# Patient Record
Sex: FEMALE | Race: WHITE | Employment: OTHER | URBAN - METROPOLITAN AREA
[De-identification: names, ages, dates, MRNs, and addresses within clinical notes are randomized per-mention and may not be internally consistent; named-entity substitution may affect disease eponyms.]

---

## 2017-01-31 RX ORDER — ATORVASTATIN CALCIUM 40 MG/1
TABLET, FILM COATED ORAL
Qty: 90 TAB | Refills: 3 | Status: SHIPPED | OUTPATIENT
Start: 2017-01-31 | End: 2018-01-19 | Stop reason: SDUPTHER

## 2017-04-21 ENCOUNTER — HOSPITAL ENCOUNTER (OUTPATIENT)
Dept: LAB | Age: 72
Discharge: HOME | End: 2017-04-21
Payer: MEDICARE

## 2017-04-21 ENCOUNTER — OFFICE VISIT (OUTPATIENT)
Dept: INTERNAL MEDICINE CLINIC | Age: 72
End: 2017-04-21

## 2017-04-21 VITALS
HEART RATE: 77 BPM | BODY MASS INDEX: 29.98 KG/M2 | HEIGHT: 67 IN | OXYGEN SATURATION: 95 % | DIASTOLIC BLOOD PRESSURE: 61 MMHG | WEIGHT: 191 LBS | RESPIRATION RATE: 14 BRPM | TEMPERATURE: 98.8 F | SYSTOLIC BLOOD PRESSURE: 120 MMHG

## 2017-04-21 DIAGNOSIS — Z12.11 COLON CANCER SCREENING: ICD-10-CM

## 2017-04-21 DIAGNOSIS — Z00.00 MEDICARE ANNUAL WELLNESS VISIT, SUBSEQUENT: Primary | ICD-10-CM

## 2017-04-21 DIAGNOSIS — E11.42 DIABETIC PERIPHERAL NEUROPATHY ASSOCIATED WITH TYPE 2 DIABETES MELLITUS (HCC): ICD-10-CM

## 2017-04-21 DIAGNOSIS — E78.00 HYPERCHOLESTEREMIA: ICD-10-CM

## 2017-04-21 DIAGNOSIS — J44.9 COPD, SEVERITY TO BE DETERMINED (HCC): ICD-10-CM

## 2017-04-21 DIAGNOSIS — E11.9 DIABETES MELLITUS TYPE 2, DIET-CONTROLLED (HCC): ICD-10-CM

## 2017-04-21 DIAGNOSIS — I10 HYPERTENSION, ESSENTIAL: ICD-10-CM

## 2017-04-21 DIAGNOSIS — Z71.89 ADVANCE DIRECTIVE DISCUSSED WITH PATIENT: ICD-10-CM

## 2017-04-21 DIAGNOSIS — K21.9 GASTROESOPHAGEAL REFLUX DISEASE WITHOUT ESOPHAGITIS: ICD-10-CM

## 2017-04-21 LAB — HBA1C MFR BLD HPLC: 6.7 % (ref 4.8–5.6)

## 2017-04-21 PROCEDURE — 82043 UR ALBUMIN QUANTITATIVE: CPT

## 2017-04-21 RX ORDER — DULOXETIN HYDROCHLORIDE 30 MG/1
30 CAPSULE, DELAYED RELEASE ORAL DAILY
Qty: 10 CAP | Refills: 0 | Status: SHIPPED | OUTPATIENT
Start: 2017-04-21 | End: 2017-05-10

## 2017-04-21 RX ORDER — AMITRIPTYLINE HYDROCHLORIDE 50 MG/1
50 TABLET, FILM COATED ORAL
Qty: 10 TAB | Refills: 0 | Status: SHIPPED | OUTPATIENT
Start: 2017-04-21 | End: 2017-05-10 | Stop reason: SDUPTHER

## 2017-04-21 RX ORDER — DULOXETIN HYDROCHLORIDE 60 MG/1
60 CAPSULE, DELAYED RELEASE ORAL DAILY
Qty: 30 CAP | Refills: 0 | Status: SHIPPED | OUTPATIENT
Start: 2017-04-21 | End: 2017-05-10

## 2017-04-21 RX ORDER — GUAIFENESIN 100 MG/5ML
81 LIQUID (ML) ORAL DAILY
Qty: 90 TAB | Refills: 3
Start: 2017-04-21 | End: 2022-03-03

## 2017-04-21 NOTE — PROGRESS NOTES
This is a Subsequent Medicare Annual Wellness Visit providing Personalized Prevention Plan Services (PPPS) (Performed 12 months after initial AWV and PPPS )    I have reviewed the patient's medical history in detail and updated the computerized patient record. History     Past Medical History:   Diagnosis Date    Abnormal chest x-ray 12/23/2009    Anemia 12/23/2009    Arrhythmia     SVT. Converts with Adenocard    Atrial fibrillation ([address] 12/23/2009    Carpal tunnel syndrome 12/23/2009    Chronic obstructive pulmonary disease (HCC)     Colitis 12/23/2009    Complex regional pain syndrome 12/23/2009    Depression 12/23/2009    Diabetes ([address]     GERD (gastroesophageal reflux disease) 2/8/2010    HTN (hypertension) 12/23/2009    Hypercholesteremia 12/23/2009    Hypertriglyceridemia 12/23/2009    Idiopathic peripheral neuropathy 12/23/2009    Iron deficiency anemia 12/23/2009    Osteopenia 12/23/2009    Paroxysmal atrial fibrillation ([address] 12/2/2011    Psoriasis 6/27/2012      Past Surgical History:   Procedure Laterality Date    HX CARPAL TUNNEL RELEASE  00/00/2002    Both hands    HX CYST REMOVAL  00/00/1965    Left wrist    HX HYSTERECTOMY  00/00/1985    HX ORTHOPAEDIC  00/00/1987    back surgery (disc)    HX ORTHOPAEDIC      right foot X 3 neuromas and tarsal tunnel release    HX TONSILLECTOMY  00/00/1962    NEUROLOGICAL PROCEDURE UNLISTED      Pain pump    PAIN PUMP DEVICE  00/00/2007    in left lower abdomen (for foot pain)     Current Outpatient Prescriptions   Medication Sig Dispense Refill    aspirin 81 mg chewable tablet Take 1 Tab by mouth daily. 90 Tab 3    omega 3-dha-epa-fish oil (FISH OIL) 100-160-1,000 mg cap Take 1 Cap by mouth daily.  amitriptyline (ELAVIL) 100 mg tablet Take 1 Tab by mouth nightly. Must have made doctor appointment for refill.  30 Tab 0    losartan (COZAAR) 100 mg tablet TAKE 1 TABLET BY MOUTH EVERY DAY 90 Tab 3    atorvastatin (LIPITOR) 40 mg tablet TAKE 1 TABLET BY MOUTH NIGHTLY. 90 Tab 3    metoprolol tartrate (LOPRESSOR) 25 mg tablet TAKE 1 TAB BY MOUTH TWO (2) TIMES A DAY. 180 Tab 3    RESTASIS 0.05 % ophthalmic emulsion   3    gabapentin (NEURONTIN) 800 mg tablet TAKE 1 TABLET BY MOUTH THREE (3) TIMES DAILY. 270 Tab 3    OMEPRAZOLE PO Take 20 mg by mouth daily.  PREVIDENT 5000 SENSITIVE 1.1-5 % pste BRUSH FOR 2 MINS TWICE A DAY  12     Allergies   Allergen Reactions    Duragesic [Fentanyl] Other (comments)     Patient sees things when taking    Codeine Nausea Only     Family History   Problem Relation Age of Onset    Arrhythmia Father     Heart Attack Father     Cancer Brother      Melanoma     Social History   Substance Use Topics    Smoking status: Former Smoker     Packs/day: 0.10     Years: 2.00     Quit date: 1/1/1975    Smokeless tobacco: Never Used    Alcohol use No     Patient Active Problem List   Diagnosis Code    Headache R51    Carpal tunnel syndrome G56.00    Hypercholesteremia E78.00    Depression F32.9    Abnormal chest x-ray R93.8    Complex regional pain syndrome G90.50    Diabetic peripheral neuropathy associated with type 2 diabetes mellitus ([facility] E11.42    Hypertension, essential I10    Diabetes mellitus type 2, diet-controlled ([facility] E11.9    Osteopenia M85.80    Vitamin D deficiency E55.9    GERD (gastroesophageal reflux disease) K21.9    Psoriasis L40.9    Paroxysmal atrial fibrillation (HCC) I48.0    Hepatic steatosis K76.0    COPD, severity to be determined ([facility] J44.9       Depression Risk Factor Screening:   No flowsheet data found. Alcohol Risk Factor Screening: On any occasion during the past 3 months, have you had more than 3 drinks containing alcohol? No    Do you average more than 7 drinks per week? No      Functional Ability and Level of Safety:     Hearing Loss   normal-to-mild    Activities of Daily Living   Self-care.    Requires assistance with: no ADLs    Fall Risk     Fall Risk Assessment, last 12 mths 8/9/2016   Able to walk? Yes   Fall in past 12 months?  No   Fall with injury? -   Number of falls in past 12 months -   Fall Risk Score -     Abuse Screen   Patient is not abused    Review of Systems   Not required    Physical Examination     Evaluation of Cognitive Function:  Mood/affect:  happy  Appearance: age appropriate, casually dressed and within normal Limits  Family member/caregiver input: none  Cognition and memory appear normal.      Patient Care Team:  Emanuel Savage MD as PCP - General (Internal Medicine)  Greta Blackman MD (Physical Medicine and Rehab)  Balbina Edmond MD (Ophthalmology)  Patt Tan MD (Neurology)    Advice/Referrals/Counseling   Education and counseling provided:  End-of-Life planning (with patient's consent)  Colorectal cancer screening tests  Screening for glaucoma      Assessment/Plan   See other note this date

## 2017-04-21 NOTE — LETTER
5/9/2017 10:50 AM 
 
Ms. Maudie [facility] [place] [id number] Dear Maudie Lundborg : 
 
 
Health Maintenance Due Topic Date Due  
 EYE EXAM RETINAL OR DILATED Q1  04/04/2017 FIT TEST is due It has come to our attention you are past due on your above health maintenance topics . If you need a copy of the order(s) or need assistance in rescheduling the test(s), please contact our office. If you have received this notification in error, please accept our apologies and contact our office ([phone]) to notify us.  
 
 
 
 
 
 
 
 
Sincerely, 
 
 
Oswaldo Wong MD

## 2017-04-21 NOTE — PROGRESS NOTES
HISTORY OF PRESENT ILLNESS  Philippe Lewis is a 66 y.o. female. HPI  She presents for follow up of diabetes mellitus with neuropathy, hypertension, hyperlipidemia and Atrial Fibrillation. Diabetic ROS - medication compliance: compliant all of the time,      home glucose monitoring: is not performed,      home BP Monitoring: is not measured at home. further diabetic ROS: no polyuria or polydipsia, no unusual visual symptoms, no hypoglycemia, no medication side effects noted, has dysesthesias in the feet. Diet and Lifestyle: generally follows a low fat low cholesterol diet, generally follows a low sodium diet, follows a diabetic diet regularly, sedentary, nonsmoker  Cardiovascular ROS:  She complains of palpitations, lower extremity edema, dyspnea on exertion. She denies orthopnea, exertional chest pressure/discomfort, claudication, dizziness    She is being seen for follow up of COPD, not currently in exacerbation. taking medications as instructed, no medication side effects noted, no significant ongoing wheezing or shortness of breath, using bronchodilator MDI less than twice a week. Shehas shortness of breath none, only with activity. Uses rescue inhaler:0  times /week      She presents for follow up of gastroesophageal reflux. She is currently treating this with omeprazole 20 mg daily. She denies dysphagia, has not lost weight, denies heartburn.       Patient Active Problem List   Diagnosis Code    Headache R51    Carpal tunnel syndrome G56.00    Hypercholesteremia E78.00    Depression F32.9    Abnormal chest x-ray R93.8    Complex regional pain syndrome G90.50    Diabetic peripheral neuropathy associated with type 2 diabetes mellitus ([facility] E11.42    Hypertension, essential I10    Diabetes mellitus type 2, diet-controlled ([facility] E11.9    Osteopenia M85.80    Vitamin D deficiency E55.9    GERD (gastroesophageal reflux disease) K21.9    Psoriasis L40.9    Paroxysmal atrial fibrillation ([facility] I48.0    Hepatic steatosis K76.0    COPD, severity to be determined ([facility] J44.9     Past Medical History:   Diagnosis Date    Abnormal chest x-ray 12/23/2009    Anemia 12/23/2009    Arrhythmia     SVT.  Converts with Adenocard    Atrial fibrillation ([address] 12/23/2009    Carpal tunnel syndrome 12/23/2009    Chronic obstructive pulmonary disease ([facility]     Colitis 12/23/2009    Complex regional pain syndrome 12/23/2009    Depression 12/23/2009    Diabetes ([address]     GERD (gastroesophageal reflux disease) 2/8/2010    HTN (hypertension) 12/23/2009    Hypercholesteremia 12/23/2009    Hypertriglyceridemia 12/23/2009    Idiopathic peripheral neuropathy 12/23/2009    Iron deficiency anemia 12/23/2009    Osteopenia 12/23/2009    Paroxysmal atrial fibrillation ([facility] 12/2/2011    Psoriasis 6/27/2012     Past Surgical History:   Procedure Laterality Date    HX CARPAL TUNNEL RELEASE  00/00/2002    Both hands    HX CYST REMOVAL  00/00/1965    Left wrist    HX HYSTERECTOMY  00/00/1985    HX ORTHOPAEDIC  00/00/1987    back surgery (disc)    HX ORTHOPAEDIC      right foot X 3 neuromas and tarsal tunnel release    HX TONSILLECTOMY  00/00/1962    NEUROLOGICAL PROCEDURE UNLISTED      Pain pump    PAIN PUMP DEVICE  00/00/2007    in left lower abdomen (for foot pain)     Social History     Social History    Marital status:      Spouse name: N/A    Number of children: N/A    Years of education: N/A     Social History Main Topics    Smoking status: Former Smoker     Packs/day: 0.10     Years: 2.00     Quit date: 1/1/1975    Smokeless tobacco: Never Used    Alcohol use No    Drug use: No    Sexual activity: Not Asked     Other Topics Concern    None     Social History Narrative     Family History   Problem Relation Age of Onset    Arrhythmia Father     Heart Attack Father     Cancer Brother      Melanoma     Allergies   Allergen Reactions    Duragesic [Fentanyl] Other (comments)     Patient sees things when taking    Codeine Nausea Only     Current Outpatient Prescriptions   Medication Sig Dispense Refill    aspirin 81 mg chewable tablet Take 1 Tab by mouth daily. 90 Tab 3    omega 3-dha-epa-fish oil (FISH OIL) 100-160-1,000 mg cap Take 1 Cap by mouth daily.  amitriptyline (ELAVIL) 100 mg tablet Take 1 Tab by mouth nightly. Must have made doctor appointment for refill. 30 Tab 0    losartan (COZAAR) 100 mg tablet TAKE 1 TABLET BY MOUTH EVERY DAY 90 Tab 3    atorvastatin (LIPITOR) 40 mg tablet TAKE 1 TABLET BY MOUTH NIGHTLY. 90 Tab 3    metoprolol tartrate (LOPRESSOR) 25 mg tablet TAKE 1 TAB BY MOUTH TWO (2) TIMES A DAY. 180 Tab 3    RESTASIS 0.05 % ophthalmic emulsion   3    gabapentin (NEURONTIN) 800 mg tablet TAKE 1 TABLET BY MOUTH THREE (3) TIMES DAILY. 270 Tab 3    OMEPRAZOLE PO Take 20 mg by mouth daily. Review of Systems   Constitutional: Positive for malaise/fatigue. Negative for weight loss. Gastrointestinal: Negative for constipation, diarrhea and heartburn. Musculoskeletal: Positive for back pain (low back). Negative for joint pain. Neurological: Negative for dizziness, tingling and focal weakness. Visit Vitals    /61 (BP 1 Location: Left arm, BP Patient Position: Sitting)    Pulse 77    Temp 98.8 °F (37.1 °C) (Oral)    Resp 14    Ht 5' 7\" (1.702 m)    Wt 191 lb (86.6 kg)    SpO2 95%    BMI 29.91 kg/m2     Physical Exam   Constitutional: She is oriented to person, place, and time. She appears well-developed and well-nourished. HENT:   Head: Normocephalic and atraumatic. Eyes: Conjunctivae are normal. Pupils are equal, round, and reactive to light. Neck: Neck supple. Carotid bruit is not present. No thyromegaly present. Cardiovascular: Normal rate, regular rhythm and normal heart sounds. PMI is not displaced. Exam reveals no gallop. No murmur heard.   Pulses:       Dorsalis pedis pulses are 2+ on the right side, and 2+ on the left side. Posterior tibial pulses are 2+ on the right side, and 2+ on the left side. Pulmonary/Chest: Effort normal. She has no wheezes. She has no rhonchi. She has no rales. Abdominal: Soft. Normal appearance. She exhibits no abdominal bruit and no mass. There is no hepatosplenomegaly. There is no tenderness. Musculoskeletal: She exhibits no edema. Lymphadenopathy:     She has no cervical adenopathy. Right: No supraclavicular adenopathy present. Left: No inguinal and no supraclavicular adenopathy present. Neurological: She is alert and oriented to person, place, and time. No sensory deficit. Skin: Skin is warm, dry and intact. No rash noted. Psychiatric: She has a normal mood and affect. Her behavior is normal.   Nursing note and vitals reviewed.   Diabetic foot exam:     Left: Reflexes absent     Vibratory sensation diminished 7 sec   Proprioception normal   Sharp/dull discrimination normal    Filament test 3/6   Pulse DP: 2+ (normal)   Pulse PT: 2+ (normal)   Deformities: None  Right: Reflexes absent   Vibratory sensation diminished  4 sec   Proprioception normal   Sharp/dull discrimination normal   Filament test 2/6   Pulse DP: 2+ (normal)   Pulse PT: 2+ (normal)   Deformities: None    Results for orders placed or performed in visit on 04/21/17   AMB POC HEMOGLOBIN A1C   Result Value Ref Range    Hemoglobin A1c (POC) 6.7 (A) 4.8 - 5.6 %     Lab Results   Component Value Date/Time    Cholesterol, total 112 02/01/2016 11:04 AM    HDL Cholesterol 31 02/01/2016 11:04 AM    LDL, calculated 46 02/01/2016 11:04 AM    VLDL, calculated 35 02/01/2016 11:04 AM    Triglyceride 176 02/01/2016 11:04 AM    CHOL/HDL Ratio 3.3 02/08/2010 01:53 PM     Lab Results   Component Value Date/Time    Sodium 141 09/22/2016 03:44 PM    Potassium 4.6 09/22/2016 03:44 PM    Chloride 96 09/22/2016 03:44 PM    CO2 28 09/22/2016 03:44 PM    Anion gap 8 03/02/2012 03:36 AM    Glucose 110 09/22/2016 03:44 PM BUN 7 09/22/2016 03:44 PM    Creatinine 0.64 09/22/2016 03:44 PM    BUN/Creatinine ratio 11 09/22/2016 03:44 PM    GFR est  09/22/2016 03:44 PM    GFR est non-AA 89 09/22/2016 03:44 PM    Calcium 8.9 09/22/2016 03:44 PM    Bilirubin, total 0.3 09/22/2016 03:44 PM    AST (SGOT) 32 09/22/2016 03:44 PM    Alk. phosphatase 128 09/22/2016 03:44 PM    Protein, total 6.6 09/22/2016 03:44 PM    Albumin 3.9 09/22/2016 03:44 PM    Globulin 4.2 03/02/2012 03:36 AM    A-G Ratio 1.4 09/22/2016 03:44 PM    ALT (SGPT) 31 09/22/2016 03:44 PM         ASSESSMENT and PLAN    ICD-10-CM ICD-9-CM    1. Medicare annual wellness visit, subsequent Z00.00 V70.0    2. Diabetes mellitus type 2, diet-controlled ([facility]) E11.9 250.00    3. Diabetic peripheral neuropathy associated with type 2 diabetes mellitus ([facility]) E11.42 250.60 MICROALBUMIN, UR, RAND W/ MICROALBUMIN/CREA RATIO     357.2 AMB POC HEMOGLOBIN A1C       DIABETES FOOT EXAM   4. Hypercholesteremia E78.00 272.0    5. Hypertension, essential I10 401.9    6. COPD, severity to be determined ([facility] J44.9 496    7. Gastroesophageal reflux disease without esophagitis K21.9 530.81    8. Colon cancer screening Z12.11 V76.51 OCCULT BLOOD, IMMUNOASSAY (FIT)   9. Advance directive discussed with patient Z71.89 V65.49          Medicare annual wellness visit, subsequent    Diabetes mellitus type 2, diet-controlled ([facility]    Diabetic peripheral neuropathy associated with type 2 diabetes mellitus ([facility]  -     amitriptyline (ELAVIL) 50 mg tablet; Take 1 Tab by mouth nightly. -     DULoxetine (CYMBALTA) 30 mg capsule; Take 1 Cap by mouth daily. When completed start duloxetine 60 mg. Indications: DIABETIC PERIPHERAL NEUROPATHY        -     DULoxetine (CYMBALTA) 60 mg capsule; Take 1 Cap by mouth daily.  Indications: DIABETIC PERIPHERAL NEUROPATHY  -     MICROALBUMIN, UR, RAND W/ MICROALBUMIN/CREA RATIO  -     AMB POC HEMOGLOBIN A1C  -     HM DIABETES FOOT EXAM    Hypercholesteremia  Hyperlipidemia is controlled    Hypertension, essential  Hypertension is controlled    COPD, severity to be determined ([facility] [address]  Stable    Gastroesophageal reflux disease without esophagitis    Colon cancer screening  -     OCCULT BLOOD, IMMUNOASSAY (FIT); Future    Advance directive discussed with patient    Other orders  -     aspirin 81 mg chewable tablet; Take 1 Tab by mouth daily. Follow-up Disposition:  Return in about 6 months (around 10/21/2017) for DM, chol, htn, POCA1c  Fasting lab next week. .  lab results and schedule of future lab studies reviewed with patient  reviewed diet, exercise and weight control  cardiovascular risk and specific lipid/LDL goals reviewed  I have discussed the diagnosis with the patient and the intended plan as seen in the above orders. Patient is in agreement. The patient has received an after-visit summary and questions were answered concerning future plans. I have discussed medication side effects and warnings with the patient as well.

## 2017-04-21 NOTE — MR AVS SNAPSHOT
Visit Information Date & Time Provider Department Dept. Phone Encounter #  
 4/21/2017  8:45 AM Anni Solano MD [facility] [phone] [id number] Follow-up Instructions Return in about 6 months (around 10/21/2017) for DM, chol, htn, POCA1c  Fasting lab next week. Magui Rashid Upcoming Health Maintenance Date Due FOBT Q 1 YEAR AGE 50-75 8/4[birth year]1993 MICROALBUMIN Q1 2/1/2017 LIPID PANEL Q1 2/1/2017 MEDICARE YEARLY EXAM 3/23/2017 EYE EXAM RETINAL OR DILATED Q1 4/4/2017 DTaP/Tdap/Td series (1 - Tdap) 9/22/2019* HEMOGLOBIN A1C Q6M 10/21/2017 GLAUCOMA SCREENING Q2Y 4/4/2018 FOOT EXAM Q1 4/21/2018 BREAST CANCER SCRN MAMMOGRAM 5/20/2018 *Topic was postponed. The date shown is not the original due date. Allergies as of 4/21/2017  Review Complete On: 4/21/2017 By: Anni Solano MD  
  
 Severity Noted Reaction Type Reactions Duragesic [Fentanyl] High 02/08/2010    Other (comments) Patient sees things when taking Codeine  12/23/2009    Nausea Only Current Immunizations  Reviewed on 4/21/2017 Name Date Influenza High Dose Vaccine PF 9/22/2016, 11/12/2015 Influenza Vaccine 10/17/2014, 10/3/2013 Influenza Vaccine Split 10/8/2012, 10/7/2011 Influenza Vaccine Whole 9/22/2010 Pneumococcal Conjugate (PCV-13) 3/22/2016 Pneumococcal Vaccine (Unspecified Type) 9/22/2009 TD Vaccine 9/22/2009 Reviewed by David Murphy LPN on 4/10/0091 at  8:41 AM  
You Were Diagnosed With   
  
 Codes Comments Diabetic peripheral neuropathy associated with type 2 diabetes mellitus ([facility]    -  Primary ICD-10-CM: E11.42 
ICD-9-CM: 250.60, 357.2 Colon cancer screening     ICD-10-CM: Z12.11 ICD-9-CM: V76.51 Hypercholesteremia     ICD-10-CM: E78.00 ICD-9-CM: 272.0 Hypertension, essential     ICD-10-CM: I10 
ICD-9-CM: 401.9 COPD, severity to be determined [facility])     ICD-10-CM: J44.9 ICD-9-CM: 265 Gastroesophageal reflux disease without esophagitis     ICD-10-CM: K21.9 ICD-9-CM: 530.81 Vitals BP Pulse Temp Resp Height(growth percentile) Weight(growth percentile) 120/61 (BP 1 Location: Left arm, BP Patient Position: Sitting) 77 98.8 °F (37.1 °C) (Oral) 14 5' 7\" (1.702 m) 191 lb (86.6 kg) SpO2 BMI OB Status Smoking Status 95% 29.91 kg/m2 Hysterectomy Former Smoker BMI and BSA Data Body Mass Index Body Surface Area  
 29.91 kg/m 2 2.02 m 2 Preferred Pharmacy Pharmacy Name Phone [place] [facility] [address] [facility], [facility] [address] [facility] [address] [facility] [place] [phone] Your Updated Medication List  
  
   
This list is accurate as of: 4/21/17  9:54 AM.  Always use your most recent med list.  
  
  
  
  
 amitriptyline 50 mg tablet Commonly known as:  ELAVIL Take 1 Tab by mouth nightly. aspirin 81 mg chewable tablet Take 1 Tab by mouth daily. atorvastatin 40 mg tablet Commonly known as:  LIPITOR  
TAKE 1 TABLET BY MOUTH NIGHTLY. * DULoxetine 30 mg capsule Commonly known as:  CYMBALTA Take 1 Cap by mouth daily. When completed start duloxetine 60 mg. Indications: DIABETIC PERIPHERAL NEUROPATHY * DULoxetine 60 mg capsule Commonly known as:  CYMBALTA Take 1 Cap by mouth daily. Indications: DIABETIC PERIPHERAL NEUROPATHY FISH -160-1,000 mg Cap Generic drug:  omega 3-dha-epa-fish oil Take 1 Cap by mouth daily. gabapentin 800 mg tablet Commonly known as:  NEURONTIN  
TAKE 1 TABLET BY MOUTH THREE (3) TIMES DAILY. losartan 100 mg tablet Commonly known as:  COZAAR  
TAKE 1 TABLET BY MOUTH EVERY DAY  
  
 metoprolol tartrate 25 mg tablet Commonly known as:  LOPRESSOR  
TAKE 1 TAB BY MOUTH TWO (2) TIMES A DAY. OMEPRAZOLE PO Take 20 mg by mouth daily. RESTASIS 0.05 % ophthalmic emulsion Generic drug:  cycloSPORINE  
  
 * Notice:   This list has 2 medication(s) that are the same as other medications prescribed for you. Read the directions carefully, and ask your doctor or other care provider to review them with you. Prescriptions Sent to Pharmacy Refills  
 amitriptyline (ELAVIL) 50 mg tablet 0 Sig: Take 1 Tab by mouth nightly. Class: Normal  
 Pharmacy: [address] [place] [address] Ph #: [phone] Route: Oral  
 DULoxetine (CYMBALTA) 30 mg capsule 0 Sig: Take 1 Cap by mouth daily. When completed start duloxetine 60 mg. Indications: DIABETIC PERIPHERAL NEUROPATHY Class: Normal  
 Pharmacy: [address] [place] [address] Ph #: [phone] Route: Oral  
 DULoxetine (CYMBALTA) 60 mg capsule 0 Sig: Take 1 Cap by mouth daily. Indications: DIABETIC PERIPHERAL NEUROPATHY Class: Normal  
 Pharmacy: [address] [place] [address] Ph #: [phone] Route: Oral  
  
We Performed the Following AMB POC HEMOGLOBIN A1C [70857 CPT(R)]  DIABETES FOOT EXAM [7 Custom] MICROALBUMIN, UR, RAND W/ MICROALBUMIN/CREA RATIO [id number] CPT(R)] Follow-up Instructions Return in about 6 months (around 10/21/2017) for DM, chol, htn, POCA1c  Fasting lab next week. Deyanira Sprung To-Do List   
 Around 04/25/2017 Lab:  OCCULT BLOOD, IMMUNOASSAY (FIT)   
  
 05/22/2017 2:00 PM  
  Appointment with [address] [address] [facility] 3 at [address] [place] [address] ([phone]) Shower or bathe using soap and water. Do not use deodorant, powder, perfumes, or lotion the day of your exam.  If your prior mammograms were not performed at [facility] 6 please bring films with you or forward prior images 2 days before your procedure. Check in at registration 15min before your appointment time unless you were instructed to do otherwise. A script is not necessary, but if you have one, please bring it on the day of the mammogram or have it faxed to the department. [facility] [phone] [facility]  [phone] [facility] 19 IGNACIA  [phone] [facility] [phone] [facility] 10[address] [facility] [phone] Patient Instructions Return soon for fasting lab Decrease amitriptyline to 50 mg for 10 days. At the same time start duloxetine 30 mg daily. After that 10 days stop amitriptyline and increase duloxetine to  60 mg daily Let me know if this does not do as well if not better for neuropathy. The other option is to increase gabapentin. Office visit in 6 months; we can do hemoglobin A1c the office that day. The best way to stay healthy is to live a healthy lifestyle. A healthy lifestyle includes regular exercise, eating a well-balanced diet, keeping a healthy weight and not smoking. Regular physical exams and screening tests are another important way to take care of yourself. Preventive exams provided by health care providers can find health problems early when treatment works best and can keep you from getting certain diseases or illnesses. Preventive services include exams, lab tests, screenings, shots, monitoring and information to help you take care of your own health. All people over 65 should have a pneumonia shot. Pneumonia shots are usually only needed once in a lifetime unless your doctor decides differently. In addition to your physical exam, some screening tests are recommended: 
 
All people over 65 should have a yearly flu shot. People over 65 are at medium to high risk for Hepatitis B. Three shots are needed for complete protection. Bone mass measurement (dexa scan) is recommended every two years. Diabetes Mellitus screening is recommended every year. Glaucoma is an eye disease caused by high pressure in the eye. An eye exam is recommended every year. Cardiovascular screening tests that check your cholesterol and other blood fat (lipid) levels are recommended every five years. Colorectal Cancer screening tests help to find pre-cancerous polyps (growths in the colon) so they can be removed before they turn into cancer. Tests ordered for screening depend on your personal and family history risk factors. Prostate Cancer Screening (annually up to age 76) Screening for breast cancer is recommended yearly with a Mammogram. 
 
Screening for cervical and vaginal cancer is recommended with a pelvic and Pap test every two years. However if you have had an abnormal pap in the past  three years or at high risk for cervical or vaginal cancer Medicare will cover a pap test and a pelvic exam every year. Here is a list of your current Health Maintenance items with a due date: 
Health Maintenance Due Topic Date Due  Stool testing for trace blood  08/04/1993  Albumin Urine Test  02/01/2017  Cholesterol Test   02/01/2017 [facility] Annual Well Visit  03/23/2017 [facility] Eye Exam  04/04/2017 Diabetes Foot Health: Care Instructions Your Care Instructions When you have diabetes, your feet need extra care and attention. Diabetes can damage the nerve endings and blood vessels in your feet, making you less likely to notice when your feet are injured. Diabetes also limits your body's ability to fight infection and get blood to areas that need it. If you get a minor foot injury, it could become an ulcer or a serious infection. With good foot care, you can prevent most of these problems. Caring for your feet can be quick and easy. Most of the care can be done when you are bathing or getting ready for bed. Follow-up care is a key part of your treatment and safety. Be sure to make and go to all appointments, and call your doctor if you are having problems. Its also a good idea to know your test results and keep a list of the medicines you take. How can you care for yourself at home?  
· Keep your blood sugar close to normal by watching what and how much you eat, monitoring blood sugar, taking medicines if prescribed, and getting regular exercise. · Do not smoke. Smoking affects blood flow and can make foot problems worse. If you need help quitting, talk to your doctor about stop-smoking programs and medicines. These can increase your chances of quitting for good. · Eat a diet that is low in fats. High fat intake can cause fat to build up in your blood vessels and decrease blood flow. · Inspect your feet daily for blisters, cuts, cracks, or sores. If you cannot see well, use a mirror or have someone help you. · Take care of your feet: 
[facility] your feet every day. Use warm (not hot) water. Check the water temperature with your wrists or other part of your body, not your feet. ¨ Dry your feet well. Pat them dry. Do not rub the skin on your feet too hard. Dry well between your toes. If the skin on your feet stays moist, bacteria or a fungus can grow, which can lead to infection. ¨ Keep your skin soft. Use moisturizing skin cream to keep the skin on your feet soft and prevent calluses and cracks. But do not put the cream between your toes, and stop using any cream that causes a rash. ¨ Clean underneath your toenails carefully. Do not use a sharp object to clean underneath your toenails. Use the blunt end of a nail file or other rounded tool. ¨ Trim and file your toenails straight across to prevent ingrown toenails. Use a nail clipper, not scissors. Use an emery board to smooth the edges. · Change socks daily. Socks without seams are best, because seams often rub the feet. You can find socks for people with diabetes from specialty catalogs. · Look inside your shoes every day for things like gravel or torn linings, which could cause blisters or sores. · Buy shoes that fit well: 
¨ Look for shoes that have plenty of space around the toes. This helps prevent bunions and blisters. ¨ Try on shoes while wearing the kind of socks you will usually wear with the shoes. ¨ Avoid plastic shoes. They may rub your feet and cause blisters. Good shoes should be made of materials that are flexible and breathable, such as leather or cloth. ¨ Break in new shoes slowly by wearing them for no more than an hour a day for several days. Take extra time to check your feet for red areas, blisters, or other problems after you wear new shoes. · Do not go barefoot. Do not wear sandals, and do not wear shoes with very thin soles. Thin soles are easy to puncture. They also do not protect your feet from hot pavement or cold weather. · Have your doctor check your feet during each visit. If you have a foot problem, see your doctor. Do not try to treat an early foot problem at home. Home remedies or treatments that you can buy without a prescription (such as corn removers) can be harmful. · Always get early treatment for foot problems. A minor irritation can lead to a major problem if not properly cared for early. When should you call for help? Call your doctor now or seek immediate medical care if: 
· You have a foot sore, an ulcer or break in the skin that is not healing after 4 days, bleeding corns or calluses, or an ingrown toenail. · You have blue or black areas, which can mean bruising or blood flow problems. · You have peeling skin or tiny blisters between your toes or cracking or oozing of the skin. · You have a fever for more than 24 hours and a foot sore. · You have new numbness or tingling in your feet that does not go away after you move your feet or change positions. · You have unexplained or unusual swelling of the foot or ankle. Watch closely for changes in your health, and be sure to contact your doctor if: 
· You cannot do proper foot care. Where can you learn more? Go to http://alejandro-samuel.info/. Enter A739 in the search box to learn more about \"Diabetes Foot Health: Care Instructions. \" Current as of: May 23, 2016 Content Version: 11.2 © 8256-7508 [employer]. Care instructions adapted under license by [employer] (which disclaims liability or warranty for this information). If you have questions about a medical condition or this instruction, always ask your healthcare professional. [address] any warranty or liability for your use of this information. Introducing [facility]! [employer] introduces Branded Online patient portal. Now you can access parts of your medical record, email your doctor's office, and request medication refills online. 1. In your internet browser, go to https://[employer] ([employer]). [employer] ([employer]) 2. Click on the First Time User? Click Here link in the Sign In box. You will see the New Member Sign Up page. 3. Enter your Branded Online Access Code exactly as it appears below. You will not need to use this code after youve completed the sign-up process. If you do not sign up before the expiration date, you must request a new code. · Branded Online Access Code: [email or web address] Expires: 2017  1:49 PM 
 
4. Enter the last four digits of your Social Security Number (xxxx) and Date of Birth (mm/dd/yyyy) as indicated and click Submit. You will be taken to the next sign-up page. 5. Create a Branded Online ID. This will be your Branded Online login ID and cannot be changed, so think of one that is secure and easy to remember. 6. Create a Branded Online password. You can change your password at any time. 7. Enter your Password Reset Question and Answer. This can be used at a later time if you forget your password. 8. Enter your e-mail address. You will receive e-mail notification when new information is available in [address] 9. Click Sign Up. You can now view and download portions of your medical record. 10. Click the Download Summary menu link to download a portable copy of your medical information.  
 
If you have questions, please visit the Frequently Asked Questions section of the [employer]. Remember, [employer] is NOT to be used for urgent needs. For medical emergencies, dial 911. Now available from your iPhone and Android! Please provide this summary of care documentation to your next provider. Your primary care clinician is listed as Jim Lofton. If you have any questions after today's visit, please call [phone].

## 2017-04-21 NOTE — PROGRESS NOTES
This is a Subsequent Medicare Annual Wellness Visit providing Personalized Prevention Plan Services (PPPS) (Performed 12 months after initial AWV and PPPS )    I have reviewed the patient's medical history in detail and updated the computerized patient record. History     Past Medical History:   Diagnosis Date    Abnormal chest x-ray 12/23/2009    Anemia 12/23/2009    Arrhythmia     SVT. Converts with [employer]    Atrial fibrillation ([address] 12/23/2009    Carpal tunnel syndrome 12/23/2009    Chronic obstructive pulmonary disease (HCC)     Colitis 12/23/2009    Complex regional pain syndrome 12/23/2009    Depression 12/23/2009    Diabetes ([address]     GERD (gastroesophageal reflux disease) 2/8/2010    HTN (hypertension) 12/23/2009    Hypercholesteremia 12/23/2009    Hypertriglyceridemia 12/23/2009    Idiopathic peripheral neuropathy 12/23/2009    Iron deficiency anemia 12/23/2009    Osteopenia 12/23/2009    Paroxysmal atrial fibrillation ([address] 12/2/2011    Psoriasis 6/27/2012      Past Surgical History:   Procedure Laterality Date    HX CARPAL TUNNEL RELEASE  00/00/2002    Both hands    HX CYST REMOVAL  00/00/1965    Left wrist    HX HYSTERECTOMY  00/00/1985    HX ORTHOPAEDIC  00/00/1987    back surgery (disc)    HX ORTHOPAEDIC      right foot X 3 neuromas and tarsal tunnel release    HX TONSILLECTOMY  00/00/1962    NEUROLOGICAL PROCEDURE UNLISTED      Pain pump    PAIN PUMP DEVICE  00/00/2007    in left lower abdomen (for foot pain)     Current Outpatient Prescriptions   Medication Sig Dispense Refill    omega 3-dha-epa-fish oil (FISH OIL) 100-160-1,000 mg cap Take 1 Cap by mouth daily.  amitriptyline (ELAVIL) 100 mg tablet Take 1 Tab by mouth nightly. Must have made doctor appointment for refill. 30 Tab 0    losartan (COZAAR) 100 mg tablet TAKE 1 TABLET BY MOUTH EVERY DAY 90 Tab 3    atorvastatin (LIPITOR) 40 mg tablet TAKE 1 TABLET BY MOUTH NIGHTLY.  90 Tab 3    metoprolol tartrate (LOPRESSOR) 25 mg tablet TAKE 1 TAB BY MOUTH TWO (2) TIMES A DAY. 180 Tab 3    RESTASIS 0.05 % ophthalmic emulsion   3    gabapentin (NEURONTIN) 800 mg tablet TAKE 1 TABLET BY MOUTH THREE (3) TIMES DAILY. 270 Tab 3    OMEPRAZOLE PO Take 20 mg by mouth daily.  aspirin 81 mg chewable tablet Take 81 mg by mouth daily.  PREVIDENT 5000 SENSITIVE 1.1-5 % pste BRUSH FOR 2 MINS TWICE A DAY  12     Allergies   Allergen Reactions    Duragesic [Fentanyl] Other (comments)     Patient sees things when taking    Codeine Nausea Only     Family History   Problem Relation Age of Onset    Arrhythmia Father     Heart Attack Father     Cancer Brother      Melanoma     Social History   Substance Use Topics    Smoking status: Former Smoker     Packs/day: 0.10     Years: 2.00     Quit date: 1/1/1975    Smokeless tobacco: Never Used    Alcohol use No     Patient Active Problem List   Diagnosis Code    Headache R51    Carpal tunnel syndrome G56.00    Hypercholesteremia E78.00    Depression F32.9    Abnormal chest x-ray R93.8    Complex regional pain syndrome G90.50    Diabetic peripheral neuropathy associated with type 2 diabetes mellitus ([facility] E11.42    Hypertension, essential I10    Diabetes mellitus type 2, diet-controlled ([facility] E11.9    Osteopenia M85.80    Vitamin D deficiency E55.9    GERD (gastroesophageal reflux disease) K21.9    Psoriasis L40.9    Paroxysmal atrial fibrillation (HCC) I48.0    Hepatic steatosis K76.0    COPD, severity to be determined ([facility] J44.9       Depression Risk Factor Screening:   No flowsheet data found. Alcohol Risk Factor Screening: On any occasion during the past 3 months, have you had more than 3 drinks containing alcohol? No    Do you average more than 7 drinks per week? No      Functional Ability and Level of Safety:     Hearing Loss   normal-to-mild    Activities of Daily Living   Self-care.    Requires assistance with: no ADLs    Fall Risk     Fall Risk Assessment, last 12 mths 8/9/2016   Able to walk? Yes   Fall in past 12 months?  No   Fall with injury? -   Number of falls in past 12 months -   Fall Risk Score -     Abuse Screen   Patient is not abused    Review of Systems   Not required    Physical Examination     Evaluation of Cognitive Function:  Mood/affect:  neutral  Appearance: age appropriate, casually dressed and within normal Limits  Family member/caregiver input: none        Patient Care Team:  Shaggy Ashford MD as PCP - General (Internal Medicine)  Erin Lopez MD (Physical Medicine and Rehab)  Baron Bran MD (Ophthalmology)  Anthony Orta MD (Neurology)    Advice/Referrals/Counseling   Education and counseling provided:  Colorectal cancer screening tests      Assessment/Plan   See other note this date

## 2017-04-21 NOTE — PROGRESS NOTES
Reviewed record In preparation for visit and have obtained necessary documentation. Advanced directive / living will on file. 1. Have you been to the ER, urgent care clinic or hospitalized since your last visit? No     2. Have you seen or consulted any other health care providers outside of the [address] [place] [address] since your last visit? Include any pap smears or colon screening.  Bone density, pain management    Health Maintenance reviewed:

## 2017-04-21 NOTE — PATIENT INSTRUCTIONS
Return soon for fasting lab  Decrease amitriptyline to 50 mg for 10 days. At the same time start duloxetine 30 mg daily. After that 10 days stop amitriptyline and increase duloxetine to  60 mg daily  Let me know if this does not do as well if not better for neuropathy. The other option is to increase gabapentin. Office visit in 6 months; we can do hemoglobin A1c the office that day. The best way to stay healthy is to live a healthy lifestyle. A healthy lifestyle includes regular exercise, eating a well-balanced diet, keeping a healthy weight and not smoking. Regular physical exams and screening tests are another important way to take care of yourself. Preventive exams provided by health care providers can find health problems early when treatment works best and can keep you from getting certain diseases or illnesses. Preventive services include exams, lab tests, screenings, shots, monitoring and information to help you take care of your own health. All people over 65 should have a pneumonia shot. Pneumonia shots are usually only needed once in a lifetime unless your doctor decides differently. In addition to your physical exam, some screening tests are recommended:    All people over 65 should have a yearly flu shot. People over 65 are at medium to high risk for Hepatitis B. Three shots are needed for complete protection. Bone mass measurement (dexa scan) is recommended every two years. Diabetes Mellitus screening is recommended every year. Glaucoma is an eye disease caused by high pressure in the eye. An eye exam is recommended every year. Cardiovascular screening tests that check your cholesterol and other blood fat (lipid) levels are recommended every five years. Colorectal Cancer screening tests help to find pre-cancerous polyps (growths in the colon) so they can be removed before they turn into cancer.   Tests ordered for screening depend on your personal and family history risk factors. Prostate Cancer Screening (annually up to age 76)    Screening for breast cancer is recommended yearly with a Mammogram.    Screening for cervical and vaginal cancer is recommended with a pelvic and Pap test every two years. However if you have had an abnormal pap in the past  three years or at high risk for cervical or vaginal cancer Medicare will cover a pap test and a pelvic exam every year. Here is a list of your current Health Maintenance items with a due date:  Health Maintenance Due   Topic Date Due    Stool testing for trace blood  08/04/1993    Albumin Urine Test  02/01/2017    Cholesterol Test   02/01/2017    Annual Well Visit  03/23/2017    Eye Exam  04/04/2017                Diabetes Foot Health: Care Instructions  Your Care Instructions    When you have diabetes, your feet need extra care and attention. Diabetes can damage the nerve endings and blood vessels in your feet, making you less likely to notice when your feet are injured. Diabetes also limits your body's ability to fight infection and get blood to areas that need it. If you get a minor foot injury, it could become an ulcer or a serious infection. With good foot care, you can prevent most of these problems. Caring for your feet can be quick and easy. Most of the care can be done when you are bathing or getting ready for bed. Follow-up care is a key part of your treatment and safety. Be sure to make and go to all appointments, and call your doctor if you are having problems. Its also a good idea to know your test results and keep a list of the medicines you take. How can you care for yourself at home? · Keep your blood sugar close to normal by watching what and how much you eat, monitoring blood sugar, taking medicines if prescribed, and getting regular exercise. · Do not smoke. Smoking affects blood flow and can make foot problems worse.  If you need help quitting, talk to your doctor about stop-smoking programs and medicines. These can increase your chances of quitting for good. · Eat a diet that is low in fats. High fat intake can cause fat to build up in your blood vessels and decrease blood flow. · Inspect your feet daily for blisters, cuts, cracks, or sores. If you cannot see well, use a mirror or have someone help you. · Take care of your feet:  [facility] your feet every day. Use warm (not hot) water. Check the water temperature with your wrists or other part of your body, not your feet. ¨ Dry your feet well. Pat them dry. Do not rub the skin on your feet too hard. Dry well between your toes. If the skin on your feet stays moist, bacteria or a fungus can grow, which can lead to infection. ¨ Keep your skin soft. Use moisturizing skin cream to keep the skin on your feet soft and prevent calluses and cracks. But do not put the cream between your toes, and stop using any cream that causes a rash. ¨ Clean underneath your toenails carefully. Do not use a sharp object to clean underneath your toenails. Use the blunt end of a nail file or other rounded tool. ¨ Trim and file your toenails straight across to prevent ingrown toenails. Use a nail clipper, not scissors. Use an emery board to smooth the edges. · Change socks daily. Socks without seams are best, because seams often rub the feet. You can find socks for people with diabetes from specialty catalogs. · Look inside your shoes every day for things like gravel or torn linings, which could cause blisters or sores. · Buy shoes that fit well:  ¨ Look for shoes that have plenty of space around the toes. This helps prevent bunions and blisters. ¨ Try on shoes while wearing the kind of socks you will usually wear with the shoes. ¨ Avoid plastic shoes. They may rub your feet and cause blisters. Good shoes should be made of materials that are flexible and breathable, such as leather or cloth.   ¨ Break in new shoes slowly by wearing them for no more than an hour a day for several days. Take extra time to check your feet for red areas, blisters, or other problems after you wear new shoes. · Do not go barefoot. Do not wear sandals, and do not wear shoes with very thin soles. Thin soles are easy to puncture. They also do not protect your feet from hot pavement or cold weather. · Have your doctor check your feet during each visit. If you have a foot problem, see your doctor. Do not try to treat an early foot problem at home. Home remedies or treatments that you can buy without a prescription (such as corn removers) can be harmful. · Always get early treatment for foot problems. A minor irritation can lead to a major problem if not properly cared for early. When should you call for help? Call your doctor now or seek immediate medical care if:  · You have a foot sore, an ulcer or break in the skin that is not healing after 4 days, bleeding corns or calluses, or an ingrown toenail. · You have blue or black areas, which can mean bruising or blood flow problems. · You have peeling skin or tiny blisters between your toes or cracking or oozing of the skin. · You have a fever for more than 24 hours and a foot sore. · You have new numbness or tingling in your feet that does not go away after you move your feet or change positions. · You have unexplained or unusual swelling of the foot or ankle. Watch closely for changes in your health, and be sure to contact your doctor if:  · You cannot do proper foot care. Where can you learn more? Go to http://alejandro-samuel.info/. Enter A739 in the search box to learn more about \"Diabetes Foot Health: Care Instructions. \"  Current as of: May 23, 2016  Content Version: 11.2  © 8001-7313 [employer]. Care instructions adapted under license by [employer] (which disclaims liability or warranty for this information).  If you have questions about a medical condition or this instruction, always ask your healthcare professional. [address] any warranty or liability for your use of this information.

## 2017-04-22 LAB — Lab: NORMAL

## 2017-04-23 ENCOUNTER — HOSPITAL ENCOUNTER (OUTPATIENT)
Dept: LAB | Age: 72
Discharge: HOME | End: 2017-04-23
Payer: MEDICARE

## 2017-04-23 PROCEDURE — 82270 OCCULT BLOOD FECES: CPT

## 2017-04-28 ENCOUNTER — HOSPITAL ENCOUNTER (OUTPATIENT)
Dept: LAB | Age: 72
Discharge: HOME | End: 2017-04-28
Payer: MEDICARE

## 2017-04-28 DIAGNOSIS — E55.9 VITAMIN D DEFICIENCY: ICD-10-CM

## 2017-04-28 DIAGNOSIS — E78.00 HYPERCHOLESTEREMIA: ICD-10-CM

## 2017-04-28 PROCEDURE — 36415 COLL VENOUS BLD VENIPUNCTURE: CPT

## 2017-04-28 PROCEDURE — 80061 LIPID PANEL: CPT

## 2017-04-28 PROCEDURE — 82306 VITAMIN D 25 HYDROXY: CPT

## 2017-04-28 PROCEDURE — 80053 COMPREHEN METABOLIC PANEL: CPT

## 2017-04-29 LAB
25(OH)D3+25(OH)D2 SERPL-MCNC: 23.4 NG/ML (ref 30–100)
ALBUMIN SERPL-MCNC: 4.1 G/DL (ref 3.5–4.8)
ALBUMIN/CREAT UR: 19.4 MG/G CREAT (ref 0–30)
ALBUMIN/GLOB SERPL: 1.4 {RATIO} (ref 1.2–2.2)
ALP SERPL-CCNC: 135 IU/L (ref 39–117)
ALT SERPL-CCNC: 56 IU/L (ref 0–32)
AST SERPL-CCNC: 66 IU/L (ref 0–40)
BILIRUB SERPL-MCNC: 0.5 MG/DL (ref 0–1.2)
BUN SERPL-MCNC: 9 MG/DL (ref 8–27)
BUN/CREAT SERPL: 13 (ref 12–28)
CALCIUM SERPL-MCNC: 9.4 MG/DL (ref 8.7–10.3)
CHLORIDE SERPL-SCNC: 98 MMOL/L (ref 96–106)
CHOLEST SERPL-MCNC: 96 MG/DL (ref 100–199)
CO2 SERPL-SCNC: 28 MMOL/L (ref 18–29)
CREAT SERPL-MCNC: 0.71 MG/DL (ref 0.57–1)
CREAT UR-MCNC: 100 MG/DL
GLOBULIN SER CALC-MCNC: 2.9 G/DL (ref 1.5–4.5)
GLUCOSE SERPL-MCNC: 111 MG/DL (ref 65–99)
HDLC SERPL-MCNC: 29 MG/DL
HEMOCCULT STL QL IA: NEGATIVE
INTERPRETATION, 910389: NORMAL
LDLC SERPL CALC-MCNC: 46 MG/DL (ref 0–99)
MICROALBUMIN UR-MCNC: 19.4 UG/ML
PDF IMAGE, 910387: NORMAL
POTASSIUM SERPL-SCNC: 5.1 MMOL/L (ref 3.5–5.2)
PROT SERPL-MCNC: 7 G/DL (ref 6–8.5)
SODIUM SERPL-SCNC: 141 MMOL/L (ref 134–144)
TRIGL SERPL-MCNC: 105 MG/DL (ref 0–149)
VLDLC SERPL CALC-MCNC: 21 MG/DL (ref 5–40)

## 2017-04-30 DIAGNOSIS — R74.01 ELEVATED TRANSAMINASE LEVEL: Primary | ICD-10-CM

## 2017-04-30 DIAGNOSIS — R73.9 HYPERGLYCEMIA: ICD-10-CM

## 2017-04-30 NOTE — PROGRESS NOTES
Inform patient stool for blood is negative. Urine shows no evidence that diabetes is affecting kidneys. Hgb A1c was done recently, so we do not need that in a month, just the hepatic panel.

## 2017-05-09 ENCOUNTER — TELEPHONE (OUTPATIENT)
Dept: INTERNAL MEDICINE CLINIC | Age: 72
End: 2017-05-09

## 2017-05-09 NOTE — TELEPHONE ENCOUNTER
Pt would like return call (preferably from Dr. Kae Lopez) but states she was advised to take a (pain) medication (pt did not specify, wasn't sure of exact name) in place of another medication (pt not sure of name)  Would like return call to further discuss

## 2017-05-10 RX ORDER — AMITRIPTYLINE HYDROCHLORIDE 50 MG/1
50 TABLET, FILM COATED ORAL
Qty: 90 TAB | Refills: 1 | Status: SHIPPED | OUTPATIENT
Start: 2017-05-10 | End: 2017-06-14 | Stop reason: SDUPTHER

## 2017-05-10 RX ORDER — GABAPENTIN 800 MG/1
TABLET ORAL
Qty: 90 TAB | Refills: 5 | Status: SHIPPED | OUTPATIENT
Start: 2017-05-10 | End: 2017-10-19 | Stop reason: SDUPTHER

## 2017-05-10 NOTE — TELEPHONE ENCOUNTER
Patient never tried the cymbalta she would like refills of the elavil and the gabapentin instead. gabapentin needs to go to [facility] for 30 days and elavil should go to [facility] for 90 days.

## 2017-05-22 ENCOUNTER — HOSPITAL ENCOUNTER (OUTPATIENT)
Dept: MAMMOGRAPHY | Age: 72
Discharge: HOME | End: 2017-05-22
Attending: INTERNAL MEDICINE
Payer: MEDICARE

## 2017-05-22 DIAGNOSIS — Z12.31 VISIT FOR SCREENING MAMMOGRAM: ICD-10-CM

## 2017-05-22 PROCEDURE — 77067 SCR MAMMO BI INCL CAD: CPT

## 2017-06-10 RX ORDER — METOPROLOL TARTRATE 25 MG/1
TABLET, FILM COATED ORAL
Qty: 180 TAB | Refills: 3 | Status: SHIPPED | OUTPATIENT
Start: 2017-06-10 | End: 2018-06-11 | Stop reason: SDUPTHER

## 2017-06-15 RX ORDER — AMITRIPTYLINE HYDROCHLORIDE 100 MG/1
100 TABLET, FILM COATED ORAL
Qty: 90 TAB | Refills: 1 | Status: SHIPPED | OUTPATIENT
Start: 2017-06-15 | End: 2017-06-23 | Stop reason: SDUPTHER

## 2017-06-23 ENCOUNTER — OFFICE VISIT (OUTPATIENT)
Dept: INTERNAL MEDICINE CLINIC | Age: 72
End: 2017-06-23

## 2017-06-23 VITALS
BODY MASS INDEX: 30.21 KG/M2 | OXYGEN SATURATION: 96 % | HEART RATE: 80 BPM | SYSTOLIC BLOOD PRESSURE: 156 MMHG | WEIGHT: 192.5 LBS | DIASTOLIC BLOOD PRESSURE: 88 MMHG | RESPIRATION RATE: 14 BRPM | TEMPERATURE: 98 F | HEIGHT: 67 IN

## 2017-06-23 DIAGNOSIS — S16.1XXA CERVICAL STRAIN, ACUTE, INITIAL ENCOUNTER: Primary | ICD-10-CM

## 2017-06-23 RX ORDER — IBUPROFEN 200 MG
TABLET ORAL
COMMUNITY
End: 2017-07-17

## 2017-06-23 RX ORDER — CYCLOBENZAPRINE HCL 5 MG
5 TABLET ORAL
Qty: 30 TAB | Refills: 0 | Status: SHIPPED | OUTPATIENT
Start: 2017-06-23 | End: 2017-09-08

## 2017-06-23 RX ORDER — MULTIVIT WITH MINERALS/HERBS
1 TABLET ORAL DAILY
COMMUNITY
End: 2018-04-17

## 2017-06-23 RX ORDER — AMITRIPTYLINE HYDROCHLORIDE 100 MG/1
100 TABLET, FILM COATED ORAL
Qty: 90 TAB | Refills: 1 | Status: SHIPPED | OUTPATIENT
Start: 2017-06-23 | End: 2018-05-28 | Stop reason: SDUPTHER

## 2017-06-23 RX ORDER — VITAMIN E 268 MG
CAPSULE ORAL DAILY
COMMUNITY
End: 2018-04-17

## 2017-06-23 NOTE — PROGRESS NOTES
HISTORY OF PRESENT ILLNESS  Yisel Dewitt is a 66 y.o. female. HPI  Neck Pain  Patient complains of left neck pain. Quality is throbbing. Intensity at worst is  10/10 and at least is 5/10 (when applies ice pack). Onset of symptoms was 2 months ago, gradually worsening since that time. Current symptoms are pain in left neck and posterior shoulder into deltoid area, paresthesias in posterior shoulder. Patient denies numbness, tingling, paresthesias in upper extremities. Patient denies weakness, diminished  strength, lack of coordination. Radiation of pain:    Event that precipitate these symptoms: none known. Pain is alleviated by ice. Pain is worse with application of heat. Patient has had neck problem in remote past. Saw Dr Unique Ybarra and had some cortisone injections, but they did not help. Was started on amitriptyline at that time and did not have pain again until now. Opioid Risk Tool   This tool should be administered to patients upon an initial visit prior to beginning opioid therapy for pain management. A score of 3 or lower indicates low risk for future opioid abuse, a score of 4 to 7 indicates moderate risk for opioid abuse, and a score of 8 or higher indicates a high risk for opioid abuse.  Chau each box that applies  Female  Male    Family history of substance abuse    Alcohol  1  3    Illegal drugs  2  3    Rx drugs  4  4    Personal history of substance abuse    Alcohol  3  3    Illegal drugs  4  4    Rx drugs  5  5    Age between 16--45 years  1  1    History of preadolescent sexual abuse  3  0    Psychological disease    ADD, OCD, bipolar, schizophrenia  2  2    Depression  1  1    Scoring totals      0              Patient Active Problem List   Diagnosis Code    Headache R51    Carpal tunnel syndrome G56.00    Hypercholesteremia E78.00    Depression F32.9    Abnormal chest x-ray R93.8    Complex regional pain syndrome G90.50    Diabetic peripheral neuropathy associated with type 2 diabetes mellitus (HCC) E11.42    Hypertension, essential I10    Diabetes mellitus type 2, diet-controlled ([address] E11.9    Osteopenia M85.80    Vitamin D deficiency E55.9    GERD (gastroesophageal reflux disease) K21.9    Psoriasis L40.9    Paroxysmal atrial fibrillation (HCC) I48.0    Hepatic steatosis K76.0    COPD, severity to be determined ([address] J44.9     Past Medical History:   Diagnosis Date    Abnormal chest x-ray 12/23/2009    Anemia 12/23/2009    Arrhythmia     SVT.  Converts with [employer]enocard    Atrial fibrillation ([address] 12/23/2009    Carpal tunnel syndrome 12/23/2009    Chronic obstructive pulmonary disease ([address]     Colitis 12/23/2009    Complex regional pain syndrome 12/23/2009    Depression 12/23/2009    Diabetes ([address]     GERD (gastroesophageal reflux disease) 2/8/2010    HTN (hypertension) 12/23/2009    Hypercholesteremia 12/23/2009    Hypertriglyceridemia 12/23/2009    Idiopathic peripheral neuropathy 12/23/2009    Iron deficiency anemia 12/23/2009    Osteopenia 12/23/2009    Paroxysmal atrial fibrillation ([address] 12/2/2011    Psoriasis 6/27/2012     Past Surgical History:   Procedure Laterality Date    HX CARPAL TUNNEL RELEASE  00/00/2002    Both hands    HX CYST REMOVAL  00/00/1965    Left wrist    HX HYSTERECTOMY  00/00/1985    HX ORTHOPAEDIC  00/00/1987    back surgery (disc)    HX ORTHOPAEDIC      right foot X 3 neuromas and tarsal tunnel release    HX TONSILLECTOMY  00/00/1962    NEUROLOGICAL PROCEDURE UNLISTED      Pain pump    PAIN PUMP DEVICE  00/00/2007    in left lower abdomen (for foot pain)     Social History     Social History    Marital status:      Spouse name: N/A    Number of children: N/A    Years of education: N/A     Social History Main Topics    Smoking status: Former Smoker     Packs/day: 0.10     Years: 2.00     Quit date: 1/1/1975    Smokeless tobacco: Never Used    Alcohol use No    Drug use: No    Sexual activity: Not Asked     Other Topics Concern    None     Social History Narrative     Family History   Problem Relation Age of Onset    Arrhythmia Father     Heart Attack Father     Cancer Brother      Melanoma    Breast Cancer Daughter      48    Breast Cancer Daughter 46    Breast Cancer Daughter 46     Allergies   Allergen Reactions    Duragesic [Fentanyl] Other (comments)     Patient sees things when taking    Codeine Nausea Only     Current Outpatient Prescriptions   Medication Sig Dispense Refill    b complex vitamins tablet Take 1 Tab by mouth daily.  multivit-min-FA-herbal no. 245 (ALIVE WOMEN'S GUMMY VITAMINS) 200 mcg- 37.5 mg chew Take  by mouth.  ibuprofen (ADVIL) 200 mg tablet Take  by mouth.  vitamin E (AQUA GEMS) 400 unit capsule Take  by mouth daily.  amitriptyline (ELAVIL) 100 mg tablet Take 1 Tab by mouth nightly. 90 Tab 1    metoprolol tartrate (LOPRESSOR) 25 mg tablet TAKE 1 TAB BY MOUTH TWO (2) TIMES A DAY. 180 Tab 3    gabapentin (NEURONTIN) 800 mg tablet TAKE 1 TABLET BY MOUTH THREE (3) TIMES DAILY. 90 Tab 5    aspirin 81 mg chewable tablet Take 1 Tab by mouth daily. 90 Tab 3    omega 3-dha-epa-fish oil (FISH OIL) 100-160-1,000 mg cap Take 1 Cap by mouth daily.  losartan (COZAAR) 100 mg tablet TAKE 1 TABLET BY MOUTH EVERY DAY 90 Tab 3    atorvastatin (LIPITOR) 40 mg tablet TAKE 1 TABLET BY MOUTH NIGHTLY. 90 Tab 3    RESTASIS 0.05 % ophthalmic emulsion   3    OMEPRAZOLE PO Take 20 mg by mouth daily. ROS       Visit Vitals    /88 (BP 1 Location: Left arm, BP Patient Position: Sitting)    Pulse 80    Temp 98 °F (36.7 °C) (Oral)    Resp 14    Ht 5' 7\" (1.702 m)    Wt 192 lb 8 oz (87.3 kg)    SpO2 96%    BMI 30.15 kg/m2     Physical Exam   Constitutional: She is oriented to person, place, and time. She appears well-developed and well-nourished. HENT:   Head: Normocephalic and atraumatic.    Cardiovascular: Normal rate, regular rhythm and normal heart sounds. Exam reveals no gallop. No murmur heard. Pulmonary/Chest: Effort normal and breath sounds normal. She has no wheezes. She has no rales. Musculoskeletal:        Cervical back: She exhibits decreased range of motion (with rotation to the left), tenderness, pain and spasm. Back:    Neurological: She is alert and oriented to person, place, and time. Reflex Scores:       Bicep reflexes are 2+ on the right side and 2+ on the left side. Motor strength is 5/5 to finger flexion/extension, abduction and opposition, wrist flexion/extension, elbow flexion extension     Skin: Skin is warm, dry and intact. No rash noted. Psychiatric: She has a normal mood and affect. Her behavior is normal.   Nursing note and vitals reviewed. ASSESSMENT and PLAN    ICD-10-CM ICD-9-CM    1. Cervical strain, acute, initial encounter S16. 1XXA 847.0 cyclobenzaprine (FLEXERIL) 5 mg tablet      REFERRAL TO PHYSICAL THERAPY         Cervical strain, acute, initial encounter  Blood pressure up due to pain. Has been well controlled at other visits. -     cyclobenzaprine (FLEXERIL) 5 mg tablet; Take 1 Tab by mouth three (3) times daily as needed for Muscle Spasm(s). -     REFERRAL TO PHYSICAL THERAPY    Other orders  -     amitriptyline (ELAVIL) 100 mg tablet; Take 1 Tab by mouth nightly. Follow-up Disposition:  Return if symptoms worsen or fail to improve. I have discussed the diagnosis with the patient and the intended plan as seen in the above orders. Patient is in agreement. The patient has received an after-visit summary and questions were answered concerning future plans. I have discussed medication side effects and warnings with the patient as well.

## 2017-06-23 NOTE — PATIENT INSTRUCTIONS
See physical therapist  [facility]   [address], [address]   Tel: [phone]     [facility]   [address] [facility] [address], [address] [place]   Tel: [phone] Tel: [phone]     Can try Flexeril 5 mg 3 times a day               Neck Strain or Sprain: Rehab Exercises  Your Care Instructions  Here are some examples of typical rehabilitation exercises for your condition. Start each exercise slowly. Ease off the exercise if you start to have pain. Your doctor or physical therapist will tell you when you can start these exercises and which ones will work best for you. How to do the exercises  Neck rotation    1. Sit in a firm chair, or stand up straight. 2. Keeping your chin level, turn your head to the right, and hold for 15 to 30 seconds. 3. Turn your head to the left and hold for 15 to 30 seconds. 4. Repeat 2 to 4 times to each side. Neck stretches    1. Look straight ahead, and tip your right ear to your right shoulder. Do not let your left shoulder rise up as you tip your head to the right. 2. Hold for 15 to 30 seconds. 3. Tilt your head to the left. Do not let your right shoulder rise up as you tip your head to the left. 4. Hold for 15 to 30 seconds. 5. Repeat 2 to 4 times to each side. Forward neck flexion    1. Sit in a firm chair, or stand up straight. 2. Bend your head forward. 3. Hold for 15 to 30 seconds. 4. Repeat 2 to 4 times. Lateral (side) bend strengthening    1. With your right hand, place your first two fingers on your right temple. 2. Start to bend your head to the side while using gentle pressure from your fingers to keep your head from bending. 3. Hold for about 6 seconds. 4. Repeat 8 to 12 times. 5. Switch hands and repeat the same exercise on your left side. Forward bend strengthening    1. Place your first two fingers of either hand on your forehead.   2. Start to bend your head forward while using gentle pressure from your fingers to keep your head from bending. 3. Hold for about 6 seconds. 4. Repeat 8 to 12 times. Neutral position strengthening    1. Using one hand, place your fingertips on the back of your head at the top of your neck. 2. Start to bend your head backward while using gentle pressure from your fingers to keep your head from bending. 3. Hold for about 6 seconds. 4. Repeat 8 to 12 times. Chin tuck    1. Lie on the floor with a rolled-up towel under your neck. Your head should be touching the floor. 2. Slowly bring your chin toward your chest.  3. Hold for a count of 6, and then relax for up to 10 seconds. 4. Repeat 8 to 12 times. Follow-up care is a key part of your treatment and safety. Be sure to make and go to all appointments, and call your doctor if you are having problems. It's also a good idea to know your test results and keep a list of the medicines you take. Where can you learn more? Go to http://alejandro-samuel.info/. Enter M679 in the search box to learn more about \"Neck Strain or Sprain: Rehab Exercises. \"  Current as of: March 21, 2017  Content Version: 11.3  © 2433-4473 [employer]. Care instructions adapted under license by [employer] (which disclaims liability or warranty for this information). If you have questions about a medical condition or this instruction, always ask your healthcare professional. [address] any warranty or liability for your use of this information.

## 2017-06-23 NOTE — MR AVS SNAPSHOT
Visit Information Date & Time Provider Department Dept. Phone Encounter #  
 6/23/2017  2:30 PM MD Kendall Maldonado [phone] [id number] Follow-up Instructions Return if symptoms worsen or fail to improve. Your Appointments 10/23/2017  1:30 PM  
ROUTINE CARE with MD Kendall Maldonado [facility]) Appt Note: 6mth f/u; DM, chol, htn, POCA1c  Fasting lab next week [address] [facility] [address] [phone]  
  
   
 8 [facility] [address] Upcoming Health Maintenance Date Due  
 EYE EXAM RETINAL OR DILATED Q1 4/4/2017 DTaP/Tdap/Td series (1 - Tdap) 9/22/2019* INFLUENZA AGE 9 TO ADULT 8/1/2017 HEMOGLOBIN A1C Q6M 10/21/2017 GLAUCOMA SCREENING Q2Y 4/4/2018 FOOT EXAM Q1 4/21/2018 MICROALBUMIN Q1 4/21/2018 MEDICARE YEARLY EXAM 4/22/2018 FOBT Q 1 YEAR AGE 50-75 4/23/2018 LIPID PANEL Q1 4/28/2018 BREAST CANCER SCRN MAMMOGRAM 5/22/2019 *Topic was postponed. The date shown is not the original due date. Allergies as of 6/23/2017  Review Complete On: 6/23/2017 By: Lennox James MD  
  
 Severity Noted Reaction Type Reactions Duragesic [Fentanyl] High 02/08/2010    Other (comments) Patient sees things when taking Codeine  12/23/2009    Nausea Only Current Immunizations  Reviewed on 6/23/2017 Name Date Influenza High Dose Vaccine PF 9/22/2016, 11/12/2015 Influenza Vaccine 10/17/2014, 10/3/2013 Influenza Vaccine Split 10/8/2012, 10/7/2011 Influenza Vaccine Whole 9/22/2010 Pneumococcal Conjugate (PCV-13) 3/22/2016 Pneumococcal Vaccine (Unspecified Type) 9/22/2009 TD Vaccine 9/22/2009 Reviewed by Alicia Aguilar LPN on 2/22/0388 at  2:27 PM  
You Were Diagnosed With   
  
 Codes Comments Cervical strain, acute, initial encounter    -  Primary ICD-10-CM: S16. Fredo Boyer ICD-9-CM: 658. 0 Vitals BP Pulse Temp Resp Height(growth percentile) Weight(growth percentile) 156/88 (BP 1 Location: Left arm, BP Patient Position: Sitting) 80 98 °F (36.7 °C) (Oral) 14 5' 7\" (1.702 m) 192 lb 8 oz (87.3 kg) SpO2 BMI OB Status Smoking Status 96% 30.15 kg/m2 Hysterectomy Former Smoker Vitals History BMI and BSA Data Body Mass Index Body Surface Area  
 30.15 kg/m 2 2.03 m 2 Preferred Pharmacy Pharmacy Name Phone [facility] [address] [place], [place] - [address] [place] [address] [place] [phone] Your Updated Medication List  
  
   
This list is accurate as of: 6/23/17  3:23 PM.  Always use your most recent med list. ADVIL 200 mg tablet Generic drug:  ibuprofen Take  by mouth. ALIVE WOMEN'S GUMMY VITAMINS 200 mcg- 37.5 mg Chew Generic drug:  multivit-min-FA-herbal no. [place] Take  by mouth. amitriptyline 100 mg tablet Commonly known as:  ELAVIL Take 1 Tab by mouth nightly. aspirin 81 mg chewable tablet Take 1 Tab by mouth daily. atorvastatin 40 mg tablet Commonly known as:  LIPITOR  
TAKE 1 TABLET BY MOUTH NIGHTLY. b complex vitamins tablet Take 1 Tab by mouth daily. cyclobenzaprine 5 mg tablet Commonly known as:  FLEXERIL Take 1 Tab by mouth three (3) times daily as needed for Muscle Spasm(s). FISH -160-1,000 mg Cap Generic drug:  omega 3-dha-epa-fish oil Take 1 Cap by mouth daily. gabapentin 800 mg tablet Commonly known as:  NEURONTIN  
TAKE 1 TABLET BY MOUTH THREE (3) TIMES DAILY. losartan 100 mg tablet Commonly known as:  COZAAR  
TAKE 1 TABLET BY MOUTH EVERY DAY  
  
 metoprolol tartrate 25 mg tablet Commonly known as:  LOPRESSOR  
TAKE 1 TAB BY MOUTH TWO (2) TIMES A DAY. OMEPRAZOLE PO Take 20 mg by mouth daily. RESTASIS 0.05 % ophthalmic emulsion Generic drug:  cycloSPORINE  
  
 vitamin E 400 unit capsule Commonly known as:  [address] Take  by mouth daily. Prescriptions Sent to Pharmacy Refills  
 amitriptyline (ELAVIL) 100 mg tablet 1 Sig: Take 1 Tab by mouth nightly. Class: Normal  
 Pharmacy: [id number] Ph #: [phone] Route: Oral  
 cyclobenzaprine (FLEXERIL) 5 mg tablet 0 Sig: Take 1 Tab by mouth three (3) times daily as needed for Muscle Spasm(s). Class: Normal  
 Pharmacy: [id number] Ph #: [phone] Route: Oral  
  
We Performed the Following REFERRAL TO PHYSICAL THERAPY [LXI91 Custom] Follow-up Instructions Return if symptoms worsen or fail to improve. Referral Information Referral ID Referred By Referred To  
  
 [id number] Sameer Gomez Not Available Visits Status Start Date End Date 1 New Request 6/23/17 6/23/18 If your referral has a status of pending review or denied, additional information will be sent to support the outcome of this decision. Patient Instructions See physical therapist 
[facility] [address] [facility], [address] [place] [address] Tel: [phone] [facility] [address] [place] [address] [id number], [address] [place] [address], [facility] Tel: [phone] Tel: [phone] Can try Flexeril 5 mg 3 times a day Neck Strain or Sprain: Rehab Exercises Your Care Instructions Here are some examples of typical rehabilitation exercises for your condition. Start each exercise slowly. Ease off the exercise if you start to have pain. Your doctor or physical therapist will tell you when you can start these exercises and which ones will work best for you. How to do the exercises Neck rotation 1. Sit in a firm chair, or stand up straight. 2. Keeping your chin level, turn your head to the right, and hold for 15 to 30 seconds. 3. Turn your head to the left and hold for 15 to 30 seconds. 4. Repeat 2 to 4 times to each side. Neck stretches 1. Look straight ahead, and tip your right ear to your right shoulder. Do not let your left shoulder rise up as you tip your head to the right. 2. Hold for 15 to 30 seconds. 3. Tilt your head to the left. Do not let your right shoulder rise up as you tip your head to the left. 4. Hold for 15 to 30 seconds. 5. Repeat 2 to 4 times to each side. Forward neck flexion 1. Sit in a firm chair, or stand up straight. 2. Bend your head forward. 3. Hold for 15 to 30 seconds. 4. Repeat 2 to 4 times. Lateral (side) bend strengthening 1. With your right hand, place your first two fingers on your right temple. 2. Start to bend your head to the side while using gentle pressure from your fingers to keep your head from bending. 3. Hold for about 6 seconds. 4. Repeat 8 to 12 times. 5. Switch hands and repeat the same exercise on your left side. Forward bend strengthening 1. Place your first two fingers of either hand on your forehead. 2. Start to bend your head forward while using gentle pressure from your fingers to keep your head from bending. 3. Hold for about 6 seconds. 4. Repeat 8 to 12 times. Neutral position strengthening 1. Using one hand, place your fingertips on the back of your head at the top of your neck. 2. Start to bend your head backward while using gentle pressure from your fingers to keep your head from bending. 3. Hold for about 6 seconds. 4. Repeat 8 to 12 times. Chin tuck 1. Lie on the floor with a rolled-up towel under your neck. Your head should be touching the floor. 2. Slowly bring your chin toward your chest. 
3. Hold for a count of 6, and then relax for up to 10 seconds. 4. Repeat 8 to 12 times. Follow-up care is a key part of your treatment and safety.  Be sure to make and go to all appointments, and call your doctor if you are having problems. It's also a good idea to know your test results and keep a list of the medicines you take. Where can you learn more? Go to http://alejandro-samuel.info/. Enter M679 in the search box to learn more about \"Neck Strain or Sprain: Rehab Exercises. \" Current as of: March 21, 2017 Content Version: 11.3 © 7882-4934 [employer]. Care instructions adapted under license by [employer] (which disclaims liability or warranty for this information). If you have questions about a medical condition or this instruction, always ask your healthcare professional. [place] [address] any warranty or liability for your use of this information. Introducing [facility]! [employer] introduces Coppertino patient portal. Now you can access parts of your medical record, email your doctor's office, and request medication refills online. 1. In your internet browser, go to [email or web address] 2. Click on the First Time User? Click Here link in the Sign In box. You will see the New Member Sign Up page. 3. Enter your Coppertino Access Code exactly as it appears below. You will not need to use this code after youve completed the sign-up process. If you do not sign up before the expiration date, you must request a new code. · Coppertino Access Code: [email or web address] Expires: 7/16/2017  1:49 PM 
 
4. Enter the last four digits of your Social Security Number (xxxx) and Date of Birth (mm/dd/yyyy) as indicated and click Submit. You will be taken to the next sign-up page. 5. Create a VIXXI Solutionst ID. This will be your Coppertino login ID and cannot be changed, so think of one that is secure and easy to remember. 6. Create a Coppertino password. You can change your password at any time. 7. Enter your Password Reset Question and Answer. This can be used at a later time if you forget your password. 8. Enter your e-mail address. You will receive e-mail notification when new information is available in [address] 9. Click Sign Up. You can now view and download portions of your medical record. 10. Click the Download Summary menu link to download a portable copy of your medical information. If you have questions, please visit the Frequently Asked Questions section of the Food[employer] website. Remember, Foodoro is NOT to be used for urgent needs. For medical emergencies, dial 911. Now available from your iPhone and Android! Please provide this summary of care documentation to your next provider. Your primary care clinician is listed as Richard Nichols. If you have any questions after today's visit, please call [phone].

## 2017-07-17 ENCOUNTER — OFFICE VISIT (OUTPATIENT)
Dept: INTERNAL MEDICINE CLINIC | Age: 72
End: 2017-07-17

## 2017-07-17 VITALS
WEIGHT: 191.5 LBS | OXYGEN SATURATION: 95 % | HEIGHT: 67 IN | HEART RATE: 82 BPM | RESPIRATION RATE: 14 BRPM | TEMPERATURE: 98.2 F | DIASTOLIC BLOOD PRESSURE: 84 MMHG | SYSTOLIC BLOOD PRESSURE: 142 MMHG | BODY MASS INDEX: 30.06 KG/M2

## 2017-07-17 DIAGNOSIS — G89.29 CHRONIC LEFT SHOULDER PAIN: ICD-10-CM

## 2017-07-17 DIAGNOSIS — M54.2 NECK PAIN ON LEFT SIDE: Primary | ICD-10-CM

## 2017-07-17 DIAGNOSIS — M25.512 CHRONIC LEFT SHOULDER PAIN: ICD-10-CM

## 2017-07-17 RX ORDER — MULTIVITAMIN WITH IRON
1 TABLET ORAL DAILY
COMMUNITY
End: 2017-10-19

## 2017-07-17 NOTE — PATIENT INSTRUCTIONS
Get X-rays done at hospital. Ask for copy of images on a disc. (this is in case you need them for an orthopedic referral). Continue physical therapy  Call your insurance carrier to see if they will cover TENS unit and if so who the DME (durable medical supplier is). If not improving in next 10 days, call us for orthopedic referral      Preventing Falls: Care Instructions  Your Care Instructions  Getting around your home safely can be a challenge if you have injuries or health problems that make it easy for you to fall. Loose rugs and furniture in walkways are among the dangers for many older people who have problems walking or who have poor eyesight. People who have conditions such as arthritis, osteoporosis, or dementia also have to be careful not to fall. You can make your home safer with a few simple measures. Follow-up care is a key part of your treatment and safety. Be sure to make and go to all appointments, and call your doctor if you are having problems. It's also a good idea to know your test results and keep a list of the medicines you take. How can you care for yourself at home? Taking care of yourself  · You may get dizzy if you do not drink enough water. To prevent dehydration, drink plenty of fluids, enough so that your urine is light yellow or clear like water. Choose water and other caffeine-free clear liquids. If you have kidney, heart, or liver disease and have to limit fluids, talk with your doctor before you increase the amount of fluids you drink. · Exercise regularly to improve your strength, muscle tone, and balance. Walk if you can. Swimming may be a good choice if you cannot walk easily. · Have your vision and hearing checked each year or any time you notice a change. If you have trouble seeing and hearing, you might not be able to avoid objects and could lose your balance. · Know the side effects of the medicines you take.  Ask your doctor or pharmacist whether the medicines you take can affect your balance. Sleeping pills or sedatives can affect your balance. · Limit the amount of alcohol you drink. Alcohol can impair your balance and other senses. · Ask your doctor whether calluses or corns on your feet need to be removed. If you wear loose-fitting shoes because of calluses or corns, you can lose your balance and fall. · Talk to your doctor if you have numbness in your feet. Preventing falls at home  · Remove raised doorway thresholds, throw rugs, and clutter. Repair loose carpet or raised areas in the floor. · Move furniture and electrical cords to keep them out of walking paths. · Use nonskid floor wax, and wipe up spills right away, especially on ceramic tile floors. · If you use a walker or cane, put rubber tips on it. If you use crutches, clean the bottoms of them regularly with an abrasive pad, such as steel wool. · Keep your house well lit, especially [place], and outside walkways. Use night-lights in areas such as hallways and bathrooms. Add extra light switches or use remote switches (such as switches that go on or off when you clap your hands) to make it easier to turn lights on if you have to get up during the night. · Install sturdy handrails on stairways. · Move items in your cabinets so that the things you use a lot are on the lower shelves (about waist level). · Keep a cordless phone and a flashlight with new batteries by your bed. If possible, put a phone in each of the main rooms of your house, or carry a cell phone in case you fall and cannot reach a phone. Or, you can wear a device around your neck or wrist. You push a button that sends a signal for help. · Wear low-heeled shoes that fit well and give your feet good support. Use footwear with nonskid soles. Check the heels and soles of your shoes for wear. Repair or replace worn heels or soles. · Do not wear socks without shoes on wood floors. · Walk on the grass when the sidewalks are slippery. If you live in an area that gets snow and ice in the winter, sprinkle salt on slippery steps and sidewalks. Preventing falls in the bath  · Install grab bars and nonskid mats inside and outside your shower or tub and near the toilet and sinks. · Use shower chairs and bath benches. · Use a hand-held shower head that will allow you to sit while showering. · Get into a tub or shower by putting the weaker leg in first. Get out of a tub or shower with your strong side first.  · Repair loose toilet seats and consider installing a raised toilet seat to make getting on and off the toilet easier. · Keep your bathroom door unlocked while you are in the shower. Where can you learn more? Go to http://alejandro-samuel.info/. Enter [id number] [id number] [phone] in the search box to learn more about \"Preventing Falls: Care Instructions. \"  Current as of: August 4, 2016  Content Version: 11.3  © 6624-8711 [employer]. Care instructions adapted under license by [employer] (which disclaims liability or warranty for this information). If you have questions about a medical condition or this instruction, always ask your healthcare professional. [address] any warranty or liability for your use of this information.

## 2017-07-17 NOTE — PROGRESS NOTES
Reviewed record In preparation for visit and have obtained necessary documentation. Advanced directive / living will on file. 1. Have you been to the ER, urgent care clinic or hospitalized since your last visit? No     2. Have you seen or consulted any other health care providers outside of the [address] [place] [address] since your last visit? Include any pap smears or colon screening. PT    Vitals reviewed with provider.     Health Maintenance reviewed:

## 2017-07-17 NOTE — MR AVS SNAPSHOT
Visit Information Date & Time Provider Department Dept. Phone Encounter #  
 7/17/2017  2:45 PM MD Macey Wisdom [phone] [id number] Follow-up Instructions Return if symptoms worsen or fail to improve. Your Appointments 10/23/2017  1:30 PM  
ROUTINE CARE with MD Macey Wisdom [address] [facility]) Appt Note: 6mth f/u; DM, chol, htn, POCA1c  Fasting lab next week [address] [facility] [address] [phone]  
  
   
 8 [facility] [address] Upcoming Health Maintenance Date Due  
 EYE EXAM RETINAL OR DILATED Q1 4/4/2017 DTaP/Tdap/Td series (1 - Tdap) 9/22/2019* INFLUENZA AGE 9 TO ADULT 8/1/2017 HEMOGLOBIN A1C Q6M 10/21/2017 GLAUCOMA SCREENING Q2Y 4/4/2018 FOOT EXAM Q1 4/21/2018 MICROALBUMIN Q1 4/21/2018 MEDICARE YEARLY EXAM 4/22/2018 FOBT Q 1 YEAR AGE 50-75 4/23/2018 LIPID PANEL Q1 4/28/2018 BREAST CANCER SCRN MAMMOGRAM 5/22/2019 *Topic was postponed. The date shown is not the original due date. Allergies as of 7/17/2017  Review Complete On: 7/17/2017 By: Iliana Miranda MD  
  
 Severity Noted Reaction Type Reactions Duragesic [Fentanyl] High 02/08/2010    Other (comments) Patient sees things when taking Codeine  12/23/2009    Nausea Only Current Immunizations  Reviewed on 7/17/2017 Name Date Influenza High Dose Vaccine PF 9/22/2016, 11/12/2015 Influenza Vaccine 10/17/2014, 10/3/2013 Influenza Vaccine Split 10/8/2012, 10/7/2011 Influenza Vaccine Whole 9/22/2010 Pneumococcal Conjugate (PCV-13) 3/22/2016 Pneumococcal Vaccine (Unspecified Type) 9/22/2009 TD Vaccine 9/22/2009 Reviewed by Jagdeep Waller LPN on 0/53/4352 at  3:06 PM  
You Were Diagnosed With   
  
 Codes Comments Neck pain on left side    -  Primary ICD-10-CM: M54.2 ICD-9-CM: 723.1 Chronic left shoulder pain     ICD-10-CM: M25.512, G89.29 ICD-9-CM: 719.41, 338.29 Vitals BP Pulse Temp Resp Height(growth percentile) Weight(growth percentile) 142/84 (BP 1 Location: Right arm, BP Patient Position: Sitting) 82 98.2 °F (36.8 °C) (Oral) 14 5' 7\" (1.702 m) 191 lb 8 oz (86.9 kg) SpO2 BMI OB Status Smoking Status 95% 29.99 kg/m2 Hysterectomy Former Smoker Vitals History BMI and BSA Data Body Mass Index Body Surface Area  
 29.99 kg/m 2 2.03 m 2 Preferred Pharmacy Pharmacy Name [facility] [address] [phone] Your Updated Medication List  
  
   
This list is accurate as of: 7/17/17  3:52 PM.  Always use your most recent med list.  
  
  
  
  
 amitriptyline 100 mg tablet Commonly known as:  ELAVIL Take 1 Tab by mouth nightly. aspirin 81 mg chewable tablet Take 1 Tab by mouth daily. atorvastatin 40 mg tablet Commonly known as:  LIPITOR  
TAKE 1 TABLET BY MOUTH NIGHTLY. b complex vitamins tablet Take 1 Tab by mouth daily. cyclobenzaprine 5 mg tablet Commonly known as:  FLEXERIL Take 1 Tab by mouth three (3) times daily as needed for Muscle Spasm(s). DAILY MULTI-VITAMINS/IRON tablet Generic drug:  multivitamin with iron Take 1 Tab by mouth daily. FISH -160-1,000 mg Cap Generic drug:  omega 3-dha-epa-fish oil Take 1 Cap by mouth daily. gabapentin 800 mg tablet Commonly known as:  NEURONTIN  
TAKE 1 TABLET BY MOUTH THREE (3) TIMES DAILY. losartan 100 mg tablet Commonly known as:  COZAAR  
TAKE 1 TABLET BY MOUTH EVERY DAY  
  
 metoprolol tartrate 25 mg tablet Commonly known as:  LOPRESSOR  
TAKE 1 TAB BY MOUTH TWO (2) TIMES A DAY. OMEPRAZOLE PO Take 20 mg by mouth daily. RESTASIS 0.05 % ophthalmic emulsion Generic drug:  cycloSPORINE  
  
 vitamin E 400 unit capsule Commonly known as:  [address]  
 Take  by mouth daily. We Performed the Following AMB SUPPLY ORDER [[id number] Custom] Comments:  
 TENS unit Follow-up Instructions Return if symptoms worsen or fail to improve. To-Do List   
 07/17/2017 Imaging:  XR SHOULDER LEFT 3 VIEW   
  
 07/17/2017 Imaging:  XR SPINE CERV 4 OR 5 V Patient Instructions Get X-rays done at hospital. Ask for copy of images on a disc. (this is in case you need them for an orthopedic referral). Continue physical therapy Call your insurance carrier to see if they will cover TENS unit and if so who the DME (durable medical supplier is). If not improving in next 10 days, call us for orthopedic referral 
 
 
Preventing Falls: Care Instructions Your Care Instructions Getting around your home safely can be a challenge if you have injuries or health problems that make it easy for you to fall. Loose rugs and furniture in walkways are among the dangers for many older people who have problems walking or who have poor eyesight. People who have conditions such as arthritis, osteoporosis, or dementia also have to be careful not to fall. You can make your home safer with a few simple measures. Follow-up care is a key part of your treatment and safety. Be sure to make and go to all appointments, and call your doctor if you are having problems. It's also a good idea to know your test results and keep a list of the medicines you take. How can you care for yourself at home? Taking care of yourself · You may get dizzy if you do not drink enough water. To prevent dehydration, drink plenty of fluids, enough so that your urine is light yellow or clear like water. Choose water and other caffeine-free clear liquids. If you have kidney, heart, or liver disease and have to limit fluids, talk with your doctor before you increase the amount of fluids you drink. · Exercise regularly to improve your strength, muscle tone, and balance. Walk if you can. Swimming may be a good choice if you cannot walk easily. · Have your vision and hearing checked each year or any time you notice a change. If you have trouble seeing and hearing, you might not be able to avoid objects and could lose your balance. · Know the side effects of the medicines you take. Ask your doctor or pharmacist whether the medicines you take can affect your balance. Sleeping pills or sedatives can affect your balance. · Limit the amount of alcohol you drink. Alcohol can impair your balance and other senses. · Ask your doctor whether calluses or corns on your feet need to be removed. If you wear loose-fitting shoes because of calluses or corns, you can lose your balance and fall. · Talk to your doctor if you have numbness in your feet. Preventing falls at home · Remove raised doorway thresholds, throw rugs, and clutter. Repair loose carpet or raised areas in the floor. · Move furniture and electrical cords to keep them out of walking paths. · Use nonskid floor wax, and wipe up spills right away, especially on ceramic tile floors. · If you use a walker or cane, put rubber tips on it. If you use crutches, clean the bottoms of them regularly with an abrasive pad, such as steel wool. · Keep your house well lit, especially [place], and outside walkways. Use night-lights in areas such as hallways and bathrooms. Add extra light switches or use remote switches (such as switches that go on or off when you clap your hands) to make it easier to turn lights on if you have to get up during the night. · Install sturdy handrails on stairways. · Move items in your cabinets so that the things you use a lot are on the lower shelves (about waist level). · Keep a cordless phone and a flashlight with new batteries by your bed. If possible, put a phone in each of the main rooms of your house, or carry a cell phone in case you fall and cannot reach a phone.  Or, you can wear a device around your neck or wrist. You push a button that sends a signal for help. · Wear low-heeled shoes that fit well and give your feet good support. Use footwear with nonskid soles. Check the heels and soles of your shoes for wear. Repair or replace worn heels or soles. · Do not wear socks without shoes on wood floors. · Walk on the grass when the sidewalks are slippery. If you live in an area that gets snow and ice in the winter, sprinkle salt on slippery steps and sidewalks. Preventing falls in the bath · Install grab bars and nonskid mats inside and outside your shower or tub and near the toilet and sinks. · Use shower chairs and bath benches. · Use a hand-held shower head that will allow you to sit while showering. · Get into a tub or shower by putting the weaker leg in first. Get out of a tub or shower with your strong side first. 
· Repair loose toilet seats and consider installing a raised toilet seat to make getting on and off the toilet easier. · Keep your bathroom door unlocked while you are in the shower. Where can you learn more? Go to http://alejandro-samuel.info/. Enter [phone] in the search box to learn more about \"Preventing Falls: Care Instructions. \" Current as of: August 4, 2016 Content Version: 11.3 © 9637-9687 [employer]. Care instructions adapted under license by "[employer] (which disclaims liability or warranty for this information). If you have questions about a medical condition or this instruction, always ask your healthcare professional. [place] [address] any warranty or liability for your use of this information. Introducing [facility]! [employer] introduces [employer] patient portal. Now you can access parts of your medical record, email your doctor's office, and request medication refills online. 1. In your internet browser, go to [email or web address] 2. Click on the First Time User? Click Here link in the Sign In box. You will see the New Member Sign Up page. 3. Enter your [employer] Access Code exactly as it appears below. You will not need to use this code after youve completed the sign-up process. If you do not sign up before the expiration date, you must request a new code. · [employer] Access Code: [email or web address] Expires: 10/15/2017  3:48 PM 
 
4. Enter the last four digits of your Social Security Number (xxxx) and Date of Birth (mm/dd/yyyy) as indicated and click Submit. You will be taken to the next sign-up page. 5. Create a Or[employer] ID. This will be your [employer] login ID and cannot be changed, so think of one that is secure and easy to remember. 6. Create a [employer] password. You can change your password at any time. 7. Enter your Password Reset Question and Answer. This can be used at a later time if you forget your password. 8. Enter your e-mail address. You will receive e-mail notification when new information is available in [address] 9. Click Sign Up. You can now view and download portions of your medical record. 10. Click the Download Summary menu link to download a portable copy of your medical information. If you have questions, please visit the Frequently Asked Questions section of the [employer] website. Remember, [employer] is NOT to be used for urgent needs. For medical emergencies, dial 911. Now available from your iPhone and Android! Please provide this summary of care documentation to your next provider. Your primary care clinician is listed as [facility] Portal. If you have any questions after today's visit, please call [phone].

## 2017-07-17 NOTE — PROGRESS NOTES
HISTORY OF PRESENT ILLNESS  Caleb Awan is a 66 y.o. female. HPI  She presents for follow up/complaint of pain in neck, left shoulder of 3 months duration. She was initially seen here for this on 6/23. She was referred to physical therapy. Thus far she has noticed no improvement in pain, but there has been improvement in ROM. Quality is throbbing. Intensity at worst is  9/10 and at least is 5/10. Pain is Increases w/ turning head to left, Pain is constant. Pain is alleviated by ice. Heat feels good while it is there, but comes back immediately after removes it. TENS feels good while using (@ PT. Over time pain is the same. Patient Active Problem List   Diagnosis Code    Headache R51    Carpal tunnel syndrome G56.00    Hypercholesteremia E78.00    Depression F32.9    Abnormal chest x-ray R93.8    Complex regional pain syndrome G90.50    Diabetic peripheral neuropathy associated with type 2 diabetes mellitus ([address] E11.42    Hypertension, essential I10    Diabetes mellitus type 2, diet-controlled ([address] E11.9    Osteopenia M85.80    Vitamin D deficiency E55.9    GERD (gastroesophageal reflux disease) K21.9    Psoriasis L40.9    Paroxysmal atrial fibrillation (HCC) I48.0    Hepatic steatosis K76.0    COPD, severity to be determined ([address] J44.9     Past Medical History:   Diagnosis Date    Abnormal chest x-ray 12/23/2009    Anemia 12/23/2009    Arrhythmia     SVT.  Converts with Adenocard    Atrial fibrillation ([address] 12/23/2009    Carpal tunnel syndrome 12/23/2009    Chronic obstructive pulmonary disease ([address]     Colitis 12/23/2009    Complex regional pain syndrome 12/23/2009    Depression 12/23/2009    Diabetes ([address]     GERD (gastroesophageal reflux disease) 2/8/2010    HTN (hypertension) 12/23/2009    Hypercholesteremia 12/23/2009    Hypertriglyceridemia 12/23/2009    Idiopathic peripheral neuropathy 12/23/2009    Iron deficiency anemia 12/23/2009    Osteopenia 12/23/2009    Paroxysmal atrial fibrillation ([facility] 12/2/2011    Psoriasis 6/27/2012     Past Surgical History:   Procedure Laterality Date    HX CARPAL TUNNEL RELEASE  00/00/2002    Both hands    HX CYST REMOVAL  00/00/1965    Left wrist    HX HYSTERECTOMY  00/00/1985    HX ORTHOPAEDIC  00/00/1987    back surgery (disc)    HX ORTHOPAEDIC      right foot X 3 neuromas and tarsal tunnel release    HX TONSILLECTOMY  00/00/1962    NEUROLOGICAL PROCEDURE UNLISTED      Pain pump    PAIN PUMP DEVICE  00/00/2007    in left lower abdomen (for foot pain)     Social History     Social History    Marital status:      Spouse name: N/A    Number of children: N/A    Years of education: N/A     Social History Main Topics    Smoking status: Former Smoker     Packs/day: 0.10     Years: 2.00     Quit date: 1/1/1975    Smokeless tobacco: Never Used    Alcohol use No    Drug use: No    Sexual activity: Not Asked     Other Topics Concern    None     Social History Narrative     Family History   Problem Relation Age of Onset    Arrhythmia Father     Heart Attack Father     Cancer Brother      Melanoma    Breast Cancer Daughter      48    Breast Cancer Daughter 46    Breast Cancer Daughter 46     Allergies   Allergen Reactions    Duragesic [Fentanyl] Other (comments)     Patient sees things when taking    Codeine Nausea Only     Current Outpatient Prescriptions   Medication Sig Dispense Refill    multivitamin with iron (DAILY MULTI-VITAMINS/IRON) tablet Take 1 Tab by mouth daily.  b complex vitamins tablet Take 1 Tab by mouth daily.  vitamin E (AQUA GEMS) 400 unit capsule Take  by mouth daily.  amitriptyline (ELAVIL) 100 mg tablet Take 1 Tab by mouth nightly. 90 Tab 1    cyclobenzaprine (FLEXERIL) 5 mg tablet Take 1 Tab by mouth three (3) times daily as needed for Muscle Spasm(s). 30 Tab 0    metoprolol tartrate (LOPRESSOR) 25 mg tablet TAKE 1 TAB BY MOUTH TWO (2) TIMES A DAY.  180 Tab 3  gabapentin (NEURONTIN) 800 mg tablet TAKE 1 TABLET BY MOUTH THREE (3) TIMES DAILY. 90 Tab 5    aspirin 81 mg chewable tablet Take 1 Tab by mouth daily. 90 Tab 3    omega 3-dha-epa-fish oil (FISH OIL) 100-160-1,000 mg cap Take 1 Cap by mouth daily.  losartan (COZAAR) 100 mg tablet TAKE 1 TABLET BY MOUTH EVERY DAY 90 Tab 3    atorvastatin (LIPITOR) 40 mg tablet TAKE 1 TABLET BY MOUTH NIGHTLY. 90 Tab 3    RESTASIS 0.05 % ophthalmic emulsion   3    OMEPRAZOLE PO Take 20 mg by mouth daily. ROS    Visit Vitals    /84 (BP 1 Location: Right arm, BP Patient Position: Sitting)    Pulse 82    Temp 98.2 °F (36.8 °C) (Oral)    Resp 14    Ht 5' 7\" (1.702 m)    Wt 191 lb 8 oz (86.9 kg)    SpO2 95%    BMI 29.99 kg/m2     Physical Exam   Constitutional: She is oriented to person, place, and time. She appears well-developed and well-nourished. HENT:   Head: Normocephalic and atraumatic. Eyes: Pupils are equal, round, and reactive to light. Neck: Neck supple. Cardiovascular: Normal rate, regular rhythm and normal heart sounds. Exam reveals no gallop. No murmur heard. Pulmonary/Chest: Effort normal and breath sounds normal. She has no wheezes. She has no rales. Musculoskeletal: She exhibits no edema. Left shoulder: She exhibits decreased range of motion (active abduction to 90 @, passsive to 180), tenderness, spasm and decreased strength (but seems to be pain induced). She exhibits no bony tenderness and no swelling. Cervical back: She exhibits decreased range of motion (rotation to left), tenderness and spasm. She exhibits no bony tenderness. Back:    Neurological: She is alert and oriented to person, place, and time. No sensory deficit. Reflex Scores:       Bicep reflexes are 2+ on the right side and 2+ on the left side.   Motor strength, right and left, is 5/5 to finger flexion/extension, abduction and opposition, wrist flexion/extension, elbow flexion extension     Skin: Skin is warm and dry. No erythema. Nursing note and vitals reviewed. ASSESSMENT and PLAN    ICD-10-CM ICD-9-CM    1. Neck pain on left side M54.2 723.1 XR SPINE CERV 4 OR 5 V      AMB SUPPLY ORDER   2. Chronic left shoulder pain M25.512 719.41 XR SHOULDER LEFT 3 VIEW    G89.29 338.29 AMB SUPPLY ORDER         Neck pain on left side  Continue physical therapy. See if insurance covers TENS unit. -     XR SPINE CERV 4 OR 5 V; Future  -     AMB SUPPLY ORDER    Chronic left shoulder pain  -     XR SHOULDER LEFT 3 VIEW; Future  -     AMB SUPPLY ORDER      Follow-up Disposition:  Return if symptoms worsen or fail to improve. I have discussed the diagnosis with the patient and the intended plan as seen in the above orders. Patient is in agreement. The patient has received an after-visit summary and questions were answered concerning future plans. I have discussed medication side effects and warnings with the patient as well.

## 2017-07-26 ENCOUNTER — HOSPITAL ENCOUNTER (OUTPATIENT)
Dept: GENERAL RADIOLOGY | Age: 72
Discharge: HOME | End: 2017-07-26
Payer: MEDICARE

## 2017-07-26 DIAGNOSIS — M25.512 CHRONIC LEFT SHOULDER PAIN: ICD-10-CM

## 2017-07-26 DIAGNOSIS — M54.2 NECK PAIN ON LEFT SIDE: ICD-10-CM

## 2017-07-26 DIAGNOSIS — G89.29 CHRONIC LEFT SHOULDER PAIN: ICD-10-CM

## 2017-07-26 PROCEDURE — 72050 X-RAY EXAM NECK SPINE 4/5VWS: CPT

## 2017-07-26 PROCEDURE — 73030 X-RAY EXAM OF SHOULDER: CPT

## 2017-07-26 NOTE — LETTER
7/27/2017 10:21 AM 
 
[id number] [phone] Dear Vickie Pruitt: 
 
Please find your most recent results below. Resulted Orders XR SPINE CERV 4 OR 5 V Narrative INDICATION: Painon left, no injury. EXAM: CERVICAL SPINE RADIOGRAPHS, MINIMUM 4 VIEWS. FINDINGS: A 6 view examination of the cervical spine reveals normal bony 
alignment and bone mineral content for age. There is no obvious acute fracture 
or dislocation . Vertebral body heights are preserved. Disc space height is 
diminished at C4-5 and C5-6, with endplate sclerosis and spondylosis. Mild 
foraminal encroachment at these levels. . The prevertebral soft tissue space is 
normal, as is the predental space. Odontoid view shows normal alignment. . There 
is mild multilevel degenerative change. IMPRESSION: No acute bony abnormality of the cervical spine . Multilevel 
degenerative disc disease. Gary Salguero RECOMMENDATIONS: 
 
Inform patient that Xray of neck shows degenerative discs and arthritis. Xray of shoulder shows arthritis at a-c joint, but not main shoulder joint. Continue PT. If not getting improvement in another 2-3 weeks, consider seeing orthopedic specialist. 
 
 
 
 
Please call me if you have any questions: [phone] Sincerely, Sincerely,  
Trudy Ashford LPN  [facility] per Dr Sandra Baker note

## 2017-07-26 NOTE — PROGRESS NOTES
Inform patient that Xray of neck shows degenerative discs and arthritis. Xray of shoulder shows arthritis at a-c joint, but not main shoulder joint. Continue PT.  If not getting improvement in another 2-3 weeks, consider seeing orthopedic specialist.

## 2017-07-31 ENCOUNTER — TELEPHONE (OUTPATIENT)
Dept: INTERNAL MEDICINE CLINIC | Age: 72
End: 2017-07-31

## 2017-07-31 DIAGNOSIS — M54.2 NECK PAIN: Primary | ICD-10-CM

## 2017-07-31 NOTE — TELEPHONE ENCOUNTER
Rquests pain medication for neck. Thinks PT made pain no better and maybe worse after treatment. She agrees to see orthopedic physician. She cannot take tramadol due to medication interactions. She is intolerant of codeine (nausea only). Do not think anything stronger is indicated, She can try Aleve 2 tablets twice a day for 7-10 days, but should not take longer due to diabetes and hypertension.

## 2017-08-04 NOTE — TELEPHONE ENCOUNTER
Pt returned my call, verified two patient identifiers, name and . She was given information about pain medications and advised to take the Aleve 2 tablets twice a day and no longer than 7 - 10 days. She has an orthopedic she has seen before, she will call and make an appointment. Advised to let us know who she will be seeing and when so we can send notes and xray results, stated she would.

## 2017-09-05 ENCOUNTER — TELEPHONE (OUTPATIENT)
Dept: INTERNAL MEDICINE CLINIC | Age: 72
End: 2017-09-05

## 2017-09-05 NOTE — TELEPHONE ENCOUNTER
I need notes from [facility]. All I have is an X-ray disc (which she needs to take to radiologist if we do CT scan) and an x-ray report.

## 2017-09-05 NOTE — TELEPHONE ENCOUNTER
Requested records from [facility] med and advised to put gloria zarate/dr Yumiko Lam

## 2017-09-05 NOTE — TELEPHONE ENCOUNTER
----- Message from Dusty Torres LPN sent at 7/4/5759  1:11 PM EDT -----  Regarding: FW: Wants appt today or tomorrow  Contact: [phone]  This writer called patient for appt, no answer. Patient walked into office at 1:12pm to inform dr Renee Modi she want to better med r/t  Congestion. Patient was dx with bronchitis and needs a ct scan and f/u with dr Renee Modi this week. Please advise  ----- Message -----     From: Buck Urrutia     Sent: 9/5/2017   9:26 AM       To: Dusty Torres LPN  Subject: Wants appt today or tomorrow                     Pt would like to come in to see anyone today or tomorrow for congestion.

## 2017-09-06 ENCOUNTER — HOSPITAL ENCOUNTER (OUTPATIENT)
Dept: CT IMAGING | Age: 72
Discharge: HOME | End: 2017-09-06
Payer: MEDICARE

## 2017-09-06 DIAGNOSIS — R93.89 ABNORMAL CHEST X-RAY: ICD-10-CM

## 2017-09-06 LAB — CREAT BLD-MCNC: 0.6 MG/DL (ref 0.6–1.3)

## 2017-09-06 PROCEDURE — 82565 ASSAY OF CREATININE: CPT

## 2017-09-06 PROCEDURE — 74011250636 HC RX REV CODE- 250/636: Performed by: INTERNAL MEDICINE

## 2017-09-06 PROCEDURE — 74011636320 HC RX REV CODE- 636/320: Performed by: INTERNAL MEDICINE

## 2017-09-06 PROCEDURE — 71260 CT THORAX DX C+: CPT

## 2017-09-06 RX ORDER — SODIUM CHLORIDE 0.9 % (FLUSH) 0.9 %
10 SYRINGE (ML) INJECTION
Status: COMPLETED | OUTPATIENT
Start: 2017-09-06 | End: 2017-09-06

## 2017-09-06 RX ORDER — SODIUM CHLORIDE 9 MG/ML
50 INJECTION, SOLUTION INTRAVENOUS
Status: COMPLETED | OUTPATIENT
Start: 2017-09-06 | End: 2017-09-06

## 2017-09-06 RX ADMIN — Medication 10 ML: at 15:32

## 2017-09-06 RX ADMIN — IOPAMIDOL 100 ML: 612 INJECTION, SOLUTION INTRAVENOUS at 15:32

## 2017-09-06 RX ADMIN — SODIUM CHLORIDE 50 ML/HR: 900 INJECTION, SOLUTION INTRAVENOUS at 15:32

## 2017-09-06 NOTE — TELEPHONE ENCOUNTER
I have only 2 pages of records from [facility] and do not have the history or physical. It appears [facility] ordered CT scan. Until we get those results, I cannot tell her what needs to be done next. She may need to see a specialist rather than us.

## 2017-09-06 NOTE — TELEPHONE ENCOUNTER
----- Message from Gardenia Ramirez LPN sent at 4/5/0268  1:11 PM EDT -----  Regarding: FW: Wants appt today or tomorrow  Contact: [phone]  This writer called patient for appt, no answer. Patient walked into office at 1:12pm to inform dr Tyler Nash she want to better med r/t  Congestion. Patient was dx with bronchitis and needs a ct scan and f/u with dr Tyler Nash this week. Please advise  ----- Message -----     From: Juan Miguel Antonio     Sent: 9/5/2017   9:26 AM       To: Gardenia Ramirez LPN  Subject: Wants appt today or tomorrow                     Pt would like to come in to see anyone today or tomorrow for congestion.

## 2017-09-06 NOTE — TELEPHONE ENCOUNTER
Patient advised to have ct scan done ordered by [facility].  Patient informed of  number for junior baez

## 2017-09-08 ENCOUNTER — OFFICE VISIT (OUTPATIENT)
Dept: INTERNAL MEDICINE CLINIC | Age: 72
End: 2017-09-08

## 2017-09-08 VITALS
SYSTOLIC BLOOD PRESSURE: 138 MMHG | BODY MASS INDEX: 29.98 KG/M2 | RESPIRATION RATE: 16 BRPM | HEIGHT: 67 IN | TEMPERATURE: 98.8 F | WEIGHT: 191 LBS | HEART RATE: 77 BPM | OXYGEN SATURATION: 95 % | DIASTOLIC BLOOD PRESSURE: 78 MMHG

## 2017-09-08 DIAGNOSIS — J01.90 ACUTE NON-RECURRENT SINUSITIS, UNSPECIFIED LOCATION: ICD-10-CM

## 2017-09-08 DIAGNOSIS — J44.1 COPD EXACERBATION (HCC): Primary | ICD-10-CM

## 2017-09-08 DIAGNOSIS — D86.2 SARCOIDOSIS OF LUNG WITH SARCOIDOSIS OF LYMPH NODES (HCC): ICD-10-CM

## 2017-09-08 PROBLEM — D86.1 SARCOIDOSIS OF LYMPH NODES: Status: ACTIVE | Noted: 2017-09-08

## 2017-09-08 RX ORDER — AMOXICILLIN AND CLAVULANATE POTASSIUM 875; 125 MG/1; MG/1
1 TABLET, FILM COATED ORAL EVERY 12 HOURS
Qty: 20 TAB | Refills: 0 | Status: SHIPPED | OUTPATIENT
Start: 2017-09-08 | End: 2017-10-19 | Stop reason: ALTCHOICE

## 2017-09-08 RX ORDER — PREDNISONE 20 MG/1
TABLET ORAL
Qty: 13 TAB | Refills: 0 | Status: SHIPPED | OUTPATIENT
Start: 2017-09-08 | End: 2017-10-19 | Stop reason: ALTCHOICE

## 2017-09-08 RX ORDER — ALBUTEROL SULFATE 90 UG/1
2 AEROSOL, METERED RESPIRATORY (INHALATION)
Qty: 1 INHALER | Refills: 1 | Status: SHIPPED | OUTPATIENT
Start: 2017-09-08 | End: 2021-06-11

## 2017-09-08 RX ORDER — ALBUTEROL SULFATE 0.83 MG/ML
2.5 SOLUTION RESPIRATORY (INHALATION) ONCE
Qty: 1 EACH | Refills: 0
Start: 2017-09-08 | End: 2017-09-08

## 2017-09-08 NOTE — PROGRESS NOTES
Reviewed record In preparation for visit and have obtained necessary documentation. Advanced directive / living will on file. 1. Have you been to the ER, urgent care clinic or hospitalized since your last visit? [facility] Med 9/5/17    2. Have you seen or consulted any other health care providers outside of the [address] [place] [address] since your last visit? Include any pap smears or colon screening. CT, PT    Vitals reviewed with provider.     Health Maintenance reviewed:

## 2017-09-08 NOTE — MR AVS SNAPSHOT
Visit Information Date & Time Provider Department Dept. Phone Encounter #  
 9/8/2017  3:30 PM MD Haley Ornelas [phone] [id number] Follow-up Instructions Return if symptoms worsen or fail to improve. Your Appointments 10/23/2017  1:30 PM  
ROUTINE CARE with MD Haley Ornelas [address] [facility]) Appt Note: 6mth f/u; DM, chol, htn, POCA1c  Fasting lab next week [address] [address] [phone]  
  
   
 [facility] [address] Upcoming Health Maintenance Date Due INFLUENZA AGE 9 TO ADULT 8/1/2017 DTaP/Tdap/Td series (1 - Tdap) 9/22/2019* HEMOGLOBIN A1C Q6M 10/21/2017 EYE EXAM RETINAL OR DILATED Q1 11/29/2017 FOOT EXAM Q1 4/21/2018 MICROALBUMIN Q1 4/21/2018 MEDICARE YEARLY EXAM 4/22/2018 FOBT Q 1 YEAR AGE 50-75 4/23/2018 LIPID PANEL Q1 4/28/2018 GLAUCOMA SCREENING Q2Y 11/29/2018 *Topic was postponed. The date shown is not the original due date. Allergies as of 9/8/2017  Review Complete On: 9/8/2017 By: Oleg Markham MD  
  
 Severity Noted Reaction Type Reactions Duragesic [Fentanyl] High 02/08/2010    Other (comments) Patient sees things when taking Codeine  12/23/2009    Nausea Only Current Immunizations  Reviewed on 9/8/2017 Name Date Influenza High Dose Vaccine PF 9/22/2016, 11/12/2015 Influenza Vaccine 10/17/2014, 10/3/2013 Influenza Vaccine Split 10/8/2012, 10/7/2011 Influenza Vaccine Whole 9/22/2010 Pneumococcal Conjugate (PCV-13) 3/22/2016 TD Vaccine 9/22/2009 ZZZ-RETIRED (DO NOT USE) Pneumococcal Vaccine (Unspecified Type) 9/22/2009 Reviewed by Amber Cesar LPN on 1/9/3095 at  0:20 PM  
You Were Diagnosed With   
  
 Codes Comments COPD exacerbation ([facility]    -  Primary ICD-10-CM: J44.1 ICD-9-CM: 491.21   
 Acute non-recurrent sinusitis, unspecified location     ICD-10-CM: J01.90 ICD-9-CM: 461.9 Vitals BP Pulse Temp Resp Height(growth percentile) Weight(growth percentile) 138/78 (BP 1 Location: Left arm, BP Patient Position: Sitting) 77 98.8 °F (37.1 °C) (Oral) 16 5' 7\" (1.702 m) 191 lb (86.6 kg) SpO2 BMI OB Status Smoking Status 95% 29.91 kg/m2 Hysterectomy Former Smoker Vitals History BMI and BSA Data Body Mass Index Body Surface Area  
 29.91 kg/m 2 2.02 m 2 Preferred Pharmacy Pharmacy Name Phone [id number] [address] [id number] [phone] Your Updated Medication List  
  
   
This list is accurate as of: 9/8/17  5:14 PM.  Always use your most recent med list.  
  
  
  
  
 * albuterol 2.5 mg /3 mL (0.083 %) nebulizer solution Commonly known as:  PROVENTIL VENTOLIN  
3 mL by Nebulization route once for 1 dose. * albuterol 90 mcg/actuation inhaler Commonly known as:  PROVENTIL HFA, VENTOLIN HFA, PROAIR HFA Take 2 Puffs by inhalation every four (4) hours as needed for Wheezing. amitriptyline 100 mg tablet Commonly known as:  ELAVIL Take 1 Tab by mouth nightly. amoxicillin-clavulanate 875-125 mg per tablet Commonly known as:  AUGMENTIN Take 1 Tab by mouth every twelve (12) hours. aspirin 81 mg chewable tablet Take 1 Tab by mouth daily. atorvastatin 40 mg tablet Commonly known as:  LIPITOR  
TAKE 1 TABLET BY MOUTH NIGHTLY. b complex vitamins tablet Take 1 Tab by mouth daily. DAILY MULTI-VITAMINS/IRON tablet Generic drug:  multivitamin with iron Take 1 Tab by mouth daily. FISH -160-1,000 mg Cap Generic drug:  omega 3-dha-epa-fish oil Take 1 Cap by mouth daily. gabapentin 800 mg tablet Commonly known as:  NEURONTIN  
TAKE 1 TABLET BY MOUTH THREE (3) TIMES DAILY. losartan 100 mg tablet Commonly known as:  COZAAR  
 TAKE 1 TABLET BY MOUTH EVERY DAY  
  
 metoprolol tartrate 25 mg tablet Commonly known as:  LOPRESSOR  
TAKE 1 TAB BY MOUTH TWO (2) TIMES A DAY. OMEPRAZOLE PO Take 20 mg by mouth daily. predniSONE 20 mg tablet Commonly known as:  [place] Take 3 tablets for 3 days and 2 tablets for 2 days. RESTASIS 0.05 % ophthalmic emulsion Generic drug:  cycloSPORINE  
  
 vitamin E 400 unit capsule Commonly known as:  [address] Take  by mouth daily. * Notice: This list has 2 medication(s) that are the same as other medications prescribed for you. Read the directions carefully, and ask your doctor or other care provider to review them with you. Prescriptions Sent to Pharmacy Refills  
 predniSONE (DELTASONE) 20 mg tablet 0 Sig: Take 3 tablets for 3 days and 2 tablets for 2 days. Class: Normal  
 Pharmacy: [facility]/pharmacy #[id number] Ph #: [phone]  
 albuterol (PROVENTIL HFA, VENTOLIN HFA, PROAIR HFA) 90 mcg/actuation inhaler 1 Sig: Take 2 Puffs by inhalation every four (4) hours as needed for Wheezing. Class: Normal  
 Pharmacy: [facility]/pharmacy #[id number] [address], [address] Ph #: [phone] Route: Inhalation  
 amoxicillin-clavulanate (AUGMENTIN) 875-125 mg per tablet 0 Sig: Take 1 Tab by mouth every twelve (12) hours. Class: Normal  
 Pharmacy: [facility]/pharmacy #[id number] [place] [address], [address] Ph #: [phone] Route: Oral  
  
We Performed the Following ALBUTEROL, INHAL. SOL., FDA-APPROVED FINAL, NON-COMPOUND UNIT DOSE, 1 MG [ \[facility] [place] [facility]\""] Follow-up Instructions Return if symptoms worsen or fail to improve. To-Do List   
 09/08/2017 Procedures:  INHAL RX, AIRWAY OBST/DX SPUTUM INDUCT Patient Instructions Use albuterol inhaler every 4 hours while awake for at least 48 hours, then as needed for cough or shortness. Prednisone 20 mg, 3 tablets daily for 3 days, then 2 daily for 2 days Augmentin (amoxicillin/clavulonate) 875/125 mg twice a day with food for 10 days If not improving call me on Monday. We will adjust medication or arrange to see you again. Using a Metered-Dose Inhaler: Care Instructions Your Care Instructions A metered-dose inhaler lets you breathe medicine into your lungs quickly. Inhaled medicine works faster than the same medicine in a pill. An inhaler allows you to take less medicine than you would need if you took it as a pill. \"Metered-dose\" means that the inhaler gives a measured amount of medicine each time you use it. A metered-dose inhaler gives medicine in the form of a liquid mist. 
Your doctor may want you to use a spacer with your inhaler. A spacer is a chamber that you attach to the inhaler. The chamber holds the medicine before you inhale it. That way, you can inhale the medicine in as many breaths as you need. Doctors recommend using a spacer with most metered-dose inhalers, especially those with corticosteroid medicines. Follow-up care is a key part of your treatment and safety. Be sure to make and go to all appointments, and call your doctor if you are having problems. It's also a good idea to know your test results and keep a list of the medicines you take. How can you care for yourself at home? To get started using your inhaler · Talk with your health care provider to be sure you are using your inhaler the right way. It might help if you practice using it in front of a mirror. Use the inhaler exactly as prescribed. · Check that you have the correct medicine. If you use more than one inhaler, put a label on each one. This will let you know which one to use at the right time. · Keep track of how much medicine is in the inhaler. Check the label to see how many doses are in the container.  If you know how many puffs you can take, you can replace the inhaler before you run out. Ask your health care provider how you can keep track of how much medicine is left. · Use a spacer if you have problems pressing the inhaler and breathing in at the same time. You also may need a spacer if you are using corticosteroid medicines. · If you are using a corticosteroid inhaler, gargle and rinse out your mouth with water after use. Do not swallow the water. Swallowing the water will increase the chance that the medicine will get into your bloodstream. This may make it more likely that you will have side effects. To use an inhaler without a spacer 1. Shake the inhaler as directed. Remove the cap. Check the instructions to see if you need to prime your inhaler before you use it. If it needs priming, follow the instructions on how to prime your inhaler. 2. Hold the inhaler upright with the mouthpiece at the bottom. 3. Tilt your head back a little, and breathe out slowly and completely. 4. Position the inhaler in one of two ways: 
¨ You can place the inhaler in your mouth. This is easier for most people. And it lowers the risk that any of the medicine will get into your eyes. ¨ Or you can place the inhaler 1 to 2 inches in front of your open mouth, without closing your lips over it. Try to open your mouth as wide as you can. Placing the inhaler in front of your open mouth may be better for getting the medicine into your lungs. But some people may find this too hard to do. 5. Start taking slow, even breaths through your mouth. Press down on the inhaler once, then inhale fully. 6. Hold your breath for 10 seconds. This will let the medicine settle in your lungs. 7. If you need to take a second dose, wait 30 to 60 seconds to allow the inhaler valve to refill. Where can you learn more? Go to http://alejandro-samuel.info/. Enter K111 in the search box to learn more about \"Using a Metered-Dose Inhaler: Care Instructions. \" 
 Current as of: March 25, 2017 Content Version: 11.3 © 9976-7250 [employer], [employer]. Care instructions adapted under license by [employer] (which disclaims liability or warranty for this information). If you have questions about a medical condition or this instruction, always ask your healthcare professional. [address] any warranty or liability for your use of this information. Introducing [facility]! Sondra Novak introduces [employer] patient portal. Now you can access parts of your medical record, email your doctor's office, and request medication refills online. 1. In your internet browser, go to https://[employer]. [employer] 2. Click on the First Time User? Click Here link in the Sign In box. You will see the New Member Sign Up page. 3. Enter your [employer] Access Code exactly as it appears below. You will not need to use this code after youve completed the sign-up process. If you do not sign up before the expiration date, you must request a new code. · [employer] Access Code: [email or web address] Expires: 10/15/2017  3:48 PM 
 
4. Enter the last four digits of your Social Security Number (xxxx) and Date of Birth (mm/dd/yyyy) as indicated and click Submit. You will be taken to the next sign-up page. 5. Create a [employer] ID. This will be your [employer] login ID and cannot be changed, so think of one that is secure and easy to remember. 6. Create a [employer] password. You can change your password at any time. 7. Enter your Password Reset Question and Answer. This can be used at a later time if you forget your password. 8. Enter your e-mail address. You will receive e-mail notification when new information is available in [address] 9. Click Sign Up. You can now view and download portions of your medical record. 10. Click the Download Summary menu link to download a portable copy of your medical information. If you have questions, please visit the Frequently Asked Questions section of the [employer] website. Remember, [facility] is NOT to be used for urgent needs. For medical emergencies, dial 911. Now available from your iPhone and Android! Please provide this summary of care documentation to your next provider. Your primary care clinician is listed as Ramonita Console. If you have any questions after today's visit, please call [phone].

## 2017-09-08 NOTE — PATIENT INSTRUCTIONS
Use albuterol inhaler every 4 hours while awake for at least 48 hours, then as needed for cough or shortness. Prednisone 20 mg, 3 tablets daily for 3 days, then 2 daily for 2 days  Augmentin (amoxicillin/clavulonate) 875/125 mg twice a day with food for 10 days  If not improving call me on Monday. We will adjust medication or arrange to see you again. Using a Metered-Dose Inhaler: Care Instructions  Your Care Instructions    A metered-dose inhaler lets you breathe medicine into your lungs quickly. Inhaled medicine works faster than the same medicine in a pill. An inhaler allows you to take less medicine than you would need if you took it as a pill. \"Metered-dose\" means that the inhaler gives a measured amount of medicine each time you use it. A metered-dose inhaler gives medicine in the form of a liquid mist.  Your doctor may want you to use a spacer with your inhaler. A spacer is a chamber that you attach to the inhaler. The chamber holds the medicine before you inhale it. That way, you can inhale the medicine in as many breaths as you need. Doctors recommend using a spacer with most metered-dose inhalers, especially those with corticosteroid medicines. Follow-up care is a key part of your treatment and safety. Be sure to make and go to all appointments, and call your doctor if you are having problems. It's also a good idea to know your test results and keep a list of the medicines you take. How can you care for yourself at home? To get started using your inhaler  · Talk with your health care provider to be sure you are using your inhaler the right way. It might help if you practice using it in front of a mirror. Use the inhaler exactly as prescribed. · Check that you have the correct medicine. If you use more than one inhaler, put a label on each one. This will let you know which one to use at the right time. · Keep track of how much medicine is in the inhaler.  Check the label to see how many doses are in the container. If you know how many puffs you can take, you can replace the inhaler before you run out. Ask your health care provider how you can keep track of how much medicine is left. · Use a spacer if you have problems pressing the inhaler and breathing in at the same time. You also may need a spacer if you are using corticosteroid medicines. · If you are using a corticosteroid inhaler, gargle and rinse out your mouth with water after use. Do not swallow the water. Swallowing the water will increase the chance that the medicine will get into your bloodstream. This may make it more likely that you will have side effects. To use an inhaler without a spacer  1. Shake the inhaler as directed. Remove the cap. Check the instructions to see if you need to prime your inhaler before you use it. If it needs priming, follow the instructions on how to prime your inhaler. 2. Hold the inhaler upright with the mouthpiece at the bottom. 3. Tilt your head back a little, and breathe out slowly and completely. 4. Position the inhaler in one of two ways:  ¨ You can place the inhaler in your mouth. This is easier for most people. And it lowers the risk that any of the medicine will get into your eyes. ¨ Or you can place the inhaler 1 to 2 inches in front of your open mouth, without closing your lips over it. Try to open your mouth as wide as you can. Placing the inhaler in front of your open mouth may be better for getting the medicine into your lungs. But some people may find this too hard to do. 5. Start taking slow, even breaths through your mouth. Press down on the inhaler once, then inhale fully. 6. Hold your breath for 10 seconds. This will let the medicine settle in your lungs. 7. If you need to take a second dose, wait 30 to 60 seconds to allow the inhaler valve to refill. Where can you learn more? Go to http://alejandro-samuel.info/.   Enter K111 in the search box to learn more about \"Using a Metered-Dose Inhaler: Care Instructions. \"  Current as of: March 25, 2017  Content Version: 11.3  © 3443-5420 [employer]. Care instructions adapted under license by [employer] (which disclaims liability or warranty for this information). If you have questions about a medical condition or this instruction, always ask your healthcare professional. [address] any warranty or liability for your use of this information.

## 2017-09-08 NOTE — PROGRESS NOTES
HISTORY OF PRESENT ILLNESS  Dana Dockery is a 74 y.o. female. She is accompanied by her . HPI  She complains of 1 month of congestion, coryza and productive cough with green colored sputum and fatigue. Other symptoms include shortness of breath, ear pressure/pain, headache, sinus pressure, sore throat and wheezing. She denies fever or chills. Clinical course has been gradually worsening since that time. She has tried DayQuil, Pseudofed with no relief. Past history is significant for COPD and sarcoidosis. Patient is former smoker. She went to [facility] on 9/5 where a chest X-ray showed mediastinal adenopathy. She was sent for a CT scan that showed mediastinal and hilar adenopathy comparable to that seen on CT scans at least as long ago as 2009. She was diagnosed and followed at that time by Dr Emerson Moritz for sarcoidosis. She has not seen him in some time. [facility] gave her a referral to oncology. No treatment was offered for congestion or dyspnea. Patient Active Problem List   Diagnosis Code    Headache R51    Carpal tunnel syndrome G56.00    Hypercholesteremia E78.00    Depression F32.9    Abnormal chest x-ray R93.8    Complex regional pain syndrome G90.50    Diabetic peripheral neuropathy associated with type 2 diabetes mellitus ([address] E11.42    Hypertension, essential I10    Diabetes mellitus type 2, diet-controlled ([address] E11.9    Osteopenia M85.80    Vitamin D deficiency E55.9    GERD (gastroesophageal reflux disease) K21.9    Psoriasis L40.9    Paroxysmal atrial fibrillation (HCC) I48.0    Hepatic steatosis K76.0    COPD, severity to be determined ([address] J44.9    Sarcoidosis of lymph nodes (HCC) D86.1     Past Medical History:   Diagnosis Date    Abnormal chest x-ray 12/23/2009    Anemia 12/23/2009    Arrhythmia     SVT.  Converts with Adenocard    Atrial fibrillation ([address] 12/23/2009    Carpal tunnel syndrome 12/23/2009    Chronic obstructive pulmonary disease ([address]     Colitis 12/23/2009    Complex regional pain syndrome 12/23/2009    Depression 12/23/2009    Diabetes [facility]     GERD (gastroesophageal reflux disease) 2/8/2010    HTN (hypertension) 12/23/2009    Hypercholesteremia 12/23/2009    Hypertriglyceridemia 12/23/2009    Idiopathic peripheral neuropathy 12/23/2009    Iron deficiency anemia 12/23/2009    Osteopenia 12/23/2009    Paroxysmal atrial fibrillation ([facility] 12/2/2011    Psoriasis 6/27/2012    Sarcoidosis ([facility]      Past Surgical History:   Procedure Laterality Date    HX CARPAL TUNNEL RELEASE  00/00/2002    Both hands    HX CYST REMOVAL  00/00/1965    Left wrist    HX HYSTERECTOMY  00/00/1985    HX ORTHOPAEDIC  00/00/1987    back surgery (disc)    HX ORTHOPAEDIC      right foot X 3 neuromas and tarsal tunnel release    HX TONSILLECTOMY  00/00/1962    NEUROLOGICAL PROCEDURE UNLISTED      Pain pump    PAIN PUMP DEVICE  00/00/2007    in left lower abdomen (for foot pain)     Social History     Social History    Marital status:      Spouse name: N/A    Number of children: N/A    Years of education: N/A     Social History Main Topics    Smoking status: Former Smoker     Packs/day: 0.10     Years: 2.00     Quit date: 1/1/1975    Smokeless tobacco: Never Used    Alcohol use No    Drug use: No    Sexual activity: Not Asked     Other Topics Concern    None     Social History Narrative     Family History   Problem Relation Age of Onset    Arrhythmia Father     Heart Attack Father     Cancer Brother      Melanoma    Breast Cancer Daughter      48    Breast Cancer Daughter 46    Breast Cancer Daughter 46     Allergies   Allergen Reactions    Duragesic [Fentanyl] Other (comments)     Patient sees things when taking    Codeine Nausea Only     Current Outpatient Prescriptions   Medication Sig Dispense Refill    multivitamin with iron (DAILY MULTI-VITAMINS/IRON) tablet Take 1 Tab by mouth daily.       b complex vitamins tablet Take 1 Tab by mouth daily.  vitamin E (AQUA GEMS) 400 unit capsule Take  by mouth daily.  amitriptyline (ELAVIL) 100 mg tablet Take 1 Tab by mouth nightly. 90 Tab 1    metoprolol tartrate (LOPRESSOR) 25 mg tablet TAKE 1 TAB BY MOUTH TWO (2) TIMES A DAY. 180 Tab 3    gabapentin (NEURONTIN) 800 mg tablet TAKE 1 TABLET BY MOUTH THREE (3) TIMES DAILY. 90 Tab 5    aspirin 81 mg chewable tablet Take 1 Tab by mouth daily. 90 Tab 3    omega 3-dha-epa-fish oil (FISH OIL) 100-160-1,000 mg cap Take 1 Cap by mouth daily.  losartan (COZAAR) 100 mg tablet TAKE 1 TABLET BY MOUTH EVERY DAY 90 Tab 3    atorvastatin (LIPITOR) 40 mg tablet TAKE 1 TABLET BY MOUTH NIGHTLY. 90 Tab 3    RESTASIS 0.05 % ophthalmic emulsion   3    OMEPRAZOLE PO Take 20 mg by mouth daily. ROS     Visit Vitals    /78 (BP 1 Location: Left arm, BP Patient Position: Sitting)    Pulse 77    Temp 98.8 °F (37.1 °C) (Oral)    Resp 16    Ht 5' 7\" (1.702 m)    Wt 191 lb (86.6 kg)    SpO2 95%    BMI 29.91 kg/m2     Physical Exam   Constitutional: She is oriented to person, place, and time. She appears well-developed and well-nourished. HENT:   Head: Normocephalic and atraumatic. Right Ear: Tympanic membrane and ear canal normal.   Left Ear: Tympanic membrane and ear canal normal.   Nose: No mucosal edema. Mouth/Throat: Oropharynx is clear and moist and mucous membranes are normal. Mucous membranes are not pale and not dry. No oropharyngeal exudate, posterior oropharyngeal edema or posterior oropharyngeal erythema. Eyes: Conjunctivae are normal. Pupils are equal, round, and reactive to light. Right eye exhibits no discharge. Left eye exhibits no discharge. Neck: Neck supple. Cardiovascular: Normal rate, regular rhythm, S1 normal, S2 normal and normal heart sounds. Exam reveals no gallop. No murmur heard. Pulmonary/Chest: Effort normal. She has decreased breath sounds. She has no wheezes. She has no rhonchi.  She has no rales.   After nebulizer treatment with albuterol she was improved with less cough, less chest tightness. Physical exam showed improved air movement and  end expiratory wheezing. Lymphadenopathy:     She has no cervical adenopathy. Neurological: She is alert and oriented to person, place, and time. Skin: Skin is warm, dry and intact. No rash noted. Nursing note and vitals reviewed. ASSESSMENT and PLAN    ICD-10-CM ICD-9-CM    1. COPD exacerbation ([facility]) J44.1 491.21 albuterol (PROVENTIL VENTOLIN) 2.5 mg /3 mL (0.083 %) nebulizer solution      ALBUTEROL, INHAL. SOL., FDA-APPROVED FINAL, NON-COMPOUND UNIT DOSE, 1 MG      INHAL RX, AIRWAY OBST/DX SPUTUM INDUCT      predniSONE (DELTASONE) 20 mg tablet      albuterol (PROVENTIL HFA, VENTOLIN HFA, PROAIR HFA) 90 mcg/actuation inhaler   2. Acute non-recurrent sinusitis, unspecified location J01.90 461.9 amoxicillin-clavulanate (AUGMENTIN) 875-125 mg per tablet   3. Sarcoidosis of lung with sarcoidosis of lymph nodes ([facility] D86.2 135      517.8      Follow-up Disposition:  Return if symptoms worsen or fail to improve. current treatment plan is effective, no change in therapy  reviewed diet, exercise and weight control  radiology results and schedule of future radiology studies reviewed with patient  I have discussed the diagnosis with the patient and the intended plan as seen in the above orders. Patient is in agreement. The patient has received an after-visit summary and questions were answered concerning future plans. I have discussed medication side effects and warnings with the patient as well.   45 minutes were spent with patient and , more than 50% in counseling regarding sarcoidosis, treatment of bronchitis and reassuring that she did not need to see oncologist.

## 2017-10-09 ENCOUNTER — TELEPHONE (OUTPATIENT)
Dept: INTERNAL MEDICINE CLINIC | Age: 72
End: 2017-10-09

## 2017-10-09 NOTE — TELEPHONE ENCOUNTER
Pt states fell on 10/1/17 and hit her head on her cedar chest. Today has been experiencing some head pain, states that even moving her hair hurts  Would like call from nurse to further discuss   May be reached at [phone]

## 2017-10-09 NOTE — TELEPHONE ENCOUNTER
Patient reports she fell and hit her head on a cedar chest 10/1/17. Patient reports she did not seek medical care r/t she felt ok. Patient reports she has a \"knot\" on the side of her head and would like to be seen r/t the issue. Patient denies any other issues at this time; no nausea/vomiting, dizziness, or memory loss.  appt set 10/10/17 10am

## 2017-10-13 ENCOUNTER — HOSPITAL ENCOUNTER (OUTPATIENT)
Dept: CT IMAGING | Age: 72
Discharge: HOME | End: 2017-10-13
Attending: ORTHOPAEDIC SURGERY
Payer: MEDICARE

## 2017-10-13 ENCOUNTER — HOSPITAL ENCOUNTER (OUTPATIENT)
Dept: GENERAL RADIOLOGY | Age: 72
Discharge: HOME | End: 2017-10-13
Attending: ORTHOPAEDIC SURGERY
Payer: MEDICARE

## 2017-10-13 VITALS
OXYGEN SATURATION: 94 % | SYSTOLIC BLOOD PRESSURE: 164 MMHG | TEMPERATURE: 98.6 F | HEART RATE: 78 BPM | RESPIRATION RATE: 20 BRPM | DIASTOLIC BLOOD PRESSURE: 77 MMHG

## 2017-10-13 DIAGNOSIS — M47.812 CERVICAL SPONDYLOSIS WITHOUT MYELOPATHY: ICD-10-CM

## 2017-10-13 DIAGNOSIS — M54.2 CERVICALGIA: ICD-10-CM

## 2017-10-13 PROCEDURE — 74011000250 HC RX REV CODE- 250: Performed by: RADIOLOGY

## 2017-10-13 PROCEDURE — 74011636320 HC RX REV CODE- 636/320: Performed by: RADIOLOGY

## 2017-10-13 PROCEDURE — 62302 MYELOGRAPHY LUMBAR INJECTION: CPT

## 2017-10-13 PROCEDURE — 72126 CT NECK SPINE W/DYE: CPT

## 2017-10-13 RX ORDER — LIDOCAINE HYDROCHLORIDE 10 MG/ML
10 INJECTION INFILTRATION; PERINEURAL
Status: COMPLETED | OUTPATIENT
Start: 2017-10-13 | End: 2017-10-13

## 2017-10-13 RX ADMIN — LIDOCAINE HYDROCHLORIDE 1 ML: 10 INJECTION, SOLUTION INFILTRATION; PERINEURAL at 09:00

## 2017-10-13 RX ADMIN — IOHEXOL 10 ML: 300 INJECTION, SOLUTION INTRAVENOUS at 09:45

## 2017-10-13 NOTE — IP AVS SNAPSHOT
[address] [facility] 
[phone] Patient: Gale Ewing MRN: [id number] YVT:9[birth year] You are allergic to the following Allergen Reactions Duragesic (Fentanyl) Other (comments) Patient sees things when taking Codeine Nausea Only Recent Documentation OB Status Smoking Status Hysterectomy Former Smoker Emergency Contacts Name Discharge Info Relation Home Work Mobile Aaron Madden DISCHARGE CAREGIVER [3] Spouse [3] [phone] About your hospitalization You were admitted on:  October 13, 2017 You last received care in the:  [facility] RADIOLOGY You were discharged on:  October 13, 2017 Unit phone number:  [phone] Why you were hospitalized Your primary diagnosis was:  Not on File Providers Seen During Your Hospitalizations Provider Role Specialty Primary office phone Presley Khoury MD Attending Provider Orthopedic Surgery [phone] Your Primary Care Physician (PCP) Primary Care Physician Office Phone Office Fax [address] [facility], [address] [place] [address] Road [phone] Follow-up Information None Your Appointments Monday October 23, 2017  1:30 PM EDT ROUTINE CARE with MD Alannah Moctezuma [facility] [address] [place] [address] [phone] Current Discharge Medication List  
  
ASK your doctor about these medications Dose & Instructions Dispensing Information Comments Morning Noon Evening Bedtime  
 albuterol 90 mcg/actuation inhaler Commonly known as:  PROVENTIL HFA, VENTOLIN HFA, PROAIR HFA Your last dose was: Your next dose is:    
   
   
 Dose:  2 Puff Take 2 Puffs by inhalation every four (4) hours as needed for Wheezing. Quantity:  1 Inhaler Refills:  1 amitriptyline 100 mg tablet Commonly known as:  ELAVIL Your last dose was: Your next dose is:    
   
   
 Dose:  100 mg Take 1 Tab by mouth nightly. Quantity:  90 Tab Refills:  1  
     
   
   
   
  
 amoxicillin-clavulanate 875-125 mg per tablet Commonly known as:  AUGMENTIN Your last dose was: Your next dose is:    
   
   
 Dose:  1 Tab Take 1 Tab by mouth every twelve (12) hours. Quantity:  20 Tab Refills:  0  
     
   
   
   
  
 aspirin 81 mg chewable tablet Your last dose was: Your next dose is:    
   
   
 Dose:  81 mg Take 1 Tab by mouth daily. Quantity:  90 Tab Refills:  3  
     
   
   
   
  
 atorvastatin 40 mg tablet Commonly known as:  LIPITOR Your last dose was: Your next dose is: TAKE 1 TABLET BY MOUTH NIGHTLY. Quantity:  90 Tab Refills:  3  
     
   
   
   
  
 b complex vitamins tablet Your last dose was: Your next dose is:    
   
   
 Dose:  1 Tab Take 1 Tab by mouth daily. Refills:  0 DAILY MULTI-VITAMINS/IRON tablet Generic drug:  multivitamin with iron Your last dose was: Your next dose is:    
   
   
 Dose:  1 Tab Take 1 Tab by mouth daily. Refills:  0  
     
   
   
   
  
 FISH -160-1,000 mg Cap Generic drug:  omega 3-dha-epa-fish oil Your last dose was: Your next dose is:    
   
   
 Dose:  1 Cap Take 1 Cap by mouth daily. Refills:  0  
     
   
   
   
  
 gabapentin 800 mg tablet Commonly known as:  NEURONTIN Your last dose was: Your next dose is: TAKE 1 TABLET BY MOUTH THREE (3) TIMES DAILY. Quantity:  90 Tab Refills:  5  
     
   
   
   
  
 losartan 100 mg tablet Commonly known as:  COZAAR Your last dose was: Your next dose is: TAKE 1 TABLET BY MOUTH EVERY DAY Quantity:  90 Tab Refills:  3 metoprolol tartrate 25 mg tablet Commonly known as:  LOPRESSOR Your last dose was: Your next dose is: TAKE 1 TAB BY MOUTH TWO (2) TIMES A DAY. Quantity:  180 Tab Refills:  3 OMEPRAZOLE PO Your last dose was: Your next dose is:    
   
   
 Dose:  20 mg Take 20 mg by mouth daily. Refills:  0  
     
   
   
   
  
 predniSONE 20 mg tablet Commonly known as:  Joy Hakan Your last dose was: Your next dose is: Take 3 tablets for 3 days and 2 tablets for 2 days. Quantity:  13 Tab Refills:  0  
     
   
   
   
  
 RESTASIS 0.05 % ophthalmic emulsion Generic drug:  cycloSPORINE Your last dose was: Your next dose is:    
   
   
  Refills:  3  
     
   
   
   
  
 vitamin E 400 unit capsule Commonly known as:  [address] Your last dose was: Your next dose is: Take  by mouth daily. Refills:  0 Discharge Instructions Lyubov Muniz [facility] Special Procedures/Radiology Department Myelogram Discharge Instructions Restrict your activity for the next 48 hours. You may get up and sit in the chair or get up and go to the bathroom with slow movements. You must keep your head above your chest until 8 a.m. tomorrow. Rest as much as possible tonight and tomorrow. Resume your previous diet and follow the medication reconciliation form. Drink plenty of water. Avoid products with caffeine. You may take Tylenol, as directed on the label, for pain or discomfort. Avoid ibuprofen (Advil, Motrin) and aspirin as they may cause bleeding. Avoid lifting anything heavier than 5 pounds. Avoid excessive bending and lifting as this may increase the chance of having a headache. Be sure to follow up with your physician. Take your CD disk with you. If you have severe pain, or if you have any questions or concerns, please call [phone] and ask to speak to the nurse on-call. Discharge Orders None ACO Transitions of Care Introducing [facility] [address] offers a voluntary care coordination program to provide high quality service and care to [facility] fee-for-service beneficiaries. Bernard Bojorquez was designed to help you enhance your health and well-being through the following services: ? Transitions of Care  support for individuals who are transitioning from one care setting to another (example: Hospital to home). ? Chronic and Complex Care Coordination  support for individuals and caregivers of those with serious or chronic illnesses or with more than one chronic (ongoing) condition and those who take a number of different medications. If you meet specific medical criteria, a [facility] [address] may call you directly to coordinate your care with your primary care physician and your other care providers. For questions about the [facility] programs, please, contact your physicians office. For general questions or additional information about Accountable Care Organizations: 
Please visit www.medicare.gov/acos. html or call 1-800-MEDICARE (1-764.753.7649) TTY users should call 6-154.240.3747. Introducing [facility]! Agustin He introduces [employer] patient portal. Now you can access parts of your medical record, email your doctor's office, and request medication refills online. 1. In your internet browser, go to [email or web address] 2. Click on the First Time User? Click Here link in the Sign In box. You will see the New Member Sign Up page. 3. Enter your [employer] Access Code exactly as it appears below. You will not need to use this code after youve completed the sign-up process.  If you do not sign up before the expiration date, you must request a new code. · [employer] Access Code: [email or web address] Expires: 10/15/2017  3:48 PM 
 
4. Enter the last four digits of your Social Security Number (xxxx) and Date of Birth (mm/dd/yyyy) as indicated and click Submit. You will be taken to the next sign-up page. 5. Create a [employer] ID. This will be your [employer] login ID and cannot be changed, so think of one that is secure and easy to remember. 6. Create a [employer] password. You can change your password at any time. 7. Enter your Password Reset Question and Answer. This can be used at a later time if you forget your password. 8. Enter your e-mail address. You will receive e-mail notification when new information is available in [address] 9. Click Sign Up. You can now view and download portions of your medical record. 10. Click the Download Summary menu link to download a portable copy of your medical information. If you have questions, please visit the Frequently Asked Questions section of the [employer] website. Remember, [employer] is NOT to be used for urgent needs. For medical emergencies, dial 911. Now available from your iPhone and Android! General Information Please provide this summary of care documentation to your next provider. Patient Signature:  ____________________________________________________________ Date:  ____________________________________________________________  
  
Franchesca Sleight Provider Signature:  ____________________________________________________________ Date:  ____________________________________________________________

## 2017-10-13 NOTE — DISCHARGE INSTRUCTIONS
[facility]  Special Procedures/Radiology Department      Myelogram Discharge Instructions    Restrict your activity for the next 48 hours. You may get up and sit in the chair or get up and go to the bathroom with slow movements. You must keep your head above your chest until 8 a.m. tomorrow. Rest as much as possible tonight and tomorrow. Resume your previous diet and follow the medication reconciliation form. Drink plenty of water. Avoid products with caffeine. You may take Tylenol, as directed on the label, for pain or discomfort. Avoid ibuprofen (Advil, Motrin) and aspirin as they may cause bleeding. Avoid lifting anything heavier than 5 pounds. Avoid excessive bending and lifting as this may increase the chance of having a headache. Be sure to follow up with your physician. Take your CD disk with you. If you have severe pain, or if you have any questions or concerns, please call [phone] and ask to speak to the nurse on-call.

## 2017-10-13 NOTE — PROGRESS NOTES
Received pt assignment - Pt sitting on stretcher with feet on floor, pt had ambulated to restroom and dressed self prior and returned fully dressed. Ms. Kelvin Westbrook denies any pain or discomfort. Vital signs obtained, Ms. Kelvin Westbrook reports that she had a myelogram \"years ago\" and recalls most of the discharge instructions reviewed. I reviewed the given discharge instructions line by line, which she verbalized understanding of all. Ms. Kelvin Westbrook advised she wanted to walk to entrance of [facility] where  would drive pt home. Ms. Kelvin Westbrook walked upright steady gait and remains pain free. Disc picked up and carried home by pt. Ms. Kelivn Westbrook stated that she will follow activity restriction and continued lay down as instructed.

## 2017-10-19 ENCOUNTER — OFFICE VISIT (OUTPATIENT)
Dept: INTERNAL MEDICINE CLINIC | Age: 72
End: 2017-10-19

## 2017-10-19 VITALS
HEIGHT: 67 IN | RESPIRATION RATE: 20 BRPM | SYSTOLIC BLOOD PRESSURE: 137 MMHG | DIASTOLIC BLOOD PRESSURE: 69 MMHG | WEIGHT: 190 LBS | BODY MASS INDEX: 29.82 KG/M2 | HEART RATE: 77 BPM | TEMPERATURE: 98.9 F | OXYGEN SATURATION: 100 %

## 2017-10-19 DIAGNOSIS — I48.0 PAROXYSMAL ATRIAL FIBRILLATION (HCC): ICD-10-CM

## 2017-10-19 DIAGNOSIS — S00.03XA HEMATOMA OF SCALP, INITIAL ENCOUNTER: ICD-10-CM

## 2017-10-19 DIAGNOSIS — E78.00 HYPERCHOLESTEREMIA: ICD-10-CM

## 2017-10-19 DIAGNOSIS — I10 HYPERTENSION, ESSENTIAL: ICD-10-CM

## 2017-10-19 DIAGNOSIS — Z23 ENCOUNTER FOR IMMUNIZATION: ICD-10-CM

## 2017-10-19 DIAGNOSIS — E11.42 CONTROLLED TYPE 2 DIABETES MELLITUS WITH DIABETIC POLYNEUROPATHY, WITHOUT LONG-TERM CURRENT USE OF INSULIN (HCC): Primary | ICD-10-CM

## 2017-10-19 LAB — HBA1C MFR BLD HPLC: 6.7 %

## 2017-10-19 RX ORDER — OLOPATADINE HYDROCHLORIDE 1 MG/ML
SOLUTION/ DROPS OPHTHALMIC
COMMUNITY
Start: 2017-09-27 | End: 2018-04-17

## 2017-10-19 RX ORDER — GABAPENTIN 800 MG/1
1200 TABLET ORAL 3 TIMES DAILY
Qty: 135 TAB | Refills: 5 | Status: SHIPPED | OUTPATIENT
Start: 2017-10-19 | End: 2017-11-20 | Stop reason: SDUPTHER

## 2017-10-19 NOTE — PATIENT INSTRUCTIONS
Office visit in 6 months with fasting lab work a week before visit. Vaccine Information Statement    Influenza (Flu) Vaccine (Inactivated or Recombinant): What you need to know    Many Vaccine Information Statements are available in Croatian and other languages. See www.immunize.org/vis  Hojas de Información Sobre Vacunas están disponibles en Español y en muchos otros idiomas. Visite www.immunize.org/vis    1. Why get vaccinated? Influenza (flu) is a contagious disease that spreads around the [facility] every year, usually between October and May. Flu is caused by influenza viruses, and is spread mainly by coughing, sneezing, and close contact. Anyone can get flu. Flu strikes suddenly and can last several days. Symptoms vary by age, but can include:   fever/chills   sore throat   muscle aches   fatigue   cough   headache    runny or stuffy nose    Flu can also lead to pneumonia and blood infections, and cause diarrhea and seizures in children. If you have a medical condition, such as heart or lung disease, flu can make it worse. Flu is more dangerous for some people. Infants and young children, people 72years of age and older, pregnant women, and people with certain health conditions or a weakened immune system are at greatest risk. Each year thousands of people in the [facility] die from flu, and many more are hospitalized. Flu vaccine can:   keep you from getting flu,   make flu less severe if you do get it, and   keep you from spreading flu to your family and other people. 2. Inactivated and recombinant flu vaccines    A dose of flu vaccine is recommended every flu season. Children 6 months through 6years of age may need two doses during the same flu season. Everyone else needs only one dose each flu season.        Some inactivated flu vaccines contain a very small amount of a mercury-based preservative called thimerosal. Studies have not shown thimerosal in vaccines to be harmful, but flu vaccines that do not contain thimerosal are available. There is no live flu virus in flu shots. They cannot cause the flu. There are many flu viruses, and they are always changing. Each year a new flu vaccine is made to protect against three or four viruses that are likely to cause disease in the upcoming flu season. But even when the vaccine doesnt exactly match these viruses, it may still provide some protection    Flu vaccine cannot prevent:   flu that is caused by a virus not covered by the vaccine, or   illnesses that look like flu but are not. It takes about 2 weeks for protection to develop after vaccination, and protection lasts through the flu season. 3. Some people should not get this vaccine    Tell the person who is giving you the vaccine:     If you have any severe, life-threatening allergies. If you ever had a life-threatening allergic reaction after a dose of flu vaccine, or have a severe allergy to any part of this vaccine, you may be advised not to get vaccinated. Most, but not all, types of flu vaccine contain a small amount of egg protein.  If you ever had Guillain-Barré Syndrome (also called GBS). Some people with a history of GBS should not get this vaccine. This should be discussed with your doctor.  If you are not feeling well. It is usually okay to get flu vaccine when you have a mild illness, but you might be asked to come back when you feel better. 4. Risks of a vaccine reaction    With any medicine, including vaccines, there is a chance of reactions. These are usually mild and go away on their own, but serious reactions are also possible. Most people who get a flu shot do not have any problems with it.      Minor problems following a flu shot include:    soreness, redness, or swelling where the shot was given     hoarseness   sore, red or itchy eyes   cough   fever   aches   headache   itching   fatigue  If these problems occur, they usually begin soon after the shot and last 1 or 2 days. More serious problems following a flu shot can include the following:     There may be a small increased risk of Guillain-Barré Syndrome (GBS) after inactivated flu vaccine. This risk has been estimated at 1 or 2 additional cases per million people vaccinated. This is much lower than the risk of severe complications from flu, which can be prevented by flu vaccine.  Young children who get the flu shot along with pneumococcal vaccine (PCV13) and/or DTaP vaccine at the same time might be slightly more likely to have a seizure caused by fever. Ask your doctor for more information. Tell your doctor if a child who is getting flu vaccine has ever had a seizure. Problems that could happen after any injected vaccine:      People sometimes faint after a medical procedure, including vaccination. Sitting or lying down for about 15 minutes can help prevent fainting, and injuries caused by a fall. Tell your doctor if you feel dizzy, or have vision changes or ringing in the ears.  Some people get severe pain in the shoulder and have difficulty moving the arm where a shot was given. This happens very rarely.  Any medication can cause a severe allergic reaction. Such reactions from a vaccine are very rare, estimated at about 1 in a million doses, and would happen within a few minutes to a few hours after the vaccination. As with any medicine, there is a very remote chance of a vaccine causing a serious injury or death. The safety of vaccines is always being monitored. For more information, visit: www.cdc.gov/vaccinesafety/    5. What if there is a serious reaction? What should I look for?  Look for anything that concerns you, such as signs of a severe allergic reaction, very high fever, or unusual behavior.     Signs of a severe allergic reaction can include hives, swelling of the face and throat, difficulty breathing, a fast heartbeat, dizziness, and weakness - usually within a few minutes to a few hours after the vaccination. What should I do?  If you think it is a severe allergic reaction or other emergency that cant wait, call 9-1-1 and get the person to the nearest hospital. Otherwise, call your doctor.  Reactions should be reported to the Vaccine Adverse Event Reporting System (VAERS). Your doctor should file this report, or you can do it yourself through  the VAERS web site at www.vaers. [facility].gov, or by calling 9-917.112.8091. VAERS does not give medical advice. 6. The National Vaccine Injury Compensation Program    The [facility] Vaccine Injury Compensation Program (VICP) is a federal program that was created to compensate people who may have been injured by certain vaccines. Persons who believe they may have been injured by a vaccine can learn about the program and about filing a claim by calling 9-818.470.8694 or visiting the [address] [place] [address] [address] website at www.[facility].gov/vaccinecompensation. There is a time limit to file a claim for compensation. 7. How can I learn more?  Ask your healthcare provider. He or she can give you the vaccine package insert or suggest other sources of information.  Call your local or state health department.  Contact the Centers for Disease Control and Prevention (CDC):  - Call 8-815.109.7746 (1-800-CDC-INFO) or  - Visit CDCs website at www.cdc.gov/flu    Vaccine Information Statement   Inactivated Influenza Vaccine   8/7/2015  [address] 191XX-24    Department of Health and Human Services  Centers for Disease Control and Prevention    Office Use Only       Diabetes and Preventing Falls: Care Instructions  Your Care Instructions  If you are an older adult who has diabetes, you may have a higher risk of falling.  Complications of diabetes--such as nerve damage, foot problems, and reduced vision--may increase your risk of a fall. Some of your medicines also may add to your risk. By making your home safer, you can lower your risk of falling. Doing things to prevent diabetes complications may also help to lower your risk. You can make your home safer with a few simple measures. Follow-up care is a key part of your treatment and safety. Be sure to make and go to all appointments, and call your doctor if you are having problems. It's also a good idea to know your test results and keep a list of the medicines you take. How can you care for yourself at home? Taking care of yourself  · Keep your blood sugar at a target level (which you set with your doctor). · Exercise regularly to improve your strength, muscle tone, and balance. Walk if you can. Swimming may be a good choice if you cannot walk easily. · Have your vision checked as often as your doctor recommends. It is usually once a year or more often if you have eye problems. · Know the side effects of the medicines you take. Ask your doctor or pharmacist whether the medicines you take can affect your balance. Sleeping pills or sedatives can affect your balance. · Limit the amount of alcohol you drink. Alcohol can impair your balance and other senses. · Have your doctor check your feet during each visit. If you have a foot problem, see your doctor. Preventing falls at home  · Remove raised doorway thresholds, throw rugs, and clutter. Repair loose carpet or raised areas in the floor. · Move furniture and electrical cords to keep them out of walking paths. · Use nonskid floor wax, and wipe up spills right away, especially on ceramic tile floors. · If you use a walker or cane, put rubber tips on it. If you use crutches, clean the bottoms of them regularly with an abrasive pad, such as steel wool. · Keep your house well lit, especially Maribel Ely, and outside walkways. Use night-lights in areas such as hallways and bathrooms.  Add extra light switches or use remote switches (such as switches that go on or off when you clap your hands) to make it easier to turn lights on if you have to get up during the night. · Install sturdy handrails on stairways. Put grab bars near your shower, bathtub, and toilet. · Store household items on low shelves so that you do not have to climb or reach high. Or use a reaching device that you can get at a medical supply store. If you have to climb for something, use a step stool with handrails, or ask someone to get it for you. · Keep a cordless phone and a flashlight with new batteries by your bed. If possible, put a phone in each of the main rooms of your house, or carry a cell phone in case you fall and cannot reach a phone. Or you can wear a device around your neck or wrist. You push a button that sends a signal for help. · Wear low-heeled shoes that fit well and give your feet good support. Use footwear with nonskid soles. Check the heels and soles of your shoes for wear. Repair or replace worn heels or soles. · Do not wear socks without shoes on wood floors. · Walk on the grass when the sidewalks are slippery. If you live in an area that gets snow and ice in the winter, sprinkle salt on slippery steps and sidewalks. Where can you learn more? Go to http://alejandro-samuel.info/. Enter L576 in the search box to learn more about \"Diabetes and Preventing Falls: Care Instructions. \"  Current as of: August 4, 2016  Content Version: 11.3  © 9420-2007 [employer]. Care instructions adapted under license by [employer] (which disclaims liability or warranty for this information). If you have questions about a medical condition or this instruction, always ask your healthcare professional. [place] [address] any warranty or liability for your use of this information.

## 2017-10-19 NOTE — PROGRESS NOTES
[place]    Chief Complaint   Patient presents with    Head Pain     fell 10/1/17       Reviewed record In preparation for visit and have obtained necessary documentation. Advanced directive / living will on file. 1. Have you been to the ER, urgent care clinic or hospitalized since your last visit? No     2. Have you seen or consulted any other health care providers outside of the [address] [place] [address] since your last visit? Include any pap smears or colon screening. Dr Peg Musa reviewed with provider. Health Maintenance reviewed: After verbal order read back of , patient received High Dose Flu Shot in left arm. [address] [phone]  Lot [id number] Exp 04/30/18. Patient tolerated procedure without complaints and received VIS.

## 2017-10-19 NOTE — MR AVS SNAPSHOT
Visit Information Date & Time Provider Department Dept. Phone Encounter #  
 10/19/2017  4:00 PM Alberto Fuentes MD [facility] [phone] [id number] Follow-up Instructions Return in about 6 months (around 4/19/2018) for DM, HTN, Chol  Fasting lab one week prior . Upcoming Health Maintenance Date Due DTaP/Tdap/Td series (1 - Tdap) 9/22/2019* EYE EXAM RETINAL OR DILATED Q1 11/29/2017 HEMOGLOBIN A1C Q6M 4/19/2018 FOOT EXAM Q1 4/21/2018 MICROALBUMIN Q1 4/21/2018 MEDICARE YEARLY EXAM 4/22/2018 FOBT Q 1 YEAR AGE 50-75 4/23/2018 LIPID PANEL Q1 4/28/2018 GLAUCOMA SCREENING Q2Y 11/29/2018 *Topic was postponed. The date shown is not the original due date. Allergies as of 10/19/2017  Review Complete On: 10/19/2017 By: Alberto Fuentes MD  
  
 Severity Noted Reaction Type Reactions Duragesic [Fentanyl] High 02/08/2010    Other (comments) Patient sees things when taking Codeine  12/23/2009    Nausea Only Current Immunizations  Reviewed on 10/19/2017 Name Date Influenza High Dose Vaccine PF 10/19/2017, 9/22/2016, 11/12/2015 Influenza Vaccine 10/17/2014, 10/3/2013 Influenza Vaccine Split 10/8/2012, 10/7/2011 Influenza Vaccine Whole 9/22/2010 Pneumococcal Conjugate (PCV-13) 3/22/2016 TD Vaccine 9/22/2009 ZZZ-RETIRED (DO NOT USE) Pneumococcal Vaccine (Unspecified Type) 9/22/2009 Reviewed by Anel Orantes LPN on 55/58/9949 at  4:17 PM  
You Were Diagnosed With   
  
 Codes Comments Controlled type 2 diabetes mellitus with diabetic polyneuropathy, without long-term current use of insulin (HCC)    -  Primary ICD-10-CM: E11.42 
ICD-9-CM: 250.60, 357.2 Diabetic peripheral neuropathy associated with type 2 diabetes mellitus (HCC)     ICD-10-CM: E11.42 
ICD-9-CM: 250.60, 357.2 Hematoma of scalp, initial encounter     ICD-10-CM: S00. [address] ICD-9-CM: 007   
 Hypercholesteremia     ICD-10-CM: E78.00 ICD-9-CM: 272.0 Hypertension, essential     ICD-10-CM: I10 
ICD-9-CM: 401.9 Paroxysmal atrial fibrillation (HCC)     ICD-10-CM: I48.0 ICD-9-CM: 427.31 Encounter for immunization     ICD-10-CM: Z30 ICD-9-CM: V03.89 Vitals BP Pulse Temp Resp Height(growth percentile) Weight(growth percentile)  
 137/69 (BP 1 Location: Left arm, BP Patient Position: Sitting) 77 98.9 °F (37.2 °C) (Oral) 20 5' 7\" (1.702 m) 190 lb (86.2 kg) SpO2 BMI OB Status Smoking Status 100% 29.76 kg/m2 Hysterectomy Former Smoker Vitals History BMI and BSA Data Body Mass Index Body Surface Area  
 29.76 kg/m 2 2.02 m 2 Preferred Pharmacy Pharmacy Name Phone [id number] [place] [id number] [phone] Your Updated Medication List  
  
   
This list is accurate as of: 10/19/17  5:19 PM.  Always use your most recent med list.  
  
  
  
  
 albuterol 90 mcg/actuation inhaler Commonly known as:  PROVENTIL HFA, VENTOLIN HFA, PROAIR HFA Take 2 Puffs by inhalation every four (4) hours as needed for Wheezing. amitriptyline 100 mg tablet Commonly known as:  ELAVIL Take 1 Tab by mouth nightly. aspirin 81 mg chewable tablet Take 1 Tab by mouth daily. atorvastatin 40 mg tablet Commonly known as:  LIPITOR  
TAKE 1 TABLET BY MOUTH NIGHTLY. b complex vitamins tablet Take 1 Tab by mouth daily. FISH -160-1,000 mg Cap Generic drug:  omega 3-dha-epa-fish oil Take 1 Cap by mouth daily. gabapentin 800 mg tablet Commonly known as:  NEURONTIN Take 1.5 Tabs by mouth three (3) times daily. losartan 100 mg tablet Commonly known as:  COZAAR  
TAKE 1 TABLET BY MOUTH EVERY DAY  
  
 metoprolol tartrate 25 mg tablet Commonly known as:  LOPRESSOR  
TAKE 1 TAB BY MOUTH TWO (2) TIMES A DAY. olopatadine 0.1 % ophthalmic solution Commonly known as:  PATANOL  
  
 OMEPRAZOLE PO Take 20 mg by mouth daily. RESTASIS 0.05 % ophthalmic emulsion Generic drug:  cycloSPORINE  
  
 vitamin E 400 unit capsule Commonly known as:  [address] Take  by mouth daily. Prescriptions Sent to Pharmacy Refills  
 gabapentin (NEURONTIN) 800 mg tablet 5 Sig: Take 1.5 Tabs by mouth three (3) times daily. Class: Normal  
 Pharmacy: [id number] Brittanie Yancey, [address] Ph #: [phone] Route: Oral  
  
We Performed the Following ADMIN INFLUENZA VIRUS VAC [ [facility]] AMB POC HEMOGLOBIN A1C [30287 CPT(R)] INFLUENZA VIRUS VACCINE, HIGH DOSE SEASONAL, PRESERVATIVE FREE [19353 CPT(R)] Follow-up Instructions Return in about 6 months (around 4/19/2018) for DM, HTN, Chol  Fasting lab one week prior . To-Do List   
 04/19/2018 Lab:  HEMOGLOBIN A1C WITH EAG   
  
 04/19/2018 Lab:  LIPID PANEL   
  
 04/19/2018 Lab:  METABOLIC PANEL, COMPREHENSIVE   
  
 04/19/2018 Lab:  MICROALBUMIN, UR, RAND W/ MICROALBUMIN/CREA RATIO Patient Instructions Office visit in 6 months with fasting lab work a week before visit. Vaccine Information Statement Influenza (Flu) Vaccine (Inactivated or Recombinant): What you need to know Many Vaccine Information Statements are available in [place]an and other languages. See www.immunize.org/vis Hojas de Información Sobre Vacunas están disponibles en Español y en muchos otros idiomas. Visite www.immunize.org/vis 1. Why get vaccinated? Influenza (flu) is a contagious disease that spreads around the [place] every year, usually between October and May. Flu is caused by influenza viruses, and is spread mainly by coughing, sneezing, and close contact. Anyone can get flu. Flu strikes suddenly and can last several days. Symptoms vary by age, but can include: 
 fever/chills  sore throat  muscle aches  fatigue  cough  headache  runny or stuffy nose Flu can also lead to pneumonia and blood infections, and cause diarrhea and seizures in children. If you have a medical condition, such as heart or lung disease, flu can make it worse. Flu is more dangerous for some people. Infants and young children, people 72years of age and older, pregnant women, and people with certain health conditions or a weakened immune system are at greatest risk. Each year thousands of people in the [facility] die from flu, and many more are hospitalized. Flu vaccine can: 
 keep you from getting flu, 
 make flu less severe if you do get it, and 
 keep you from spreading flu to your family and other people. 2. Inactivated and recombinant flu vaccines A dose of flu vaccine is recommended every flu season. Children 6 months through 6years of age may need two doses during the same flu season. Everyone else needs only one dose each flu season. Some inactivated flu vaccines contain a very small amount of a mercury-based preservative called thimerosal. Studies have not shown thimerosal in vaccines to be harmful, but flu vaccines that do not contain thimerosal are available. There is no live flu virus in flu shots. They cannot cause the flu. There are many flu viruses, and they are always changing. Each year a new flu vaccine is made to protect against three or four viruses that are likely to cause disease in the upcoming flu season. But even when the vaccine doesnt exactly match these viruses, it may still provide some protection Flu vaccine cannot prevent: 
 flu that is caused by a virus not covered by the vaccine, or 
 illnesses that look like flu but are not. It takes about 2 weeks for protection to develop after vaccination, and protection lasts through the flu season. 3. Some people should not get this vaccine Tell the person who is giving you the vaccine:  If you have any severe, life-threatening allergies. If you ever had a life-threatening allergic reaction after a dose of flu vaccine, or have a severe allergy to any part of this vaccine, you may be advised not to get vaccinated. Most, but not all, types of flu vaccine contain a small amount of egg protein.  If you ever had Guillain-Barré Syndrome (also called GBS). Some people with a history of GBS should not get this vaccine. This should be discussed with your doctor.  If you are not feeling well. It is usually okay to get flu vaccine when you have a mild illness, but you might be asked to come back when you feel better. 4. Risks of a vaccine reaction With any medicine, including vaccines, there is a chance of reactions. These are usually mild and go away on their own, but serious reactions are also possible. Most people who get a flu shot do not have any problems with it. Minor problems following a flu shot include:  
 soreness, redness, or swelling where the shot was given  hoarseness  sore, red or itchy eyes  cough  fever  aches  headache  itching  fatigue If these problems occur, they usually begin soon after the shot and last 1 or 2 days. More serious problems following a flu shot can include the following:  There may be a small increased risk of Guillain-Barré Syndrome (GBS) after inactivated flu vaccine. This risk has been estimated at 1 or 2 additional cases per million people vaccinated. This is much lower than the risk of severe complications from flu, which can be prevented by flu vaccine.  Young children who get the flu shot along with pneumococcal vaccine (PCV13) and/or DTaP vaccine at the same time might be slightly more likely to have a seizure caused by fever. Ask your doctor for more information. Tell your doctor if a child who is getting flu vaccine has ever had a seizure. Problems that could happen after any injected vaccine:  People sometimes faint after a medical procedure, including vaccination. Sitting or lying down for about 15 minutes can help prevent fainting, and injuries caused by a fall. Tell your doctor if you feel dizzy, or have vision changes or ringing in the ears.  Some people get severe pain in the shoulder and have difficulty moving the arm where a shot was given. This happens very rarely.  Any medication can cause a severe allergic reaction. Such reactions from a vaccine are very rare, estimated at about 1 in a million doses, and would happen within a few minutes to a few hours after the vaccination. As with any medicine, there is a very remote chance of a vaccine causing a serious injury or death. The safety of vaccines is always being monitored. For more information, visit: www.cdc.gov/vaccinesafety/ 
 
 
6. The National Vaccine Injury Compensation Program 
 
The [facility] Vaccine Injury Compensation Program (VICP) is a federal program that was created to compensate people who may have been injured by certain vaccines. Persons who believe they may have been injured by a vaccine can learn about the program and about filing a claim by calling 7-296.773.2820 or visiting the [employer] website at www.[facility].gov/vaccinecompensation. There is a time limit to file a claim for compensation. 7. How can I learn more?  Ask your healthcare provider. He or she can give you the vaccine package insert or suggest other sources of information.  Call your local or state health department.  Contact the Centers for Disease Control and Prevention (CDC): 
- Call 5-106.290.5924 (1-800-CDC-INFO) or 
- Visit CDCs website at www.cdc.gov/flu Vaccine Information Statement Inactivated Influenza Vaccine 8/7/2015 
[facility] [id number] Department of Health and [employer] Centers for Disease Control and Prevention Office Use Only Diabetes and Preventing Falls: Care Instructions Your Care Instructions If you are an older adult who has diabetes, you may have a higher risk of falling. Complications of diabetessuch as nerve damage, foot problems, and reduced visionmay increase your risk of a fall. Some of your medicines also may add to your risk. By making your home safer, you can lower your risk of falling. Doing things to prevent diabetes complications may also help to lower your risk. You can make your home safer with a few simple measures. Follow-up care is a key part of your treatment and safety. Be sure to make and go to all appointments, and call your doctor if you are having problems. It's also a good idea to know your test results and keep a list of the medicines you take. How can you care for yourself at home? Taking care of yourself · Keep your blood sugar at a target level (which you set with your doctor). · Exercise regularly to improve your strength, muscle tone, and balance. Walk if you can. Swimming may be a good choice if you cannot walk easily. · Have your vision checked as often as your doctor recommends. It is usually once a year or more often if you have eye problems. · Know the side effects of the medicines you take. Ask your doctor or pharmacist whether the medicines you take can affect your balance. Sleeping pills or sedatives can affect your balance. · Limit the amount of alcohol you drink. Alcohol can impair your balance and other senses. · Have your doctor check your feet during each visit. If you have a foot problem, see your doctor. Preventing falls at home · Remove raised doorway thresholds, throw rugs, and clutter. Repair loose carpet or raised areas in the floor. · Move furniture and electrical cords to keep them out of walking paths. · Use nonskid floor wax, and wipe up spills right away, especially on ceramic tile floors. · If you use a walker or cane, put rubber tips on it. If you use crutches, clean the bottoms of them regularly with an abrasive pad, such as steel wool. · Keep your house well lit, especially [place], and outside walkways. Use night-lights in areas such as hallways and bathrooms. Add extra light switches or use remote switches (such as switches that go on or off when you clap your hands) to make it easier to turn lights on if you have to get up during the night. · Install sturdy handrails on stairways. Put grab bars near your shower, bathtub, and toilet. · Store household items on low shelves so that you do not have to climb or reach high. Or use a reaching device that you can get at a medical supply store. If you have to climb for something, use a step stool with handrails, or ask someone to get it for you. · Keep a cordless phone and a flashlight with new batteries by your bed. If possible, put a phone in each of the main rooms of your house, or carry a cell phone in case you fall and cannot reach a phone.  Or you can wear a device around your neck or wrist. You push a button that sends a signal for help. · Wear low-heeled shoes that fit well and give your feet good support. Use footwear with nonskid soles. Check the heels and soles of your shoes for wear. Repair or replace worn heels or soles. · Do not wear socks without shoes on wood floors. · Walk on the grass when the sidewalks are slippery. If you live in an area that gets snow and ice in the winter, sprinkle salt on slippery steps and sidewalks. Where can you learn more? Go to http://[employer]uel.info/. Enter F777 in the search box to learn more about \"Diabetes and Preventing Falls: Care Instructions. \" Current as of: August 4, 2016 Content Version: 11.3 © 4170-4497 [employer]. Care instructions adapted under license by [employer] (which disclaims liability or warranty for this information). If you have questions about a medical condition or this instruction, always ask your healthcare professional. [address] any warranty or liability for your use of this information. Introducing [facility]! [facility] introduces [employer] patient portal. Now you can access parts of your medical record, email your doctor's office, and request medication refills online. 1. In your internet browser, go to [email or web address] 2. Click on the First Time User? Click Here link in the Sign In box. You will see the New Member Sign Up page. 3. Enter your [employer] Access Code exactly as it appears below. You will not need to use this code after youve completed the sign-up process. If you do not sign up before the expiration date, you must request a new code. · [employer] Access Code: [email or web address] Expires: 1/17/2018  4:25 PM 
 
4. Enter the last four digits of your Social Security Number (xxxx) and Date of Birth (mm/dd/yyyy) as indicated and click Submit.  You will be taken to the next sign-up page. 5. Create a Runteq ID. This will be your Runteq login ID and cannot be changed, so think of one that is secure and easy to remember. 6. Create a Runteq password. You can change your password at any time. 7. Enter your Password Reset Question and Answer. This can be used at a later time if you forget your password. 8. Enter your e-mail address. You will receive e-mail notification when new information is available in [address]. 9. Click Sign Up. You can now view and download portions of your medical record. 10. Click the Download Summary menu link to download a portable copy of your medical information. If you have questions, please visit the Frequently Asked Questions section of the [employer] website. Remember, [employer]unteq is NOT to be used for urgent needs. For medical emergencies, dial 911. Now available from your iPhone and Android! Please provide this summary of care documentation to your next provider. Your primary care clinician is listed as Joseph Bates. If you have any questions after today's visit, please call [phone].

## 2017-10-19 NOTE — PROGRESS NOTES
HISTORY OF PRESENT ILLNESS  Keara Ravi is a 74 y.o. female. HPI  Got up at 5 AM on October 1 to use bathroom. She fell backward as she went around foot of bed, (did not trip or feel dizzy) and hit left parietal region of head on corner of cedar chest. Had a big knot that has gone down some. If bends over feels lightheaded. No headache. Has blurry vision, but this was present prior to fall (chronic). No numbness or weakness in face, arms or legs. No difficulty speaking or swallowing. Scalp still very tender. She presents for follow up of diabetes mellitus, hypertension, hyperlipidemia and Atrial Fibrillation. Diabetic ROS - medication compliance: compliant all of the time,      home glucose monitoring: is not performed,      home BP Monitoring: is not measured at home. further diabetic ROS: no polyuria or polydipsia, no unusual visual symptoms, no hypoglycemia, no medication side effects noted, has dysesthesias in the feet. Diet and Lifestyle: generally follows a low fat low cholesterol diet, generally follows a low sodium diet, does not rigorously follow a diabetic diet, sedentary, nonsmoker  Cardiovascular ROS:  She complains of dyspnea on exertion. She denies palpitations, orthopnea, exertional chest pressure/discomfort, claudication, lower extremity edema    She is being seen for follow up of sarcoidosis and COPD, not currently in exacerbation. taking medications as instructed, no medication side effects noted, using bronchodilator MDI less than twice a week, notes stable dyspnea on exertion, no change. Shehas shortness of breath none, only with activity.      Patient Active Problem List   Diagnosis Code    Headache(784.0) R51    Carpal tunnel syndrome G56.00    Hypercholesteremia E78.00    Depression F32.9    Abnormal chest x-ray R93.8    Complex regional pain syndrome G90.50    Diabetic peripheral neuropathy associated with type 2 diabetes mellitus ([facility] [address] E11.42    Hypertension, essential I10    Controlled type 2 diabetes mellitus with diabetic polyneuropathy, without long-term current use of insulin (HCC) E11.42    Osteopenia M85.80    Vitamin D deficiency E55.9    GERD (gastroesophageal reflux disease) K21.9    Psoriasis L40.9    Paroxysmal atrial fibrillation (HCC) I48.0    Hepatic steatosis K76.0    COPD, severity to be determined ([address] J44.9    Sarcoidosis of lymph nodes D86.1     Past Medical History:   Diagnosis Date    Abnormal chest x-ray 12/23/2009    Anemia 12/23/2009    Arrhythmia     SVT.  Converts with Adenocard    Atrial fibrillation ([address] 12/23/2009    Carpal tunnel syndrome 12/23/2009    Chronic obstructive pulmonary disease (HCC)     Colitis 12/23/2009    Complex regional pain syndrome 12/23/2009    Depression 12/23/2009    Diabetes ([address]     GERD (gastroesophageal reflux disease) 2/8/2010    HTN (hypertension) 12/23/2009    Hypercholesteremia 12/23/2009    Hypertriglyceridemia 12/23/2009    Idiopathic peripheral neuropathy 12/23/2009    Iron deficiency anemia 12/23/2009    Osteopenia 12/23/2009    Paroxysmal atrial fibrillation ([address] 12/2/2011    Psoriasis 6/27/2012    Sarcoidosis      Past Surgical History:   Procedure Laterality Date    HX CARPAL TUNNEL RELEASE  00/00/2002    Both hands    HX CYST REMOVAL  00/00/1965    Left wrist    HX HYSTERECTOMY  00/00/1985    HX ORTHOPAEDIC  00/00/1987    back surgery (disc)    HX ORTHOPAEDIC      right foot X 3 neuromas and tarsal tunnel release    HX TONSILLECTOMY  00/00/1962    NEUROLOGICAL PROCEDURE UNLISTED      Pain pump    PAIN PUMP DEVICE  00/00/2007    in left lower abdomen (for foot pain)     Social History     Social History    Marital status:      Spouse name: N/A    Number of children: N/A    Years of education: N/A     Social History Main Topics    Smoking status: Former Smoker     Packs/day: 0.10     Years: 2.00     Quit date: 1/1/1975    Smokeless tobacco: Never Used  Alcohol use No    Drug use: No    Sexual activity: Not Asked     Other Topics Concern    None     Social History Narrative     Family History   Problem Relation Age of Onset    Arrhythmia Father     Heart Attack Father     Cancer Brother      Melanoma    Breast Cancer Daughter      48    Breast Cancer Daughter 46    Breast Cancer Daughter 46     Allergies   Allergen Reactions    Duragesic [Fentanyl] Other (comments)     Patient sees things when taking    Codeine Nausea Only     Current Outpatient Prescriptions   Medication Sig Dispense Refill    olopatadine (PATANOL) 0.1 % ophthalmic solution       albuterol (PROVENTIL HFA, VENTOLIN HFA, PROAIR HFA) 90 mcg/actuation inhaler Take 2 Puffs by inhalation every four (4) hours as needed for Wheezing. 1 Inhaler 1    b complex vitamins tablet Take 1 Tab by mouth daily.  vitamin E (AQUA GEMS) 400 unit capsule Take  by mouth daily.  amitriptyline (ELAVIL) 100 mg tablet Take 1 Tab by mouth nightly. 90 Tab 1    metoprolol tartrate (LOPRESSOR) 25 mg tablet TAKE 1 TAB BY MOUTH TWO (2) TIMES A DAY. 180 Tab 3    gabapentin (NEURONTIN) 800 mg tablet TAKE 1 TABLET BY MOUTH THREE (3) TIMES DAILY. 90 Tab 5    aspirin 81 mg chewable tablet Take 1 Tab by mouth daily. 90 Tab 3    omega 3-dha-epa-fish oil (FISH OIL) 100-160-1,000 mg cap Take 1 Cap by mouth daily.  losartan (COZAAR) 100 mg tablet TAKE 1 TABLET BY MOUTH EVERY DAY 90 Tab 3    atorvastatin (LIPITOR) 40 mg tablet TAKE 1 TABLET BY MOUTH NIGHTLY. 90 Tab 3    RESTASIS 0.05 % ophthalmic emulsion   3    OMEPRAZOLE PO Take 20 mg by mouth daily. Review of Systems   Constitutional: Negative for malaise/fatigue and weight loss. Gastrointestinal: Negative for constipation, diarrhea and heartburn. Musculoskeletal: Negative for back pain and joint pain. Neurological: Negative for dizziness, tingling and focal weakness.      Visit Vitals    /69 (BP 1 Location: Left arm, BP Patient Position: Sitting)    Pulse 77    Temp 98.9 °F (37.2 °C) (Oral)    Resp 20    Ht 5' 7\" (1.702 m)    Wt 190 lb (86.2 kg)    SpO2 100%    BMI 29.76 kg/m2     Physical Exam   Constitutional: She is oriented to person, place, and time. She appears well-developed and well-nourished. HENT:   Head: Normocephalic. Eyes: Conjunctivae are normal. Pupils are equal, round, and reactive to light. Neck: Neck supple. Carotid bruit is not present. No thyromegaly present. Cardiovascular: Normal rate, regular rhythm and normal heart sounds. PMI is not displaced. Exam reveals no gallop. No murmur heard. Pulses:       Dorsalis pedis pulses are 1+ on the right side, and 1+ on the left side. Posterior tibial pulses are 1+ on the right side, and 1+ on the left side. Pulmonary/Chest: Effort normal. She has no wheezes. She has no rhonchi. She has no rales. Abdominal: Soft. Normal appearance. She exhibits no abdominal bruit and no mass. There is no hepatosplenomegaly. There is no tenderness. Musculoskeletal: She exhibits no edema. Lymphadenopathy:     She has no cervical adenopathy. Right: No supraclavicular adenopathy present. Left: No inguinal and no supraclavicular adenopathy present. Neurological: She is alert and oriented to person, place, and time. No sensory deficit. Skin: Skin is warm, dry and intact. No rash noted. Psychiatric: She has a normal mood and affect. Her behavior is normal.   Nursing note and vitals reviewed.     Results for orders placed or performed in visit on 10/19/17   AMB POC HEMOGLOBIN A1C   Result Value Ref Range    Hemoglobin A1c (POC) 6.7 %     Lab Results   Component Value Date/Time    Cholesterol, total 96 04/28/2017 09:55 AM    HDL Cholesterol 29 04/28/2017 09:55 AM    LDL, calculated 46 04/28/2017 09:55 AM    VLDL, calculated 21 04/28/2017 09:55 AM    Triglyceride 105 04/28/2017 09:55 AM    CHOL/HDL Ratio 3.3 02/08/2010 01:53 PM     Lab Results Component Value Date/Time    Sodium 141 04/28/2017 09:55 AM    Potassium 5.1 04/28/2017 09:55 AM    Chloride 98 04/28/2017 09:55 AM    CO2 28 04/28/2017 09:55 AM    Anion gap 8 03/02/2012 03:36 AM    Glucose 111 04/28/2017 09:55 AM    BUN 9 04/28/2017 09:55 AM    Creatinine 0.71 04/28/2017 09:55 AM    BUN/Creatinine ratio 13 04/28/2017 09:55 AM    GFR est AA 98 04/28/2017 09:55 AM    GFR est non-AA 85 04/28/2017 09:55 AM    Calcium 9.4 04/28/2017 09:55 AM    Bilirubin, total 0.5 04/28/2017 09:55 AM    AST (SGOT) 66 04/28/2017 09:55 AM    Alk. phosphatase 135 04/28/2017 09:55 AM    Protein, total 7.0 04/28/2017 09:55 AM    Albumin 4.1 04/28/2017 09:55 AM    Globulin 4.2 03/02/2012 03:36 AM    A-G Ratio 1.4 04/28/2017 09:55 AM    ALT (SGPT) 56 04/28/2017 09:55 AM       ASSESSMENT and PLAN    ICD-10-CM ICD-9-CM    1. Controlled type 2 diabetes mellitus with diabetic polyneuropathy, without long-term current use of insulin (HCC) E11.42 250.60 AMB POC HEMOGLOBIN A1C     357.2 HEMOGLOBIN A1C WITH EAG      MICROALBUMIN, UR, RAND W/ MICROALBUMIN/CREA RATIO   2. Hematoma of scalp, initial encounter S00. 03XA 920    3. Hypercholesteremia E78.00 272.0 LIPID PANEL      METABOLIC PANEL, COMPREHENSIVE   4. Hypertension, essential I10 401.9    5. Paroxysmal atrial fibrillation (HCC) I48.0 427.31    6. Encounter for immunization Z23 V03.89 INFLUENZA VIRUS VACCINE, HIGH DOSE SEASONAL, PRESERVATIVE FREE      ADMIN INFLUENZA VIRUS VAC     Diagnoses and all orders for this visit:    1. Controlled type 2 diabetes mellitus with diabetic polyneuropathy, without long-term current use of insulin (HCC)  -     AMB POC HEMOGLOBIN A1C  -     HEMOGLOBIN A1C WITH EAG; Future  -     MICROALBUMIN, UR, RAND W/ MICROALBUMIN/CREA RATIO; Future        -     gabapentin (NEURONTIN) 800 mg tablet; Take 1.5 Tabs by mouth three (3) times daily. 2. Hematoma of scalp, initial encounter  Reassured.     3. Hypercholesteremia  Hyperlipidemia is controlled  - LIPID PANEL; Future  -     METABOLIC PANEL, COMPREHENSIVE; Future    4. Hypertension, essential  Hypertension is controlled    5. Paroxysmal atrial fibrillation (HCC)  Stable. 6. Encounter for immunization  -     Influenza virus vaccine ([address]) 72 years and older ([id number])  -     Administration fee () for Medicare insured patients        Follow-up Disposition:  Return in about 6 months (around 4/19/2018) for DM, HTN, Chol  Fasting lab one week prior . lab results and schedule of future lab studies reviewed with patient  reviewed diet, exercise and weight control  cardiovascular risk and specific lipid/LDL goals reviewed  I have discussed the diagnosis with the patient and the intended plan as seen in the above orders. Patient is in agreement. The patient has received an after-visit summary and questions were answered concerning future plans. I have discussed medication side effects and warnings with the patient as well.

## 2017-11-20 RX ORDER — GABAPENTIN 800 MG/1
TABLET ORAL
Qty: 90 TAB | Refills: 5 | Status: SHIPPED | OUTPATIENT
Start: 2017-11-20 | End: 2018-04-17

## 2018-01-19 RX ORDER — ATORVASTATIN CALCIUM 40 MG/1
TABLET, FILM COATED ORAL
Qty: 90 TAB | Refills: 3 | Status: SHIPPED | OUTPATIENT
Start: 2018-01-19 | End: 2018-07-16 | Stop reason: SDUPTHER

## 2018-03-14 RX ORDER — LOSARTAN POTASSIUM 100 MG/1
TABLET ORAL
Qty: 90 TAB | Refills: 3 | Status: SHIPPED | OUTPATIENT
Start: 2018-03-14 | End: 2019-02-25 | Stop reason: SDUPTHER

## 2018-03-14 NOTE — TELEPHONE ENCOUNTER
PCP: Vidhya Ordoñez MD     Last appt: 10/19/2017   Future Appointments  Date Time Provider Vaibhav Velascoi   4/10/2018 8:30 AM LAB [facility] SCHED   4/17/2018 1:30 PM Vidhya Ordoñez MD [facility] SCHED        Requested Prescriptions     Pending Prescriptions Disp Refills    losartan (COZAAR) 100 mg tablet 90 Tab 3     Sig: TAKE 1 TABLET BY MOUTH EVERY DAY

## 2018-04-17 ENCOUNTER — OFFICE VISIT (OUTPATIENT)
Dept: INTERNAL MEDICINE CLINIC | Facility: CLINIC | Age: 73
End: 2018-04-17

## 2018-04-17 ENCOUNTER — HOSPITAL ENCOUNTER (OUTPATIENT)
Dept: LAB | Age: 73
Discharge: HOME | End: 2018-04-17
Payer: COMMERCIAL

## 2018-04-17 VITALS
DIASTOLIC BLOOD PRESSURE: 76 MMHG | OXYGEN SATURATION: 96 % | WEIGHT: 188 LBS | TEMPERATURE: 99.5 F | SYSTOLIC BLOOD PRESSURE: 128 MMHG | HEIGHT: 67 IN | BODY MASS INDEX: 29.51 KG/M2 | HEART RATE: 72 BPM | RESPIRATION RATE: 20 BRPM

## 2018-04-17 DIAGNOSIS — E11.42 CONTROLLED TYPE 2 DIABETES MELLITUS WITH DIABETIC POLYNEUROPATHY, WITHOUT LONG-TERM CURRENT USE OF INSULIN (HCC): ICD-10-CM

## 2018-04-17 DIAGNOSIS — K76.0 HEPATIC STEATOSIS: ICD-10-CM

## 2018-04-17 DIAGNOSIS — R74.01 ELEVATED TRANSAMINASE LEVEL: ICD-10-CM

## 2018-04-17 DIAGNOSIS — D86.1 SARCOIDOSIS OF LYMPH NODES: ICD-10-CM

## 2018-04-17 DIAGNOSIS — I10 HYPERTENSION, ESSENTIAL: ICD-10-CM

## 2018-04-17 DIAGNOSIS — E78.00 HYPERCHOLESTEREMIA: ICD-10-CM

## 2018-04-17 DIAGNOSIS — Z00.00 MEDICARE ANNUAL WELLNESS VISIT, SUBSEQUENT: Primary | ICD-10-CM

## 2018-04-17 DIAGNOSIS — J44.1 COPD WITH EXACERBATION (HCC): ICD-10-CM

## 2018-04-17 DIAGNOSIS — I48.0 PAROXYSMAL ATRIAL FIBRILLATION (HCC): ICD-10-CM

## 2018-04-17 DIAGNOSIS — Z71.89 ADVANCE DIRECTIVE DISCUSSED WITH PATIENT: ICD-10-CM

## 2018-04-17 DIAGNOSIS — E11.42 DIABETIC PERIPHERAL NEUROPATHY ASSOCIATED WITH TYPE 2 DIABETES MELLITUS (HCC): ICD-10-CM

## 2018-04-17 LAB — HBA1C MFR BLD HPLC: 6.9 %

## 2018-04-17 PROCEDURE — 82043 UR ALBUMIN QUANTITATIVE: CPT

## 2018-04-17 RX ORDER — DOXYCYCLINE 100 MG/1
100 CAPSULE ORAL 2 TIMES DAILY
Qty: 14 CAP | Refills: 0 | Status: SHIPPED | OUTPATIENT
Start: 2018-04-17 | End: 2018-06-18 | Stop reason: ALTCHOICE

## 2018-04-17 RX ORDER — GABAPENTIN 600 MG/1
900 TABLET ORAL 2 TIMES DAILY
COMMUNITY
Start: 2018-02-05 | End: 2018-07-20 | Stop reason: SDUPTHER

## 2018-04-17 NOTE — MR AVS SNAPSHOT
[address] [address] [phone] Patient: Alex Ballard MRN: [id number] HYR:[birth year] Visit Information Date & Time Provider Department Dept. Phone Encounter #  
 4/17/2018  1:30 PM MD Komal Shankar [facility] [address] [id number] Follow-up Instructions Return in about 6 months (around 10/17/2018) for DM, HTN, chol, POC A1c,  Fasitng lab soone. Routing History Upcoming Health Maintenance Date Due  
 EYE EXAM RETINAL OR DILATED Q1 11/29/2017 FOOT EXAM Q1 4/21/2018 MICROALBUMIN Q1 4/21/2018 FOBT Q 1 YEAR AGE 50-75 4/23/2018 LIPID PANEL Q1 4/28/2018 DTaP/Tdap/Td series (1 - Tdap) 9/22/2019* MEDICARE YEARLY EXAM 4/22/2018 HEMOGLOBIN A1C Q6M 10/17/2018 GLAUCOMA SCREENING Q2Y 11/29/2018 BREAST CANCER SCRN MAMMOGRAM 5/22/2019 *Topic was postponed. The date shown is not the original due date. Allergies as of 4/17/2018  Review Complete On: 4/17/2018 By: Xenia Pritchard MD  
  
 Severity Noted Reaction Type Reactions Duragesic [Fentanyl] High 02/08/2010    Other (comments) Patient sees things when taking Codeine  12/23/2009    Nausea Only Current Immunizations  Reviewed on 4/17/2018 Name Date Influenza High Dose Vaccine PF 10/19/2017, 9/22/2016, 11/12/2015 Influenza Vaccine 10/17/2014, 10/3/2013 Influenza Vaccine Split 10/8/2012, 10/7/2011 Influenza Vaccine Whole 9/22/2010 Pneumococcal Conjugate (PCV-13) 3/22/2016 TD Vaccine 9/22/2009 ZZZ-RETIRED (DO NOT USE) Pneumococcal Vaccine (Unspecified Type) 9/22/2009 Reviewed by Montana Ivy LPN on 3/74/5164 at  1:23 PM  
 Reviewed by Montana Ivy LPN on 3/11/9066 at  1:24 PM  
You Were Diagnosed With   
  
 Codes Comments Medicare annual wellness visit, subsequent    -  Primary ICD-10-CM: Z00.00 ICD-9-CM: V70.0 Advance directive discussed with patient     ICD-10-CM: Z71.89 ICD-9-CM: V65.49 Diabetic peripheral neuropathy associated with type 2 diabetes mellitus (HCC)     ICD-10-CM: E11.42 
ICD-9-CM: 250.60, 357.2 Controlled type 2 diabetes mellitus with diabetic polyneuropathy, without long-term current use of insulin (HCC)     ICD-10-CM: E11.42 
ICD-9-CM: 250.60, 357.2 Hypercholesteremia     ICD-10-CM: E78.00 ICD-9-CM: 272.0 Hypertension, essential     ICD-10-CM: I10 
ICD-9-CM: 401.9 Sarcoidosis of lymph nodes     ICD-10-CM: D86.1 ICD-9-CM: 135 Paroxysmal atrial fibrillation (HCC)     ICD-10-CM: I48.0 ICD-9-CM: 427.31   
 COPD with exacerbation ([facility] [address]     ICD-10-CM: J44.1 ICD-9-CM: 491.21 Vitals BP Pulse Temp Resp Height(growth percentile) Weight(growth percentile) 128/76 (BP 1 Location: Left arm, BP Patient Position: Sitting) 72 99.5 °F (37.5 °C) (Oral) 20 5' 7\" (1.702 m) 188 lb (85.3 kg) SpO2 BMI OB Status Smoking Status 96% 29.44 kg/m2 Hysterectomy Former Smoker BMI and BSA Data Body Mass Index Body Surface Area  
 29.44 kg/m 2 2.01 m 2 Preferred Pharmacy Pharmacy Name Phone [facility]/PHARMACY #[id number] [id number] [phone] Your Updated Medication List  
  
   
This list is accurate as of 4/17/18  2:21 PM.  Always use your most recent med list.  
  
  
  
  
 albuterol 90 mcg/actuation inhaler Commonly known as:  PROVENTIL HFA, VENTOLIN HFA, PROAIR HFA Take 2 Puffs by inhalation every four (4) hours as needed for Wheezing. amitriptyline 100 mg tablet Commonly known as:  ELAVIL Take 1 Tab by mouth nightly. aspirin 81 mg chewable tablet Take 1 Tab by mouth daily. atorvastatin 40 mg tablet Commonly known as:  LIPITOR  
TAKE 1 TABLET BY MOUTH NIGHTLY. doxycycline 100 mg capsule Commonly known as:  VIBRAMYCIN Take 1 Cap by mouth two (2) times a day. FISH -160-1,000 mg Cap Generic drug:  omega 3-dha-epa-fish oil Take 1 Cap by mouth daily. gabapentin 600 mg tablet Commonly known as:  NEURONTIN Take 900 mg by mouth two (2) times a day. losartan 100 mg tablet Commonly known as:  COZAAR  
TAKE 1 TABLET BY MOUTH EVERY DAY  
  
 metoprolol tartrate 25 mg tablet Commonly known as:  LOPRESSOR  
TAKE 1 TAB BY MOUTH TWO (2) TIMES A DAY. OMEPRAZOLE PO Take 20 mg by mouth daily. RESTASIS 0.05 % ophthalmic emulsion Generic drug:  cycloSPORINE Prescriptions Sent to Pharmacy Refills  
 doxycycline (VIBRAMYCIN) 100 mg capsule 0 Sig: Take 1 Cap by mouth two (2) times a day. Class: Normal  
 Pharmacy: [facility]/pharmacy #[id number] Mariana Sandoval, [address] Ph #: [phone] Route: Oral  
  
We Performed the Following AMB POC HEMOGLOBIN A1C [83928 CPT(R)] HM DIABETES FOOT EXAM [7 Custom] MICROALBUMIN, UR, RAND W/ MICROALB/CREAT RATIO [id number] CPT(R)] Follow-up Instructions Return in about 6 months (around 10/17/2018) for DM, HTN, chol, POC A1c,  Fasitng lab soone. Patient Instructions Return soon for fasting lab Office visit in 6 months with hemoglobin A1c at that visit. The best way to stay healthy is to live a healthy lifestyle. A healthy lifestyle includes regular exercise, eating a well-balanced diet, keeping a healthy weight and not smoking. Regular physical exams and screening tests are another important way to take care of yourself. Preventive exams provided by health care providers can find health problems early when treatment works best and can keep you from getting certain diseases or illnesses. Preventive services include exams, lab tests, screenings, shots, monitoring and information to help you take care of your own health. All people over 65 should have a pneumonia shot.  Pneumonia shots are usually only needed once in a lifetime unless your doctor decides differently. In addition to your physical exam, some screening tests are recommended: 
 
All people over 65 should have a yearly flu shot. People over 65 are at medium to high risk for Hepatitis B. Three shots are needed for complete protection. Bone mass measurement (dexa scan) is recommended every two years. Diabetes Mellitus screening is recommended every year. Glaucoma is an eye disease caused by high pressure in the eye. An eye exam is recommended every year. Cardiovascular screening tests that check your cholesterol and other blood fat (lipid) levels are recommended every five years. Colorectal Cancer screening tests help to find pre-cancerous polyps (growths in the colon) so they can be removed before they turn into cancer. Tests ordered for screening depend on your personal and family history risk factors. Prostate Cancer Screening (annually up to age 76) Screening for breast cancer is recommended yearly with a Mammogram. 
 
Screening for cervical and vaginal cancer is recommended with a pelvic and Pap test every two years. However if you have had an abnormal pap in the past  three years or at high risk for cervical or vaginal cancer Medicare will cover a pap test and a pelvic exam every year. Here is a list of your current Health Maintenance items with a due date: 
Health Maintenance Due Topic Date Due Quincy Mcgarry Eye Exam  11/29/2017 Quincy Mcgarry Diabetic Foot Care  04/21/2018  Albumin Urine Test  04/21/2018  Stool testing for trace blood  04/23/2018  Cholesterol Test   04/28/2018 Diabetes Foot Health: Care Instructions Your Care Instructions When you have diabetes, your feet need extra care and attention. Diabetes can damage the nerve endings and blood vessels in your feet, making you less likely to notice when your feet are injured.  Diabetes also limits your body's ability to fight infection and get blood to areas that need it. If you get a minor foot injury, it could become an ulcer or a serious infection. With good foot care, you can prevent most of these problems. Caring for your feet can be quick and easy. Most of the care can be done when you are bathing or getting ready for bed. Follow-up care is a key part of your treatment and safety. Be sure to make and go to all appointments, and call your doctor if you are having problems. It's also a good idea to know your test results and keep a list of the medicines you take. How can you care for yourself at home? · Keep your blood sugar close to normal by watching what and how much you eat, monitoring blood sugar, taking medicines if prescribed, and getting regular exercise. · Do not smoke. Smoking affects blood flow and can make foot problems worse. If you need help quitting, talk to your doctor about stop-smoking programs and medicines. These can increase your chances of quitting for good. · Eat a diet that is low in fats. High fat intake can cause fat to build up in your blood vessels and decrease blood flow. · Inspect your feet daily for blisters, cuts, cracks, or sores. If you cannot see well, use a mirror or have someone help you. · Take care of your feet: 
[facility] your feet every day. Use warm (not hot) water. Check the water temperature with your wrists or other part of your body, not your feet. ¨ Dry your feet well. Pat them dry. Do not rub the skin on your feet too hard. Dry well between your toes. If the skin on your feet stays moist, bacteria or a fungus can grow, which can lead to infection. ¨ Keep your skin soft. Use moisturizing skin cream to keep the skin on your feet soft and prevent calluses and cracks. But do not put the cream between your toes, and stop using any cream that causes a rash. ¨ Clean underneath your toenails carefully.  Do not use a sharp object to clean underneath your toenails. Use the blunt end of a nail file or other rounded tool. ¨ Trim and file your toenails straight across to prevent ingrown toenails. Use a nail clipper, not scissors. Use an emery board to smooth the edges. · Change socks daily. Socks without seams are best, because seams often rub the feet. You can find socks for people with diabetes from specialty catalogs. · Look inside your shoes every day for things like gravel or torn linings, which could cause blisters or sores. · Buy shoes that fit well: 
¨ Look for shoes that have plenty of space around the toes. This helps prevent bunions and blisters. ¨ Try on shoes while wearing the kind of socks you will usually wear with the shoes. ¨ Avoid plastic shoes. They may rub your feet and cause blisters. Good shoes should be made of materials that are flexible and breathable, such as leather or cloth. ¨ Break in new shoes slowly by wearing them for no more than an hour a day for several days. Take extra time to check your feet for red areas, blisters, or other problems after you wear new shoes. · Do not go barefoot. Do not wear sandals, and do not wear shoes with very thin soles. Thin soles are easy to puncture. They also do not protect your feet from hot pavement or cold weather. · Have your doctor check your feet during each visit. If you have a foot problem, see your doctor. Do not try to treat an early foot problem at home. Home remedies or treatments that you can buy without a prescription (such as corn removers) can be harmful. · Always get early treatment for foot problems. A minor irritation can lead to a major problem if not properly cared for early. When should you call for help? Call your doctor now or seek immediate medical care if: 
? · You have a foot sore, an ulcer or break in the skin that is not healing after 4 days, bleeding corns or calluses, or an ingrown toenail. ? · You have blue or black areas, which can mean bruising or blood flow problems. ? · You have peeling skin or tiny blisters between your toes or cracking or oozing of the skin. ? · You have a fever for more than 24 hours and a foot sore. ? · You have new numbness or tingling in your feet that does not go away after you move your feet or change positions. ? · You have unexplained or unusual swelling of the foot or ankle. ? Watch closely for changes in your health, and be sure to contact your doctor if: 
? · You cannot do proper foot care. Where can you learn more? Go to http://alejandro-samuel.info/. Enter A739 in the search box to learn more about \"Diabetes Foot Health: Care Instructions. \" Current as of: March 13, 2017 Content Version: 11.4 © 4824-3632 [employer]. Care instructions adapted under license by [employer] (which disclaims liability or warranty for this information). If you have questions about a medical condition or this instruction, always ask your healthcare professional. [address] [address] any warranty or liability for your use of this information. Introducing [facility]! [employer] introduces [employer] patient portal. Now you can access parts of your medical record, email your doctor's office, and request medication refills online. 1. In your internet browser, go to [email or web address] 2. Click on the First Time User? Click Here link in the Sign In box. You will see the New Member Sign Up page. 3. Enter your [employer] Access Code exactly as it appears below. You will not need to use this code after youve completed the sign-up process. If you do not sign up before the expiration date, you must request a new code. · [employer] Access Code: [email or web address] Expires: 7/16/2018  2:21 PM 
 
4.  Enter the last four digits of your Social Security Number (xxxx) and Date of Birth (mm/dd/yyyy) as indicated and click Submit. You will be taken to the next sign-up page. 5. Create a [employer] ID. This will be your [employer] login ID and cannot be changed, so think of one that is secure and easy to remember. 6. Create a [employer] password. You can change your password at any time. 7. Enter your Password Reset Question and Answer. This can be used at a later time if you forget your password. 8. Enter your e-mail address. You will receive e-mail notification when new information is available in [address]. 9. Click Sign Up. You can now view and download portions of your medical record. 10. Click the Download Summary menu link to download a portable copy of your medical information. If you have questions, please visit the Frequently Asked Questions section of the [employer] website. Remember, [employer] is NOT to be used for urgent needs. For medical emergencies, dial 911. Now available from your iPhone and Android! Please provide this summary of care documentation to your next provider. Your primary care clinician is listed as Gabriel Suárez. If you have any questions after today's visit, please call [phone].

## 2018-04-17 NOTE — PROGRESS NOTES
This is the Subsequent Medicare Annual Wellness Exam, performed 12 months or more after the Initial AWV or the last Subsequent AWV    I have reviewed the patient's medical history in detail and updated the computerized patient record. History     Past Medical History:   Diagnosis Date    Abnormal chest x-ray 12/23/2009    Anemia 12/23/2009    Arrhythmia     SVT. Converts with [employer]    Atrial fibrillation ([address] 12/23/2009    Carpal tunnel syndrome 12/23/2009    Chronic obstructive pulmonary disease (HCC)     Colitis 12/23/2009    Complex regional pain syndrome 12/23/2009    Depression 12/23/2009    Diabetes ([address]     GERD (gastroesophageal reflux disease) 2/8/2010    HTN (hypertension) 12/23/2009    Hypercholesteremia 12/23/2009    Hypertriglyceridemia 12/23/2009    Idiopathic peripheral neuropathy 12/23/2009    Iron deficiency anemia 12/23/2009    Osteopenia 12/23/2009    Paroxysmal atrial fibrillation ([address] 12/2/2011    Psoriasis 6/27/2012    Sarcoidosis       Past Surgical History:   Procedure Laterality Date    HX CARPAL TUNNEL RELEASE  00/00/2002    Both hands    HX CYST REMOVAL  00/00/1965    Left wrist    HX HYSTERECTOMY  00/00/1985    HX ORTHOPAEDIC  00/00/1987    back surgery (disc)    HX ORTHOPAEDIC      right foot X 3 neuromas and tarsal tunnel release    HX TONSILLECTOMY  00/00/1962    NEUROLOGICAL PROCEDURE UNLISTED      Pain pump    PAIN PUMP DEVICE  00/00/2007    in left lower abdomen (for foot pain)     Current Outpatient Prescriptions   Medication Sig Dispense Refill    gabapentin (NEURONTIN) 600 mg tablet Take 900 mg by mouth two (2) times a day.  doxycycline (VIBRAMYCIN) 100 mg capsule Take 1 Cap by mouth two (2) times a day. 14 Cap 0    losartan (COZAAR) 100 mg tablet TAKE 1 TABLET BY MOUTH EVERY DAY 90 Tab 3    atorvastatin (LIPITOR) 40 mg tablet TAKE 1 TABLET BY MOUTH NIGHTLY.  90 Tab 3    albuterol (PROVENTIL HFA, VENTOLIN HFA, PROAIR HFA) 90 mcg/actuation inhaler Take 2 Puffs by inhalation every four (4) hours as needed for Wheezing. 1 Inhaler 1    amitriptyline (ELAVIL) 100 mg tablet Take 1 Tab by mouth nightly. 90 Tab 1    metoprolol tartrate (LOPRESSOR) 25 mg tablet TAKE 1 TAB BY MOUTH TWO (2) TIMES A DAY. 180 Tab 3    aspirin 81 mg chewable tablet Take 1 Tab by mouth daily. 90 Tab 3    RESTASIS 0.05 % ophthalmic emulsion   3    OMEPRAZOLE PO Take 20 mg by mouth daily.  omega 3-dha-epa-fish oil (FISH OIL) 100-160-1,000 mg cap Take 1 Cap by mouth daily.        Allergies   Allergen Reactions    Duragesic [Fentanyl] Other (comments)     Patient sees things when taking    Codeine Nausea Only     Family History   Problem Relation Age of Onset    Arrhythmia Father     Heart Attack Father     Cancer Brother      Melanoma    Breast Cancer Daughter      48    Breast Cancer Daughter 46    Breast Cancer Daughter 46     Social History   Substance Use Topics    Smoking status: Former Smoker     Packs/day: 0.10     Years: 2.00     Quit date: 1/1/1975    Smokeless tobacco: Never Used    Alcohol use No     Patient Active Problem List   Diagnosis Code    Headache(784.0) R51    Carpal tunnel syndrome G56.00    Hypercholesteremia E78.00    Depression F32.9    Abnormal chest x-ray R93.8    Complex regional pain syndrome G90.50    Diabetic peripheral neuropathy associated with type 2 diabetes mellitus ([facility] [address] E11.42    Hypertension, essential I10    Controlled type 2 diabetes mellitus with diabetic polyneuropathy, without long-term current use of insulin (HCC) E11.42    Osteopenia M85.80    Vitamin D deficiency E55.9    GERD (gastroesophageal reflux disease) K21.9    Psoriasis L40.9    Paroxysmal atrial fibrillation (HCC) I48.0    Hepatic steatosis K76.0    COPD, severity to be determined ([facility] [address] J44.9    Sarcoidosis of lymph nodes D86.1       Depression Risk Factor Screening:     PHQ over the last two weeks 4/17/2018   Little interest or pleasure in doing things Not at all   Feeling down, depressed or hopeless Not at all   Total Score PHQ 2 0   Trouble falling or staying asleep, or sleeping too much Not at all   Feeling tired or having little energy Not at all   Poor appetite or overeating Not at all   Feeling bad about yourself - or that you are a failure or have let yourself or your family down Not at all   Trouble concentrating on things such as school, work, reading or watching TV Not at all   Moving or speaking so slowly that other people could have noticed; or the opposite being so fidgety that others notice Not at all   Thoughts of being better off dead, or hurting yourself in some way Not at all   PHQ 9 Score 0   How difficult have these problems made it for you to do your work, take care of your home and get along with others Not difficult at all     Alcohol Risk Factor Screening: You do not drink alcohol or very rarely. Functional Ability and Level of Safety:   Hearing Loss  Hearing is good. Activities of Daily Living  The home contains: handrails and rugs  Patient does total self care    Fall Risk  Fall Risk Assessment, last 12 mths 7/17/2017   Able to walk? Yes   Fall in past 12 months? Yes   Fall with injury? Yes   Number of falls in past 12 months 1   Fall Risk Score 2       Abuse Screen  Patient is not abused    Cognitive Screening   Evaluation of Cognitive Function:  Has your family/caregiver stated any concerns about your memory: no  Normal  Three word recall  Immediate recall 3/3 Delayed recall 3/3    Patient Care Team   Patient Care Team:  Jones Nino MD as PCP - General (Internal Medicine)  Steven Mckinley MD (Physical Medicine and Rehab)  James Sosa MD (Ophthalmology)  Ruperto Nyhan, MD (Neurology)    Assessment/Plan   Education and counseling provided:  Are appropriate based on today's review and evaluation    Diagnoses and all orders for this visit:    1.  Controlled type 2 diabetes mellitus with diabetic polyneuropathy, without long-term current use of insulin (HCC)  -     AMB POC HEMOGLOBIN A1C  -     MICROALBUMIN, UR, RAND W/ MICROALB/CREAT RATIO  -      DIABETES FOOT EXAM    2. Diabetic peripheral neuropathy associated with type 2 diabetes mellitus ([facility] [address]    3. Hypercholesteremia    4. Hypertension, essential    5. Sarcoidosis of lymph nodes    6. Paroxysmal atrial fibrillation (HCC)    7. COPD with exacerbation ([facility])  -     doxycycline (VIBRAMYCIN) 100 mg capsule; Take 1 Cap by mouth two (2) times a day.         Health Maintenance Due   Topic Date Due    EYE EXAM RETINAL OR DILATED Q1  11/29/2017    FOOT EXAM Q1  04/21/2018    MICROALBUMIN Q1  04/21/2018    FOBT Q 1 YEAR AGE 50-75  04/23/2018    LIPID PANEL Q1  04/28/2018

## 2018-04-17 NOTE — PATIENT INSTRUCTIONS
Return soon for fasting lab  Office visit in 6 months with hemoglobin A1c at that visit. The best way to stay healthy is to live a healthy lifestyle. A healthy lifestyle includes regular exercise, eating a well-balanced diet, keeping a healthy weight and not smoking. Regular physical exams and screening tests are another important way to take care of yourself. Preventive exams provided by health care providers can find health problems early when treatment works best and can keep you from getting certain diseases or illnesses. Preventive services include exams, lab tests, screenings, shots, monitoring and information to help you take care of your own health. All people over 65 should have a pneumonia shot. Pneumonia shots are usually only needed once in a lifetime unless your doctor decides differently. In addition to your physical exam, some screening tests are recommended:    All people over 65 should have a yearly flu shot. People over 65 are at medium to high risk for Hepatitis B. Three shots are needed for complete protection. Bone mass measurement (dexa scan) is recommended every two years. Diabetes Mellitus screening is recommended every year. Glaucoma is an eye disease caused by high pressure in the eye. An eye exam is recommended every year. Cardiovascular screening tests that check your cholesterol and other blood fat (lipid) levels are recommended every five years. Colorectal Cancer screening tests help to find pre-cancerous polyps (growths in the colon) so they can be removed before they turn into cancer. Tests ordered for screening depend on your personal and family history risk factors. Prostate Cancer Screening (annually up to age 76)    Screening for breast cancer is recommended yearly with a Mammogram.    Screening for cervical and vaginal cancer is recommended with a pelvic and Pap test every two years.  However if you have had an abnormal pap in the past three years or at high risk for cervical or vaginal cancer Medicare will cover a pap test and a pelvic exam every year. Here is a list of your current Health Maintenance items with a due date:  Health Maintenance Due   Topic Date Due    Eye Exam  11/29/2017    Diabetic Foot Care  04/21/2018    Albumin Urine Test  04/21/2018    Stool testing for trace blood  04/23/2018    Cholesterol Test   04/28/2018          Diabetes Foot Health: Care Instructions  Your Care Instructions    When you have diabetes, your feet need extra care and attention. Diabetes can damage the nerve endings and blood vessels in your feet, making you less likely to notice when your feet are injured. Diabetes also limits your body's ability to fight infection and get blood to areas that need it. If you get a minor foot injury, it could become an ulcer or a serious infection. With good foot care, you can prevent most of these problems. Caring for your feet can be quick and easy. Most of the care can be done when you are bathing or getting ready for bed. Follow-up care is a key part of your treatment and safety. Be sure to make and go to all appointments, and call your doctor if you are having problems. It's also a good idea to know your test results and keep a list of the medicines you take. How can you care for yourself at home? · Keep your blood sugar close to normal by watching what and how much you eat, monitoring blood sugar, taking medicines if prescribed, and getting regular exercise. · Do not smoke. Smoking affects blood flow and can make foot problems worse. If you need help quitting, talk to your doctor about stop-smoking programs and medicines. These can increase your chances of quitting for good. · Eat a diet that is low in fats. High fat intake can cause fat to build up in your blood vessels and decrease blood flow. · Inspect your feet daily for blisters, cuts, cracks, or sores.  If you cannot see well, use a mirror or have someone help you. · Take care of your feet:  [facility] your feet every day. Use warm (not hot) water. Check the water temperature with your wrists or other part of your body, not your feet. ¨ Dry your feet well. Pat them dry. Do not rub the skin on your feet too hard. Dry well between your toes. If the skin on your feet stays moist, bacteria or a fungus can grow, which can lead to infection. ¨ Keep your skin soft. Use moisturizing skin cream to keep the skin on your feet soft and prevent calluses and cracks. But do not put the cream between your toes, and stop using any cream that causes a rash. ¨ Clean underneath your toenails carefully. Do not use a sharp object to clean underneath your toenails. Use the blunt end of a nail file or other rounded tool. ¨ Trim and file your toenails straight across to prevent ingrown toenails. Use a nail clipper, not scissors. Use an emery board to smooth the edges. · Change socks daily. Socks without seams are best, because seams often rub the feet. You can find socks for people with diabetes from specialty catalogs. · Look inside your shoes every day for things like gravel or torn linings, which could cause blisters or sores. · Buy shoes that fit well:  ¨ Look for shoes that have plenty of space around the toes. This helps prevent bunions and blisters. ¨ Try on shoes while wearing the kind of socks you will usually wear with the shoes. ¨ Avoid plastic shoes. They may rub your feet and cause blisters. Good shoes should be made of materials that are flexible and breathable, such as leather or cloth. ¨ Break in new shoes slowly by wearing them for no more than an hour a day for several days. Take extra time to check your feet for red areas, blisters, or other problems after you wear new shoes. · Do not go barefoot. Do not wear sandals, and do not wear shoes with very thin soles. Thin soles are easy to puncture.  They also do not protect your feet from hot pavement or cold weather. · Have your doctor check your feet during each visit. If you have a foot problem, see your doctor. Do not try to treat an early foot problem at home. Home remedies or treatments that you can buy without a prescription (such as corn removers) can be harmful. · Always get early treatment for foot problems. A minor irritation can lead to a major problem if not properly cared for early. When should you call for help? Call your doctor now or seek immediate medical care if:  ? · You have a foot sore, an ulcer or break in the skin that is not healing after 4 days, bleeding corns or calluses, or an ingrown toenail. ? · You have blue or black areas, which can mean bruising or blood flow problems. ? · You have peeling skin or tiny blisters between your toes or cracking or oozing of the skin. ? · You have a fever for more than 24 hours and a foot sore. ? · You have new numbness or tingling in your feet that does not go away after you move your feet or change positions. ? · You have unexplained or unusual swelling of the foot or ankle. ? Watch closely for changes in your health, and be sure to contact your doctor if:  ? · You cannot do proper foot care. Where can you learn more? Go to http://alejandro-samuel.info/. Enter A739 in the search box to learn more about \"Diabetes Foot Health: Care Instructions. \"  Current as of: March 13, 2017  Content Version: 11.4  © 4928-6244 [employer]. Care instructions adapted under license by [employer] (which disclaims liability or warranty for this information). If you have questions about a medical condition or this instruction, always ask your healthcare professional. [place] [address] any warranty or liability for your use of this information.

## 2018-04-17 NOTE — PROGRESS NOTES
Edgard Laureano  Identified pt with two pt identifiers(name and ). Chief Complaint   Patient presents with    Diabetes    Blood Pressure Check    Cholesterol Problem       Reviewed record In preparation for visit and have obtained necessary documentation. Advanced directive / living will on file. 1. Have you been to the ER, urgent care clinic or hospitalized since your last visit? No     2. Have you seen or consulted any other health care providers outside of the [address] since your last visit? Include any pap smears or colon screening. [place]    Vitals reviewed with provider. Health Maintenance reviewed:     Health Maintenance Due   Topic    EYE EXAM RETINAL OR DILATED Q1     FOOT EXAM Q1     MICROALBUMIN Q1     FOBT Q 1 YEAR AGE 50-75     LIPID PANEL Q1     or   Wt Readings from Last 3 Encounters:   18 188 lb (85.3 kg)   10/19/17 190 lb (86.2 kg)   17 191 lb (86.6 kg)   mentally threatens you? N N N Y   Is it safe for you to go home? Y Y Y Y                No Help Needed    No Help Needed                               Shopping for groceries    Driving    Climbing a flight of stairs N  Learning Assessment 2014   PRIMARY LEARNER Patient   HIGHEST LEVEL OF EDUCATION - PRIMARY LEARNER  GRADUATED HIGH SCHOOL OR GED   BARRIERS PRIMARY LEARNER NONE   CO-LEARNER CAREGIVER No   PRIMARY LANGUAGE ENGLISH   LEARNER PREFERENCE PRIMARY DEMONSTRATION   ANSWERED BY patient   RELATIONSHIP SELF   e for you to go home?  Evie Anderson     PHQ over the last two weeks 2018   Little interest or pleasure in doing things Not at all   Feeling down, depressed or hopeless Not at all   Total Score PHQ 2 0   Trouble falling or staying asleep, or sleeping too much Not at all   Feeling tired or having little energy Not at all   Poor appetite or overeating Not at all   Feeling bad about yourself - or that you are a failure or have let yourself or your family down Not at all   Trouble concentrating on things such as school, work, reading or watching TV Not at all   Moving or speaking so slowly that other people could have noticed; or the opposite being so fidgety that others notice Not at all   Thoughts of being better off dead, or hurting yourself in some way Not at all   PHQ 9 Score 0   How difficult have these problems made it for you to do your work, take care of your home and get along with others Not difficult at all

## 2018-04-17 NOTE — PROGRESS NOTES
HISTORY OF PRESENT ILLNESS  Lennox Koenig is a 74 y.o. female. HPI  She presents for follow up of diabetes mellitus with peripheral neuropathy, hypertension, hyperlipidemia and Atrial Fibrillation. Diabetic ROS - medication compliance: compliant all of the time,      home glucose monitoring: no     home BP Monitoring: no.      further diabetic ROS: no polyuria or polydipsia, no unusual visual symptoms, no hypoglycemia, no medication side effects noted, has dysesthesias in the feet. Diet and Lifestyle: generally follows a low fat low cholesterol diet, generally follows a low sodium diet, follows a diabetic diet regularly, sedentary, nonsmoker  Cardiovascular ROS:  She complains of dyspnea on exertion. She denies palpitations, orthopnea, exertional chest pressure/discomfort, claudication, lower extremity edema, dizziness    She is being seen for follow up of COPD and sarcoidosis currently in exacerbation for 2 weeks. Taking medications as instructed, no medication side effects noted, using bronchodilator MDI less than twice a week. Denies wheezing. She has shortness of breath none, only with activity. She coughs up moderate green sputum. Sputum production has baseline.    Uses rescue inhaler:0  times /week  Smoking: no       Patient Active Problem List   Diagnosis Code    Headache(784.0) R51    Carpal tunnel syndrome G56.00    Hypercholesteremia E78.00    Depression F32.9    Abnormal chest x-ray R93.8    Complex regional pain syndrome G90.50    Diabetic peripheral neuropathy associated with type 2 diabetes mellitus ([facility] E11.42    Hypertension, essential I10    Controlled type 2 diabetes mellitus with diabetic polyneuropathy, without long-term current use of insulin (HCC) E11.42    Osteopenia M85.80    Vitamin D deficiency E55.9    GERD (gastroesophageal reflux disease) K21.9    Psoriasis L40.9    Paroxysmal atrial fibrillation (HCC) I48.0    Hepatic steatosis K76.0    COPD, severity to be determined ([facility] J44.9    Sarcoidosis of lymph nodes D86.1     Past Medical History:   Diagnosis Date    Abnormal chest x-ray 12/23/2009    Anemia 12/23/2009    Arrhythmia     SVT.  Converts with Adenocard    Atrial fibrillation ([facility] 12/23/2009    Carpal tunnel syndrome 12/23/2009    [facility] pulmonary disease [facility]     Colitis 12/23/2009    Complex regional pain syndrome 12/23/2009    Depression 12/23/2009    Diabetes [facility]     GERD (gastroesophageal reflux disease) 2/8/2010    HTN (hypertension) 12/23/2009    Hypercholesteremia 12/23/2009    Hypertriglyceridemia 12/23/2009    Idiopathic peripheral neuropathy 12/23/2009    Iron deficiency anemia 12/23/2009    Osteopenia 12/23/2009    Paroxysmal atrial fibrillation ([facility] 12/2/2011    Psoriasis 6/27/2012    Sarcoidosis      Past Surgical History:   Procedure Laterality Date    HX CARPAL TUNNEL RELEASE  00/00/2002    Both hands    HX CYST REMOVAL  00/00/1965    Left wrist    HX HYSTERECTOMY  00/00/1985    HX ORTHOPAEDIC  00/00/1987    back surgery (disc)    HX ORTHOPAEDIC      right foot X 3 neuromas and tarsal tunnel release    HX TONSILLECTOMY  00/00/1962    NEUROLOGICAL PROCEDURE UNLISTED      Pain pump    PAIN PUMP DEVICE  00/00/2007    in left lower abdomen (for foot pain)     Social History     Social History    Marital status:      Spouse name: N/A    Number of children: N/A    Years of education: N/A     Social History Main Topics    Smoking status: Former Smoker     Packs/day: 0.10     Years: 2.00     Quit date: 1/1/1975    Smokeless tobacco: Never Used    Alcohol use No    Drug use: No    Sexual activity: Not Asked     Other Topics Concern    None     Social History Narrative     Family History   Problem Relation Age of Onset    Arrhythmia Father     Heart Attack Father     Cancer Brother      Melanoma    Breast Cancer Daughter      48    Breast Cancer Daughter 46    Breast Cancer Daughter 46 Allergies   Allergen Reactions    Duragesic [Fentanyl] Other (comments)     Patient sees things when taking    Codeine Nausea Only     Current Outpatient Prescriptions   Medication Sig Dispense Refill    gabapentin (NEURONTIN) 600 mg tablet Take 900 mg by mouth two (2) times a day.  losartan (COZAAR) 100 mg tablet TAKE 1 TABLET BY MOUTH EVERY DAY 90 Tab 3    atorvastatin (LIPITOR) 40 mg tablet TAKE 1 TABLET BY MOUTH NIGHTLY. 90 Tab 3    albuterol (PROVENTIL HFA, VENTOLIN HFA, PROAIR HFA) 90 mcg/actuation inhaler Take 2 Puffs by inhalation every four (4) hours as needed for Wheezing. 1 Inhaler 1    amitriptyline (ELAVIL) 100 mg tablet Take 1 Tab by mouth nightly. 90 Tab 1    metoprolol tartrate (LOPRESSOR) 25 mg tablet TAKE 1 TAB BY MOUTH TWO (2) TIMES A DAY. 180 Tab 3    aspirin 81 mg chewable tablet Take 1 Tab by mouth daily. 90 Tab 3    RESTASIS 0.05 % ophthalmic emulsion   3    OMEPRAZOLE PO Take 20 mg by mouth daily.  omega 3-dha-epa-fish oil (FISH OIL) 100-160-1,000 mg cap Take 1 Cap by mouth daily. Review of Systems   Constitutional: Negative for malaise/fatigue and weight loss. Gastrointestinal: Negative for constipation and diarrhea. Musculoskeletal: Positive for neck pain. Negative for back pain and joint pain. Soreness around left shoulder blade for a couple of months It tingles at times. Hurts more with movement of shoulder. Neurological: Positive for tingling (feet and scapular area and left 3rd, 4th and 5th fingers) and focal weakness (left arm). Visit Vitals    /76 (BP 1 Location: Left arm, BP Patient Position: Sitting)    Pulse 72    Temp 99.5 °F (37.5 °C) (Oral)    Resp 20    Ht 5' 7\" (1.702 m)    Wt 188 lb (85.3 kg)    SpO2 96%    BMI 29.44 kg/m2     Physical Exam   Constitutional: She is oriented to person, place, and time. She appears well-developed and well-nourished. HENT:   Head: Normocephalic and atraumatic.    Eyes: Conjunctivae are normal. Pupils are equal, round, and reactive to light. Neck: Neck supple. Carotid bruit is not present. No thyromegaly present. Cardiovascular: Normal rate, regular rhythm and normal heart sounds. PMI is not displaced. Exam reveals no gallop. No murmur heard. Pulses:       Dorsalis pedis pulses are 2+ on the right side, and 2+ on the left side. Posterior tibial pulses are 2+ on the right side, and 2+ on the left side. Pulmonary/Chest: Effort normal. She has no wheezes. She has no rhonchi. She has no rales. Abdominal: Soft. Normal appearance. She exhibits no abdominal bruit and no mass. There is no hepatosplenomegaly. There is no tenderness. Musculoskeletal: She exhibits no edema. Lymphadenopathy:     She has no cervical adenopathy. Right: No supraclavicular adenopathy present. Left: No supraclavicular adenopathy present. Neurological: She is alert and oriented to person, place, and time. No sensory deficit. Skin: Skin is warm, dry and intact. No rash noted. Psychiatric: She has a normal mood and affect. Her behavior is normal.   Nursing note and vitals reviewed.   Diabetic foot exam:     Left: Reflexes absent     Vibratory sensation diminished    Proprioception normal   Sharp/dull discrimination normal    Filament test 1/6   Pulse DP: 1+ (weak)   Pulse PT: 1+ (weak)   Deformities: None  Right: Reflexes absent   Vibratory sensation diminished   Proprioception normal   Sharp/dull discrimination normal   Filament test 2/6   Pulse DP: 1+ (weak)   Pulse PT: 1+ (weak)   Deformities: None      Results for orders placed or performed in visit on 04/17/18   AMB POC HEMOGLOBIN A1C   Result Value Ref Range    Hemoglobin A1c (POC) 6.9 %       Lab Results   Component Value Date/Time    Cholesterol, total 96 (L) 04/28/2017 09:55 AM    HDL Cholesterol 29 (L) 04/28/2017 09:55 AM    LDL, calculated 46 04/28/2017 09:55 AM    VLDL, calculated 21 04/28/2017 09:55 AM Triglyceride 105 04/28/2017 09:55 AM    CHOL/HDL Ratio 3.3 02/08/2010 01:53 PM     Lab Results   Component Value Date/Time    Sodium 141 04/28/2017 09:55 AM    Potassium 5.1 04/28/2017 09:55 AM    Chloride 98 04/28/2017 09:55 AM    CO2 28 04/28/2017 09:55 AM    Anion gap 8 03/02/2012 03:36 AM    Glucose 111 (H) 04/28/2017 09:55 AM    BUN 9 04/28/2017 09:55 AM    Creatinine 0.71 04/28/2017 09:55 AM    BUN/Creatinine ratio 13 04/28/2017 09:55 AM    GFR est AA 98 04/28/2017 09:55 AM    GFR est non-AA 85 04/28/2017 09:55 AM    Calcium 9.4 04/28/2017 09:55 AM    Bilirubin, total 0.5 04/28/2017 09:55 AM    AST (SGOT) 66 (H) 04/28/2017 09:55 AM    Alk. phosphatase 135 (H) 04/28/2017 09:55 AM    Protein, total 7.0 04/28/2017 09:55 AM    Albumin 4.1 04/28/2017 09:55 AM    Globulin 4.2 (H) 03/02/2012 03:36 AM    A-G Ratio 1.4 04/28/2017 09:55 AM    ALT (SGPT) 56 (H) 04/28/2017 09:55 AM         ASSESSMENT and PLAN    ICD-10-CM ICD-9-CM    1. Medicare annual wellness visit, subsequent Z00.00 V70.0    2. Advance directive discussed with patient Z71.89 V65.49    3. Diabetic peripheral neuropathy associated with type 2 diabetes mellitus (HCC) E11.42 250.60      357.2    4. Controlled type 2 diabetes mellitus with diabetic polyneuropathy, without long-term current use of insulin (HCC) E11.42 250.60 AMB POC HEMOGLOBIN A1C     357.2 MICROALBUMIN, UR, RAND W/ MICROALB/CREAT RATIO       DIABETES FOOT EXAM   5. Hypercholesteremia E78.00 272.0    6. Hypertension, essential I10 401.9    7. Sarcoidosis of lymph nodes D86.1 135    8. Paroxysmal atrial fibrillation ([facility]) I48.0 427.31    9. COPD with exacerbation ([facility]) J44.1 491.21 doxycycline (VIBRAMYCIN) 100 mg capsule   10. Hepatic steatosis K76.0 571.8    11. Elevated transaminase level R74.0 790.4      Diagnoses and all orders for this visit:    1. Medicare annual wellness visit, subsequent    2. Advance directive discussed with patient    3.  Diabetic peripheral neuropathy associated with type 2 diabetes mellitus [facility]    4. Controlled type 2 diabetes mellitus with diabetic polyneuropathy, without long-term current use of insulin (HCC)  Diabetes Mellitus: well controlledIs taking statin and ACE/ARB  Issues reviewed with her: foot care discussed and Podiatry visits discussed, annual eye examinations at Ophthalmology discussed, glycohemoglobin and other lab monitoring discussed and long term diabetic complications discussed. -     AMB POC HEMOGLOBIN A1C  -     MICROALBUMIN, UR, RAND W/ MICROALB/CREAT RATIO  -      DIABETES FOOT EXAM    5. Hypercholesteremia  Hyperlipidemia is controlled     6. Hypertension, essential  Hypertension is controlled    7. Sarcoidosis of lymph nodes  Stable    8. Paroxysmal atrial fibrillation (HCC)  Stable on metoprolol. Continue current therapy. 9. COPD with exacerbation ([facility]  -     doxycycline (VIBRAMYCIN) 100 mg capsule; Take 1 Cap by mouth two (2) times a day. 10. Hepatic steatosis  Encouraged weight loss    11. Elevated transaminase level  Due to fatty liver. Stable since 2009. Follow-up Disposition:  Return in about 6 months (around 10/17/2018) for DM, HTN, chol, POC A1c,  Fasitng lab soone. lab results and schedule of future lab studies reviewed with patient  reviewed diet, exercise and weight control  cardiovascular risk and specific lipid/LDL goals reviewed  I have discussed the diagnosis, evaluation and treatment options and the intended plan with the patient. Patient is in agreement. The patient has received an after-visit summary and questions were answered concerning future plans. I have discussed side effects and warnings of any new medications with the patient as well.

## 2018-04-18 LAB
ALBUMIN/CREAT UR: 6.3 MG/G CREAT (ref 0–30)
CREAT UR-MCNC: 75.7 MG/DL
MICROALBUMIN UR-MCNC: 4.8 UG/ML

## 2018-04-23 ENCOUNTER — APPOINTMENT (OUTPATIENT)
Dept: INTERNAL MEDICINE CLINIC | Facility: CLINIC | Age: 73
End: 2018-04-23

## 2018-04-23 ENCOUNTER — HOSPITAL ENCOUNTER (OUTPATIENT)
Dept: LAB | Age: 73
Discharge: HOME | End: 2018-04-23
Payer: MEDICARE

## 2018-04-23 DIAGNOSIS — E11.42 CONTROLLED TYPE 2 DIABETES MELLITUS WITH DIABETIC POLYNEUROPATHY, WITHOUT LONG-TERM CURRENT USE OF INSULIN (HCC): ICD-10-CM

## 2018-04-23 DIAGNOSIS — E78.00 HYPERCHOLESTEREMIA: ICD-10-CM

## 2018-04-23 PROCEDURE — 36415 COLL VENOUS BLD VENIPUNCTURE: CPT

## 2018-04-23 PROCEDURE — 80053 COMPREHEN METABOLIC PANEL: CPT

## 2018-04-23 PROCEDURE — 80061 LIPID PANEL: CPT

## 2018-04-23 PROCEDURE — 82043 UR ALBUMIN QUANTITATIVE: CPT

## 2018-04-24 LAB
ALBUMIN SERPL-MCNC: 4 G/DL (ref 3.5–4.8)
ALBUMIN/CREAT UR: 6 MG/G CREAT (ref 0–30)
ALBUMIN/GLOB SERPL: 1.4 {RATIO} (ref 1.2–2.2)
ALP SERPL-CCNC: 158 IU/L (ref 39–117)
ALT SERPL-CCNC: 31 IU/L (ref 0–32)
AST SERPL-CCNC: 42 IU/L (ref 0–40)
BILIRUB SERPL-MCNC: 0.5 MG/DL (ref 0–1.2)
BUN SERPL-MCNC: 8 MG/DL (ref 8–27)
BUN/CREAT SERPL: 12 (ref 12–28)
CALCIUM SERPL-MCNC: 9.2 MG/DL (ref 8.7–10.3)
CHLORIDE SERPL-SCNC: 98 MMOL/L (ref 96–106)
CHOLEST SERPL-MCNC: 101 MG/DL (ref 100–199)
CO2 SERPL-SCNC: 28 MMOL/L (ref 18–29)
CREAT SERPL-MCNC: 0.69 MG/DL (ref 0.57–1)
CREAT UR-MCNC: 86.4 MG/DL
GFR SERPLBLD CREATININE-BSD FMLA CKD-EPI: 86 ML/MIN/1.73
GFR SERPLBLD CREATININE-BSD FMLA CKD-EPI: 99 ML/MIN/1.73
GLOBULIN SER CALC-MCNC: 2.9 G/DL (ref 1.5–4.5)
GLUCOSE SERPL-MCNC: 115 MG/DL (ref 65–99)
HDLC SERPL-MCNC: 29 MG/DL
LDLC SERPL CALC-MCNC: 50 MG/DL (ref 0–99)
MICROALBUMIN UR-MCNC: 5.2 UG/ML
POTASSIUM SERPL-SCNC: 4.6 MMOL/L (ref 3.5–5.2)
PROT SERPL-MCNC: 6.9 G/DL (ref 6–8.5)
SODIUM SERPL-SCNC: 142 MMOL/L (ref 134–144)
TRIGL SERPL-MCNC: 108 MG/DL (ref 0–149)
VLDLC SERPL CALC-MCNC: 22 MG/DL (ref 5–40)

## 2018-05-28 RX ORDER — AMITRIPTYLINE HYDROCHLORIDE 100 MG/1
TABLET, FILM COATED ORAL
Qty: 90 TAB | Refills: 1 | Status: SHIPPED | OUTPATIENT
Start: 2018-05-28 | End: 2018-12-07 | Stop reason: SDUPTHER

## 2018-06-11 RX ORDER — METOPROLOL TARTRATE 25 MG/1
TABLET, FILM COATED ORAL
Qty: 180 TAB | Refills: 3 | Status: SHIPPED | OUTPATIENT
Start: 2018-06-11 | End: 2018-06-26 | Stop reason: SDUPTHER

## 2018-06-11 NOTE — TELEPHONE ENCOUNTER
PCP: Bety Contreras MD     Last appt: 4/23/2018   Future Appointments  Date Time Provider Vaibhav Quintanilla   10/19/2018 1:30 PM Bety Contreras MD [facility]        Requested Prescriptions     Pending Prescriptions Disp Refills    metoprolol tartrate (LOPRESSOR) 25 mg tablet 180 Tab 3     Sig: TAKE 1 TAB BY MOUTH TWO (2) TIMES A DAY.

## 2018-06-18 ENCOUNTER — OFFICE VISIT (OUTPATIENT)
Dept: INTERNAL MEDICINE CLINIC | Facility: CLINIC | Age: 73
End: 2018-06-18

## 2018-06-18 VITALS
HEART RATE: 75 BPM | WEIGHT: 186 LBS | DIASTOLIC BLOOD PRESSURE: 76 MMHG | OXYGEN SATURATION: 95 % | SYSTOLIC BLOOD PRESSURE: 144 MMHG | TEMPERATURE: 98.6 F | BODY MASS INDEX: 29.19 KG/M2 | RESPIRATION RATE: 20 BRPM | HEIGHT: 67 IN

## 2018-06-18 DIAGNOSIS — M75.42 IMPINGEMENT SYNDROME OF LEFT SHOULDER: Primary | ICD-10-CM

## 2018-06-18 DIAGNOSIS — Z12.11 COLON CANCER SCREENING: ICD-10-CM

## 2018-06-18 RX ORDER — LIDOCAINE HYDROCHLORIDE 10 MG/ML
1 INJECTION INFILTRATION; PERINEURAL ONCE
Qty: 1 VIAL | Refills: 0
Start: 2018-06-18 | End: 2018-06-18

## 2018-06-18 RX ORDER — HYDROCODONE BITARTRATE AND ACETAMINOPHEN 5; 325 MG/1; MG/1
1 TABLET ORAL
Qty: 28 TAB | Refills: 0 | Status: SHIPPED | OUTPATIENT
Start: 2018-06-18 | End: 2018-07-18

## 2018-06-18 RX ORDER — TRIAMCINOLONE ACETONIDE 40 MG/ML
40 INJECTION, SUSPENSION INTRA-ARTICULAR; INTRAMUSCULAR ONCE
Qty: 1 ML | Refills: 0
Start: 2018-06-18 | End: 2018-06-18

## 2018-06-18 NOTE — PROGRESS NOTES
Devika Barnes  Identified pt with two pt identifiers(name and ). Chief Complaint   Patient presents with    Shoulder Pain     left for 6 weeks       Reviewed record In preparation for visit and have obtained necessary documentation. Advanced directive / living will on file. 1. Have you been to the ER, urgent care clinic or hospitalized since your last visit? No     2. Have you seen or consulted any other health care providers outside of the [facility] since your last visit? Include any pap smears or colon screening. No    Vitals reviewed with provider. Health Maintenance reviewed: Lab ordered per verbal order read back of Dr Dorcas Medley.     Health Maintenance Due   Topic    EYE EXAM RETINAL OR DILATED Q1     FOBT Q 1 YEAR AGE 50-75           Wt Readings from Last 3 Encounters:   18 186 lb (84.4 kg)   18 188 lb (85.3 kg)   10/19/17 190 lb (86.2 kg)        Temp Readings from Last 3 Encounters:   18 98.6 °F (37 °C) (Oral)   18 99.5 °F (37.5 °C) (Oral)   10/19/17 98.9 °F (37.2 °C) (Oral)        BP Readings from Last 3 Encounters:   18 144/76   18 128/76   10/19/17 137/69        Pulse Readings from Last 3 Encounters:   18 75   18 72   10/19/17 77        Vitals:    18 0929   BP: 144/76   Pulse: 75   Resp: 20   Temp: 98.6 °F (37 °C)   TempSrc: Oral   SpO2: 95%   Weight: 186 lb (84.4 kg)   Height: 5' 7\" (1.702 m)   PainSc:   9   PainLoc: Shoulder          Learning Assessment:   :       Learning Assessment 2014   PRIMARY LEARNER Patient   HIGHEST LEVEL OF EDUCATION - PRIMARY LEARNER  GRADUATED HIGH SCHOOL OR GED   BARRIERS PRIMARY LEARNER NONE   CO-LEARNER CAREGIVER No   PRIMARY LANGUAGE ENGLISH   LEARNER PREFERENCE PRIMARY DEMONSTRATION   ANSWERED BY patient   RELATIONSHIP SELF        Depression Screening:   :       PHQ over the last two weeks 2018   Little interest or pleasure in doing things Not at all   Feeling down, depressed or hopeless Not at all   Total Score PHQ 2 0   Trouble falling or staying asleep, or sleeping too much Not at all   Feeling tired or having little energy Not at all   Poor appetite or overeating Not at all   Feeling bad about yourself - or that you are a failure or have let yourself or your family down Not at all   Trouble concentrating on things such as school, work, reading or watching TV Not at all   Moving or speaking so slowly that other people could have noticed; or the opposite being so fidgety that others notice Not at all   Thoughts of being better off dead, or hurting yourself in some way Not at all   PHQ 9 Score 0   How difficult have these problems made it for you to do your work, take care of your home and get along with others Not difficult at all        Fall Risk Assessment:   :       Fall Risk Assessment, last 12 mths 6/18/2018   Able to walk? Yes   Fall in past 12 months? Yes   Fall with injury? No   Number of falls in past 12 months 1   Fall Risk Score 1        Abuse Screening:   :       Abuse Screening Questionnaire 4/17/2018 4/21/2017 3/22/2016 8/6/2014   Do you ever feel afraid of your partner? N N N N   Are you in a relationship with someone who physically or mentally threatens you? N N N Y   Is it safe for you to go home?  Y Y Y Y        ADL Screening:   :       ADL Assessment 12/17/2014   Feeding yourself No Help Needed   Getting from bed to chair No Help Needed   Getting dressed No Help Needed   Bathing or showering No Help Needed   Walk across the room (includes cane/walker) No Help Needed   Using the telphone No Help Needed   Taking your medications No Help Needed   Preparing meals No Help Needed   Managing money (expenses/bills) No Help Needed   Moderately strenuous housework (laundry) No Help Needed   Shopping for personal items (toiletries/medicines) No Help Needed   Shopping for groceries No Help Needed   Driving No Help Needed   Climbing a flight of stairs No Help Needed   Getting to places beyond walking distances No Help Needed

## 2018-06-18 NOTE — PROGRESS NOTES
HISTORY OF PRESENT ILLNESS  Dennis Mercado is a 74 y.o. female. HPI  She presents for complaint of pain in left shoulder of 6 weeks duration. . No injury:no unusual exertion. Quality is sharp. Intensity at worst is  9/10. Pain keeps her awake at night, wakes her from sleep, and prevents or limits ADLs, especially dressing. Pain is constant. Pain is worse with abduction and rotation of shoulder. Shoulder pops with movement . Pain is alleviated by nothing   ice has been ineffective. Over time pain is worsening. Patient Active Problem List   Diagnosis Code    Headache(784.0) R51    Carpal tunnel syndrome G56.00    Hypercholesteremia E78.00    Depression F32.9    Abnormal chest x-ray R93.8    Complex regional pain syndrome G90.50    Diabetic peripheral neuropathy associated with type 2 diabetes mellitus ([address] E11.42    Hypertension, essential I10    Controlled type 2 diabetes mellitus with diabetic polyneuropathy, without long-term current use of insulin (HCC) E11.42    Osteopenia M85.80    Vitamin D deficiency E55.9    GERD (gastroesophageal reflux disease) K21.9    Psoriasis L40.9    Paroxysmal atrial fibrillation (HCC) I48.0    Hepatic steatosis K76.0    COPD, severity to be determined ([address] J44.9    Sarcoidosis of lymph nodes D86.1     Past Medical History:   Diagnosis Date    Abnormal chest x-ray 12/23/2009    Anemia 12/23/2009    Arrhythmia     SVT.  Converts with Adenocard    Atrial fibrillation ([address] 12/23/2009    Carpal tunnel syndrome 12/23/2009    Chronic obstructive pulmonary disease ([address]     Colitis 12/23/2009    Complex regional pain syndrome 12/23/2009    Depression 12/23/2009    Diabetes ([address]     GERD (gastroesophageal reflux disease) 2/8/2010    HTN (hypertension) 12/23/2009    Hypercholesteremia 12/23/2009    Hypertriglyceridemia 12/23/2009    Idiopathic peripheral neuropathy 12/23/2009    Iron deficiency anemia 12/23/2009    Osteopenia 12/23/2009    Paroxysmal atrial fibrillation ([facility] 12/2/2011    Psoriasis 6/27/2012    Sarcoidosis      Past Surgical History:   Procedure Laterality Date    HX CARPAL TUNNEL RELEASE  00/00/2002    Both hands    HX CYST REMOVAL  00/00/1965    Left wrist    HX HYSTERECTOMY  00/00/1985    HX ORTHOPAEDIC  00/00/1987    back surgery (disc)    HX ORTHOPAEDIC      right foot X 3 neuromas and tarsal tunnel release    HX TONSILLECTOMY  00/00/1962    NEUROLOGICAL PROCEDURE UNLISTED      Pain pump    PAIN PUMP DEVICE  00/00/2007    in left lower abdomen (for foot pain)     Social History     Social History    Marital status:      Spouse name: N/A    Number of children: N/A    Years of education: N/A     Social History Main Topics    Smoking status: Former Smoker     Packs/day: 0.10     Years: 2.00     Quit date: 1/1/1975    Smokeless tobacco: Never Used    Alcohol use No    Drug use: No    Sexual activity: Not Asked     Other Topics Concern    None     Social History Narrative     Family History   Problem Relation Age of Onset    Arrhythmia Father     Heart Attack Father     Cancer Brother      Melanoma    Breast Cancer Daughter      48    Breast Cancer Daughter 46    Breast Cancer Daughter 46     Allergies   Allergen Reactions    Duragesic [Fentanyl] Other (comments)     Patient sees things when taking    Codeine Nausea Only     Current Outpatient Prescriptions   Medication Sig Dispense Refill    metoprolol tartrate (LOPRESSOR) 25 mg tablet TAKE 1 TAB BY MOUTH TWO (2) TIMES A DAY. 180 Tab 3    amitriptyline (ELAVIL) 100 mg tablet TAKE ONE TABLET BY MOUTH NIGHTLY 90 Tab 1    gabapentin (NEURONTIN) 600 mg tablet Take 900 mg by mouth two (2) times a day.  losartan (COZAAR) 100 mg tablet TAKE 1 TABLET BY MOUTH EVERY DAY 90 Tab 3    atorvastatin (LIPITOR) 40 mg tablet TAKE 1 TABLET BY MOUTH NIGHTLY.  90 Tab 3    albuterol (PROVENTIL HFA, VENTOLIN HFA, PROAIR HFA) 90 mcg/actuation inhaler Take 2 Puffs by inhalation every four (4) hours as needed for Wheezing. 1 Inhaler 1    aspirin 81 mg chewable tablet Take 1 Tab by mouth daily. 90 Tab 3    omega 3-dha-epa-fish oil (FISH OIL) 100-160-1,000 mg cap Take 1 Cap by mouth daily.  RESTASIS 0.05 % ophthalmic emulsion   3    OMEPRAZOLE PO Take 20 mg by mouth daily. ROS  Visit Vitals    /76 (BP 1 Location: Left arm, BP Patient Position: Sitting)    Pulse 75    Temp 98.6 °F (37 °C) (Oral)    Resp 20    Ht 5' 7\" (1.702 m)    Wt 186 lb (84.4 kg)    SpO2 95%    BMI 29.13 kg/m2     Physical Exam  [facility] INTERNAL MEDICINE [facility]  OFFICE PROCEDURE PROGRESS NOTE        Chart reviewed for the following:   Gabriel MARIE MD, have reviewed the History, Physical and updated the Allergic reactions for [address] performed immediately prior to start of procedure:   Gabriel MARIE MD, have performed the following reviews on Karen Juarez prior to the start of the procedure:            * Patient was identified by name and date of birth   * Agreement on procedure being performed was verified  * Risks and Benefits explained to the patient  * Procedure site verified and marked as necessary  * Patient was positioned for comfort  * Consent was signed and verified     Time: 1020      Date of procedure: 6/18/2018    Procedure performed by:  Gabriel Suárez MD    Provider assisted by: Jon Jarrett LPN    Patient assisted by: self    How tolerated by patient: tolerated the procedure well with no complications    Post Procedural Pain Scale: 6 - Hurts Even More    Comments: none    Indications:   Impingement syndrome    Procedure:  After consent was obtained, using sterile technique the left shoulder joint was prepped using Betadine. Kenalog 40 mg was mixed with 1% lidocaine 1 ml  and injected into the subacromial space and the needle withdrawn. The procedure was well tolerated.   The patient is asked to continue to rest the joint for a few more days before resuming regular activities. It may be more painful for the first 1-2 days. Watch for fever, or increased swelling or persistent pain in the joint. Call or return to clinic prn if such symptoms occur or there is failure to improve as anticipated. Results from Appointment encounter on 06/18/18   XR SHOULDER LT AP/LAT MIN 2 V   Narrative EXAM:  XR SHOULDER LT AP/LAT MIN 2 V  INDICATION: Left shoulder pain without injury. COMPARISON: 7/26/2017. FINDINGS: Two views of the left shoulder demonstrate no fracture, dislocation or  other abnormality. Impression IMPRESSION: Normal left shoulder. ASSESSMENT and PLAN    ICD-10-CM ICD-9-CM    1. Impingement syndrome of left shoulder M75.42 726.2 TRIAMCINOLONE ACETONIDE INJ      triamcinolone acetonide (KENALOG) 40 mg/mL injection      lidocaine (XYLOCAINE) 10 mg/mL (1 %) injection      MT DRAIN/INJECT LARGE JOINT/BURSA      HYDROcodone-acetaminophen (NORCO) 5-325 mg per tablet      XR SHOULDER LT AP/LAT MIN 2 V      CANCELED: XR SHOULDER LEFT 3 VIEW   2. Colon cancer screening Z12.11 V76.51 OCCULT BLOOD, IMMUNOASSAY (FIT)     Diagnoses and all orders for this visit:    1. Impingement syndrome of left shoulder  -     TRIAMCINOLONE ACETONIDE INJ  -     triamcinolone acetonide (KENALOG) 40 mg/mL injection; 1 mL by IntraBURSal route once for 1 dose. -     lidocaine (XYLOCAINE) 10 mg/mL (1 %) injection; 1 mL by IntraBURSal route once for 1 dose. -     MT DRAIN/INJECT LARGE JOINT/BURSA  - 20610  -     HYDROcodone-acetaminophen (NORCO) 5-325 mg per tablet; Take 1 Tab by mouth every six (6) hours as needed for Pain. Max Daily Amount: 4 Tabs. -     XR SHOULDER LT AP/LAT MIN 2 V; Future    2. Colon cancer screening  -     OCCULT BLOOD, IMMUNOASSAY (FIT);  Future      Follow-up Disposition: Not on File  radiology results and schedule of future radiology studies reviewed with patient  I have discussed the diagnosis, evaluation and treatment options and the intended plan with the patient. Patient is in agreement. The patient has received an after-visit summary and questions were answered concerning future plans. I have discussed side effects and warnings of any new medications with the patient as well.

## 2018-06-18 NOTE — MR AVS SNAPSHOT
[address] [address] [phone] Patient: Christy Alexis MRN: [id number] BFF:[id number] Visit Information Date & Time Provider Department Dept. Phone Encounter #  
 6/18/2018  9:15 AM MD Nimisha Muniz [facility] of [address] [place] [address] [id number] Your Appointments 10/19/2018  1:30 PM  
ROUTINE CARE with MD Nimisha Muniz [facility]) Appt Note: 6 months (around 10/17/2018) for DM, HTN, chol, POC A1c,  Fasitng lab chiara Scales 7 [phone]  
  
   
 [address] [facility] Upcoming Health Maintenance Date Due  
 EYE EXAM RETINAL OR DILATED Q1 11/29/2017 FOBT Q 1 YEAR AGE 50-75 4/23/2018 DTaP/Tdap/Td series (1 - Tdap) 9/22/2019* Influenza Age 5 to Adult 8/1/2018 HEMOGLOBIN A1C Q6M 10/17/2018 GLAUCOMA SCREENING Q2Y 11/29/2018 FOOT EXAM Q1 4/17/2019 MEDICARE YEARLY EXAM 4/18/2019 MICROALBUMIN Q1 4/23/2019 LIPID PANEL Q1 4/23/2019 BREAST CANCER SCRN MAMMOGRAM 5/22/2019 *Topic was postponed. The date shown is not the original due date. Allergies as of 6/18/2018  Review Complete On: 6/18/2018 By: Celina Rodriguez MD  
  
 Severity Noted Reaction Type Reactions Duragesic [Fentanyl] High 02/08/2010    Other (comments) Patient sees things when taking Codeine  12/23/2009    Nausea Only Current Immunizations  Reviewed on 6/18/2018 Name Date Influenza High Dose Vaccine PF 10/19/2017, 9/22/2016, 11/12/2015 Influenza Vaccine 10/17/2014, 10/3/2013 Influenza Vaccine Split 10/8/2012, 10/7/2011 Influenza Vaccine Whole 9/22/2010 Pneumococcal Conjugate (PCV-13) 3/22/2016 TD Vaccine 9/22/2009 ZZZ-RETIRED (DO NOT USE) Pneumococcal Vaccine (Unspecified Type) 9/22/2009 Reviewed by Lilian Nina LPN on 1/70/2043 at  9:23 AM  
You Were Diagnosed With   
  
 Codes Comments Colon cancer screening    -  Primary ICD-10-CM: Z12.11 ICD-9-CM: V76.51 Impingement syndrome of left shoulder     ICD-10-CM: M75.42 
ICD-9-CM: 726.2 Vitals BP Pulse Temp Resp Height(growth percentile) Weight(growth percentile) 144/76 (BP 1 Location: Left arm, BP Patient Position: Sitting) 75 98.6 °F (37 °C) (Oral) 20 5' 7\" (1.702 m) 186 lb (84.4 kg) SpO2 BMI OB Status Smoking Status 95% 29.13 kg/m2 Hysterectomy Former Smoker BMI and BSA Data Body Mass Index Body Surface Area  
 29.13 kg/m 2 2 m 2 Preferred Pharmacy Pharmacy Name Phone [id number] [address] [place] [address] [phone] Your Updated Medication List  
  
   
This list is accurate as of 6/18/18 10:27 AM.  Always use your most recent med list.  
  
  
  
  
 albuterol 90 mcg/actuation inhaler Commonly known as:  PROVENTIL HFA, VENTOLIN HFA, PROAIR HFA Take 2 Puffs by inhalation every four (4) hours as needed for Wheezing. amitriptyline 100 mg tablet Commonly known as:  ELAVIL TAKE ONE TABLET BY MOUTH NIGHTLY  
  
 aspirin 81 mg chewable tablet Take 1 Tab by mouth daily. atorvastatin 40 mg tablet Commonly known as:  LIPITOR  
TAKE 1 TABLET BY MOUTH NIGHTLY. FISH -160-1,000 mg Cap Generic drug:  omega 3-dha-epa-fish oil Take 1 Cap by mouth daily. gabapentin 600 mg tablet Commonly known as:  NEURONTIN Take 900 mg by mouth two (2) times a day. HYDROcodone-acetaminophen 5-325 mg per tablet Commonly known as:  Loli Moore Take 1 Tab by mouth every six (6) hours as needed for Pain. Max Daily Amount: 4 Tabs.  
  
 lidocaine 10 mg/mL (1 %) injection Commonly known as:  XYLOCAINE  
1 mL by IntraBURSal route once for 1 dose. losartan 100 mg tablet Commonly known as:  COZAAR  
 TAKE 1 TABLET BY MOUTH EVERY DAY  
  
 metoprolol tartrate 25 mg tablet Commonly known as:  LOPRESSOR  
TAKE 1 TAB BY MOUTH TWO (2) TIMES A DAY. OMEPRAZOLE PO Take 20 mg by mouth daily. RESTASIS 0.05 % ophthalmic emulsion Generic drug:  cycloSPORINE  
  
 triamcinolone acetonide 40 mg/mL injection Commonly known as:  KENALOG  
1 mL by IntraBURSal route once for 1 dose. Prescriptions Printed Refills HYDROcodone-acetaminophen (NORCO) 5-325 mg per tablet 0 Sig: Take 1 Tab by mouth every six (6) hours as needed for Pain. Max Daily Amount: 4 Tabs. Class: Print Route: Oral  
  
We Performed the Following TN DRAIN/INJECT LARGE JOINT/BURSA [id number] CPT(R)] TRIAMCINOLONE ACETONIDE INJ [ [facility]] To-Do List   
 06/18/2018 Imaging:  XR SHOULDER LEFT 3 VIEW Around 07/18/2018 Lab:  OCCULT BLOOD, IMMUNOASSAY (FIT) Patient Instructions Joint Injections: Care Instructions Your Care Instructions Joint injections are shots into a joint, such as the knee. They may be used to put in medicines, such as pain relievers. Or they can be used to take out fluid. Sometimes the fluid is tested in a lab. This can help find the cause of a joint problem. A corticosteroid, or steroid, shot is used to reduce inflammation in tendons or joints. It is often used to treat problems such as arthritis, tendinitis, and bursitis. Steroids can be injected directly into a painful, inflamed joint. They can also help reduce inflammation of a bursa. A bursa is a sac of fluid. It cushions and lubricates areas where tendons, ligaments, skin, muscles, or bones rub against each other. A steroid shot can sometimes help with short-term pain relief when other treatments haven't worked. If steroid shots help, pain may improve for weeks or months. Follow-up care is a key part of your treatment and safety.  Be sure to make and go to all appointments, and call your doctor if you are having problems. It's also a good idea to know your test results and keep a list of the medicines you take. How can you care for yourself at home? · Put ice or a cold pack on the area for 10 to 20 minutes at a time. Put a thin cloth between the ice and your skin. · Avoid strenuous activities for several days, especially those that put stress on the area where you got the shot. · If you have dressings over the area, keep them clean and dry. You may remove them when your doctor tells you to. When should you call for help? Call your doctor now or seek immediate medical care if: 
? · You have signs of infection, such as: 
¨ Increased pain, swelling, warmth, or redness. ¨ Red streaks leading from the site. ¨ Pus draining from the site. ¨ A fever. ? Watch closely for changes in your health, and be sure to contact your doctor if you have any problems. Where can you learn more? Go to http://alejandro-samuel.info/. Enter N616 in the search box to learn more about \"Joint Injections: Care Instructions. \" Current as of: March 21, 2017 Content Version: 11.4 © 7024-1363 [employer]. Care instructions adapted under license by [employer] (which disclaims liability or warranty for this information). If you have questions about a medical condition or this instruction, always ask your healthcare professional. [address] [address] any warranty or liability for your use of this information. Introducing [facility] [place] [facility]! [employer] introduces [employer] patient portal. Now you can access parts of your medical record, email your doctor's office, and request medication refills online. 1. In your internet browser, go to [email or web address] 2. Click on the First Time User? Click Here link in the Sign In box. You will see the New Member Sign Up page. 3. Enter your [employer] Access Code exactly as it appears below. You will not need to use this code after youve completed the sign-up process. If you do not sign up before the expiration date, you must request a new code. · [employer] Access Code: [email or web address] Expires: 7/16/2018  2:21 PM 
 
4. Enter the last four digits of your Social Security Number (xxxx) and Date of Birth (mm/dd/yyyy) as indicated and click Submit. You will be taken to the next sign-up page. 5. Create a [employer] ID. This will be your [employer] login ID and cannot be changed, so think of one that is secure and easy to remember. 6. Create a [employer] password. You can change your password at any time. 7. Enter your Password Reset Question and Answer. This can be used at a later time if you forget your password. 8. Enter your e-mail address. You will receive e-mail notification when new information is available in [address]. 9. Click Sign Up. You can now view and download portions of your medical record. 10. Click the Download Summary menu link to download a portable copy of your medical information. If you have questions, please visit the Frequently Asked Questions section of the [employer] website. Remember, [employer] is NOT to be used for urgent needs. For medical emergencies, dial 911. Now available from your iPhone and Android! Please provide this summary of care documentation to your next provider. Your primary care clinician is listed as Chris Gleason. If you have any questions after today's visit, please call [phone].

## 2018-06-18 NOTE — PATIENT INSTRUCTIONS
Joint Injections: Care Instructions  Your Care Instructions  Joint injections are shots into a joint, such as the knee. They may be used to put in medicines, such as pain relievers. Or they can be used to take out fluid. Sometimes the fluid is tested in a lab. This can help find the cause of a joint problem. A corticosteroid, or steroid, shot is used to reduce inflammation in tendons or joints. It is often used to treat problems such as arthritis, tendinitis, and bursitis. Steroids can be injected directly into a painful, inflamed joint. They can also help reduce inflammation of a bursa. A bursa is a sac of fluid. It cushions and lubricates areas where tendons, ligaments, skin, muscles, or bones rub against each other. A steroid shot can sometimes help with short-term pain relief when other treatments haven't worked. If steroid shots help, pain may improve for weeks or months. Follow-up care is a key part of your treatment and safety. Be sure to make and go to all appointments, and call your doctor if you are having problems. It's also a good idea to know your test results and keep a list of the medicines you take. How can you care for yourself at home? · Put ice or a cold pack on the area for 10 to 20 minutes at a time. Put a thin cloth between the ice and your skin. · Avoid strenuous activities for several days, especially those that put stress on the area where you got the shot. · If you have dressings over the area, keep them clean and dry. You may remove them when your doctor tells you to. When should you call for help? Call your doctor now or seek immediate medical care if:  ? · You have signs of infection, such as:  ¨ Increased pain, swelling, warmth, or redness. ¨ Red streaks leading from the site. ¨ Pus draining from the site. ¨ A fever. ? Watch closely for changes in your health, and be sure to contact your doctor if you have any problems. Where can you learn more?   Go to http://alejandro-samuel.info/. Enter N616 in the search box to learn more about \"Joint Injections: Care Instructions. \"  Current as of: March 21, 2017  Content Version: 11.4  © 5624-0751 Healthwise, [employer]. Care instructions adapted under license by [employer] (which disclaims liability or warranty for this information). If you have questions about a medical condition or this instruction, always ask your healthcare professional. [address] [address] any warranty or liability for your use of this information.

## 2018-06-26 ENCOUNTER — OFFICE VISIT (OUTPATIENT)
Dept: INTERNAL MEDICINE CLINIC | Facility: CLINIC | Age: 73
End: 2018-06-26

## 2018-06-26 VITALS
HEART RATE: 75 BPM | HEIGHT: 67 IN | DIASTOLIC BLOOD PRESSURE: 84 MMHG | SYSTOLIC BLOOD PRESSURE: 136 MMHG | BODY MASS INDEX: 28.09 KG/M2 | RESPIRATION RATE: 20 BRPM | TEMPERATURE: 98.4 F | WEIGHT: 179 LBS | OXYGEN SATURATION: 95 %

## 2018-06-26 DIAGNOSIS — I25.10 CORONARY ARTERY DISEASE INVOLVING NATIVE CORONARY ARTERY OF NATIVE HEART, ANGINA PRESENCE UNSPECIFIED: Primary | ICD-10-CM

## 2018-06-26 DIAGNOSIS — I48.0 PAROXYSMAL ATRIAL FIBRILLATION (HCC): ICD-10-CM

## 2018-06-26 DIAGNOSIS — I95.9 HYPOTENSION, UNSPECIFIED HYPOTENSION TYPE: ICD-10-CM

## 2018-06-26 RX ORDER — METOPROLOL TARTRATE 50 MG/1
TABLET ORAL
Qty: 60 TAB | Refills: 5 | Status: SHIPPED | OUTPATIENT
Start: 2018-06-26 | End: 2018-07-27 | Stop reason: SDUPTHER

## 2018-06-26 RX ORDER — OMEPRAZOLE 20 MG/1
20 CAPSULE, DELAYED RELEASE ORAL DAILY
COMMUNITY
Start: 2017-09-05

## 2018-06-26 NOTE — PATIENT INSTRUCTIONS
Increase metoprolol to 50 mg twice a day  Continue aspirin  Id you have symptoms again, call 911 and go to ER. Learning About Coronary Artery Disease (CAD)  What is coronary artery disease? Coronary artery disease (CAD) occurs when plaque builds up in the arteries that bring oxygen-rich blood to your heart. Plaque is a fatty substance made of cholesterol, calcium, and other substances in the blood. This process is called hardening of the arteries, or atherosclerosis. What happens when you have coronary artery disease? · Plaque may narrow the coronary arteries. Narrowed arteries cause poor blood flow. This can lead to angina symptoms such as chest pain or discomfort. If blood flow is completely blocked, you could have a heart attack. · You can slow CAD and reduce the risk of future problems by making changes in your lifestyle. These include quitting smoking and eating heart-healthy foods. · Treatments for CAD, along with changes in your lifestyle, can help you live a longer and healthier life. How can you prevent coronary artery disease? · Do not smoke. It may be the best thing you can do to prevent heart disease. If you need help quitting, talk to your doctor about stop-smoking programs and medicines. These can increase your chances of quitting for good. · Be active. Get at least 30 minutes of exercise on most days of the week. Walking is a good choice. You also may want to do other activities, such as running, swimming, cycling, or playing tennis or team sports. · Eat heart-healthy foods. Eat more fruits and vegetables and less foods that contain saturated and trans fats. Limit alcohol, sodium, and sweets. · Stay at a healthy weight. Lose weight if you need to. · Manage other health problems such as diabetes, high blood pressure, and high cholesterol. · Manage stress. Stress can hurt your heart. To keep stress low, talk about your problems and feelings.  Don't keep your feelings hidden. · If you have talked about it with your doctor, take a low-dose aspirin every day. Aspirin can help certain people lower their risk of a heart attack or stroke. But taking aspirin isn't right for everyone, because it can cause serious bleeding. Do not start taking daily aspirin unless your doctor knows about it. How is coronary artery disease treated? · Your doctor will suggest that you make lifestyle changes. For example, your doctor may ask you to eat healthy foods, quit smoking, lose extra weight, and be more active. · You will have to take medicines. · Your doctor may suggest a procedure to open narrowed or blocked arteries. This is called angioplasty. Or your doctor may suggest using healthy blood vessels to create detours around narrowed or blocked arteries. This is called bypass surgery. Follow-up care is a key part of your treatment and safety. Be sure to make and go to all appointments, and call your doctor if you are having problems. It's also a good idea to know your test results and keep a list of the medicines you take. Where can you learn more? Go to http://alejandro-samuel.info/. Enter [phone] in the search box to learn more about \"Learning About Coronary Artery Disease (CAD). \"  Current as of: September 21, 2016  Content Version: 11.4  © 4925-7964 Healthwise, Incorporated. Care instructions adapted under license by [employer] (which disclaims liability or warranty for this information). If you have questions about a medical condition or this instruction, always ask your healthcare professional. [place] [address] any warranty or liability for your use of this information.

## 2018-06-26 NOTE — MR AVS SNAPSHOT
[address] [address] [phone] Patient: Elinor Luna MRN: [id number] Visit Information Date & Time Provider Department Dept. Phone Encounter #  
 6/26/2018  9:15 AM MD Jovanna Sal[facility] [place] [id number] Follow-up Instructions Return if symptoms worsen or fail to improve. Your Appointments 10/19/2018  1:30 PM  
ROUTINE CARE with MD Jovanna Salazar [facility]) Appt Note: 6 months (around 10/17/2018) for DM, HTN, chol, POC A1c,  Fasitng lab chiara Scales 7 [phone]  
  
   
 [address] [place] [address] Upcoming Health Maintenance Date Due  
 EYE EXAM RETINAL OR DILATED Q1 11/29/2017 FOBT Q 1 YEAR AGE 50-75 4/23/2018 DTaP/Tdap/Td series (1 - Tdap) 9/22/2019* Influenza Age 5 to Adult 8/1/2018 HEMOGLOBIN A1C Q6M 10/17/2018 GLAUCOMA SCREENING Q2Y 11/29/2018 FOOT EXAM Q1 4/17/2019 MEDICARE YEARLY EXAM 4/18/2019 MICROALBUMIN Q1 4/23/2019 LIPID PANEL Q1 4/23/2019 BREAST CANCER SCRN MAMMOGRAM 5/22/2019 *Topic was postponed. The date shown is not the original due date. Allergies as of 6/26/2018  Review Complete On: 6/26/2018 By: Danielle Hubbard MD  
  
 Severity Noted Reaction Type Reactions Duragesic [Fentanyl] High 02/08/2010    Other (comments) Patient sees things when taking Codeine  12/23/2009    Nausea Only Current Immunizations  Reviewed on 6/26/2018 Name Date Influenza High Dose Vaccine PF 10/19/2017, 9/22/2016, 11/12/2015 Influenza Vaccine 10/17/2014, 10/3/2013 Influenza Vaccine Split 10/8/2012, 10/7/2011 Influenza Vaccine Whole 9/22/2010 Pneumococcal Conjugate (PCV-13) 3/22/2016 TD Vaccine 9/22/2009 ZZZ-RETIRED (DO NOT USE) Pneumococcal Vaccine (Unspecified Type) 9/22/2009 Reviewed by Fred Cedeno LPN on 6/05/0181 at  9:18 AM  
You Were Diagnosed With   
  
 Codes Comments Coronary artery disease involving native coronary artery of native heart, angina presence unspecified    -  Primary ICD-10-CM: I25.10 ICD-9-CM: 414.01 Hypotension, unspecified hypotension type     ICD-10-CM: I95.9 ICD-9-CM: 458.9 Paroxysmal atrial fibrillation (HCC)     ICD-10-CM: I48.0 ICD-9-CM: 427.31 Vitals BP Pulse Temp Resp Height(growth percentile) Weight(growth percentile) 136/84 (BP 1 Location: Left arm, BP Patient Position: Sitting) 75 98.4 °F (36.9 °C) (Oral) 20 5' 7\" (1.702 m) 179 lb (81.2 kg) SpO2 BMI OB Status Smoking Status 95% 28.04 kg/m2 Hysterectomy Former Smoker Vitals History BMI and BSA Data Body Mass Index Body Surface Area 28.04 kg/m 2 1.96 m 2 Preferred Pharmacy Pharmacy Name Phone [id number] [address] [id number] [phone] Your Updated Medication List  
  
   
This list is accurate as of 6/26/18 10:54 AM.  Always use your most recent med list.  
  
  
  
  
 albuterol 90 mcg/actuation inhaler Commonly known as:  PROVENTIL HFA, VENTOLIN HFA, PROAIR HFA Take 2 Puffs by inhalation every four (4) hours as needed for Wheezing. amitriptyline 100 mg tablet Commonly known as:  ELAVIL TAKE ONE TABLET BY MOUTH NIGHTLY  
  
 aspirin 81 mg chewable tablet Take 1 Tab by mouth daily. atorvastatin 40 mg tablet Commonly known as:  LIPITOR  
TAKE 1 TABLET BY MOUTH NIGHTLY. FISH -160-1,000 mg Cap Generic drug:  omega 3-dha-epa-fish oil Take 1 Cap by mouth daily. gabapentin 600 mg tablet Commonly known as:  NEURONTIN Take 900 mg by mouth two (2) times a day. HYDROcodone-acetaminophen 5-325 mg per tablet Commonly known as:  [facility]  
 Take 1 Tab by mouth every six (6) hours as needed for Pain. Max Daily Amount: 4 Tabs. losartan 100 mg tablet Commonly known as:  COZAAR  
TAKE 1 TABLET BY MOUTH EVERY DAY  
  
 metoprolol tartrate 50 mg tablet Commonly known as:  LOPRESSOR  
TAKE 1 TAB BY MOUTH TWO (2) TIMES A DAY. omeprazole 20 mg capsule Commonly known as:  PRILOSEC Take  by mouth. RESTASIS 0.05 % ophthalmic emulsion Generic drug:  cycloSPORINE Prescriptions Sent to Pharmacy Refills  
 metoprolol tartrate (LOPRESSOR) 50 mg tablet 5 Sig: TAKE 1 TAB BY MOUTH TWO (2) TIMES A DAY. Class: Normal  
 Pharmacy: [id number] Edwinvaishali Fred, [address] Ph #: [phone] We Performed the Following AMB POC EKG ROUTINE W/ 12 LEADS, INTER & REP [04297 CPT(R)] REFERRAL TO CARDIOLOGY [DDD30 Custom] Comments:  
 Possible recent IWMI Follow-up Instructions Return if symptoms worsen or fail to improve. Referral Information Referral ID Referred By Referred To  
  
 [id number] [address] [facility], [address] [facility] [address] [place] [address] [place], [address] [facility] Visits Status Start Date End Date 1 New Request 6/26/18 6/26/19 If your referral has a status of pending review or denied, additional information will be sent to support the outcome of this decision. Patient Instructions Increase metoprolol to 50 mg twice a day Continue aspirin Id you have symptoms again, call 911 and go to ER. Learning About Coronary Artery Disease (CAD) What is coronary artery disease? Coronary artery disease (CAD) occurs when plaque builds up in the arteries that bring oxygen-rich blood to your heart. Plaque is a fatty substance made of cholesterol, calcium, and other substances in the blood. This process is called hardening of the arteries, or atherosclerosis. What happens when you have coronary artery disease? · Plaque may narrow the coronary arteries. Narrowed arteries cause poor blood flow. This can lead to angina symptoms such as chest pain or discomfort. If blood flow is completely blocked, you could have a heart attack. · You can slow CAD and reduce the risk of future problems by making changes in your lifestyle. These include quitting smoking and eating heart-healthy foods. · Treatments for CAD, along with changes in your lifestyle, can help you live a longer and healthier life. How can you prevent coronary artery disease? · Do not smoke. It may be the best thing you can do to prevent heart disease. If you need help quitting, talk to your doctor about stop-smoking programs and medicines. These can increase your chances of quitting for good. · Be active. Get at least 30 minutes of exercise on most days of the week. Walking is a good choice. You also may want to do other activities, such as running, swimming, cycling, or playing tennis or team sports. · Eat heart-healthy foods. Eat more fruits and vegetables and less foods that contain saturated and trans fats. Limit alcohol, sodium, and sweets. · Stay at a healthy weight. Lose weight if you need to. · Manage other health problems such as diabetes, high blood pressure, and high cholesterol. · Manage stress. Stress can hurt your heart. To keep stress low, talk about your problems and feelings. Don't keep your feelings hidden. · If you have talked about it with your doctor, take a low-dose aspirin every day. Aspirin can help certain people lower their risk of a heart attack or stroke. But taking aspirin isn't right for everyone, because it can cause serious bleeding. Do not start taking daily aspirin unless your doctor knows about it. How is coronary artery disease treated? · Your doctor will suggest that you make lifestyle changes.  For example, your doctor may ask you to eat healthy foods, quit smoking, lose extra weight, and be more active. · You will have to take medicines. · Your doctor may suggest a procedure to open narrowed or blocked arteries. This is called angioplasty. Or your doctor may suggest using healthy blood vessels to create detours around narrowed or blocked arteries. This is called bypass surgery. Follow-up care is a key part of your treatment and safety. Be sure to make and go to all appointments, and call your doctor if you are having problems. It's also a good idea to know your test results and keep a list of the medicines you take. Where can you learn more? Go to http://alejandro-samuel.info/. Enter ([phone] in the search box to learn more about \"Learning About Coronary Artery Disease (CAD). \" Current as of: September 21, 2016 Content Version: 11.4 © 2323-2565 [employer]. Care instructions adapted under license by [employer] (which disclaims liability or warranty for this information). If you have questions about a medical condition or this instruction, always ask your healthcare professional. [address] any warranty or liability for your use of this information. Introducing [facility]! Mauricio Hanley introduces [email or web address] / Netcycler patient portal. Now you can access parts of your medical record, email your doctor's office, and request medication refills online. 1. In your internet browser, go to [email or web address] 2. Click on the First Time User? Click Here link in the Sign In box. You will see the New Member Sign Up page. 3. Enter your Swap.com / Net[employer]er Access Code exactly as it appears below. You will not need to use this code after youve completed the sign-up process. If you do not sign up before the expiration date, you must request a new code. · Swap.com / Net[employer] Access Code: [email or web address] Expires: 7/16/2018  2:21 PM 
 
 4. Enter the last four digits of your Social Security Number (xxxx) and Date of Birth (mm/dd/yyyy) as indicated and click Submit. You will be taken to the next sign-up page. 5. Create a [email or web address] ID. This will be your [email or web address] login ID and cannot be changed, so think of one that is secure and easy to remember. 6. Create a [email or web address] password. You can change your password at any time. 7. Enter your Password Reset Question and Answer. This can be used at a later time if you forget your password. 8. Enter your e-mail address. You will receive e-mail notification when new information is available in [address] 9. Click Sign Up. You can now view and download portions of your medical record. 10. Click the Download Summary menu link to download a portable copy of your medical information. If you have questions, please visit the Frequently Asked Questions section of the [email or web address] website. Remember, [email or web address] is NOT to be used for urgent needs. For medical emergencies, dial 911. Now available from your iPhone and Android! Please provide this summary of care documentation to your next provider. Your primary care clinician is listed as Alina Borjas. If you have any questions after today's visit, please call [phone].

## 2018-06-26 NOTE — PROGRESS NOTES
Madan Lopez  Identified pt with two pt identifiers(name and ). Chief Complaint   Patient presents with    Vomiting     cols]d sweats       Reviewed record In preparation for visit and have obtained necessary documentation. Advanced directive / living will on file. 1. Have you been to the ER, urgent care clinic or hospitalized since your last visit? No     2. Have you seen or consulted any other health care providers outside of the [address] [place] [address] since your last visit? Include any pap smears or colon screening. No    Vitals reviewed with provider.     Health Maintenance reviewed:     Health Maintenance Due   Topic    EYE EXAM RETINAL OR DILATED Q1     FOBT Q 1 YEAR AGE 50-75           Wt Readings from Last 3 Encounters:   18 179 lb (81.2 kg)   18 186 lb (84.4 kg)   18 188 lb (85.3 kg)        Temp Readings from Last 3 Encounters:   18 98.4 °F (36.9 °C) (Oral)   18 98.6 °F (37 °C) (Oral)   18 99.5 °F (37.5 °C) (Oral)        BP Readings from Last 3 Encounters:   18 146/81   18 144/76   18 128/76        Pulse Readings from Last 3 Encounters:   18 75   18 75   18 72        Vitals:    18 0925   BP: 146/81   Pulse: 75   Resp: 20   Temp: 98.4 °F (36.9 °C)   TempSrc: Oral   SpO2: 95%   Weight: 179 lb (81.2 kg)   Height: 5' 7\" (1.702 m)   PainSc:   7   PainLoc: Foot          Learning Assessment:   :       Learning Assessment 2014   PRIMARY LEARNER Patient   HIGHEST LEVEL OF EDUCATION - PRIMARY LEARNER  GRADUATED HIGH SCHOOL OR GED   BARRIERS PRIMARY LEARNER NONE   CO-LEARNER CAREGIVER No   PRIMARY LANGUAGE ENGLISH   LEARNER PREFERENCE PRIMARY DEMONSTRATION   ANSWERED BY patient   RELATIONSHIP SELF        Depression Screening:   :       PHQ over the last two weeks 2018   Little interest or pleasure in doing things Not at all   Feeling down, depressed or hopeless Not at all   Total Score PHQ 2 0   Trouble falling or staying asleep, or sleeping too much Not at all   Feeling tired or having little energy Not at all   Poor appetite or overeating Not at all   Feeling bad about yourself - or that you are a failure or have let yourself or your family down Not at all   Trouble concentrating on things such as school, work, reading or watching TV Not at all   Moving or speaking so slowly that other people could have noticed; or the opposite being so fidgety that others notice Not at all   Thoughts of being better off dead, or hurting yourself in some way Not at all   PHQ 9 Score 0   How difficult have these problems made it for you to do your work, take care of your home and get along with others Not difficult at all        Fall Risk Assessment:   :       Fall Risk Assessment, last 12 mths 6/18/2018   Able to walk? Yes   Fall in past 12 months? Yes   Fall with injury? No   Number of falls in past 12 months 1   Fall Risk Score 1        Abuse Screening:   :       Abuse Screening Questionnaire 4/17/2018 4/21/2017 3/22/2016 8/6/2014   Do you ever feel afraid of your partner? N N N N   Are you in a relationship with someone who physically or mentally threatens you? N N N Y   Is it safe for you to go home?  Y Y Y Y        ADL Screening:   :       ADL Assessment 12/17/2014   Feeding yourself No Help Needed   Getting from bed to chair No Help Needed   Getting dressed No Help Needed   Bathing or showering No Help Needed   Walk across the room (includes cane/walker) No Help Needed   Using the telphone No Help Needed   Taking your medications No Help Needed   Preparing meals No Help Needed   Managing money (expenses/bills) No Help Needed   Moderately strenuous housework (laundry) No Help Needed   Shopping for personal items (toiletries/medicines) No Help Needed   Shopping for groceries No Help Needed   Driving No Help Needed   Climbing a flight of stairs No Help Needed   Getting to places beyond walking distances No Help Needed

## 2018-06-26 NOTE — PROGRESS NOTES
HISTORY OF PRESENT ILLNESS  Karen Juarez is a 74 y.o. female. She is accompanied by her daughter. HPI  She presents after an episode of nausea, vomiting and cold sweats in past week. It lasted for about 30 minutes. Denies chest discomfort, dyspnea, palpitations, edema and dizziness. Felt exhasted after the episode. She stayed in bed all day for several days after this event. S;he did not seek medical attention at that time. She denies previous or subsequent episodes. She has a history of diabetes with neuropathy, hypertension, atrial fibrillation, hepatic steatosis and hypercholesterolemia. She also has a history of GERD. Patient Active Problem List   Diagnosis Code    Headache(784.0) R51    Carpal tunnel syndrome G56.00    Hypercholesteremia E78.00    Depression F32.9    Abnormal chest x-ray R93.8    Complex regional pain syndrome G90.50    Diabetic peripheral neuropathy associated with type 2 diabetes mellitus ([address] E11.42    Hypertension, essential I10    Controlled type 2 diabetes mellitus with diabetic polyneuropathy, without long-term current use of insulin (HCC) E11.42    Osteopenia M85.80    Vitamin D deficiency E55.9    GERD (gastroesophageal reflux disease) K21.9    Psoriasis L40.9    Paroxysmal atrial fibrillation (HCC) I48.0    Hepatic steatosis K76.0    COPD, severity to be determined ([address] J44.9    Sarcoidosis of lymph nodes D86.1     Past Medical History:   Diagnosis Date    Abnormal chest x-ray 12/23/2009    Anemia 12/23/2009    Arrhythmia     SVT.  Converts with Adenocard    Atrial fibrillation ([address] 12/23/2009    Carpal tunnel syndrome 12/23/2009    Chronic obstructive pulmonary disease ([address]     Colitis 12/23/2009    Complex regional pain syndrome 12/23/2009    Depression 12/23/2009    Diabetes ([address]     GERD (gastroesophageal reflux disease) 2/8/2010    HTN (hypertension) 12/23/2009    Hypercholesteremia 12/23/2009    Hypertriglyceridemia 12/23/2009    Idiopathic peripheral neuropathy 12/23/2009    Iron deficiency anemia 12/23/2009    Osteopenia 12/23/2009    Paroxysmal atrial fibrillation ([facility] [address] 12/2/2011    Psoriasis 6/27/2012    Sarcoidosis      Past Surgical History:   Procedure Laterality Date    HX CARPAL TUNNEL RELEASE  00/00/2002    Both hands    HX CYST REMOVAL  00/00/1965    Left wrist    HX HYSTERECTOMY  00/00/1985    HX ORTHOPAEDIC  00/00/1987    back surgery (disc)    HX ORTHOPAEDIC      right foot X 3 neuromas and tarsal tunnel release    HX TONSILLECTOMY  00/00/1962    NEUROLOGICAL PROCEDURE UNLISTED      Pain pump    PAIN PUMP DEVICE  00/00/2007    in left lower abdomen (for foot pain)     Social History     Social History    Marital status:      Spouse name: N/A    Number of children: N/A    Years of education: N/A     Social History Main Topics    Smoking status: Former Smoker     Packs/day: 0.10     Years: 2.00     Quit date: 1/1/1975    Smokeless tobacco: Never Used    Alcohol use No    Drug use: No    Sexual activity: Not Asked     Other Topics Concern    None     Social History Narrative     Family History   Problem Relation Age of Onset    Arrhythmia Father     Heart Attack Father     Cancer Brother      Melanoma    Breast Cancer Daughter      48    Breast Cancer Daughter 46    Breast Cancer Daughter 46     Allergies   Allergen Reactions    Duragesic [Fentanyl] Other (comments)     Patient sees things when taking    Codeine Nausea Only     Current Outpatient Prescriptions   Medication Sig Dispense Refill    omeprazole (PRILOSEC) 20 mg capsule Take  by mouth.  HYDROcodone-acetaminophen (NORCO) 5-325 mg per tablet Take 1 Tab by mouth every six (6) hours as needed for Pain. Max Daily Amount: 4 Tabs. 28 Tab 0    metoprolol tartrate (LOPRESSOR) 25 mg tablet TAKE 1 TAB BY MOUTH TWO (2) TIMES A DAY.  180 Tab 3    amitriptyline (ELAVIL) 100 mg tablet TAKE ONE TABLET BY MOUTH NIGHTLY 90 Tab 1    gabapentin (NEURONTIN) 600 mg tablet Take 900 mg by mouth two (2) times a day.  losartan (COZAAR) 100 mg tablet TAKE 1 TABLET BY MOUTH EVERY DAY 90 Tab 3    atorvastatin (LIPITOR) 40 mg tablet TAKE 1 TABLET BY MOUTH NIGHTLY. 90 Tab 3    albuterol (PROVENTIL HFA, VENTOLIN HFA, PROAIR HFA) 90 mcg/actuation inhaler Take 2 Puffs by inhalation every four (4) hours as needed for Wheezing. 1 Inhaler 1    aspirin 81 mg chewable tablet Take 1 Tab by mouth daily. 90 Tab 3    omega 3-dha-epa-fish oil (FISH OIL) 100-160-1,000 mg cap Take 1 Cap by mouth daily.  RESTASIS 0.05 % ophthalmic emulsion   3         ROS       Visit Vitals    /84 (BP 1 Location: Left arm, BP Patient Position: Sitting)    Pulse 75    Temp 98.4 °F (36.9 °C) (Oral)    Resp 20    Ht 5' 7\" (1.702 m)    Wt 179 lb (81.2 kg)    SpO2 95%    BMI 28.04 kg/m2     Physical Exam   Constitutional: She is oriented to person, place, and time. She appears well-developed and well-nourished. HENT:   Head: Normocephalic and atraumatic. Eyes: Pupils are equal, round, and reactive to light. Neck: Neck supple. Cardiovascular: Normal rate, regular rhythm and normal heart sounds. Exam reveals no gallop. No murmur heard. Pulmonary/Chest: Effort normal and breath sounds normal. She has no wheezes. She has no rales. Musculoskeletal: She exhibits no edema. Neurological: She is alert and oriented to person, place, and time. Skin: Skin is warm, dry and intact. No rash noted. Nursing note and vitals reviewed. EKG:  Sinus rhythm, rate: 69  IL: 152 msec, QRS: 108 msec, QT: 378 msec,  QRS axis: -27 degrees, T wave inversions and Q waves in III and avf, electrical interference in II could not be resolved. ST/T changes. Findings c/w inferior wall MI age uncertain, but new since tracing of 3/2/2012. There is voltage criteria for LVH unchanged since tracing of 3/2/2012. ASSESSMENT and PLAN    ICD-10-CM ICD-9-CM    1.  Coronary artery disease involving native coronary artery of native heart, angina presence unspecified I25.10 414.01 REFERRAL TO CARDIOLOGY      metoprolol tartrate (LOPRESSOR) 50 mg tablet   2. Hypotension, unspecified hypotension type I95.9 458.9 AMB POC EKG ROUTINE W/ 12 LEADS, INTER & REP   3. Paroxysmal atrial fibrillation (HCC) I48.0 427.31      Diagnoses and all orders for this visit:    1. Coronary artery disease involving native coronary artery of native heart, angina presence unspecified  Event 8 days ago, could have been IWMI. She is diabetic and absence of chest pain could occur. She will continue aspirin and increase metoprolol until we get cardiology evaluation. If has symptoms again, instructed to call 911.   -     Jenn Rykert Card Ref [facility]  -     metoprolol tartrate (LOPRESSOR) 50 mg tablet; TAKE 1 TAB BY MOUTH TWO (2) TIMES A DAY. 2. Hypotension, unspecified hypotension type  Suspect hypotension with event 8 days ago. -     AMB POC EKG ROUTINE W/ 12 LEADS, INTER & REP    3. Paroxysmal atrial fibrillation (HCC)  In sinus rhythm      Follow-up Disposition:  Return if symptoms worsen or fail to improve. reviewed diet, exercise and weight control  EKG results reviewed. I have discussed the diagnosis, evaluation and treatment options and the intended plan with the patient. Patient is in agreement. The patient has received an after-visit summary and questions were answered concerning future plans. I have discussed side effects and warnings of any new medications with the patient as well.

## 2018-06-28 LAB — HEMOCCULT STL QL IA: POSITIVE

## 2018-06-29 ENCOUNTER — TELEPHONE (OUTPATIENT)
Dept: INTERNAL MEDICINE CLINIC | Facility: CLINIC | Age: 73
End: 2018-06-29

## 2018-06-29 DIAGNOSIS — R19.5 HEME POSITIVE STOOL: Primary | ICD-10-CM

## 2018-06-29 DIAGNOSIS — D50.9 MICROCYTIC ANEMIA: ICD-10-CM

## 2018-06-29 NOTE — PROGRESS NOTES
Inform patient there is blood in stool. Needs to see gastroenterologist for colonoscopy and maybe EGD as well. Should have CBC done as well.

## 2018-06-29 NOTE — TELEPHONE ENCOUNTER
Pt notified about positive stool, CBC scheduled for Monday. Will send inf to gastro after results of lab. Pt has no further questions at this time.

## 2018-07-02 ENCOUNTER — APPOINTMENT (OUTPATIENT)
Dept: INTERNAL MEDICINE CLINIC | Facility: CLINIC | Age: 73
End: 2018-07-02

## 2018-07-03 LAB
BASOPHILS # BLD AUTO: 0.1 X10E3/UL (ref 0–0.2)
BASOPHILS NFR BLD AUTO: 1 %
EOSINOPHIL # BLD AUTO: 2 X10E3/UL (ref 0–0.4)
EOSINOPHIL NFR BLD AUTO: 15 %
ERYTHROCYTE [DISTWIDTH] IN BLOOD BY AUTOMATED COUNT: 17.5 % (ref 12.3–15.4)
HCT VFR BLD AUTO: 34.8 % (ref 34–46.6)
HGB BLD-MCNC: 10.5 G/DL (ref 11.1–15.9)
IMM GRANULOCYTES # BLD: 0 X10E3/UL (ref 0–0.1)
IMM GRANULOCYTES NFR BLD: 0 %
LYMPHOCYTES # BLD AUTO: 2.5 X10E3/UL (ref 0.7–3.1)
LYMPHOCYTES NFR BLD AUTO: 18 %
MCH RBC QN AUTO: 23.3 PG (ref 26.6–33)
MCHC RBC AUTO-ENTMCNC: 30.2 G/DL (ref 31.5–35.7)
MCV RBC AUTO: 77 FL (ref 79–97)
MONOCYTES # BLD AUTO: 0.8 X10E3/UL (ref 0.1–0.9)
MONOCYTES NFR BLD AUTO: 6 %
NEUTROPHILS # BLD AUTO: 8.3 X10E3/UL (ref 1.4–7)
NEUTROPHILS NFR BLD AUTO: 60 %
PLATELET # BLD AUTO: 328 X10E3/UL (ref 150–379)
RBC # BLD AUTO: 4.51 X10E6/UL (ref 3.77–5.28)
WBC # BLD AUTO: 13.6 X10E3/UL (ref 3.4–10.8)

## 2018-07-03 NOTE — TELEPHONE ENCOUNTER
Inform patient there is a mild anemia that looks like iron deficiency. We need iron levels to confirm, but will need blood in a different tube. Needs lab appointment. Also need to send results to gastroenterologist who will be doing colonoscopy; might also need EGD at same sitting. Do not start taking iron until we get results of iron levels.

## 2018-07-05 NOTE — TELEPHONE ENCOUNTER
Verified two patient identifiers, name and . Pt notified of lab results, scheduled for lab tomorrow. She does not want a colonoscopy. Pt had no further questions at this time.

## 2018-07-06 ENCOUNTER — APPOINTMENT (OUTPATIENT)
Dept: INTERNAL MEDICINE CLINIC | Facility: CLINIC | Age: 73
End: 2018-07-06

## 2018-07-06 DIAGNOSIS — D50.9 MICROCYTIC ANEMIA: ICD-10-CM

## 2018-07-07 LAB
FERRITIN SERPL-MCNC: 21 NG/ML (ref 15–150)
IRON SATN MFR SERPL: 8 % (ref 15–55)
IRON SERPL-MCNC: 28 UG/DL (ref 27–139)
RETICS/RBC NFR AUTO: 1.1 % (ref 0.6–2.6)
TIBC SERPL-MCNC: 373 UG/DL (ref 250–450)
UIBC SERPL-MCNC: 345 UG/DL (ref 118–369)

## 2018-07-08 NOTE — PROGRESS NOTES
Inform patient iron is low. If she absolutely refuses colonoscopy, we can do air contrast barium enema (ACBE), but we must look at colon. If negative, then she needs to see GI for EGD. Should start ferrous sulfate 325 mg PLUS Vitamin C 500 mg twice a day for at least 4 months. Will she agree to ACBE? If so we can order. If not, She needs OV to discuss further.

## 2018-07-16 RX ORDER — ATORVASTATIN CALCIUM 40 MG/1
TABLET, FILM COATED ORAL
Qty: 90 TAB | Refills: 3 | Status: SHIPPED | OUTPATIENT
Start: 2018-07-16 | End: 2019-05-06 | Stop reason: SDUPTHER

## 2018-07-16 NOTE — TELEPHONE ENCOUNTER
Genevieve Kerr [facility]   10/19/2018 2:Future Appointments  Date Time Provider Vaibhav Garridoisti   10/19/2018 2:30 PM Genevieve Kerr  W. Daryl Vega   30 PM Genevieve Kerr  W. Daryl Vega        Pending Prescriptions Disp Refills    atorvastatin (LIPITOR) 40 mg tablet 90 Tab 3     Sig: TAKE 1 TABLET BY MOUTH NIGHTLY.

## 2018-07-18 ENCOUNTER — HOSPITAL ENCOUNTER (EMERGENCY)
Age: 73
Discharge: HOME | End: 2018-07-19
Attending: EMERGENCY MEDICINE | Admitting: EMERGENCY MEDICINE
Payer: MEDICARE

## 2018-07-18 ENCOUNTER — APPOINTMENT (OUTPATIENT)
Dept: GENERAL RADIOLOGY | Age: 73
End: 2018-07-18
Attending: EMERGENCY MEDICINE
Payer: MEDICARE

## 2018-07-18 DIAGNOSIS — D86.9 SARCOID: ICD-10-CM

## 2018-07-18 DIAGNOSIS — G47.30 SLEEP APNEA, UNSPECIFIED TYPE: Primary | ICD-10-CM

## 2018-07-18 DIAGNOSIS — R07.89 OTHER CHEST PAIN: ICD-10-CM

## 2018-07-18 DIAGNOSIS — R42 DIZZINESS: ICD-10-CM

## 2018-07-18 LAB
BASOPHILS # BLD: 0.1 K/UL (ref 0–0.1)
BASOPHILS NFR BLD: 1 % (ref 0–1)
DIFFERENTIAL METHOD BLD: ABNORMAL
EOSINOPHIL # BLD: 0.7 K/UL (ref 0–0.4)
EOSINOPHIL NFR BLD: 6 % (ref 0–7)
ERYTHROCYTE [DISTWIDTH] IN BLOOD BY AUTOMATED COUNT: 19.1 % (ref 11.5–14.5)
HCT VFR BLD AUTO: 34 % (ref 35–47)
HGB BLD-MCNC: 10.6 G/DL (ref 11.5–16)
IMM GRANULOCYTES # BLD: 0.1 K/UL (ref 0–0.04)
IMM GRANULOCYTES NFR BLD AUTO: 1 % (ref 0–0.5)
LYMPHOCYTES # BLD: 1.9 K/UL (ref 0.8–3.5)
LYMPHOCYTES NFR BLD: 18 % (ref 12–49)
MCH RBC QN AUTO: 24.4 PG (ref 26–34)
MCHC RBC AUTO-ENTMCNC: 31.2 G/DL (ref 30–36.5)
MCV RBC AUTO: 78.2 FL (ref 80–99)
MONOCYTES # BLD: 0.7 K/UL (ref 0–1)
MONOCYTES NFR BLD: 7 % (ref 5–13)
NEUTS SEG # BLD: 7.1 K/UL (ref 1.8–8)
NEUTS SEG NFR BLD: 68 % (ref 32–75)
NRBC # BLD: 0 K/UL (ref 0–0.01)
NRBC BLD-RTO: 0 PER 100 WBC
PLATELET # BLD AUTO: 321 K/UL (ref 150–400)
PMV BLD AUTO: 11.5 FL (ref 8.9–12.9)
RBC # BLD AUTO: 4.35 M/UL (ref 3.8–5.2)
WBC # BLD AUTO: 10.4 K/UL (ref 3.6–11)

## 2018-07-18 PROCEDURE — 71045 X-RAY EXAM CHEST 1 VIEW: CPT

## 2018-07-18 PROCEDURE — 99285 EMERGENCY DEPT VISIT HI MDM: CPT

## 2018-07-18 PROCEDURE — 85025 COMPLETE CBC W/AUTO DIFF WBC: CPT | Performed by: EMERGENCY MEDICINE

## 2018-07-18 PROCEDURE — 82550 ASSAY OF CK (CPK): CPT | Performed by: EMERGENCY MEDICINE

## 2018-07-18 PROCEDURE — 77030038269 HC DRN EXT URIN PURWCK BARD -A

## 2018-07-18 PROCEDURE — 84443 ASSAY THYROID STIM HORMONE: CPT | Performed by: EMERGENCY MEDICINE

## 2018-07-18 PROCEDURE — 80053 COMPREHEN METABOLIC PANEL: CPT | Performed by: EMERGENCY MEDICINE

## 2018-07-18 PROCEDURE — 81001 URINALYSIS AUTO W/SCOPE: CPT | Performed by: EMERGENCY MEDICINE

## 2018-07-18 PROCEDURE — 93005 ELECTROCARDIOGRAM TRACING: CPT

## 2018-07-18 PROCEDURE — 36415 COLL VENOUS BLD VENIPUNCTURE: CPT | Performed by: EMERGENCY MEDICINE

## 2018-07-18 PROCEDURE — 84484 ASSAY OF TROPONIN QUANT: CPT | Performed by: EMERGENCY MEDICINE

## 2018-07-19 ENCOUNTER — APPOINTMENT (OUTPATIENT)
Dept: CT IMAGING | Age: 73
End: 2018-07-19
Attending: EMERGENCY MEDICINE
Payer: MEDICARE

## 2018-07-19 VITALS
OXYGEN SATURATION: 91 % | HEART RATE: 85 BPM | HEIGHT: 67 IN | SYSTOLIC BLOOD PRESSURE: 141 MMHG | WEIGHT: 179.01 LBS | RESPIRATION RATE: 11 BRPM | BODY MASS INDEX: 28.1 KG/M2 | DIASTOLIC BLOOD PRESSURE: 67 MMHG | TEMPERATURE: 98.9 F

## 2018-07-19 LAB
ALBUMIN SERPL-MCNC: 3.3 G/DL (ref 3.5–5)
ALBUMIN/GLOB SERPL: 0.9 {RATIO} (ref 1.1–2.2)
ALP SERPL-CCNC: 148 U/L (ref 45–117)
ALT SERPL-CCNC: 33 U/L (ref 12–78)
ANION GAP SERPL CALC-SCNC: 10 MMOL/L (ref 5–15)
APPEARANCE UR: CLEAR
AST SERPL-CCNC: 29 U/L (ref 15–37)
ATRIAL RATE: 78 BPM
BACTERIA URNS QL MICRO: NEGATIVE /HPF
BILIRUB SERPL-MCNC: 0.4 MG/DL (ref 0.2–1)
BILIRUB UR QL: NEGATIVE
BUN SERPL-MCNC: 9 MG/DL (ref 6–20)
BUN/CREAT SERPL: 12 (ref 12–20)
CALCIUM SERPL-MCNC: 8.9 MG/DL (ref 8.5–10.1)
CALCULATED P AXIS, ECG09: 38 DEGREES
CALCULATED R AXIS, ECG10: -22 DEGREES
CALCULATED T AXIS, ECG11: 16 DEGREES
CHLORIDE SERPL-SCNC: 100 MMOL/L (ref 97–108)
CK SERPL-CCNC: 51 U/L (ref 26–192)
CK SERPL-CCNC: 58 U/L (ref 26–192)
CO2 SERPL-SCNC: 30 MMOL/L (ref 21–32)
COLOR UR: NORMAL
CREAT SERPL-MCNC: 0.73 MG/DL (ref 0.55–1.02)
D DIMER PPP FEU-MCNC: 0.45 MG/L FEU (ref 0–0.65)
DIAGNOSIS, 93000: NORMAL
EPITH CASTS URNS QL MICRO: NORMAL /LPF
GLOBULIN SER CALC-MCNC: 3.7 G/DL (ref 2–4)
GLUCOSE SERPL-MCNC: 111 MG/DL (ref 65–100)
GLUCOSE UR STRIP.AUTO-MCNC: NEGATIVE MG/DL
HGB UR QL STRIP: NEGATIVE
HYALINE CASTS URNS QL MICRO: NORMAL /LPF (ref 0–5)
KETONES UR QL STRIP.AUTO: NEGATIVE MG/DL
LEUKOCYTE ESTERASE UR QL STRIP.AUTO: NEGATIVE
NITRITE UR QL STRIP.AUTO: NEGATIVE
P-R INTERVAL, ECG05: 176 MS
PH UR STRIP: 6.5 [PH] (ref 5–8)
POTASSIUM SERPL-SCNC: 3.9 MMOL/L (ref 3.5–5.1)
PROT SERPL-MCNC: 7 G/DL (ref 6.4–8.2)
PROT UR STRIP-MCNC: NEGATIVE MG/DL
Q-T INTERVAL, ECG07: 390 MS
QRS DURATION, ECG06: 100 MS
QTC CALCULATION (BEZET), ECG08: 444 MS
RBC #/AREA URNS HPF: NORMAL /HPF (ref 0–5)
SODIUM SERPL-SCNC: 140 MMOL/L (ref 136–145)
SP GR UR REFRACTOMETRY: 1.01 (ref 1–1.03)
TROPONIN I SERPL-MCNC: <0.05 NG/ML
TROPONIN I SERPL-MCNC: <0.05 NG/ML
TSH SERPL DL<=0.05 MIU/L-ACNC: 1.6 UIU/ML (ref 0.36–3.74)
UA: UC IF INDICATED,UAUC: NORMAL
UROBILINOGEN UR QL STRIP.AUTO: 1 EU/DL (ref 0.2–1)
VENTRICULAR RATE, ECG03: 78 BPM
WBC URNS QL MICRO: NORMAL /HPF (ref 0–4)

## 2018-07-19 PROCEDURE — 74011636320 HC RX REV CODE- 636/320: Performed by: EMERGENCY MEDICINE

## 2018-07-19 PROCEDURE — 96360 HYDRATION IV INFUSION INIT: CPT

## 2018-07-19 PROCEDURE — 71275 CT ANGIOGRAPHY CHEST: CPT

## 2018-07-19 PROCEDURE — 85379 FIBRIN DEGRADATION QUANT: CPT | Performed by: EMERGENCY MEDICINE

## 2018-07-19 PROCEDURE — 36415 COLL VENOUS BLD VENIPUNCTURE: CPT | Performed by: EMERGENCY MEDICINE

## 2018-07-19 PROCEDURE — 84484 ASSAY OF TROPONIN QUANT: CPT | Performed by: EMERGENCY MEDICINE

## 2018-07-19 PROCEDURE — 70450 CT HEAD/BRAIN W/O DYE: CPT

## 2018-07-19 PROCEDURE — 74011250636 HC RX REV CODE- 250/636: Performed by: EMERGENCY MEDICINE

## 2018-07-19 PROCEDURE — 82550 ASSAY OF CK (CPK): CPT | Performed by: EMERGENCY MEDICINE

## 2018-07-19 RX ORDER — SODIUM CHLORIDE 9 MG/ML
50 INJECTION, SOLUTION INTRAVENOUS
Status: DISCONTINUED | OUTPATIENT
Start: 2018-07-19 | End: 2018-07-19

## 2018-07-19 RX ORDER — SODIUM CHLORIDE 0.9 % (FLUSH) 0.9 %
10 SYRINGE (ML) INJECTION
Status: DISCONTINUED | OUTPATIENT
Start: 2018-07-19 | End: 2018-07-19

## 2018-07-19 RX ORDER — SODIUM CHLORIDE 9 MG/ML
50 INJECTION, SOLUTION INTRAVENOUS
Status: COMPLETED | OUTPATIENT
Start: 2018-07-19 | End: 2018-07-19

## 2018-07-19 RX ORDER — SODIUM CHLORIDE 0.9 % (FLUSH) 0.9 %
10 SYRINGE (ML) INJECTION
Status: COMPLETED | OUTPATIENT
Start: 2018-07-19 | End: 2018-07-19

## 2018-07-19 RX ADMIN — IOPAMIDOL 80 ML: 755 INJECTION, SOLUTION INTRAVENOUS at 03:40

## 2018-07-19 RX ADMIN — SODIUM CHLORIDE 50 ML/HR: 900 INJECTION, SOLUTION INTRAVENOUS at 03:40

## 2018-07-19 RX ADMIN — Medication 10 ML: at 03:40

## 2018-07-19 RX ADMIN — SODIUM CHLORIDE 1000 ML: 900 INJECTION, SOLUTION INTRAVENOUS at 00:19

## 2018-07-19 NOTE — ED PROVIDER NOTES
EMERGENCY DEPARTMENT HISTORY AND PHYSICAL EXAM 
 
 
Date: 7/18/2018 Patient Name: Christopher Burger History of Presenting Illness Chief Complaint Patient presents with  Chest Pain  Dizziness History Provided By: Patient and EMS 
 
HPI: Christopher Burger, 74 y.o. female with PMHx significant for anemia / hypercholesteremia / hypertriglyceridemia / depression / A fib / HTN / iron deficiency anemia / GERD / SVT / DM / COPD / sarcoidosis, presents via EMS to the ED with cc of acute onset of L side non-radiating CP that began at ~2030 this evening and has gradually resolved since arrival to the ED. Pt complains of associated intermittent lightheadedness and dizziness that began at ~2130 this evening that has likewise resolved and intermittent diaphoresis that accompanied her CP. She notes that her CP is not exacerbated with deep inspiration and denies any other exacerbating or alleviating factors. Pt reports a similar episode of diaphoresis and lightheadedness about 1.5 weeks ago while getting up to go to the bathroom at night. She states that she was seen by her PCP following that incident and had an EKG that was normal but notes that her Hgb was low at that time and was started on Iron and Vitamin C supplements but denies any improvement in her symptoms since starting these medications. Pt reports a history of A fib and SVT but notes that her current symptoms are not similar. She also states that she was seen by her cardiologist last week at which time she had a normal stress test. Pt notes that she has not had her thyroid levels evaluated recently. Per EMS, pt was given Nitro and ASA en route. She specifically denies nausea, vomiting, fever, chills, or SOB. There are no other complaints, changes, or physical findings at this time. PCP: Lynne Kaminski MD 
 
Current Outpatient Prescriptions Medication Sig Dispense Refill  atorvastatin (LIPITOR) 40 mg tablet TAKE 1 TABLET BY MOUTH NIGHTLY. 90 Tab 3  
 omeprazole (PRILOSEC) 20 mg capsule Take  by mouth.  metoprolol tartrate (LOPRESSOR) 50 mg tablet TAKE 1 TAB BY MOUTH TWO (2) TIMES A DAY. 60 Tab 5  
 amitriptyline (ELAVIL) 100 mg tablet TAKE ONE TABLET BY MOUTH NIGHTLY 90 Tab 1  
 gabapentin (NEURONTIN) 600 mg tablet Take 900 mg by mouth two (2) times a day.  losartan (COZAAR) 100 mg tablet TAKE 1 TABLET BY MOUTH EVERY DAY 90 Tab 3  
 albuterol (PROVENTIL HFA, VENTOLIN HFA, PROAIR HFA) 90 mcg/actuation inhaler Take 2 Puffs by inhalation every four (4) hours as needed for Wheezing. 1 Inhaler 1  
 aspirin 81 mg chewable tablet Take 1 Tab by mouth daily. 90 Tab 3  
 RESTASIS 0.05 % ophthalmic emulsion   3 Past History Past Medical History: 
Past Medical History:  
Diagnosis Date  Abnormal chest x-ray 12/23/2009  Anemia 12/23/2009  Arrhythmia   
 SVT. Converts with Adenocard  Atrial fibrillation ([address] 12/23/2009  Carpal tunnel syndrome 12/23/2009  Chronic obstructive pulmonary disease ([address]  Colitis 12/23/2009  Complex regional pain syndrome 12/23/2009  Depression 12/23/2009  Diabetes ([address]  GERD (gastroesophageal reflux disease) 2/8/2010  
 HTN (hypertension) 12/23/2009  Hypercholesteremia 12/23/2009  Hypertriglyceridemia 12/23/2009  Idiopathic peripheral neuropathy 12/23/2009  Iron deficiency anemia 12/23/2009  Osteopenia 12/23/2009  Paroxysmal atrial fibrillation ([address] 12/2/2011  Psoriasis 6/27/2012  Sarcoidosis Past Surgical History: 
Past Surgical History:  
Procedure Laterality Date  HX CARPAL TUNNEL RELEASE  00/00/2002 Both hands  HX CYST REMOVAL  00/00/1965 Left wrist  
 HX HYSTERECTOMY  00/00/1985  HX ORTHOPAEDIC  00/00/1987  
 back surgery (disc)  HX ORTHOPAEDIC    
 right foot X 3 neuromas and tarsal tunnel release  HX TONSILLECTOMY  00/00/1962  
 NEUROLOGICAL PROCEDURE UNLISTED Pain pump  PAIN PUMP DEVICE  00/00/2007  in left lower abdomen (for foot pain) Family History: 
Family History Problem Relation Age of Onset  Arrhythmia Father  Heart Attack Father  Cancer Brother Melanoma  Breast Cancer Daughter 48  Breast Cancer Daughter 46  Breast Cancer Daughter 46 Social History: 
Social History Substance Use Topics  Smoking status: Former Smoker Packs/day: 0.10 Years: 2.00 Quit date: 1/1/1975  Smokeless tobacco: Never Used  Alcohol use No  
 
 
Allergies: Allergies Allergen Reactions  Duragesic [Fentanyl] Other (comments) Patient sees things when taking  Codeine Nausea Only Review of Systems Review of Systems Constitutional: Positive for diaphoresis (resolved). Negative for activity change, appetite change, fatigue and fever. HENT: Negative. Negative for congestion, rhinorrhea and sore throat. Respiratory: Negative. Negative for cough, shortness of breath and wheezing. Cardiovascular: Positive for chest pain (resolved). Negative for leg swelling. Gastrointestinal: Negative. Negative for abdominal distention, abdominal pain, constipation, diarrhea, nausea and vomiting. Endocrine: Negative. Genitourinary: Negative for difficulty urinating, dysuria, menstrual problem, vaginal bleeding and vaginal discharge. Musculoskeletal: Negative. Negative for arthralgias, joint swelling and myalgias. Skin: Negative. Negative for rash. Neurological: Positive for dizziness (resolved) and light-headedness (resolved). Negative for weakness and headaches. Psychiatric/Behavioral: Negative. Physical Exam  
Physical Exam  
Constitutional: She is oriented to person, place, and time. She appears well-developed and well-nourished. HENT:  
Head: Atraumatic. Eyes: EOM are normal.  
Cardiovascular: Normal rate, regular rhythm, normal heart sounds and intact distal pulses. Exam reveals no gallop and no friction rub.    
No murmur heard. 
Positive orthostatics Pulmonary/Chest: Effort normal and breath sounds normal. No respiratory distress. She has no wheezes. She has no rales. She exhibits no tenderness. Abdominal: Soft. Bowel sounds are normal. She exhibits no distension and no mass. There is no tenderness. There is no rebound and no guarding. Musculoskeletal: Normal range of motion. She exhibits no edema or tenderness. Neurological: She is oriented to person, place, and time. Skin: Skin is warm. Psychiatric: She has a normal mood and affect. Nursing note and vitals reviewed. Diagnostic Study Results Labs - Recent Results (from the past 12 hour(s)) EKG, 12 LEAD, INITIAL Collection Time: 07/18/18 11:11 PM  
Result Value Ref Range Ventricular Rate 78 BPM  
 Atrial Rate 78 BPM  
 P-R Interval 176 ms QRS Duration 100 ms Q-T Interval 390 ms QTC Calculation (Bezet) 444 ms Calculated P Axis 38 degrees Calculated R Axis -22 degrees Calculated T Axis 16 degrees Diagnosis Normal sinus rhythm Moderate voltage criteria for LVH, may be normal variant When compared with ECG of 02-MAR-2012 03:22, No significant change was found CBC WITH AUTOMATED DIFF Collection Time: 07/18/18 11:19 PM  
Result Value Ref Range WBC 10.4 3.6 - 11.0 K/uL  
 RBC 4.35 3.80 - 5.20 M/uL  
 HGB 10.6 (L) 11.5 - 16.0 g/dL HCT 34.0 (L) 35.0 - 47.0 % MCV 78.2 (L) 80.0 - 99.0 FL  
 MCH 24.4 (L) 26.0 - 34.0 PG  
 MCHC 31.2 30.0 - 36.5 g/dL  
 RDW 19.1 (H) 11.5 - 14.5 % PLATELET 914 593 - 603 K/uL MPV 11.5 8.9 - 12.9 FL  
 NRBC 0.0 0  WBC ABSOLUTE NRBC 0.00 0.00 - 0.01 K/uL NEUTROPHILS 68 32 - 75 % LYMPHOCYTES 18 12 - 49 % MONOCYTES 7 5 - 13 % EOSINOPHILS 6 0 - 7 % BASOPHILS 1 0 - 1 % IMMATURE GRANULOCYTES 1 (H) 0.0 - 0.5 % ABS. NEUTROPHILS 7.1 1.8 - 8.0 K/UL  
 ABS. LYMPHOCYTES 1.9 0.8 - 3.5 K/UL  
 ABS. MONOCYTES 0.7 0.0 - 1.0 K/UL  
 ABS.  EOSINOPHILS 0.7 (H) 0.0 - 0.4 K/UL  
 ABS. BASOPHILS 0.1 0.0 - 0.1 K/UL  
 ABS. IMM. GRANS. 0.1 (H) 0.00 - 0.04 K/UL  
 DF AUTOMATED METABOLIC PANEL, COMPREHENSIVE Collection Time: 07/18/18 11:19 PM  
Result Value Ref Range Sodium 140 136 - 145 mmol/L Potassium 3.9 3.5 - 5.1 mmol/L Chloride 100 97 - 108 mmol/L  
 CO2 30 21 - 32 mmol/L Anion gap 10 5 - 15 mmol/L Glucose 111 (H) 65 - 100 mg/dL BUN 9 6 - 20 MG/DL Creatinine 0.73 0.55 - 1.02 MG/DL  
 BUN/Creatinine ratio 12 12 - 20 GFR est AA >60 >60 ml/min/1.73m2 GFR est non-AA >60 >60 ml/min/1.73m2 Calcium 8.9 8.5 - 10.1 MG/DL Bilirubin, total 0.4 0.2 - 1.0 MG/DL  
 ALT (SGPT) 33 12 - 78 U/L  
 AST (SGOT) 29 15 - 37 U/L Alk. phosphatase 148 (H) 45 - 117 U/L Protein, total 7.0 6.4 - 8.2 g/dL Albumin 3.3 (L) 3.5 - 5.0 g/dL Globulin 3.7 2.0 - 4.0 g/dL A-G Ratio 0.9 (L) 1.1 - 2.2    
TROPONIN I Collection Time: 07/18/18 11:19 PM  
Result Value Ref Range Troponin-I, Qt. <0.05 <0.05 ng/mL CK W/ REFLX CKMB Collection Time: 07/18/18 11:19 PM  
Result Value Ref Range CK 58 26 - 192 U/L  
TSH 3RD GENERATION Collection Time: 07/18/18 11:19 PM  
Result Value Ref Range TSH 1.60 0.36 - 3.74 uIU/mL URINALYSIS W/ REFLEX CULTURE Collection Time: 07/18/18 11:32 PM  
Result Value Ref Range Color YELLOW/STRAW Appearance CLEAR CLEAR Specific gravity 1.011 1.003 - 1.030    
 pH (UA) 6.5 5.0 - 8.0 Protein NEGATIVE  NEG mg/dL Glucose NEGATIVE  NEG mg/dL Ketone NEGATIVE  NEG mg/dL Bilirubin NEGATIVE  NEG Blood NEGATIVE  NEG Urobilinogen 1.0 0.2 - 1.0 EU/dL Nitrites NEGATIVE  NEG Leukocyte Esterase NEGATIVE  NEG    
 WBC 0-4 0 - 4 /hpf  
 RBC 0-5 0 - 5 /hpf Epithelial cells FEW FEW /lpf Bacteria NEGATIVE  NEG /hpf  
 UA:UC IF INDICATED CULTURE NOT INDICATED BY UA RESULT CNI Hyaline cast 0-2 0 - 5 /lpf D DIMER Collection Time: 07/19/18  2:40 AM  
Result Value Ref Range  D-dimer 0. 45 0.00 - 0.65 mg/L FEU  
TROPONIN I Collection Time: 07/19/18  2:40 AM  
Result Value Ref Range Troponin-I, Qt. <0.05 <0.05 ng/mL CK W/ REFLX CKMB Collection Time: 07/19/18  2:40 AM  
Result Value Ref Range CK 51 26 - 192 U/L Radiologic Studies -  
CTA CHEST W OR W WO CONT Final Result CT HEAD WO CONT Final Result XR CHEST PORT Final Result CT Results  (Last 48 hours) 07/19/18 0340  CTA CHEST W OR W WO CONT Final result Impression:  IMPRESSION: No pulmonary embolus. Mediastinal and hilar adenopathy  
redemonstrated, likely sarcoid. Narrative:  EXAM:  CTA CHEST W OR W WO CONT INDICATION: Chest pain and dizziness with hypoxia. COMPARISON: CT 9/6/2017. Tab Lobo CONTRAST:  80 mL of Isovue-370. TECHNIQUE:   
Precontrast  images were obtained to localize the volume for acquisition. Multislice helical CT arteriography was performed from the diaphragm to the  
thoracic inlet during uneventful rapid bolus intravenous contrast  
administration. Lung and soft tissue windows were generated. Coronal and  
sagittal images were generated and 3D post processing consisting of coronal  
maximum intensity images was performed. CT dose reduction was achieved through  
use of a standardized protocol tailored for this examination and automatic  
exposure control for dose modulation. FINDINGS:  
The lungs show some linear scarring or atelectasis in the upper lobes  
bilaterally and left lower lobe but otherwise are clear of mass, nodule,  
airspace disease or edema. The pulmonary arteries are well enhanced and no pulmonary emboli are identified. There is stable mediastinal and hilar adenopathy measuring up to 1.6 cm  
subcarinal, 1.1 cm left paratracheal, 1.4 cm right paratracheal, 1.5 cm left  
hilar, and 1.1 cm right hilar. There is no mediastinal mass. The heart is normal  
in size without pericardial effusion.  The aorta enhances normally without  
evidence of aneurysm or dissection. The visualized portions of the upper abdominal organs are normal. Distal  
structures are unremarkable apart degenerative spine change. A small sliding  
hiatal hernia is noted. 07/19/18 0158  CT HEAD WO CONT Final result Impression:  IMPRESSION: No acute findings or findings correlate with dizziness. Narrative:  EXAM:  CT HEAD WO CONT INDICATION:   DIZZINESS  
   
COMPARISON: None. CONTRAST:  None. TECHNIQUE: Unenhanced CT of the head was performed using 5 mm images. Brain and  
bone windows were generated. CT dose reduction was achieved through use of a  
standardized protocol tailored for this examination and automatic exposure  
control for dose modulation. FINDINGS:  
The ventricles and sulci are normal in size, shape and configuration and  
midline. There is no significant white matter disease. There is no intracranial  
hemorrhage, extra-axial collection, mass, mass effect or midline shift. The  
basilar cisterns are open. No acute infarct is identified. The bone windows  
demonstrate no abnormalities. The visualized portions of the paranasal sinuses  
and mastoid air cells are clear. CXR Results  (Last 48 hours) 07/19/18 0005  XR CHEST PORT Final result Impression:  IMPRESSION: No acute findings. Narrative:  EXAM:  XR CHEST PORT INDICATION:  Chest pain and dizziness. COMPARISON:  Chest x-ray 7/23/2013. FINDINGS: A portable AP radiograph of the chest was obtained at 00:00 hours. The  
patient is on a cardiac monitor. The lungs are clear. The cardiac and  
mediastinal contours and pulmonary vascularity are normal.  The bones and soft  
tissues are grossly within normal limits. Medical Decision Making I am the first provider for this patient.  
 
I reviewed the vital signs, available nursing notes, past medical history, past surgical history, family history and social history. Vital Signs-Reviewed the patient's vital signs. Patient Vitals for the past 12 hrs: 
 Temp Pulse Resp BP SpO2  
07/19/18 0401 - - - - 91 %  
07/19/18 0400 - 85 11 141/67 -  
07/19/18 0130 - 72 14 125/66 (!) 87 % 07/19/18 0100 - 69 14 134/69 (!) 87 % 07/19/18 0030 - 70 13 139/70 (!) 84 % 07/18/18 2358 - 72 16 147/73 -  
07/18/18 2356 - 76 21 100/62 (!) 89 % 07/18/18 2353 - 75 12 148/69 (!) 87 % 07/18/18 2330 - 80 20 (!) 128/109 90 % 07/18/18 2318 - - - - 93 % 07/18/18 2317 - - - (!) 157/96 -  
07/18/18 2311 98.9 °F (37.2 °C) 78 17 (!) 157/96 94 % Pulse Oximetry Analysis - 94% on RA 
 
EKG interpretation: (Preliminary) 23:11 Rhythm: normal sinus rhythm; and regular . Rate (approx.): 78; Axis: normal; AL interval: normal; QRS interval: normal ; ST/T wave: normal; Other findings: left ventricular hypertrophy. Written by Hector Weissr, ED Scribe, as dictated by Radha Miller MD. Records Reviewed: Nursing Notes, Old Medical Records, Previous electrocardiograms, Ambulance Run Sheet, Previous Radiology Studies and Previous Laboratory Studies Provider Notes (Medical Decision Making):  
Pt has had recent negative stress test, however is here today with recurrence of CP and diaphoresis. Will obtain 2 set cardiac enzyme work up and expand work up to evaluate for thyroid disease, electrolyte abnormality, or acute kidney injury. Will evaluate for orthostasis. Possible episodes of SVT however not present at current time of assessment. Pt with self-reported recent anemia, will evaluate for response to iron. ED Course:  
Initial assessment performed. The patients presenting problems have been discussed, and they are in agreement with the care plan formulated and outlined with them. I have encouraged them to ask questions as they arise throughout their visit.  
 
Medications  
sodium chloride 0.9 % bolus infusion 1,000 mL (0 mL IntraVENous IV Completed 7/19/18 0119)  
sodium chloride (NS) flush 10 mL (10 mL IntraVENous Given 7/19/18 0340) iopamidol (ISOVUE-370) 76 % injection 80 mL (80 mL IntraVENous Given 7/19/18 0340)  
0.9% sodium chloride infusion (0 mL/hr IntraVENous IV Completed 7/19/18 2105) Progress Note: 
12:00 AM 
Pt was noted to be orthostatic; Pts blood systolic blood pressure dropped from the 140s while supine and sitting to 100 with standing although pt states that she did not experience any recurrence of her dizziness or diaphoresis. Written by BRAULIO Gomes, as dictated by Pardeep De La Rosa MD. 
 
Progress Note: 
1:41 AM 
Pts pulse ox was 86% on entry to room with pt sleeping. After waking the pt and having her speak a few sentences, pts pulse ox improved to 96%. Findings are consistent with sleep apnea. Pt states that her dizziness is improved and continues to deny any SOB. Written by BRAULIO Gomes, as dictated by Pardeep De La Rosa MD. 
 
Progress Note: 
2:53 AM 
Pts O2 sats are 86% while sleeping, pt has been persistently hypoxic while sleeping. Will obtain imaging for further evaluation. Written by BRAULIO Gomes, as dictated by Pardeep De La Rosa MD. 
 
Progress Note: 
3:57 AM 
Pt ambulated without difficulty with O2 sat at 93%. Written by BRAULIO Gomesibtiffanie, as dictated by Pardepe De La Rosa MD. 
 
Progress Note: 
4:05 AM 
Pt updated on imaging results. She expresses her understanding and agrees with the current plan of care, states that she is ready for discharge. Pt is asymptomatic at the time of discharge and has had no recurrence of her CP or dizziness. Written by BRAULIO Gomes, as dictated by Pardeep De La Rosa MD. Critical Care Time:  
0 Disposition: 
DISCHARGE NOTE 
4:08 AM 
The patient has been re-evaluated and is ready for discharge. Reviewed available results with patient. Counseled pt on diagnosis and care plan.  Pt has expressed understanding, and all questions have been answered. Pt agrees with plan and agrees to F/U as recommended, or return to the ED if their sxs worsen. Discharge instructions have been provided and explained to the pt, along with reasons to return to the ED. Written by Ramona Guerrero, ED Scribe, as dictated by Shilpa Dobson MD. 
 
PLAN: 
1. Discharge Medication List as of 7/19/2018  4:08 AM  
  
 
2. Follow-up Information Follow up With Details Comments Contact Info Mario Dominguez MD In 3 days  [address] [facility] [address] [phone] Fransico Henry MD In 1 week for possible Sarcoid [address] [facility] 
[phone] Nancie Cooper MD In 3 days  [id number] [facility] 
[phone] [facility] EMERGENCY DEPT  If symptoms worsen [address] [facility] [address] [facility] 
[phone] Return to ED if worse Diagnosis Clinical Impression: 1. Sleep apnea, unspecified type 2. Dizziness 3. Sarcoid 4. Other chest pain Attestations: This note is prepared by Livia Booth, acting as Scribe for Shilpa Dobson MD. 
 
The scribe's documentation has been prepared under my direction and personally reviewed by me in its entirety. I confirm that the note above accurately reflects all work, treatment, procedures, and medical decision making performed by me.  
Shilpa Dobson MD

## 2018-07-19 NOTE — DISCHARGE INSTRUCTIONS
Chest Pain: Care Instructions  Your Care Instructions    There are many things that can cause chest pain. Some are not serious and will get better on their own in a few days. But some kinds of chest pain need more testing and treatment. Your doctor may have recommended a follow-up visit in the next 8 to 12 hours. If you are not getting better, you may need more tests or treatment. Even though your doctor has released you, you still need to watch for any problems. The doctor carefully checked you, but sometimes problems can develop later. If you have new symptoms or if your symptoms do not get better, get medical care right away. If you have worse or different chest pain or pressure that lasts more than 5 minutes or you passed out (lost consciousness), call 911 or seek other emergency help right away. A medical visit is only one step in your treatment. Even if you feel better, you still need to do what your doctor recommends, such as going to all suggested follow-up appointments and taking medicines exactly as directed. This will help you recover and help prevent future problems. How can you care for yourself at home? · Rest until you feel better. · Take your medicine exactly as prescribed. Call your doctor if you think you are having a problem with your medicine. · Do not drive after taking a prescription pain medicine. When should you call for help? Call 911 if:    · You passed out (lost consciousness).     · You have severe difficulty breathing.     · You have symptoms of a heart attack. These may include:  ¨ Chest pain or pressure, or a strange feeling in your chest.  ¨ Sweating. ¨ Shortness of breath. ¨ Nausea or vomiting. ¨ Pain, pressure, or a strange feeling in your back, neck, jaw, or upper belly or in one or both shoulders or arms. ¨ Lightheadedness or sudden weakness. ¨ A fast or irregular heartbeat.   After you call 911, the  may tell you to chew 1 adult-strength or 2 to 4 low-dose aspirin. Wait for an ambulance. Do not try to drive yourself.    Call your doctor today if:    · You have any trouble breathing.     · Your chest pain gets worse.     · You are dizzy or lightheaded, or you feel like you may faint.     · You are not getting better as expected.     · You are having new or different chest pain. Where can you learn more? Go to http://alejandro-samuel.info/. Enter A120 in the search box to learn more about \"Chest Pain: Care Instructions. \"  Current as of: November 20, 2017  Content Version: 11.7  © 6481-8908 [employer]. Care instructions adapted under license by [employer] (which disclaims liability or warranty for this information). If you have questions about a medical condition or this instruction, always ask your healthcare professional. [address] any warranty or liability for your use of this information. Dizziness: Care Instructions  Your Care Instructions  Dizziness is the feeling of unsteadiness or fuzziness in your head. It is different than having vertigo, which is a feeling that the room is spinning or that you are moving or falling. It is also different from lightheadedness, which is the feeling that you are about to faint. It can be hard to know what causes dizziness. Some people feel dizzy when they have migraine headaches. Sometimes bouts of flu can make you feel dizzy. Some medical conditions, such as heart problems or high blood pressure, can make you feel dizzy. Many medicines can cause dizziness, including medicines for high blood pressure, pain, or anxiety. If a medicine causes your symptoms, your doctor may recommend that you stop or change the medicine. If it is a problem with your heart, you may need medicine to help your heart work better.  If there is no clear reason for your symptoms, your doctor may suggest watching and waiting for a while to see if the dizziness goes away on its own. Follow-up care is a key part of your treatment and safety. Be sure to make and go to all appointments, and call your doctor if you are having problems. It's also a good idea to know your test results and keep a list of the medicines you take. How can you care for yourself at home? · If your doctor recommends or prescribes medicine, take it exactly as directed. Call your doctor if you think you are having a problem with your medicine. · Do not drive while you feel dizzy. · Try to prevent falls. Steps you can take include:  ¨ Using nonskid mats, adding grab bars near the tub, and using night-lights. ¨ Clearing your home so that walkways are free of anything you might trip on. ¨ Letting family and friends know that you have been feeling dizzy. This will help them know how to help you. When should you call for help? Call 911 anytime you think you may need emergency care. For example, call if:    · You passed out (lost consciousness).     · You have dizziness along with symptoms of a heart attack. These may include:  ¨ Chest pain or pressure, or a strange feeling in the chest.  ¨ Sweating. ¨ Shortness of breath. ¨ Nausea or vomiting. ¨ Pain, pressure, or a strange feeling in the back, neck, jaw, or upper belly or in one or both shoulders or arms. ¨ Lightheadedness or sudden weakness. ¨ A fast or irregular heartbeat.     · You have symptoms of a stroke. These may include:  ¨ Sudden numbness, tingling, weakness, or loss of movement in your face, arm, or leg, especially on only one side of your body. ¨ Sudden vision changes. ¨ Sudden trouble speaking. ¨ Sudden confusion or trouble understanding simple statements. ¨ Sudden problems with walking or balance.   ¨ A sudden, severe headache that is different from past headaches.    Call your doctor now or seek immediate medical care if:    · You feel dizzy and have a fever, headache, or ringing in your ears.     · You have new or increased nausea and vomiting.     · Your dizziness does not go away or comes back.    Watch closely for changes in your health, and be sure to contact your doctor if:    · You do not get better as expected. Where can you learn more? Go to http://alejandro-samuel.info/. Enter C842 in the search box to learn more about \"Dizziness: Care Instructions. \"  Current as of: November 20, 2017  Content Version: 11.7  © 3000-0860 [employer]. Care instructions adapted under license by [employer] (which disclaims liability or warranty for this information). If you have questions about a medical condition or this instruction, always ask your healthcare professional. [address] any warranty or liability for your use of this information.

## 2018-07-20 ENCOUNTER — OFFICE VISIT (OUTPATIENT)
Dept: INTERNAL MEDICINE CLINIC | Facility: CLINIC | Age: 73
End: 2018-07-20

## 2018-07-20 VITALS
BODY MASS INDEX: 28.28 KG/M2 | TEMPERATURE: 98.2 F | WEIGHT: 180.2 LBS | HEART RATE: 65 BPM | HEIGHT: 67 IN | SYSTOLIC BLOOD PRESSURE: 122 MMHG | RESPIRATION RATE: 18 BRPM | OXYGEN SATURATION: 95 % | DIASTOLIC BLOOD PRESSURE: 73 MMHG

## 2018-07-20 DIAGNOSIS — R19.5 OCCULT BLOOD IN STOOLS: ICD-10-CM

## 2018-07-20 DIAGNOSIS — K21.9 GASTROESOPHAGEAL REFLUX DISEASE WITHOUT ESOPHAGITIS: Primary | ICD-10-CM

## 2018-07-20 DIAGNOSIS — E11.42 DIABETIC PERIPHERAL NEUROPATHY ASSOCIATED WITH TYPE 2 DIABETES MELLITUS (HCC): ICD-10-CM

## 2018-07-20 RX ORDER — GABAPENTIN 600 MG/1
900 TABLET ORAL 2 TIMES DAILY
Qty: 90 TAB | Refills: 1 | Status: SHIPPED | OUTPATIENT
Start: 2018-07-20 | End: 2018-11-15 | Stop reason: SDUPTHER

## 2018-07-20 NOTE — MR AVS SNAPSHOT
[address] [address] [phone] Patient: Iona Barnett MRN: [id number] VMV:[id number] Visit Information Date & Time Provider Department Dept. Phone Encounter #  
 7/20/2018  2:30 PM Janeen Bill, [address] Internal Medicine of [address] [place] [address] [id number] Follow-up Instructions Return if symptoms worsen or fail to improve, for Keep f/u with Dr. Melo Villalobos. Your Appointments 10/19/2018  2:30 PM  
ROUTINE CARE with Delores Dakins, MD [facility] of [facility]) Appt Note: 6 months (around 10/17/2018) for DM, HTN, chol, POC A1c,  Fasitng lab soone; 6 months (around 10/17/2018) for DM, HTN, chol, POC A1c, Fasitng lab soone [address] [phone]  
  
   
 14 [facility] [address] [place] [address] Upcoming Health Maintenance Date Due  
 EYE EXAM RETINAL OR DILATED Q1 11/29/2017 DTaP/Tdap/Td series (1 - Tdap) 9/22/2019* Influenza Age 5 to Adult 8/1/2018 HEMOGLOBIN A1C Q6M 10/17/2018 GLAUCOMA SCREENING Q2Y 11/29/2018 FOOT EXAM Q1 4/17/2019 MEDICARE YEARLY EXAM 4/18/2019 MICROALBUMIN Q1 4/23/2019 LIPID PANEL Q1 4/23/2019 BREAST CANCER SCRN MAMMOGRAM 5/22/2019 FOBT Q 1 YEAR AGE 50-75 6/18/2019 *Topic was postponed. The date shown is not the original due date. Allergies as of 7/20/2018  Review Complete On: 7/20/2018 By: Janeen Bill NP Severity Noted Reaction Type Reactions Duragesic [Fentanyl] High 02/08/2010    Other (comments) Patient sees things when taking Codeine  12/23/2009    Nausea Only Current Immunizations  Reviewed on 7/20/2018 Name Date Influenza High Dose Vaccine PF 10/19/2017, 9/22/2016, 11/12/2015 Influenza Vaccine 10/17/2014, 10/3/2013 Influenza Vaccine Split 10/8/2012, 10/7/2011 Influenza Vaccine Whole 9/22/2010 Pneumococcal Conjugate (PCV-13) 3/22/2016 TD Vaccine 9/22/2009 ZZZ-RETIRED (DO NOT USE) Pneumococcal Vaccine (Unspecified Type) 9/22/2009 Reviewed by Sergio Sebastian LPN on 5/66/1979 at  2:19 PM  
You Were Diagnosed With   
  
 Codes Comments Gastroesophageal reflux disease without esophagitis    -  Primary ICD-10-CM: K21.9 ICD-9-CM: 530.81 Diabetic peripheral neuropathy associated with type 2 diabetes mellitus (HCC)     ICD-10-CM: E11.42 
ICD-9-CM: 250.60, 357.2 Vitals BP Pulse Temp Resp Height(growth percentile) Weight(growth percentile) 122/73 (BP 1 Location: Left arm, BP Patient Position: Sitting) 65 98.2 °F (36.8 °C) (Oral) 18 5' 7\" (1.702 m) 180 lb 3.2 oz (81.7 kg) SpO2 BMI OB Status Smoking Status 95% 28.22 kg/m2 Hysterectomy Former Smoker Vitals History BMI and BSA Data Body Mass Index Body Surface Area  
 28.22 kg/m 2 1.97 m 2 Preferred Pharmacy Pharmacy Name Phone [id number] [address] [address] [address] [phone] Your Updated Medication List  
  
   
This list is accurate as of 7/20/18  3:09 PM.  Always use your most recent med list.  
  
  
  
  
 albuterol 90 mcg/actuation inhaler Commonly known as:  PROVENTIL HFA, VENTOLIN HFA, PROAIR HFA Take 2 Puffs by inhalation every four (4) hours as needed for Wheezing. amitriptyline 100 mg tablet Commonly known as:  ELAVIL TAKE ONE TABLET BY MOUTH NIGHTLY  
  
 aspirin 81 mg chewable tablet Take 1 Tab by mouth daily. atorvastatin 40 mg tablet Commonly known as:  LIPITOR  
TAKE 1 TABLET BY MOUTH NIGHTLY.  
  
 gabapentin 600 mg tablet Commonly known as:  NEURONTIN Take 1.5 Tabs by mouth two (2) times a day. losartan 100 mg tablet Commonly known as:  COZAAR  
TAKE 1 TABLET BY MOUTH EVERY DAY  
  
 metoprolol tartrate 50 mg tablet Commonly known as:  LOPRESSOR  
TAKE 1 TAB BY MOUTH TWO (2) TIMES A DAY. omeprazole 20 mg capsule Commonly known as:  PRILOSEC Take  by mouth. RESTASIS 0.05 % ophthalmic emulsion Generic drug:  cycloSPORINE Prescriptions Sent to Pharmacy Refills  
 gabapentin (NEURONTIN) 600 mg tablet 1 Sig: Take 1.5 Tabs by mouth two (2) times a day. Class: Normal  
 Pharmacy: [id number] Jennifer Redman, [address]  #: [phone] Route: Oral  
  
Follow-up Instructions Return if symptoms worsen or fail to improve, for Keep f/u with Dr. Darion Larkin. Patient Instructions Start taking zantac 150mg at nightime. Continue prilosec 20mg in the AM. To control GERD: 
Elevate the head of the bed on blocks at least 6-8 inches high. Weight loss (if indicated) can be helpful in reducing or completely eliminating symptoms. Avoid acidic, spicy, greasy, tomato-based, dairy, and high-fat foods. Avoid caffeine, carbonated beverages, chocolate, and peppermint. Avoid NSAIDs- Ibuprofen, Motrin, Advil, and Aleve. Avoid tobacco and excessive alcohol use. Please contact our office if your symptoms worsen or do not improve. Please call 911 or go directly to the Emergency Department if you develop shortness of breath, chest pain, difficulty breathing or worsening of your symptoms. Please follow-up with Dr. [facility] at [facility] for your sarcoid. Introducing [facility]! Livia Conway introduces [employer] patient portal. Now you can access parts of your medical record, email your doctor's office, and request medication refills online. 1. In your internet browser, go to [email or web address] 2. Click on the First Time User? Click Here link in the Sign In box. You will see the New Member Sign Up page. 3. Enter your [email or web address] Access Code exactly as it appears below. You will not need to use this code after youve completed the sign-up process.  If you do not sign up before the expiration date, you must request a new code. · [employer] Access Code: [email or web address] Expires: 10/16/2018 11:11 PM 
 
4. Enter the last four digits of your Social Security Number (xxxx) and Date of Birth (mm/dd/yyyy) as indicated and click Submit. You will be taken to the next sign-up page. 5. Create a [employer] ID. This will be your [employer] login ID and cannot be changed, so think of one that is secure and easy to remember. 6. Create a [employer] password. You can change your password at any time. 7. Enter your Password Reset Question and Answer. This can be used at a later time if you forget your password. 8. Enter your e-mail address. You will receive e-mail notification when new information is available in [address]. 9. Click Sign Up. You can now view and download portions of your medical record. 10. Click the Download Summary menu link to download a portable copy of your medical information. If you have questions, please visit the Frequently Asked Questions section of the [employer] website. Remember, [employer] is NOT to be used for urgent needs. For medical emergencies, dial 911. Now available from your iPhone and Android! Please provide this summary of care documentation to your next provider. Your primary care clinician is listed as Asmita Chinchilla. If you have any questions after today's visit, please call [phone].

## 2018-07-20 NOTE — PROGRESS NOTES
SANDRO Sabillon is a 74y.o. year old female patient of Salazar Fair MD who presents with c/o ED follow-up. Pt has history of has Headache(784.0), Carpal tunnel syndrome, Hypercholesteremia, Depression, Abnormal chest x-ray, Complex regional pain syndrome, Diabetic peripheral neuropathy associated with type 2 diabetes mellitus ([address] Hypertension, essential, Controlled type 2 diabetes mellitus with diabetic polyneuropathy, without long-term current use of insulin ([address] Osteopenia, Vitamin D deficiency, GERD (gastroesophageal reflux disease), Psoriasis, Paroxysmal atrial fibrillation ([address] Hepatic steatosis, COPD, severity to be determined ([address] and Sarcoidosis of lymph nodes on her problem list..    Pt was seen in the ED on 7/18 for chest pain. She was previously referred to cardiology after her appt on 6/26 with Dr. Desiree Barboza and underwent a stress test last week which was negative. While in the ED patient had a negative CXR, EKG, CT Head, cardiac enzymes, D-dimer, CMP, TSH, and UA that were all normal. Her CBC showed some chronic anemia. CT-A showed possible chronic sarcoidosis. Pt was found to be hypoxic while sleeping attributed to likely ROYCE. She was orthostatic positive during ED visit. Pt was encouraged to f/u with Dr. Trisha Mcintyre for sarcoidosis and ROYCE evaluation, and Dr. Royal Fisher with Cardiology. Pt was also referred to GI 3 weeks ago for hem positive stool for a colonoscopy and possible EGD. Pt reports epigastric pain,  Describes as stabbing, started two days ago. Hurts worse right after eating or especially if eat too fast. Takes prilosec 20mg daily. Denies belching or food emesis. Denies waking her up from sleep. Denies unintentional weight loss. Flares up with any foods or sometimes just drinking water. Doesn't currently have follow-up for pulmonary with Dr. Trisha Mcintyre.    Doesn't currently have a f/u with cardiologist.   Did not go see GI- pt states she does not believe she truly has blood in her stools, she believes its from her hemorrhoids and would like to have a kit to recheck her stool. She did start taking the iron and vitamin C Dr. Odilia Diaz recommended. Patient Active Problem List   Diagnosis Code    Headache(784.0) R51    Carpal tunnel syndrome G56.00    Hypercholesteremia E78.00    Depression F32.9    Abnormal chest x-ray R93.8    Complex regional pain syndrome G90.50    Diabetic peripheral neuropathy associated with type 2 diabetes mellitus ([address] E11.42    Hypertension, essential I10    Controlled type 2 diabetes mellitus with diabetic polyneuropathy, without long-term current use of insulin (HCC) E11.42    Osteopenia M85.80    Vitamin D deficiency E55.9    GERD (gastroesophageal reflux disease) K21.9    Psoriasis L40.9    Paroxysmal atrial fibrillation (HCC) I48.0    Hepatic steatosis K76.0    COPD, severity to be determined ([address] J44.9    Sarcoidosis of lymph nodes D86.1     Past Medical History:   Diagnosis Date    Abnormal chest x-ray 12/23/2009    Anemia 12/23/2009    Arrhythmia     SVT.  Converts with [employer]    Atrial fibrillation ([address] 12/23/2009    Carpal tunnel syndrome 12/23/2009    Chronic obstructive pulmonary disease ([address]     Colitis 12/23/2009    Complex regional pain syndrome 12/23/2009    Depression 12/23/2009    Diabetes ([address]     GERD (gastroesophageal reflux disease) 2/8/2010    HTN (hypertension) 12/23/2009    Hypercholesteremia 12/23/2009    Hypertriglyceridemia 12/23/2009    Idiopathic peripheral neuropathy 12/23/2009    Iron deficiency anemia 12/23/2009    Osteopenia 12/23/2009    Paroxysmal atrial fibrillation ([address] 12/2/2011    Psoriasis 6/27/2012    Sarcoidosis      Past Surgical History:   Procedure Laterality Date    HX CARPAL TUNNEL RELEASE  00/00/2002    Both hands    HX CYST REMOVAL  00/00/1965    Left wrist    HX HYSTERECTOMY  00/00/1985    HX ORTHOPAEDIC  00/00/1987    back surgery (disc)    HX ORTHOPAEDIC right foot X 3 neuromas and tarsal tunnel release    HX TONSILLECTOMY  00/00/1962    NEUROLOGICAL PROCEDURE UNLISTED      Pain pump    PAIN PUMP DEVICE  00/00/2007    in left lower abdomen (for foot pain)     Social History     Social History    Marital status:      Spouse name: N/A    Number of children: N/A    Years of education: N/A     Social History Main Topics    Smoking status: Former Smoker     Packs/day: 0.10     Years: 2.00     Quit date: 1/1/1975    Smokeless tobacco: Never Used    Alcohol use No    Drug use: No    Sexual activity: Not Asked     Other Topics Concern    None     Social History Narrative     Family History   Problem Relation Age of Onset    Arrhythmia Father     Heart Attack Father     Cancer Brother      Melanoma    Breast Cancer Daughter      48    Breast Cancer Daughter 46    Breast Cancer Daughter 46     Allergies   Allergen Reactions    Duragesic [Fentanyl] Other (comments)     Patient sees things when taking    Codeine Nausea Only       MEDICATIONS  Current Outpatient Prescriptions   Medication Sig    atorvastatin (LIPITOR) 40 mg tablet TAKE 1 TABLET BY MOUTH NIGHTLY.  omeprazole (PRILOSEC) 20 mg capsule Take  by mouth.  metoprolol tartrate (LOPRESSOR) 50 mg tablet TAKE 1 TAB BY MOUTH TWO (2) TIMES A DAY.  amitriptyline (ELAVIL) 100 mg tablet TAKE ONE TABLET BY MOUTH NIGHTLY    gabapentin (NEURONTIN) 600 mg tablet Take 900 mg by mouth two (2) times a day.  losartan (COZAAR) 100 mg tablet TAKE 1 TABLET BY MOUTH EVERY DAY    albuterol (PROVENTIL HFA, VENTOLIN HFA, PROAIR HFA) 90 mcg/actuation inhaler Take 2 Puffs by inhalation every four (4) hours as needed for Wheezing.  aspirin 81 mg chewable tablet Take 1 Tab by mouth daily.  RESTASIS 0.05 % ophthalmic emulsion      No current facility-administered medications for this visit.         REVIEW OF SYSTEMS  Per HPI        Visit Vitals    /73 (BP 1 Location: Left arm, BP Patient Position: Sitting)    Pulse 65    Temp 98.2 °F (36.8 °C) (Oral)    Resp 18    Ht 5' 7\" (1.702 m)    Wt 180 lb 3.2 oz (81.7 kg)    SpO2 95%    BMI 28.22 kg/m2         General: Well-developed, well-nourished. In no distress. A&O x 3. Head: Normocephalic, atraumatic. Eyes: Conjunctiva clear. Pupils equal, round, reactive to light. Extraocular movements intact. Ears/Nose: TM's and ear canals normal bilaterally. Nares normal. Septum midline. Normal nasal mucosa. No drainage or sinus tenderness. Mouth/Throat: Lips, mucosa, and tongue normal. Oropharynx benign. Neck: Supple, symmetrical, trachea midline, no lymphadenopathy, no carotid bruits, no JVD, thyroid: not enlarged, symmetric, no tenderness/mass/nodules. Lungs: Clear to auscultation bilaterally. No crackles or wheezes. No use of accessory muscles. Speaks in full sentences without SOB. Chest Wall: No tenderness or deformity. Heart: RRR, normal S1 and S2, no murmur, click, rub, or gallop. Back: Symmetric. ROM intact. No CVA tenderness. Abdomen: Soft, non-distended, bowel sounds normal. No tenderness. No masses. No hepatosplenomegaly. .   Skin: No rashes or lesions. Neurological: CN II-XII intact. Normal strength, sensation, and reflexes throughout. Neurovasc: No edema appreciated. Dorsalis pedis pulses are 2+ on the right side, and 2+ on the left side. Posterior tibial pulses are 2+ on the right side, and 2+ on the left side. Musculoskeletal: Gait normal. ROM normal at both knees and shoulders. Psychiatric: Normal mood and affect. Behavior is normal.             ASSESSMENT and PLAN  Diagnoses and all orders for this visit:    1.  Gastroesophageal reflux disease without esophagitis  -Symptoms likely GERD-releated  -Discussed option including increasing protonix to 40mg vs starting zantac in the evening  -Pt to start Zantac 150mg qhs- denies prescription, [facility]  -Provided handout on GERD lifestyle modifications  -Contact office if sx worsen or do not improve  -If sx persist, recommend increasing protonix and possible referral for EGD      2. Diabetic peripheral neuropathy associated with type 2 diabetes mellitus (HCC)  -     gabapentin (NEURONTIN) 600 mg tablet; Take 1.5 Tabs by mouth two (2) times a day. -Med refilled per request    3. Occult blood in stools  -     OCCULT BLOOD, STOOL; Future  -Pt would like to repeat her home test- doesn't feel last test was accurate  -Recommend pt f/u with GI per Dr. Ruben Jensen for anemia          Patient Instructions   Start taking zantac 150mg at nightime. Continue prilosec 20mg in the AM.    To control GERD:  Elevate the head of the bed on blocks at least 6-8 inches high. Weight loss (if indicated) can be helpful in reducing or completely eliminating symptoms. Avoid acidic, spicy, greasy, tomato-based, dairy, and high-fat foods. Avoid caffeine, carbonated beverages, chocolate, and peppermint. Avoid NSAIDs- Ibuprofen, Motrin, Advil, and Aleve. Avoid tobacco and excessive alcohol use. Please contact our office if your symptoms worsen or do not improve. Please call 911 or go directly to the Emergency Department if you develop shortness of breath, chest pain, difficulty breathing or worsening of your symptoms. Please follow-up with Dr. Mone Hoffman at [facility] for your sarcoid. Please keep your follow-up appointment with May Chow MD.     Follow-up Disposition:  Return if symptoms worsen or fail to improve, for Keep f/u with Dr. Raine Chino. Health Maintenance Due   Topic Date Due    EYE EXAM RETINAL OR DILATED Q1  11/29/2017       I have discussed the diagnosis with the patient and the intended plan as seen in the above orders. Patient is in agreement. The patient has received an after-visit summary and questions were answered concerning future plans. I have discussed medication side effects and warnings with the patient as well.  Warning signs for the above conditions were discussed including when to call our office or go to the emergency room. The nurse provided the patient and/or family with advanced directive information if needed and encouraged the patient to provide a copy to the office when available.

## 2018-07-20 NOTE — PATIENT INSTRUCTIONS
Start taking zantac 150mg at nightime. Continue prilosec 20mg in the AM.    To control GERD:  Elevate the head of the bed on blocks at least 6-8 inches high. Weight loss (if indicated) can be helpful in reducing or completely eliminating symptoms. Avoid acidic, spicy, greasy, tomato-based, dairy, and high-fat foods. Avoid caffeine, carbonated beverages, chocolate, and peppermint. Avoid NSAIDs- Ibuprofen, Motrin, Advil, and Aleve. Avoid tobacco and excessive alcohol use. Please contact our office if your symptoms worsen or do not improve. Please call 911 or go directly to the Emergency Department if you develop shortness of breath, chest pain, difficulty breathing or worsening of your symptoms. Please follow-up with Dr. Dannielle Gautam at [facility] for your sarcoid.

## 2018-07-20 NOTE — PROGRESS NOTES
Sumi Barrera  Identified pt with two pt identifiers(name and ). Chief Complaint   Patient presents with    Chest Pain     ER follow up , not steady shoots to right shoulder 10/10 takes her breath away       Reviewed record In preparation for visit and have obtained necessary documentation. Advanced directive / living will on file. 1. Have you been to the ER, urgent care clinic or hospitalized since your last visit? Er     2. Have you seen or consulted any other health care providers outside of the [address] [place] [address] since your last visit? Include any pap smears or colon screening. No    Vitals reviewed with provider. Health Maintenance reviewed: eye exam this     Health Maintenance Due   Topic    EYE EXAM RETINAL OR DILATED Q1    14   Wt Readings from Last 3 Encounters:   18 180 lb 3.2 oz (81.7 kg)   18 179 lb 0.2 oz (81.2 kg)   18 179 lb (81.2 kg)    N N N N    N N N Y   Is it safe for you to go home?  Y Y Y Y                   BP Readings from Last 3 Encounters:   18 122/73   18 141/67   18 136/84                                No Help Needed    No Help Needed    No Help Needed    No Help Needed    No Help Needed      Abu  PHQ over the last two weeks 2018   Little interest or pleasure in doing things Not at all   Feeling down, depressed, irritable, or hopeless Not at all   Total Score PHQ 2 0   Trouble falling or staying asleep, or sleeping too much Not at all   Feeling tired or having little energy Not at all   Poor appetite, weight loss, or overeating Not at all   Feeling bad about yourself - or that you are a failure or have let yourself or your family down Not at all   Trouble concentrating on things such as school, work, reading, or watching TV Not at all   Moving or speaking so slowly that other people could have noticed; or the opposite being so fidgety that others notice Not at all   Thoughts of being better off dead, or hurting yourself in some way Not at all   PHQ 9 Score 0   How difficult have these problems made it for you to do your work, take care of your home and get along with others Not difficult at all

## 2018-07-27 DIAGNOSIS — I25.10 CORONARY ARTERY DISEASE INVOLVING NATIVE CORONARY ARTERY OF NATIVE HEART, ANGINA PRESENCE UNSPECIFIED: ICD-10-CM

## 2018-07-27 RX ORDER — METOPROLOL TARTRATE 50 MG/1
TABLET ORAL
Qty: 180 TAB | Refills: 3 | Status: SHIPPED | OUTPATIENT
Start: 2018-07-27 | End: 2019-05-06 | Stop reason: SDUPTHER

## 2018-07-27 NOTE — TELEPHONE ENCOUNTER
Frank Mac   10/19/2018 2:3Future Appointments  Date Time Provider Vaibhav Quintanilla   10/19/2018 2:30 PM Vanita Adams  W. Daryl Vega   0 PM Vanita Adams  W. Daryl Vega        Pending Prescriptions Disp Refills    metoprolol tartrate (LOPRESSOR) 50 mg tablet 180 Tab 5     Sig: TAKE 1 TAB BY MOUTH TWO (2) TIMES A DAY.

## 2018-08-22 ENCOUNTER — TELEPHONE (OUTPATIENT)
Dept: INTERNAL MEDICINE CLINIC | Facility: CLINIC | Age: 73
End: 2018-08-22

## 2018-08-22 NOTE — TELEPHONE ENCOUNTER
----- Message from Yael Jordan sent at 8/22/2018  2:16 PM EDT -----  Regarding: Dr. Suze Garrido  Pt requested a call back in regards to needing a good orthopedic doctor. Best contact [phone].

## 2018-08-30 ENCOUNTER — TELEPHONE (OUTPATIENT)
Dept: INTERNAL MEDICINE CLINIC | Facility: CLINIC | Age: 73
End: 2018-08-30

## 2018-08-30 NOTE — TELEPHONE ENCOUNTER
She wants to see an orthopedic about her shoulder, given number for [facility]. Pt has no further questions at this time.

## 2018-08-30 NOTE — TELEPHONE ENCOUNTER
Dr. Boroke Marks  Received: Today       Terri FLETCHER [facility]                     Pt requesting a call from \"Gerri\". She stated she had missed a call from her and would like to know what it was in regards to.  Pt's contact [phone].

## 2018-10-05 ENCOUNTER — OFFICE VISIT (OUTPATIENT)
Dept: DERMATOLOGY | Facility: AMBULATORY SURGERY CENTER | Age: 73
End: 2018-10-05

## 2018-10-05 VITALS
HEART RATE: 74 BPM | RESPIRATION RATE: 16 BRPM | WEIGHT: 180 LBS | OXYGEN SATURATION: 95 % | SYSTOLIC BLOOD PRESSURE: 100 MMHG | BODY MASS INDEX: 28.25 KG/M2 | DIASTOLIC BLOOD PRESSURE: 60 MMHG | TEMPERATURE: 98 F | HEIGHT: 67 IN

## 2018-10-05 DIAGNOSIS — L57.0 ACTINIC KERATOSES: ICD-10-CM

## 2018-10-05 DIAGNOSIS — L82.1 SEBORRHEIC KERATOSES: ICD-10-CM

## 2018-10-05 DIAGNOSIS — Z80.8 FAMILY HISTORY OF MELANOMA: ICD-10-CM

## 2018-10-05 DIAGNOSIS — D22.9 MULTIPLE BENIGN NEVI: ICD-10-CM

## 2018-10-05 DIAGNOSIS — L85.3 XEROSIS CUTIS: ICD-10-CM

## 2018-10-05 DIAGNOSIS — R21 RASH AND OTHER NONSPECIFIC SKIN ERUPTION: Primary | ICD-10-CM

## 2018-10-05 DIAGNOSIS — Q82.5 VASCULAR BIRTHMARK: ICD-10-CM

## 2018-10-05 RX ORDER — TRIAMCINOLONE ACETONIDE 1 MG/G
CREAM TOPICAL 2 TIMES DAILY
Qty: 454 G | Refills: 0 | Status: SHIPPED | OUTPATIENT
Start: 2018-10-05 | End: 2021-06-11

## 2018-10-05 RX ORDER — FLUOROURACIL 50 MG/G
CREAM TOPICAL 2 TIMES DAILY
Qty: 40 G | Refills: 0 | Status: SHIPPED | OUTPATIENT
Start: 2018-10-05 | End: 2022-05-13 | Stop reason: ALTCHOICE

## 2018-10-05 NOTE — PROGRESS NOTES
Written by Luli Meek, as dictated by Dylan Buitrago Ala, [address]. Name: Osmel Fall       Age: 74 y.o. Date: 10/5/2018    Chief Complaint:   Chief Complaint   Patient presents with    Skin Exam     New patient-full skin exam        Subjective:    HPI  Ms. Osmel Fall is a 74 y.o. female who presents as a new patient to [facility] for a skin exam.  The patient was referred for this evaluation by herself. The patient has had a skin exam in the past and the patient does have current complaints related to her skin. She reports a red rash on her legs and feet that presented 2 months ago. She denies tenderness or itching. She states the lesions initially presented on her legs and spread to her feet, few on the abdomen, and few on thechest. Around the time the lesion presented she had started taking iron and Vitamin C supplements. She states she stopped taking these and the rash has improved slightly. The lesions on her chest and abdomen have gone away, however the rash is still present on her lower extremities. She also reports a persistent and red lesion on her nose that has been present for many years and she denies bleeding but admits it is rough. She has no personal history of skin cancer and little sun exposure throughout her life, however her brother  of melanoma. The patient's pertinent skin history includes: No personal history of skin cancer. History of melanoma on her brother. Possible history of psoriasis in the chart. ROS: Constitutional: Negative. Dermatological : positive for - skin lesion changes    Social History     Social History    Marital status:      Spouse name: N/A    Number of children: N/A    Years of education: N/A     Occupational History    Not on file.      Social History Main Topics    Smoking status: Former Smoker     Packs/day: 0.10     Years: 2.00     Quit date: 1975    Smokeless tobacco: Never Used  Alcohol use No    Drug use: No    Sexual activity: Not on file     Other Topics Concern    Not on file     Social History Narrative       Family History   Problem Relation Age of Onset    Arrhythmia Father     Heart Attack Father     Cancer Brother      Melanoma    Breast Cancer Daughter      48    Breast Cancer Daughter 46    Breast Cancer Daughter 46       Past Medical History:   Diagnosis Date    Abnormal chest x-ray 12/23/2009    Anemia 12/23/2009    Arrhythmia     SVT. Converts with Adenocard    Atrial fibrillation ([address] 12/23/2009    Carpal tunnel syndrome 12/23/2009    Chronic obstructive pulmonary disease ([address]     Colitis 12/23/2009    Complex regional pain syndrome 12/23/2009    Depression 12/23/2009    Diabetes ([address]     Family history of skin cancer     brother-melanoma    GERD (gastroesophageal reflux disease) 2/8/2010    HTN (hypertension) 12/23/2009    Hypercholesteremia 12/23/2009    Hypertriglyceridemia 12/23/2009    Idiopathic peripheral neuropathy 12/23/2009    Iron deficiency anemia 12/23/2009    Osteopenia 12/23/2009    Paroxysmal atrial fibrillation ([address] 12/2/2011    Psoriasis 6/27/2012    Sarcoidosis        Past Surgical History:   Procedure Laterality Date    HX CARPAL TUNNEL RELEASE  00/00/2002    Both hands    HX CYST REMOVAL  00/00/1965    Left wrist    HX HYSTERECTOMY  00/00/1985    HX ORTHOPAEDIC  00/00/1987    back surgery (disc)    HX ORTHOPAEDIC      right foot X 3 neuromas and tarsal tunnel release    HX TONSILLECTOMY  00/00/1962    NEUROLOGICAL PROCEDURE UNLISTED      Pain pump    PAIN PUMP DEVICE  00/00/2007    in left lower abdomen (for foot pain)       Current Outpatient Prescriptions   Medication Sig Dispense Refill    metoprolol tartrate (LOPRESSOR) 50 mg tablet TAKE 1 TAB BY MOUTH TWO (2) TIMES A DAY. 180 Tab 3    gabapentin (NEURONTIN) 600 mg tablet Take 1.5 Tabs by mouth two (2) times a day.  90 Tab 1    atorvastatin (LIPITOR) 40 mg tablet TAKE 1 TABLET BY MOUTH NIGHTLY. 90 Tab 3    omeprazole (PRILOSEC) 20 mg capsule Take  by mouth.  amitriptyline (ELAVIL) 100 mg tablet TAKE ONE TABLET BY MOUTH NIGHTLY 90 Tab 1    losartan (COZAAR) 100 mg tablet TAKE 1 TABLET BY MOUTH EVERY DAY 90 Tab 3    albuterol (PROVENTIL HFA, VENTOLIN HFA, PROAIR HFA) 90 mcg/actuation inhaler Take 2 Puffs by inhalation every four (4) hours as needed for Wheezing. 1 Inhaler 1    aspirin 81 mg chewable tablet Take 1 Tab by mouth daily. 90 Tab 3    RESTASIS 0.05 % ophthalmic emulsion   3       Allergies   Allergen Reactions    Duragesic [Fentanyl] Other (comments)     Patient sees things when taking    Codeine Nausea Only         Objective:    Visit Vitals    /60 (BP 1 Location: Left arm, BP Patient Position: Sitting)    Pulse 74    Temp 98 °F (36.7 °C) (Oral)    Resp 16    Ht 5' 7\" (1.702 m)    Wt 180 lb (81.6 kg)    SpO2 95%    BMI 28.19 kg/m2       Osmel Fall is a 74 y.o. female who appears well and in no distress. She is awake, alert, and oriented. There is no preauricular, submandibular, or cervical lymphadenopathy. A skin examination was performed including her scalp, face (including eyelid), ears, neck, chest, back, abdomen, upper extremities (including digits/nails), lower extremities, breasts, buttocks; genital skin was not examined. She has a blanchable pink and erythematous macular rash on her lower extremities and dorsal feet, non confluent. She has a vascular birthmark on her left neck. She has isolated thin-scaled actinic keratoses on her right cheek and nose. She has dry skin on her arms, chest, back, and abdomen. She has scattered waxy macules and keratotic papules consistent with seborrheic keratoses. Shee has many dry, pink and inflamed seborrheic keratoses. She has pink intradermal nevi and a few brown junctional nevi, no concerning features for severe atypia. Assessment/Plan:    1.  Family history of melanoma skin cancer. I discussed sun protection, sunscreen use, the warning signs of skin cancer, the need for self-skin examinations, and the need for regular practitioner exams every 1 year. The patient should follow up sooner as needed if new, changing, or symptomatic skin lesions arise. 2. Inflammatory appearing rash, lower extremities. The differential diagnoses were discussed. I prescribed Triamcinolone to apply on her lower extremities. I advised to call if the rash has not improved in two weeks or has become symptomatic. 3. Vascular birthmark. The diagnosis was discussed. The patient was reassured that no treatment is necessary at this time. 4. Actinic Keratoses. The diagnosis of this precancerous lesion related to sun exposure was reviewed. Treatment of these thin-scaled actinic keratoses was discussed including Efudex, Picato and cryotherapy. I prescribed Efudex to treat the lesions on her right cheek and nose. She will apply the cream on these areas BID for two weeks, followed by a two week break, and finishing with an additional two weeks of application for a full four weeks. She is aware of side effects of using cream.     5. Dry skin. The diagnosis was discussed. I recommended the use of heavy creams and moisturizers on her arms, chest, back, and abdomen. 6. Seborrheic keratoses. The diagnosis was reviewed and the patient was reassured that no treatment is needed for these benign lesions. 7. Normal nevi. The diagnosis of normal nevi was reviewed. I discussed sun protection, sunscreen use, the warning signs of skin cancer, the need for self-skin examinations, and the need for regular practitioner exams every 1 year. The patient should follow up sooner as needed if new, changing, or symptomatic skin lesions arise. This plan was reviewed with the patient and patient agrees. All questions were answered.     This scribe documentation was reviewed by me and accurately reflects the examination and decisions made by me.

## 2018-10-05 NOTE — MR AVS SNAPSHOT
[address] [facility] [address] [place] [phone] Patient: Marce Mcnally MRN:  [id number] Visit Information Date & Time Provider Department Dept. Phone Encounter #  
 10/5/2018 10:00 AM Custer Ganser, NP [place] [address] [phone] [id number] Your Appointments 10/5/2018 10:00 AM  
Office Visit with Custer Ganser, NP [place] [address] [facility]) Appt Note: Np- full skin exam-paperwork mailed [facility] [address]  
[address] [facility] [address] [facility] [address]  
  
    
 10/19/2018  2:30 PM  
ROUTINE CARE with MD Valente Ball [facility]) Appt Note: 6 months (around 10/17/2018) for DM, HTN, chol, POC A1c,  Fasitng lab soone; 6 months (around 10/17/2018) for DM, HTN, chol, POC A1c, Fasitng lab soone [place] [address] [phone]  
  
   
 [address] [facility] [address] [facility] Upcoming Health Maintenance Date Due Shingrix Vaccine Age 50> (1 of 2) 8/4/1993 EYE EXAM RETINAL OR DILATED Q1 11/29/2017 Influenza Age 5 to Adult 8/1/2018 HEMOGLOBIN A1C Q6M 10/17/2018 GLAUCOMA SCREENING Q2Y 11/29/2018 DTaP/Tdap/Td series (1 - Tdap) 9/22/2019* FOOT EXAM Q1 4/17/2019 MEDICARE YEARLY EXAM 4/18/2019 MICROALBUMIN Q1 4/23/2019 LIPID PANEL Q1 4/23/2019 *Topic was postponed. The date shown is not the original due date. Allergies as of 10/5/2018  Review Complete On: 10/5/2018 By: Irma Diaz Severity Noted Reaction Type Reactions Duragesic [Fentanyl] High 02/08/2010    Other (comments) Patient sees things when taking Codeine  12/23/2009    Nausea Only Current Immunizations  Reviewed on 7/20/2018 Name Date Influenza High Dose Vaccine PF 10/19/2017, 9/22/2016, 11/12/2015 Influenza Vaccine 10/17/2014, 10/3/2013 Influenza Vaccine Split 10/8/2012, 10/7/2011 Influenza Vaccine Whole 9/22/2010 Pneumococcal Conjugate (PCV-13) 3/22/2016 TD Vaccine 9/22/2009 ZZZ-RETIRED (DO NOT USE) Pneumococcal Vaccine (Unspecified Type) 9/22/2009 Not reviewed this visit Vitals BP Pulse Temp Resp Height(growth percentile) Weight(growth percentile) 100/60 (BP 1 Location: Left arm, BP Patient Position: Sitting) 74 98 °F (36.7 °C) (Oral) 16 5' 7\" (1.702 m) 180 lb (81.6 kg) SpO2 BMI OB Status Smoking Status 95% 28.19 kg/m2 Hysterectomy Former Smoker BMI and BSA Data Body Mass Index Body Surface Area  
 28.19 kg/m 2 1.96 m 2 Preferred Pharmacy Pharmacy Name Phone [id number] Aline Garner [address] [place] [id number] [phone] Your Updated Medication List  
  
   
This list is accurate as of 10/5/18  9:51 AM.  Always use your most recent med list.  
  
  
  
  
 albuterol 90 mcg/actuation inhaler Commonly known as:  PROVENTIL HFA, VENTOLIN HFA, PROAIR HFA Take 2 Puffs by inhalation every four (4) hours as needed for Wheezing. amitriptyline 100 mg tablet Commonly known as:  ELAVIL TAKE ONE TABLET BY MOUTH NIGHTLY  
  
 aspirin 81 mg chewable tablet Take 1 Tab by mouth daily. atorvastatin 40 mg tablet Commonly known as:  LIPITOR  
TAKE 1 TABLET BY MOUTH NIGHTLY.  
  
 gabapentin 600 mg tablet Commonly known as:  NEURONTIN Take 1.5 Tabs by mouth two (2) times a day. losartan 100 mg tablet Commonly known as:  COZAAR  
TAKE 1 TABLET BY MOUTH EVERY DAY  
  
 metoprolol tartrate 50 mg tablet Commonly known as:  LOPRESSOR  
TAKE 1 TAB BY MOUTH TWO (2) TIMES A DAY. omeprazole 20 mg capsule Commonly known as:  PRILOSEC Take  by mouth. RESTASIS 0.05 % ophthalmic emulsion Generic drug:  cycloSPORINE Patient Instructions Self Skin Exam and Sunscreens Early detection and treatment is essential in the treatment of all forms of skin cancer. If caught early, all forms of skin cancer are curable. In addition to your regular visits, you should perform a monthly skin examination. Over time, you become familiar with what is normally found on your skin and can identify new or suspicious spots. One of the screening tools you can use to assess your skin is to follow the ABCDEs: 
 
A= Asymmetry (One half is unlike the other half) B= Border (An irregular, scalloped or poorly defined edge) C= Color (Is varied from one area to another, has shades of tan, brown/ black,       white, red or blue) D= Diameter (Spots larger than 6mm or a pencil eraser) E= Evolving (New spots or one that is changing in size, shape, or color) A follow- up interval will be customized based on your history of skin cancer or level of skin damage and risk factors. In any case, if you notice a suspicious or new spot, an appointment should be arranged between regular visits. Everyone should use sunscreen and sun-safe practices, which is especially important for those with a personal or family history of skin cancer. Suggestions for this include: 1. Use daily moisturizers containing SPF 30 or higher. 2. Wear long sleeve clothing with UPF ratings and a broad-brimmed hat. 3. Apply sunscreen with SPF 30 or higher to all sun exposed areas if you are going to be in the sun. A broad spectrum UVA/ UVB sunscreen is best.  Dont forget to REAPPLY every two hours or more often if swimming or sweating! 4. Avoid outside activities during peak sun hours, especially in the summer (10am- 2pm). 5. DO NOT use tanning beds. Using sunscreen and sun-safe practices can help reduce the likelihood of developing skin cancer or additional skin cancers in those previously diagnosed. Introducing [facility]! [employer] introduces zulily patient portal. Now you can access parts of your medical record, email your doctor's office, and request medication refills online. 1. In your internet browser, go to [email or web address] 2. Click on the First Time User? Click Here link in the Sign In box. You will see the New Member Sign Up page. 3. Enter your zulily Access Code exactly as it appears below. You will not need to use this code after youve completed the sign-up process. If you do not sign up before the expiration date, you must request a new code. · zulily Access Code: [email or web address] Expires: 10/16/2018 11:11 PM 
 
4. Enter the last four digits of your Social Security Number (xxxx) and Date of Birth (mm/dd/yyyy) as indicated and click Submit. You will be taken to the next sign-up page. 5. Create a zulily ID. This will be your zulily login ID and cannot be changed, so think of one that is secure and easy to remember. 6. Create a zulily password. You can change your password at any time. 7. Enter your Password Reset Question and Answer. This can be used at a later time if you forget your password. 8. Enter your e-mail address. You will receive e-mail notification when new information is available in [address] 9. Click Sign Up. You can now view and download portions of your medical record. 10. Click the Download Summary menu link to download a portable copy of your medical information. If you have questions, please visit the Frequently Asked Questions section of the zulily website. Remember, zulily is NOT to be used for urgent needs. For medical emergencies, dial 911. Now available from your iPhone and Android! Please provide this summary of care documentation to your next provider. Your primary care clinician is listed as Iman Ferris. If you have any questions after today's visit, please call [phone].

## 2018-11-09 RX ORDER — GABAPENTIN 800 MG/1
TABLET ORAL
Qty: 90 TAB | Refills: 1 | OUTPATIENT
Start: 2018-11-09

## 2018-11-13 NOTE — PROGRESS NOTES
SANDRO Wang is a 74y.o. year old female patient of Bill Terrazas MD who presents with c/o medication refill. Pt has history of has Headache(784.0), Carpal tunnel syndrome, Hypercholesteremia, Depression, Abnormal chest x-ray, Complex regional pain syndrome, Diabetic peripheral neuropathy associated with type 2 diabetes mellitus ([address] Hypertension, essential, Controlled type 2 diabetes mellitus with diabetic polyneuropathy, without long-term current use of insulin ([address] Osteopenia, Vitamin D deficiency, GERD (gastroesophageal reflux disease), Psoriasis, Paroxysmal atrial fibrillation ([address] Hepatic steatosis, COPD, severity to be determined ([address] and Sarcoidosis of lymph nodes on their problem list..    Pt requesting refill on gabapentin for diabetic peripheral neuropathy. Doesn't take anything for diabetes. Hasn't had eye exam yet, sees Dr. Felicia Laguna. Still has burning in her feet. Denies numbness/tingling. Doesn't matter time of day. Gabapentin helps some. Has tried to increase gabapentin in past and made her too sleepy, takes 600mg BID. Has a pain pump from Dr. Pan Lara, has been working on adjusting dose. Denies any other complaints.      Patient Active Problem List   Diagnosis Code    Headache(784.0) R51    Carpal tunnel syndrome G56.00    Hypercholesteremia E78.00    Depression F32.9    Abnormal chest x-ray R93.89    Complex regional pain syndrome [id number]    Diabetic peripheral neuropathy associated with type 2 diabetes mellitus ([address] E11.42    Hypertension, essential I10    Controlled type 2 diabetes mellitus with diabetic polyneuropathy, without long-term current use of insulin (HCC) E11.42    Osteopenia M85.80    Vitamin D deficiency E55.9    GERD (gastroesophageal reflux disease) K21.9    Psoriasis L40.9    Paroxysmal atrial fibrillation (HCC) I48.0    Hepatic steatosis K76.0    COPD, severity to be determined ([address] J44.9    Sarcoidosis of lymph nodes D86.1 Past Medical History:   Diagnosis Date    Abnormal chest x-ray 2009    Anemia 2009    Arrhythmia     SVT.  Converts with [employer]    [facility]rial [facility] 2009    Carpal tunnel syndrome 2009    Chronic obstructive pulmonary disease ([facility]     Colitis 2009    Complex regional pain syndrome 2009    Depression 2009    Diabetes ([facility]     Family history of skin cancer     brother-melanoma    GERD (gastroesophageal reflux disease) 2010    HTN (hypertension) 2009    Hypercholesteremia 2009    Hypertriglyceridemia 2009    Idiopathic peripheral neuropathy 2009    Iron deficiency anemia 2009    Osteopenia 2009    Paroxysmal atrial fibrillation ([facility] 2011    Psoriasis 2012    Sarcoidosis      Past Surgical History:   Procedure Laterality Date    HX CARPAL TUNNEL RELEASE      Both hands    HX CYST REMOVAL      Left wrist    HX HYSTERECTOMY      HX ORTHOPAEDIC      back surgery (disc)    HX ORTHOPAEDIC      right foot X 3 neuromas and tarsal tunnel release    HX TONSILLECTOMY      NEUROLOGICAL PROCEDURE UNLISTED      Pain pump    PAIN PUMP DEVICE      in left lower abdomen (for foot pain)     Social History     Socioeconomic History    Marital status:      Spouse name: Not on file    Number of children: Not on file    Years of education: Not on file    Highest education level: Not on file   Social Needs    Financial resource strain: Not on file    Food insecurity - worry: Not on file    Food insecurity - inability: Not on file   [employer] needs - medical: Not on file   [employer] needs - non-medical: Not on file   Occupational History    Not on file   Tobacco Use    Smoking status: Former Smoker     Packs/day: 0.10     Years: 2.00     Pack years: 0.20     Last attempt to quit: 1975     Years since quittin.8  Smokeless tobacco: Never Used   Substance and Sexual Activity    Alcohol use: No    Drug use: No    Sexual activity: Not on file   Other Topics Concern    Not on file   Social History Narrative    Not on file     Family History   Problem Relation Age of Onset    Arrhythmia Father     Heart Attack Father     Cancer Brother         Melanoma    Breast Cancer Daughter         48    Breast Cancer Daughter 46    Breast Cancer Daughter 46     Allergies   Allergen Reactions    Duragesic [Fentanyl] Other (comments)     Patient sees things when taking    Codeine Nausea Only       MEDICATIONS  Current Outpatient Medications   Medication Sig    triamcinolone acetonide (KENALOG) 0.1 % topical cream Apply  to affected area two (2) times a day. use thin layer    fluorouracil (EFUDEX) 5 % chemo cream Apply  to affected area two (2) times a day.  metoprolol tartrate (LOPRESSOR) 50 mg tablet TAKE 1 TAB BY MOUTH TWO (2) TIMES A DAY.  gabapentin (NEURONTIN) 600 mg tablet Take 1.5 Tabs by mouth two (2) times a day.  atorvastatin (LIPITOR) 40 mg tablet TAKE 1 TABLET BY MOUTH NIGHTLY.  omeprazole (PRILOSEC) 20 mg capsule Take  by mouth.  amitriptyline (ELAVIL) 100 mg tablet TAKE ONE TABLET BY MOUTH NIGHTLY    losartan (COZAAR) 100 mg tablet TAKE 1 TABLET BY MOUTH EVERY DAY    albuterol (PROVENTIL HFA, VENTOLIN HFA, PROAIR HFA) 90 mcg/actuation inhaler Take 2 Puffs by inhalation every four (4) hours as needed for Wheezing.  aspirin 81 mg chewable tablet Take 1 Tab by mouth daily.  RESTASIS 0.05 % ophthalmic emulsion      No current facility-administered medications for this visit. REVIEW OF SYSTEMS  Per HPI        Visit Vitals  /82 (BP 1 Location: Left arm, BP Patient Position: Sitting)   Pulse 74   Temp 98.7 °F (37.1 °C) (Oral)   Resp 14   Ht 5' 7\" (1.702 m)   Wt 181 lb (82.1 kg)   SpO2 98%   BMI 28.35 kg/m²         General: Well-developed, well-nourished. In no distress.   A&O x 3.  Head: Normocephalic, atraumatic. Eyes: Conjunctiva clear. Lungs: Clear to auscultation bilaterally. No crackles or wheezes. No use of accessory muscles. Speaks in full sentences without SOB. Chest Wall: No tenderness or deformity. Heart: RRR, normal S1 and S2, no murmur, click, rub, or gallop. Skin: No rashes or lesions. Musculoskeletal: Gait normal.   Psychiatric: Normal mood and affect. Behavior is normal.       Results for orders placed or performed in visit on 11/15/18   AMB POC HEMOGLOBIN A1C   Result Value Ref Range    Hemoglobin A1c (POC) 6.5 %     Lab Results   Component Value Date/Time    Sodium 140 07/18/2018 11:19 PM    Potassium 3.9 07/18/2018 11:19 PM    Chloride 100 07/18/2018 11:19 PM    CO2 30 07/18/2018 11:19 PM    Anion gap 10 07/18/2018 11:19 PM    Glucose 111 (H) 07/18/2018 11:19 PM    BUN 9 07/18/2018 11:19 PM    Creatinine 0.73 07/18/2018 11:19 PM    BUN/Creatinine ratio 12 07/18/2018 11:19 PM    GFR est AA >60 07/18/2018 11:19 PM    GFR est non-AA >60 07/18/2018 11:19 PM    Calcium 8.9 07/18/2018 11:19 PM    Bilirubin, total 0.4 07/18/2018 11:19 PM    AST (SGOT) 29 07/18/2018 11:19 PM    Alk. phosphatase 148 (H) 07/18/2018 11:19 PM    Protein, total 7.0 07/18/2018 11:19 PM    Albumin 3.3 (L) 07/18/2018 11:19 PM    Globulin 3.7 07/18/2018 11:19 PM    A-G Ratio 0.9 (L) 07/18/2018 11:19 PM    ALT (SGPT) 33 07/18/2018 11:19 PM       ASSESSMENT and PLAN  Diagnoses and all orders for this visit:    1. Controlled type 2 diabetes mellitus with diabetic polyneuropathy, without long-term current use of insulin (HCC)  -     AMB POC HEMOGLOBIN A1C  -A1C improved 6.5% from 6.9%    2. Diabetic peripheral neuropathy associated with type 2 diabetes mellitus (HCC)  -     gabapentin (NEURONTIN) 600 mg tablet; Take 1 Tab by mouth two (2) times a day.           Patient Instructions          Diabetic Neuropathy: Care Instructions  Your Care Instructions    When you have diabetes, your blood sugar level may get too high. Over time, high blood sugar levels can damage nerves. This is called diabetic neuropathy. Nerve damage can cause pain, burning, tingling, and numbness and may leave you feeling weak. The feet are often affected. When you have nerve damage in your feet, you cannot feel your feet and toes as well as normal and may not notice cuts or sores. Even a small injury can lead to a serious infection. It is very important that you follow your doctor's advice on foot care. Sometimes diabetes damages nerves that help the body function. If this happens, your blood pressure, sweating, digestion, and urination might be affected. Your doctor may give you a target blood sugar level that is higher or lower than you are used to. Try to keep your blood sugar very close to this target level to prevent more damage. Follow-up care is a key part of your treatment and safety. Be sure to make and go to all appointments, and call your doctor if you are having problems. It's also a good idea to know your test results and keep a list of the medicines you take. How can you care for yourself at home? · Take your medicines exactly as prescribed. Call your doctor if you think you are having a problem with your medicine. It is very important that you take your insulin or diabetes pills as your doctor tells you. · Try to keep blood sugar at your target level. ? Eat a variety of healthy foods, with carbohydrate spread out in your meals. A dietitian can help you plan meals. ? Try to get at least 30 minutes of exercise on most days. ? Check your blood sugar as many times each day as your doctor recommends. · Take and record your blood pressure at home if your doctor tells you to. Learn the importance of the two measures of blood pressure (such as 130 over 80, or 130/80). To take your blood pressure at home:  ? Ask your doctor to check your blood pressure monitor to be sure it is accurate and the cuff fits you.  Also ask your doctor to watch you to make sure that you are using it right. ? Do not use medicine known to raise blood pressure (such as some nasal decongestant sprays) before taking your blood pressure. ? Avoid taking your blood pressure if you have just exercised or are nervous or upset. Rest at least 15 minutes before you take a reading. · Take pain medicines exactly as directed. ? If the doctor gave you a prescription medicine for pain, take it as prescribed. ? If you are not taking a prescription pain medicine, ask your doctor if you can take an over-the-counter medicine. · Do not smoke. Smoking can increase your chance for a heart attack or stroke. If you need help quitting, talk to your doctor about stop-smoking programs and medicines. These can increase your chances of quitting for good. · Limit alcohol to 2 drinks a day for men and 1 drink a day for women. Too much alcohol can cause health problems. · Eat small meals often, rather than 2 or 3 large meals a day. To care for your feet  · Prevent injury by wearing shoes at all times, even when you are indoors. · Do foot care as part of your daily routine. Wash your feet and then rub lotion on your feet, but not between your toes. Use a handheld mirror or magnifying mirror to inspect your feet for blisters, cuts, cracks, or sores. · Have your toenails trimmed and filed straight across. · Wear shoes and socks that fit well. Soft shoes that have good support and that fit well (such as tennis shoes) are best for your feet. · Check your shoes for any loose objects or rough edges before you put them on. · Ask your doctor to check your feet during each visit. Your doctor may notice a foot problem you have missed. · Get early treatment for any foot problem, even a minor one. When should you call for help? Call your doctor now or seek immediate medical care if:    · You have symptoms of infection, such as:  ? Increased pain, swelling, warmth, or redness. ?  Red streaks leading from the area. ? Pus draining from the area. ? A fever.     · You have new or worse numbness, pain, or tingling in any part of your body.    Watch closely for changes in your health, and be sure to contact your doctor if:    · You have a new problem with your feet, such as:  ? A new sore or ulcer. ? A break in the skin that is not healing after several days. ? Bleeding corns or calluses. ? An ingrown toenail.     · You do not get better as expected. Where can you learn more? Go to http://alejandro-samuel.info/. Enter P902 in the search box to learn more about \"Diabetic Neuropathy: Care Instructions. \"  Current as of: December 7, 2017  Content Version: 11.8  © 2073-4359 [employer]. Care instructions adapted under license by [employer] (which disclaims liability or warranty for this information). If you have questions about a medical condition or this instruction, always ask your healthcare professional. [place] [address] any warranty or liability for your use of this information. Please keep your follow-up appointment with Nate Wilkins MD.     Follow-up Disposition:  Return if symptoms worsen or fail to improve. Health Maintenance Due   Topic Date Due    Shingrix Vaccine Age 49> (1 of 2) 08/04/1993    EYE EXAM RETINAL OR DILATED Q1  11/29/2017    Influenza Age 5 to Adult  08/01/2018    HEMOGLOBIN A1C Q6M  10/17/2018    GLAUCOMA SCREENING Q2Y  11/29/2018       I have discussed the diagnosis with the patient and the intended plan as seen in the above orders. Patient is in agreement. The patient has received an after-visit summary and questions were answered concerning future plans. I have discussed medication side effects and warnings with the patient as well. Warning signs for the above conditions were discussed including when to call our office or go to the emergency room.      The nurse provided the patient and/or family with advanced directive information if needed and encouraged the patient to provide a copy to the office when available.

## 2018-11-15 ENCOUNTER — OFFICE VISIT (OUTPATIENT)
Dept: INTERNAL MEDICINE CLINIC | Facility: CLINIC | Age: 73
End: 2018-11-15

## 2018-11-15 VITALS
OXYGEN SATURATION: 98 % | RESPIRATION RATE: 14 BRPM | DIASTOLIC BLOOD PRESSURE: 82 MMHG | HEIGHT: 67 IN | TEMPERATURE: 98.7 F | SYSTOLIC BLOOD PRESSURE: 148 MMHG | HEART RATE: 74 BPM | BODY MASS INDEX: 28.41 KG/M2 | WEIGHT: 181 LBS

## 2018-11-15 DIAGNOSIS — E11.42 DIABETIC PERIPHERAL NEUROPATHY ASSOCIATED WITH TYPE 2 DIABETES MELLITUS (HCC): ICD-10-CM

## 2018-11-15 DIAGNOSIS — E11.42 CONTROLLED TYPE 2 DIABETES MELLITUS WITH DIABETIC POLYNEUROPATHY, WITHOUT LONG-TERM CURRENT USE OF INSULIN (HCC): Primary | ICD-10-CM

## 2018-11-15 LAB — HBA1C MFR BLD HPLC: 6.5 %

## 2018-11-15 RX ORDER — GABAPENTIN 600 MG/1
600 TABLET ORAL 2 TIMES DAILY
Qty: 180 TAB | Refills: 1 | Status: SHIPPED | OUTPATIENT
Start: 2018-11-15 | End: 2019-02-07 | Stop reason: SDUPTHER

## 2018-11-15 NOTE — PROGRESS NOTES
Johnny Wang  Identified pt with two pt identifiers(name and ). Chief Complaint   Patient presents with    Medication Refill     gabapentin       Reviewed record In preparation for visit and have obtained necessary documentation. Advanced directive / living will on file. 1. Have you been to the ER, urgent care clinic or hospitalized since your last visit? No     2. Have you seen or consulted any other health care providers outside of the [address] [place] [address] since your last visit? Include any pap smears or colon screening. DR Mohan Matias NP, pain med, Dr Kamila Otero reviewed with provider.     Health Maintenance reviewed:     Health Maintenance Due   Topic    Shingrix Vaccine Age 50> (1 of 2)    EYE EXAM RETINAL OR DILATED Q1     HEMOGLOBIN A1C Q6M     GLAUCOMA SCREENING Q2Y           Wt Readings from Last 3 Encounters:   11/15/18 181 lb (82.1 kg)   10/05/18 180 lb (81.6 kg)   18 180 lb 3.2 oz (81.7 kg)        Temp Readings from Last 3 Encounters:   11/15/18 98.7 °F (37.1 °C) (Oral)   10/05/18 98 °F (36.7 °C) (Oral)   18 98.2 °F (36.8 °C) (Oral)        BP Readings from Last 3 Encounters:   11/15/18 148/82   10/05/18 100/60   18 122/73        Pulse Readings from Last 3 Encounters:   11/15/18 74   10/05/18 74   18 65        Vitals:    11/15/18 1541   BP: 148/82   Pulse: 74   Resp: 14   Temp: 98.7 °F (37.1 °C)   TempSrc: Oral   SpO2: 98%   Weight: 181 lb (82.1 kg)   Height: 5' 7\" (1.702 m)   PainSc:   6   PainLoc: Foot          Learning Assessment:   :       Learning Assessment 2014   PRIMARY LEARNER Patient   HIGHEST LEVEL OF EDUCATION - PRIMARY LEARNER  GRADUATED HIGH SCHOOL OR GED   BARRIERS PRIMARY LEARNER NONE   CO-LEARNER CAREGIVER No   PRIMARY LANGUAGE ENGLISH   LEARNER PREFERENCE PRIMARY DEMONSTRATION   ANSWERED BY patient   RELATIONSHIP SELF        Depression Screening:   :       PHQ over the last two weeks 2018   Little interest or pleasure in doing things Not at all   Feeling down, depressed, irritable, or hopeless Not at all   Total Score PHQ 2 0   Trouble falling or staying asleep, or sleeping too much Not at all   Feeling tired or having little energy Not at all   Poor appetite, weight loss, or overeating Not at all   Feeling bad about yourself - or that you are a failure or have let yourself or your family down Not at all   Trouble concentrating on things such as school, work, reading, or watching TV Not at all   Moving or speaking so slowly that other people could have noticed; or the opposite being so fidgety that others notice Not at all   Thoughts of being better off dead, or hurting yourself in some way Not at all   PHQ 9 Score 0   How difficult have these problems made it for you to do your work, take care of your home and get along with others Not difficult at all        Fall Risk Assessment:   :       Fall Risk Assessment, last 12 mths 6/18/2018   Able to walk? Yes   Fall in past 12 months? Yes   Fall with injury? No   Number of falls in past 12 months 1   Fall Risk Score 1        Abuse Screening:   :       Abuse Screening Questionnaire 4/17/2018 4/21/2017 3/22/2016 8/6/2014   Do you ever feel afraid of your partner? N N N N   Are you in a relationship with someone who physically or mentally threatens you? N N N Y   Is it safe for you to go home?  Y Y Y Y        ADL Screening:   :       ADL Assessment 12/17/2014   Feeding yourself No Help Needed   Getting from bed to chair No Help Needed   Getting dressed No Help Needed   Bathing or showering No Help Needed   Walk across the room (includes cane/walker) No Help Needed   Using the telphone No Help Needed   Taking your medications No Help Needed   Preparing meals No Help Needed   Managing money (expenses/bills) No Help Needed   Moderately strenuous housework (laundry) No Help Needed   Shopping for personal items (toiletries/medicines) No Help Needed   Shopping for groceries No Help Needed Driving No Help Needed   Climbing a flight of stairs No Help Needed   Getting to places beyond walking distances No Help Needed

## 2018-11-15 NOTE — PATIENT INSTRUCTIONS
Diabetic Neuropathy: Care Instructions  Your Care Instructions    When you have diabetes, your blood sugar level may get too high. Over time, high blood sugar levels can damage nerves. This is called diabetic neuropathy. Nerve damage can cause pain, burning, tingling, and numbness and may leave you feeling weak. The feet are often affected. When you have nerve damage in your feet, you cannot feel your feet and toes as well as normal and may not notice cuts or sores. Even a small injury can lead to a serious infection. It is very important that you follow your doctor's advice on foot care. Sometimes diabetes damages nerves that help the body function. If this happens, your blood pressure, sweating, digestion, and urination might be affected. Your doctor may give you a target blood sugar level that is higher or lower than you are used to. Try to keep your blood sugar very close to this target level to prevent more damage. Follow-up care is a key part of your treatment and safety. Be sure to make and go to all appointments, and call your doctor if you are having problems. It's also a good idea to know your test results and keep a list of the medicines you take. How can you care for yourself at home? · Take your medicines exactly as prescribed. Call your doctor if you think you are having a problem with your medicine. It is very important that you take your insulin or diabetes pills as your doctor tells you. · Try to keep blood sugar at your target level. ? Eat a variety of healthy foods, with carbohydrate spread out in your meals. A dietitian can help you plan meals. ? Try to get at least 30 minutes of exercise on most days. ? Check your blood sugar as many times each day as your doctor recommends. · Take and record your blood pressure at home if your doctor tells you to. Learn the importance of the two measures of blood pressure (such as 130 over 80, or 130/80).  To take your blood pressure at home:  ? Ask your doctor to check your blood pressure monitor to be sure it is accurate and the cuff fits you. Also ask your doctor to watch you to make sure that you are using it right. ? Do not use medicine known to raise blood pressure (such as some nasal decongestant sprays) before taking your blood pressure. ? Avoid taking your blood pressure if you have just exercised or are nervous or upset. Rest at least 15 minutes before you take a reading. · Take pain medicines exactly as directed. ? If the doctor gave you a prescription medicine for pain, take it as prescribed. ? If you are not taking a prescription pain medicine, ask your doctor if you can take an over-the-counter medicine. · Do not smoke. Smoking can increase your chance for a heart attack or stroke. If you need help quitting, talk to your doctor about stop-smoking programs and medicines. These can increase your chances of quitting for good. · Limit alcohol to 2 drinks a day for men and 1 drink a day for women. Too much alcohol can cause health problems. · Eat small meals often, rather than 2 or 3 large meals a day. To care for your feet  · Prevent injury by wearing shoes at all times, even when you are indoors. · Do foot care as part of your daily routine. Wash your feet and then rub lotion on your feet, but not between your toes. Use a handheld mirror or magnifying mirror to inspect your feet for blisters, cuts, cracks, or sores. · Have your toenails trimmed and filed straight across. · Wear shoes and socks that fit well. Soft shoes that have good support and that fit well (such as tennis shoes) are best for your feet. · Check your shoes for any loose objects or rough edges before you put them on. · Ask your doctor to check your feet during each visit. Your doctor may notice a foot problem you have missed. · Get early treatment for any foot problem, even a minor one. When should you call for help?   Call your doctor now or seek immediate medical care if:    · You have symptoms of infection, such as:  ? Increased pain, swelling, warmth, or redness. ? Red streaks leading from the area. ? Pus draining from the area. ? A fever.     · You have new or worse numbness, pain, or tingling in any part of your body.    Watch closely for changes in your health, and be sure to contact your doctor if:    · You have a new problem with your feet, such as:  ? A new sore or ulcer. ? A break in the skin that is not healing after several days. ? Bleeding corns or calluses. ? An ingrown toenail.     · You do not get better as expected. Where can you learn more? Go to http://alejandro-samuel.info/. Enter X619 in the search box to learn more about \"Diabetic Neuropathy: Care Instructions. \"  Current as of: December 7, 2017  Content Version: 11.8  © 6991-9756 [employer]. Care instructions adapted under license by [employer] (which disclaims liability or warranty for this information). If you have questions about a medical condition or this instruction, always ask your healthcare professional. [place] [address] any warranty or liability for your use of this information.

## 2018-12-07 RX ORDER — AMITRIPTYLINE HYDROCHLORIDE 100 MG/1
TABLET, FILM COATED ORAL
Qty: 90 TAB | Refills: 1 | Status: SHIPPED | OUTPATIENT
Start: 2018-12-07 | End: 2019-05-06 | Stop reason: SDUPTHER

## 2019-02-07 DIAGNOSIS — E11.42 DIABETIC PERIPHERAL NEUROPATHY ASSOCIATED WITH TYPE 2 DIABETES MELLITUS (HCC): ICD-10-CM

## 2019-02-07 RX ORDER — GABAPENTIN 600 MG/1
600 TABLET ORAL 2 TIMES DAILY
Qty: 180 TAB | Refills: 1 | Status: SHIPPED | OUTPATIENT
Start: 2019-02-07 | End: 2019-05-06 | Stop reason: SDUPTHER

## 2019-02-07 NOTE — TELEPHONE ENCOUNTER
PCP: Roswell Crigler, MD     Last appt: 11/15/2018   No future appointments. Requested Prescriptions     Pending Prescriptions Disp Refills    gabapentin (NEURONTIN) 600 mg tablet 180 Tab 1     Sig: Take 1 Tab by mouth two (2) times a day.

## 2019-02-25 RX ORDER — LOSARTAN POTASSIUM 100 MG/1
TABLET ORAL
Qty: 90 TAB | Refills: 3 | Status: SHIPPED | OUTPATIENT
Start: 2019-02-25 | End: 2019-05-06 | Stop reason: SDUPTHER

## 2019-03-20 ENCOUNTER — OFFICE VISIT (OUTPATIENT)
Dept: INTERNAL MEDICINE CLINIC | Facility: CLINIC | Age: 74
End: 2019-03-20

## 2019-03-20 VITALS
RESPIRATION RATE: 16 BRPM | BODY MASS INDEX: 28.06 KG/M2 | DIASTOLIC BLOOD PRESSURE: 64 MMHG | SYSTOLIC BLOOD PRESSURE: 102 MMHG | WEIGHT: 178.8 LBS | OXYGEN SATURATION: 96 % | HEIGHT: 67 IN | TEMPERATURE: 98 F | HEART RATE: 86 BPM

## 2019-03-20 DIAGNOSIS — B37.2 CUTANEOUS CANDIDIASIS: Primary | ICD-10-CM

## 2019-03-20 RX ORDER — NYSTATIN 100000 [USP'U]/G
POWDER TOPICAL 3 TIMES DAILY
Qty: 60 G | Refills: 1 | Status: SHIPPED | OUTPATIENT
Start: 2019-03-20 | End: 2020-04-05

## 2019-03-20 RX ORDER — KETOCONAZOLE 20 MG/G
CREAM TOPICAL DAILY
Qty: 60 G | Refills: 1 | Status: SHIPPED | OUTPATIENT
Start: 2019-03-20 | End: 2021-06-11

## 2019-03-20 RX ORDER — FLUCONAZOLE 150 MG/1
150 TABLET ORAL DAILY
Qty: 1 TAB | Refills: 0 | Status: SHIPPED | OUTPATIENT
Start: 2019-03-20 | End: 2019-03-21

## 2019-03-20 NOTE — PROGRESS NOTES
CC:   Chief Complaint   Patient presents with    Rash       HISTORY OF PRESENT ILLNESS  Devika Barnes is a 74 y.o. female. Patient complains of 2 month history of a red rash under both breasts. Denies itching or burning. Using Neosporin with no improvement; unchanged with time. Patient Active Problem List   Diagnosis Code    Headache(784.0) R51    Carpal tunnel syndrome G56.00    Hypercholesteremia E78.00    Depression F32.9    Abnormal chest x-ray R93.89    Complex regional pain syndrome [id number]    Diabetic peripheral neuropathy associated with type 2 diabetes mellitus ([address] E11.42    Hypertension, essential I10    Controlled type 2 diabetes mellitus with diabetic polyneuropathy, without long-term current use of insulin (HCC) E11.42    Osteopenia M85.80    Vitamin D deficiency E55.9    GERD (gastroesophageal reflux disease) K21.9    Psoriasis L40.9    Paroxysmal atrial fibrillation (HCC) I48.0    Hepatic steatosis K76.0    COPD, severity to be determined ([address] J44.9    Sarcoidosis of lymph nodes D86.1     Past Medical History:   Diagnosis Date    Abnormal chest x-ray 12/23/2009    Anemia 12/23/2009    Arrhythmia     SVT.  Converts with Adenocard    Atrial fibrillation (HCC) 12/23/2009    Carpal tunnel syndrome 12/23/2009    Chronic obstructive pulmonary disease ([address]     Colitis 12/23/2009    Complex regional pain syndrome 12/23/2009    Depression 12/23/2009    Diabetes ([address]     Family history of skin cancer     brother-melanoma    GERD (gastroesophageal reflux disease) 2/8/2010    HTN (hypertension) 12/23/2009    Hypercholesteremia 12/23/2009    Hypertriglyceridemia 12/23/2009    Idiopathic peripheral neuropathy 12/23/2009    Iron deficiency anemia 12/23/2009    Osteopenia 12/23/2009    Paroxysmal atrial fibrillation ([address] 12/2/2011    Psoriasis 6/27/2012    Sarcoidosis      Allergies   Allergen Reactions    Duragesic [Fentanyl] Other (comments)     Patient sees things when taking    Codeine Nausea Only       Current Outpatient Medications   Medication Sig Dispense Refill    losartan (COZAAR) 100 mg tablet TAKE 1 TABLET BY MOUTH EVERY DAY 90 Tab 3    gabapentin (NEURONTIN) 600 mg tablet Take 1 Tab by mouth two (2) times a day. 180 Tab 1    amitriptyline (ELAVIL) 100 mg tablet TAKE 1 TABLET BY MOUTH NIGHTLY 90 Tab 1    triamcinolone acetonide (KENALOG) 0.1 % topical cream Apply  to affected area two (2) times a day. use thin layer 454 g 0    fluorouracil (EFUDEX) 5 % chemo cream Apply  to affected area two (2) times a day. 40 g 0    metoprolol tartrate (LOPRESSOR) 50 mg tablet TAKE 1 TAB BY MOUTH TWO (2) TIMES A DAY. 180 Tab 3    atorvastatin (LIPITOR) 40 mg tablet TAKE 1 TABLET BY MOUTH NIGHTLY. 90 Tab 3    omeprazole (PRILOSEC) 20 mg capsule Take  by mouth.  albuterol (PROVENTIL HFA, VENTOLIN HFA, PROAIR HFA) 90 mcg/actuation inhaler Take 2 Puffs by inhalation every four (4) hours as needed for Wheezing. 1 Inhaler 1    aspirin 81 mg chewable tablet Take 1 Tab by mouth daily. 90 Tab 3         PHYSICAL EXAM  Visit Vitals  /64 (BP 1 Location: Right arm, BP Patient Position: Sitting)   Pulse 86   Temp 98 °F (36.7 °C) (Oral)   Resp 16   Ht 5' 7\" (1.702 m)   Wt 178 lb 12.8 oz (81.1 kg)   SpO2 96%   BMI 28.00 kg/m²       General: Well-developed and well-nourished, no distress. HEENT:  Head normocephalic/atraumatic, no scleral icterus  Skin:  10 x 6 cm erythematous moist patch on the undersurface of each breast      Results for orders placed or performed in visit on 11/15/18   AMB POC HEMOGLOBIN A1C   Result Value Ref Range    Hemoglobin A1c (POC) 6.5 %         ASSESSMENT AND PLAN    ICD-10-CM ICD-9-CM    1. Cutaneous candidiasis B37.2 112.3 fluconazole (DIFLUCAN) 150 mg tablet      ketoconazole (NIZORAL) 2 % topical cream      nystatin (MYCOSTATIN) powder     Diagnoses and all orders for this visit:    1.  Cutaneous candidiasis  Recommended keeping affected area under breast dry. -     Start fluconazole (DIFLUCAN) 150 mg tablet; Take 1 Tab by mouth daily for 1 day. For yeast infection on skin. -     Start ketoconazole (NIZORAL) 2 % topical cream; Apply  to affected area daily.  -     Start nystatin (MYCOSTATIN) powder; Apply  to affected area three (3) times daily. Follow-up and Dispositions    · Return in about 3 months (around 6/20/2019), or if symptoms worsen or fail to improve, for DM, HTN with Dr. Lori Espinoza (PCP). I have discussed the diagnosis with the patient and the intended plan as seen in the above orders. Patient is in agreement. The patient has received an after-visit summary and questions were answered concerning future plans. I have discussed medication side effects and warnings with the patient as well.

## 2019-03-20 NOTE — PATIENT INSTRUCTIONS
Yeast Skin Infection: Care Instructions  Your Care Instructions    Yeast normally lives on your skin. Sometimes too much yeast can overgrow in certain areas of the skin and cause an infection. The infection causes red, scaly, moist patches on your skin that may itch. Common areas for skin yeast infections are skin folds under the breasts or belly area. The warm and moist areas in the skin folds can make it easier for yeast to overgrow. Yeast infections also can be found on other parts of the body such as the groin or armpits. You will probably get a cream or ointment that contains an antifungal medicine. Examples of these are miconazole and clotrimazole. You put it on your skin to treat the infection. Your doctor may give you a prescription for the cream or ointment. Or you may be able to buy it without a prescription at most drugstores. If the infection is severe, the doctor will prescribe antifungal pills. A yeast infection usually goes away after about a week of treatment. But it's important to use the medicine for as long as your doctor tells you to. Follow-up care is a key part of your treatment and safety. Be sure to make and go to all appointments, and call your doctor if you are having problems. It's also a good idea to know your test results and keep a list of the medicines you take. How can you care for yourself at home? · Be safe with medicines. Take your medicines exactly as prescribed. Call your doctor if you think you are having a problem with your medicine. · Keep your skin clean and dry. Your doctor may suggest using powder that contains an antifungal medicine in the skin folds. · Wear loose clothing. When should you call for help? Call your doctor now or seek immediate medical care if:    · You have symptoms of infection, such as:  ? Increased pain, swelling, warmth, or redness. ? Red streaks leading from the area. ? Pus draining from the area.   ? A fever.    Watch closely for changes in your health, and be sure to contact your doctor if:    · You do not get better as expected. Where can you learn more? Go to http://alejandro-samuel.info/. Enter R988 in the search box to learn more about \"Yeast Skin Infection: Care Instructions. \"  Current as of: April 17, 2018  Content Version: 11.9  © 8763-0104 [employer]. Care instructions adapted under license by [employer] (which disclaims liability or warranty for this information). If you have questions about a medical condition or this instruction, always ask your healthcare professional. [address] any warranty or liability for your use of this information. Candidiasis: Care Instructions  Your Care Instructions  Candidiasis (say \"urk-jdi-ZQ-uh-aditya\") is a yeast infection. Yeast normally lives in your body. But it can cause problems if your body's defenses don't work as they should. Some medicines can increase your chance of getting a yeast infection. These include antibiotics, steroids, and cancer drugs. And some diseases like AIDS and diabetes can make you more likely to get yeast infections. There are different types of yeast infections. Ryder Pester is a yeast infection in the mouth. It usually occurs in people with weak immune systems. It causes white patches inside the mouth and throat. Yeast infections of the skin usually occur in skin folds where the skin stays moist. They cause red, oozing patches on your skin. Babies can get these infections under the diaper. People who often wear gloves can get them on their hands. Many women get vaginal yeast infections. They are most common when women take antibiotics. These infections can cause the vagina to itch and burn. They also cause white discharge that looks like cottage cheese. In rare cases, yeast infects the blood. This can cause serious disease.  This kind of infection is treated with medicine given through a needle into a vein (IV). After you start treatment, a yeast infection usually goes away quickly. But if your immune system is weak, the infection may come back. Tell your doctor if you get yeast infections often. Follow-up care is a key part of your treatment and safety. Be sure to make and go to all appointments, and call your doctor if you are having problems. It's also a good idea to know your test results and keep a list of the medicines you take. How can you care for yourself at home? · Take your medicines exactly as prescribed. Call your doctor if you think you are having a problem with your medicine. · Use antibiotics only as directed by your doctor. · Eat yogurt with live cultures. It has bacteria called lactobacillus. It may help prevent some types of yeast infections. · Keep your skin clean and dry. Put powder on moist places. · If you are using a cream or suppository to treat a vaginal yeast infection, don't use condoms or a diaphragm. Use a different type of birth control. · Eat a healthy diet and get regular exercise. This will help keep your immune system strong. When should you call for help? Watch closely for changes in your health, and be sure to contact your doctor if:    · You do not get better as expected. Where can you learn more? Go to http://alejandro-samuel.info/. Enter C046 in the search box to learn more about \"Candidiasis: Care Instructions. \"  Current as of: May 14, 2018  Content Version: 11.9  © 3526-4365 [employer]. Care instructions adapted under license by [employer] (which disclaims liability or warranty for this information). If you have questions about a medical condition or this instruction, always ask your healthcare professional. [address] any warranty or liability for your use of this information.

## 2019-05-06 DIAGNOSIS — I10 HYPERTENSION, ESSENTIAL: ICD-10-CM

## 2019-05-06 DIAGNOSIS — E11.42 CONTROLLED TYPE 2 DIABETES MELLITUS WITH DIABETIC POLYNEUROPATHY, WITHOUT LONG-TERM CURRENT USE OF INSULIN (HCC): Primary | ICD-10-CM

## 2019-05-06 DIAGNOSIS — I25.10 CORONARY ARTERY DISEASE INVOLVING NATIVE CORONARY ARTERY OF NATIVE HEART, ANGINA PRESENCE UNSPECIFIED: ICD-10-CM

## 2019-05-06 DIAGNOSIS — E78.00 HYPERCHOLESTEREMIA: ICD-10-CM

## 2019-05-06 DIAGNOSIS — E11.42 DIABETIC PERIPHERAL NEUROPATHY ASSOCIATED WITH TYPE 2 DIABETES MELLITUS (HCC): ICD-10-CM

## 2019-05-06 RX ORDER — AMITRIPTYLINE HYDROCHLORIDE 100 MG/1
TABLET, FILM COATED ORAL
Qty: 90 TAB | Refills: 1 | Status: SHIPPED | OUTPATIENT
Start: 2019-05-06 | End: 2019-06-17 | Stop reason: SDUPTHER

## 2019-05-06 RX ORDER — LOSARTAN POTASSIUM 100 MG/1
TABLET ORAL
Qty: 90 TAB | Refills: 3 | Status: SHIPPED | OUTPATIENT
Start: 2019-05-06 | End: 2020-03-15

## 2019-05-06 RX ORDER — METOPROLOL TARTRATE 50 MG/1
TABLET ORAL
Qty: 180 TAB | Refills: 3 | Status: SHIPPED | OUTPATIENT
Start: 2019-05-06

## 2019-05-06 RX ORDER — GABAPENTIN 600 MG/1
600 TABLET ORAL 2 TIMES DAILY
Qty: 180 TAB | Refills: 3 | Status: SHIPPED | OUTPATIENT
Start: 2019-05-06 | End: 2019-07-18 | Stop reason: SDUPTHER

## 2019-05-06 RX ORDER — ATORVASTATIN CALCIUM 40 MG/1
TABLET, FILM COATED ORAL
Qty: 90 TAB | Refills: 3 | Status: SHIPPED | OUTPATIENT
Start: 2019-05-06 | End: 2020-02-16

## 2019-05-06 NOTE — TELEPHONE ENCOUNTER
PCP: Rafael Swift MD     Last appt: 3/20/2019   Future Appointments   Date Time Provider Vaibhav Quintanilla   6/17/2019  9:15 AM Rafael Swift MD [facility]        Requested Prescriptions     Pending Prescriptions Disp Refills    atorvastatin (LIPITOR) 40 mg tablet 90 Tab 3     Sig: TAKE 1 TABLET BY MOUTH NIGHTLY.  metoprolol tartrate (LOPRESSOR) 50 mg tablet 180 Tab 3     Sig: TAKE 1 TAB BY MOUTH TWO (2) TIMES A DAY.  amitriptyline (ELAVIL) 100 mg tablet 90 Tab 1     Sig: TAKE 1 TABLET BY MOUTH NIGHTLY    gabapentin (NEURONTIN) 600 mg tablet 180 Tab 3     Sig: Take 1 Tab by mouth two (2) times a day.     losartan (COZAAR) 100 mg tablet 90 Tab 3     Sig: TAKE 1 TABLET BY MOUTH EVERY DAY

## 2019-06-13 RX ORDER — AMITRIPTYLINE HYDROCHLORIDE 100 MG/1
TABLET, FILM COATED ORAL
Qty: 90 TAB | Refills: 1 | Status: SHIPPED | OUTPATIENT
Start: 2019-06-13 | End: 2019-07-01 | Stop reason: SDUPTHER

## 2019-06-13 RX ORDER — AMITRIPTYLINE HYDROCHLORIDE 100 MG/1
100 TABLET, FILM COATED ORAL
Qty: 10 TAB | Refills: 0 | Status: SHIPPED | OUTPATIENT
Start: 2019-06-13 | End: 2019-06-17

## 2019-06-13 NOTE — TELEPHONE ENCOUNTER
Pt states she has not received her Rx from [address] [place] [address], she will call to find status. She would like a 10 day supply sent to [facility] to hold her over.

## 2019-06-13 NOTE — TELEPHONE ENCOUNTER
Refill for amitriptyline sent to [facility] a month ago. Now have [facility] request to refill and send to [facility]. Does she need this at this time and if so does it go to [facility]? ?

## 2019-06-17 ENCOUNTER — OFFICE VISIT (OUTPATIENT)
Dept: INTERNAL MEDICINE CLINIC | Facility: CLINIC | Age: 74
End: 2019-06-17

## 2019-06-17 VITALS
WEIGHT: 177 LBS | HEIGHT: 67 IN | BODY MASS INDEX: 27.78 KG/M2 | OXYGEN SATURATION: 95 % | HEART RATE: 71 BPM | TEMPERATURE: 98.1 F | RESPIRATION RATE: 12 BRPM | DIASTOLIC BLOOD PRESSURE: 71 MMHG | SYSTOLIC BLOOD PRESSURE: 119 MMHG

## 2019-06-17 DIAGNOSIS — I48.0 PAROXYSMAL ATRIAL FIBRILLATION (HCC): ICD-10-CM

## 2019-06-17 DIAGNOSIS — K21.9 GASTROESOPHAGEAL REFLUX DISEASE WITHOUT ESOPHAGITIS: ICD-10-CM

## 2019-06-17 DIAGNOSIS — D86.1 SARCOIDOSIS OF LYMPH NODES: ICD-10-CM

## 2019-06-17 DIAGNOSIS — E11.42 DIABETIC PERIPHERAL NEUROPATHY ASSOCIATED WITH TYPE 2 DIABETES MELLITUS (HCC): ICD-10-CM

## 2019-06-17 DIAGNOSIS — Z13.31 SCREENING FOR DEPRESSION: ICD-10-CM

## 2019-06-17 DIAGNOSIS — Z00.00 MEDICARE ANNUAL WELLNESS VISIT, SUBSEQUENT: Primary | ICD-10-CM

## 2019-06-17 DIAGNOSIS — J44.9 COPD, SEVERITY TO BE DETERMINED (HCC): ICD-10-CM

## 2019-06-17 DIAGNOSIS — I10 HYPERTENSION, ESSENTIAL: ICD-10-CM

## 2019-06-17 DIAGNOSIS — E78.00 HYPERCHOLESTEREMIA: ICD-10-CM

## 2019-06-17 DIAGNOSIS — Z71.89 ADVANCE DIRECTIVE DISCUSSED WITH PATIENT: ICD-10-CM

## 2019-06-17 DIAGNOSIS — E11.42 CONTROLLED TYPE 2 DIABETES MELLITUS WITH DIABETIC POLYNEUROPATHY, WITHOUT LONG-TERM CURRENT USE OF INSULIN (HCC): ICD-10-CM

## 2019-06-17 LAB — HBA1C MFR BLD HPLC: 6.2 %

## 2019-06-17 RX ORDER — HYDROCORTISONE 25 MG/G
CREAM TOPICAL
COMMUNITY
Start: 2019-04-16 | End: 2022-03-03

## 2019-06-17 NOTE — PROGRESS NOTES
HISTORY OF PRESENT ILLNESS  Eliu Lopez is a 74 y.o. female. HPI  She presents for follow up of diabetes mellitus with neuropathy, hypertension, hyperlipidemia, Atrial Fibrillation, hepatic steatosis and overweight. .  Diet and Lifestyle: generally follows a low fat low cholesterol diet, generally follows a low sodium diet, does not rigorously follow a diabetic diet, exercises sporadically, nonsmoker  Diabetic ROS - medication compliance: compliant all of the time,      home glucose monitoring:not done     home BP Monitoring: not done. further diabetic ROS: no polyuria or polydipsia, no unusual visual symptoms, no hypoglycemia, no medication side effects noted, has dysesthesias in the feet. Cardiovascular ROS:  She complains of dizziness. (lasts 15 seconds; associated with positiion change)  She denies palpitations, orthopnea, exertional chest pressure/discomfort, claudication, lower extremity edema, dyspnea on exertion      She presents for follow up of gastroesophageal reflux. She denies dysphagia, has not lost weight, denies heartburn. She is being seen for follow up of COPD and sarcoidosis. She is taking medications as instructed, no medication side effects noted, no significant ongoing wheezing or shortness of breath, using bronchodilator MDI less than twice a week.    Uses rescue inhaler:0-1  times /week      Patient Active Problem List   Diagnosis Code    Headache(784.0) R51    Carpal tunnel syndrome G56.00    Hypercholesteremia E78.00    Depression F32.9    Abnormal chest x-ray R93.89    Complex regional pain syndrome [id number]    Diabetic peripheral neuropathy associated with type 2 diabetes mellitus ([facility] E11.42    Hypertension, essential I10    Controlled type 2 diabetes mellitus with diabetic polyneuropathy, without long-term current use of insulin ([facility]) E11.42    Osteopenia M85.80    Vitamin D deficiency E55.9    GERD (gastroesophageal reflux disease) K21.9    Psoriasis L40.9  Paroxysmal atrial fibrillation (HCC) I48.0    Hepatic steatosis K76.0    COPD, severity to be determined ([address] J44.9    Sarcoidosis of lymph nodes D86.1     Past Medical History:   Diagnosis Date    Abnormal chest x-ray 2009    Anemia 2009    Arrhythmia     SVT.  Converts with Adenocard    Atrial fibrillation ([address] 2009    Carpal tunnel syndrome 2009    Chronic obstructive pulmonary disease ([address]     Colitis 2009    Complex regional pain syndrome 2009    Depression 2009    Diabetes ([address]     Family history of skin cancer     brother-melanoma    GERD (gastroesophageal reflux disease) 2010    HTN (hypertension) 2009    Hypercholesteremia 2009    Hypertriglyceridemia 2009    Idiopathic peripheral neuropathy 2009    Iron deficiency anemia 2009    Osteopenia 2009    Paroxysmal atrial fibrillation ([address] 2011    Psoriasis 2012    Sarcoidosis      Past Surgical History:   Procedure Laterality Date    HX CARPAL TUNNEL RELEASE      Both hands    HX CYST REMOVAL      Left wrist    HX HYSTERECTOMY      HX ORTHOPAEDIC      back surgery (disc)    HX ORTHOPAEDIC      right foot X 3 neuromas and tarsal tunnel release    HX TONSILLECTOMY      NEUROLOGICAL PROCEDURE UNLISTED      Pain pump    PAIN PUMP DEVICE      in left lower abdomen (for foot pain)     Social History     Socioeconomic History    Marital status:      Spouse name: Not on file    Number of children: Not on file    Years of education: Not on file    Highest education level: Not on file   Tobacco Use    Smoking status: Former Smoker     Packs/day: 0.10     Years: 2.00     Pack years: 0.20     Last attempt to quit: 1975     Years since quittin.4    Smokeless tobacco: Never Used   Substance and Sexual Activity    Alcohol use: No    Drug use: No     Family History Problem Relation Age of Onset    Arrhythmia Father     Heart Attack Father     Cancer Brother         Melanoma    Breast Cancer Daughter         48    Breast Cancer Daughter 46    Breast Cancer Daughter 46     Allergies   Allergen Reactions    Duragesic [Fentanyl] Other (comments)     Patient sees things when taking    Codeine Nausea Only     Current Outpatient Medications   Medication Sig Dispense Refill    hydrocortisone (HYTONE) 2.5 % topical cream       B complx-C-folic-zinc-copper-E 374 mg-400 mcg- 24 mg-3 mg tab Take 1 Tab by mouth.  amitriptyline (ELAVIL) 100 mg tablet TAKE 1 TABLET BY MOUTH NIGHTLY 90 Tab 1    atorvastatin (LIPITOR) 40 mg tablet TAKE 1 TABLET BY MOUTH NIGHTLY. 90 Tab 3    metoprolol tartrate (LOPRESSOR) 50 mg tablet TAKE 1 TAB BY MOUTH TWO (2) TIMES A DAY. 180 Tab 3    gabapentin (NEURONTIN) 600 mg tablet Take 1 Tab by mouth two (2) times a day. 180 Tab 3    losartan (COZAAR) 100 mg tablet TAKE 1 TABLET BY MOUTH EVERY DAY 90 Tab 3    ketoconazole (NIZORAL) 2 % topical cream Apply  to affected area daily. 60 g 1    nystatin (MYCOSTATIN) powder Apply  to affected area three (3) times daily. 60 g 1    triamcinolone acetonide (KENALOG) 0.1 % topical cream Apply  to affected area two (2) times a day. use thin layer 454 g 0    fluorouracil (EFUDEX) 5 % chemo cream Apply  to affected area two (2) times a day. 40 g 0    omeprazole (PRILOSEC) 20 mg capsule Take  by mouth.  albuterol (PROVENTIL HFA, VENTOLIN HFA, PROAIR HFA) 90 mcg/actuation inhaler Take 2 Puffs by inhalation every four (4) hours as needed for Wheezing. 1 Inhaler 1    aspirin 81 mg chewable tablet Take 1 Tab by mouth daily. 90 Tab 3       Review of Systems   Constitutional: Negative for malaise/fatigue and weight loss. Gastrointestinal: Positive for constipation. Negative for diarrhea. Musculoskeletal: Positive for back pain. Negative for joint pain.    Neurological: Positive for focal weakness (right leg first thing in AM. Chronic problem ). Negative for tingling. Visit Vitals  /71 (BP 1 Location: Left arm, BP Patient Position: Sitting)   Pulse 71   Temp 98.1 °F (36.7 °C) (Oral)   Resp 12   Ht 5' 7\" (1.702 m)   Wt 177 lb (80.3 kg)   SpO2 95%   BMI 27.72 kg/m²     Physical Exam   Constitutional: She is oriented to person, place, and time. She appears well-developed and well-nourished. HENT:   Head: Normocephalic and atraumatic. Eyes: Pupils are equal, round, and reactive to light. Conjunctivae are normal.   Neck: Neck supple. Carotid bruit is not present. No thyromegaly present. Cardiovascular: Normal rate, regular rhythm and normal heart sounds. PMI is not displaced. Exam reveals no gallop. No murmur heard. Pulses:       Dorsalis pedis pulses are 1+ on the right side, and 1+ on the left side. Posterior tibial pulses are 1+ on the right side, and 1+ on the left side. Pulmonary/Chest: Effort normal. She has no wheezes. She has no rhonchi. She has no rales. Abdominal: Soft. Normal appearance. She exhibits no abdominal bruit and no mass. There is no hepatosplenomegaly. There is no tenderness. Musculoskeletal: She exhibits no edema. Lymphadenopathy:     She has no cervical adenopathy. Right: No supraclavicular adenopathy present. Left: No supraclavicular adenopathy present. Neurological: She is alert and oriented to person, place, and time. No sensory deficit. Skin: Skin is warm, dry and intact. No rash noted. Psychiatric: She has a normal mood and affect. Her behavior is normal.   Nursing note and vitals reviewed.     Diabetic foot exam:     Left Foot:   Visual Exam: normal    Pulse DP: 1+ (weak)   Filament test: 2/6   Vibratory sensation: diminished      Right Foot:   Visual Exam: normal    Pulse DP: 1+ (weak)   Filament test: 2/6   Vibratory sensation: diminished    Results for orders placed or performed in visit on 06/17/19   AMB POC HEMOGLOBIN A1C Result Value Ref Range    Hemoglobin A1c (POC) 6.2 %     Lab Results   Component Value Date/Time    Hemoglobin A1c 6.6 (H) 02/01/2016 11:04 AM    Hemoglobin A1c (POC) 6.5 11/15/2018 03:50 PM     Lab Results   Component Value Date/Time    Microalb/Creat ratio (ug/mg creat.) 6.0 04/23/2018 10:01 AM     Lab Results   Component Value Date/Time    Cholesterol, total 101 04/23/2018 10:01 AM    HDL Cholesterol 29 (L) 04/23/2018 10:01 AM    LDL, calculated 50 04/23/2018 10:01 AM    VLDL, calculated 22 04/23/2018 10:01 AM    Triglyceride 108 04/23/2018 10:01 AM    CHOL/HDL Ratio 3.3 02/08/2010 01:53 PM     Lab Results   Component Value Date/Time    Sodium 140 07/18/2018 11:19 PM    Potassium 3.9 07/18/2018 11:19 PM    Chloride 100 07/18/2018 11:19 PM    CO2 30 07/18/2018 11:19 PM    Anion gap 10 07/18/2018 11:19 PM    Glucose 111 (H) 07/18/2018 11:19 PM    BUN 9 07/18/2018 11:19 PM    Creatinine 0.73 07/18/2018 11:19 PM    BUN/Creatinine ratio 12 07/18/2018 11:19 PM    GFR est AA >60 07/18/2018 11:19 PM    GFR est non-AA >60 07/18/2018 11:19 PM    Calcium 8.9 07/18/2018 11:19 PM    Bilirubin, total 0.4 07/18/2018 11:19 PM    AST (SGOT) 29 07/18/2018 11:19 PM    Alk. phosphatase 148 (H) 07/18/2018 11:19 PM    Protein, total 7.0 07/18/2018 11:19 PM    Albumin 3.3 (L) 07/18/2018 11:19 PM    Globulin 3.7 07/18/2018 11:19 PM    A-G Ratio 0.9 (L) 07/18/2018 11:19 PM    ALT (SGPT) 33 07/18/2018 11:19 PM     Lab Results   Component Value Date/Time    TSH 1.60 07/18/2018 11:19 PM    T4, Free 1.0 03/02/2012 03:36 AM       ASSESSMENT and PLAN    ICD-10-CM ICD-9-CM    1. Medicare annual wellness visit, subsequent Z00.00 V70.0    2. Advance directive discussed with patient Z71.89 V65.49    3. Controlled type 2 diabetes mellitus with diabetic polyneuropathy, without long-term current use of insulin (HCC) E11.42 250.60  DIABETES FOOT EXAM     357.2 AMB POC HEMOGLOBIN A1C      MICROALBUMIN, UR, RAND W/ MICROALB/CREAT RATIO   4. Hypertension, essential I10 401.9    5. Hypercholesteremia E78.00 272.0 LIPID PANEL      METABOLIC PANEL, COMPREHENSIVE   6. Diabetic peripheral neuropathy associated with type 2 diabetes mellitus (HCC) E11.42 250.60      357.2    7. Paroxysmal atrial fibrillation (HCC) I48.0 427.31    8. COPD, severity to be determined ([facility] J44.9 496    9. Sarcoidosis of lymph nodes D86.1 135    10. Gastroesophageal reflux disease without esophagitis K21.9 530.81    11. Screening for depression Z13.31 V79.0 AL DEPRESSION SCREEN ANNUAL     Diagnoses and all orders for this visit:    1. Medicare annual wellness visit, subsequent    2. Advance directive discussed with patient    3. Controlled type 2 diabetes mellitus with diabetic polyneuropathy, without long-term current use of insulin ([facility]  Diabetes Mellitus: well controlled. Is taking statin and ACE/ARB  Issues reviewed with her: low cholesterol diet, weight control and daily exercise discussed and glycohemoglobin and other lab monitoring discussed. -      DIABETES FOOT EXAM  -     AMB POC HEMOGLOBIN A1C  -     MICROALBUMIN, UR, RAND W/ MICROALB/CREAT RATIO; Future    4. Hypertension, essential  Hypertension is controlled. 5. Hypercholesteremia  Hyperlipidemia is controlled  -     LIPID PANEL; Future  -     METABOLIC PANEL, COMPREHENSIVE; Future    6. Diabetic peripheral neuropathy associated with type 2 diabetes mellitus (HCC)  Stable. Decreased sensation. No lesions on feet. 7. Paroxysmal atrial fibrillation ([facility]  Has been in NSR since initial episode in 2012    8. COPD, severity to be determined ([facility]  Stable; continue prn albuterol. 9. Sarcoidosis of lymph nodes  Stable. 10. Gastroesophageal reflux disease without esophagitis  Symptoms controlled. 11. Screening for depression-negative  -     AL DEPRESSION SCREEN ANNUAL      Follow-up and Dispositions    · Return in about 6 months (around 12/17/2019), or DM, HTN, chol, POCA1c    Fasting lab soon . lab results and schedule of future lab studies reviewed with patient  reviewed diet, exercise and weight control  I have discussed the diagnosis, evaluation and treatment options and the intended plan with the patient. Patient understands and is in agreement. The patient has received an after-visit summary and questions were answered concerning future plans. I have discussed side effects and warnings of any new medications with the patient as well.

## 2019-06-17 NOTE — PATIENT INSTRUCTIONS
Office visit in 6 months. Fasting lab work a week before visit. The best way to stay healthy is to live a healthy lifestyle. A healthy lifestyle includes regular exercise, eating a well-balanced diet, keeping a healthy weight and not smoking. Regular physical exams and screening tests are another important way to take care of yourself. Preventive exams provided by health care providers can find health problems early when treatment works best and can keep you from getting certain diseases or illnesses. Preventive services include exams, lab tests, screenings, shots, monitoring and information to help you take care of your own health. All people over 65 should have a pneumonia shot. Pneumonia shots are usually only needed once in a lifetime unless your doctor decides differently. In addition to your physical exam, some screening tests are recommended: 
 
All people over 65 should have a yearly flu shot. People over 65 are at medium to high risk for Hepatitis B. Three shots are needed for complete protection. Bone mass measurement (dexa scan) is recommended every two years. Diabetes Mellitus screening is recommended every year. Glaucoma is an eye disease caused by high pressure in the eye. An eye exam is recommended every year. Cardiovascular screening tests that check your cholesterol and other blood fat (lipid) levels are recommended every five years. Colorectal Cancer screening tests help to find pre-cancerous polyps (growths in the colon) so they can be removed before they turn into cancer. Tests ordered for screening depend on your personal and family history risk factors. Prostate Cancer Screening (annually up to age 76) Screening for breast cancer is recommended yearly with a Mammogram. 
 
Screening for cervical and vaginal cancer is recommended with a pelvic and Pap test every two years.  However if you have had an abnormal pap in the past  three years or at high risk for cervical or vaginal cancer Medicare will cover a pap test and a pelvic exam every year. Here is a list of your current Health Maintenance items with a due date: 
Health Maintenance Due Topic Date Due  Shingles Vaccine (1 of 2) 08/04/1993 [facility]  06/23/2017  Glaucoma Screening   11/29/2018 [facility] Annual Well Visit  04/18/2019  Diabetic Foot Care  04/22/2019  Albumin Urine Test  04/23/2019  Cholesterol Test   04/23/2019 Preventing Falls: Care Instructions Your Care Instructions Getting around your home safely can be a challenge if you have injuries or health problems that make it easy for you to fall. Loose rugs and furniture in walkways are among the dangers for many older people who have problems walking or who have poor eyesight. People who have conditions such as arthritis, osteoporosis, or dementia also have to be careful not to fall. You can make your home safer with a few simple measures. Follow-up care is a key part of your treatment and safety. Be sure to make and go to all appointments, and call your doctor if you are having problems. It's also a good idea to know your test results and keep a list of the medicines you take. How can you care for yourself at home? Taking care of yourself · You may get dizzy if you do not drink enough water. To prevent dehydration, drink plenty of fluids, enough so that your urine is light yellow or clear like water. Choose water and other caffeine-free clear liquids. If you have kidney, heart, or liver disease and have to limit fluids, talk with your doctor before you increase the amount of fluids you drink. · Exercise regularly to improve your strength, muscle tone, and balance. Walk if you can. Swimming may be a good choice if you cannot walk easily. · Have your vision and hearing checked each year or any time you notice a change.  If you have trouble seeing and hearing, you might not be able to avoid objects and could lose your balance. · Know the side effects of the medicines you take. Ask your doctor or pharmacist whether the medicines you take can affect your balance. Sleeping pills or sedatives can affect your balance. · Limit the amount of alcohol you drink. Alcohol can impair your balance and other senses. · Ask your doctor whether calluses or corns on your feet need to be removed. If you wear loose-fitting shoes because of calluses or corns, you can lose your balance and fall. · Talk to your doctor if you have numbness in your feet. Preventing falls at home · Remove raised doorway thresholds, throw rugs, and clutter. Repair loose carpet or raised areas in the floor. · Move furniture and electrical cords to keep them out of walking paths. · Use nonskid floor wax, and wipe up spills right away, especially on ceramic tile floors. · If you use a walker or cane, put rubber tips on it. If you use crutches, clean the bottoms of them regularly with an abrasive pad, such as steel wool. · Keep your house well lit, especially [place], and outside walkways. Use night-lights in areas such as hallways and bathrooms. Add extra light switches or use remote switches (such as switches that go on or off when you clap your hands) to make it easier to turn lights on if you have to get up during the night. · Install sturdy handrails on stairways. · Move items in your cabinets so that the things you use a lot are on the lower shelves (about waist level). · Keep a cordless phone and a flashlight with new batteries by your bed. If possible, put a phone in each of the main rooms of your house, or carry a cell phone in case you fall and cannot reach a phone. Or, you can wear a device around your neck or wrist. You push a button that sends a signal for help. · Wear low-heeled shoes that fit well and give your feet good support.  Use footwear with nonskid soles. Check the heels and soles of your shoes for wear. Repair or replace worn heels or soles. · Do not wear socks without shoes on wood floors. · Walk on the grass when the sidewalks are slippery. If you live in an area that gets snow and ice in the winter, sprinkle salt on slippery steps and sidewalks. Preventing falls in the bath · Install grab bars and nonskid mats inside and outside your shower or tub and near the toilet and sinks. · Use shower chairs and bath benches. · Use a hand-held shower head that will allow you to sit while showering. · Get into a tub or shower by putting the weaker leg in first. Get out of a tub or shower with your strong side first. 
· Repair loose toilet seats and consider installing a raised toilet seat to make getting on and off the toilet easier. · Keep your bathroom door unlocked while you are in the shower. Where can you learn more? Go to http://alejandro-samuel.info/. Enter [phone] [id number] [phone] in the search box to learn more about \"Preventing Falls: Care Instructions. \" Current as of: March 15, 2018 Content Version: 11.9 © 9358-8224 [employer]. Care instructions adapted under license by [employer] (which disclaims liability or warranty for this information). If you have questions about a medical condition or this instruction, always ask your healthcare professional. [place] [address] any warranty or liability for your use of this information.

## 2019-06-17 NOTE — PROGRESS NOTES
Carolann Aguero  Identified pt with two pt identifiers(name and ). Chief Complaint   Patient presents with    Diabetes    Hypertension       Reviewed record In preparation for visit and have obtained necessary documentation. Advanced directive / living will on file. 1. Have you been to the ER, urgent care clinic or hospitalized since your last visit? No     2. Have you seen or consulted any other health care providers outside of the [address] [place] [address] since your last visit? Include any pap smears or colon screening. 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 Health Maintenance reviewed:     Health Maintenance Due   Topic    Shingrix Vaccine Age 50> (1 of 2)    Pneumococcal 65+ years (2 of 2 - PPSV23)    EYE EXAM RETINAL OR DILATED     GLAUCOMA SCREENING Q2Y     MEDICARE YEARLY EXAM     FOOT EXAM Q1     MICROALBUMIN Q1     LIPID PANEL Q1     HEMOGLOBIN A1C Q6M      Abuse Screening Questionnaire 2018 2017 3/22/2016 2014   Do you ever feel afraid of your partner? N N N N   Are you in a relationship with someone who physically or mentally threatens you? N N N Y   Is it safe for you to go home? [place]           Wt Readings from Last 3 Encounters:   19 177 lb (80.3 kg)   19 178 lb 12.8 oz (81.1 kg)   11/15/18 181 lb (82.1 kg)    No Help Needed    No Help Needed    No Help Needed        Temp Readings from Last 3 Encounters:   19 98.1 °F (36.7 °C) (Oral)   19 98 °F (36.7 °C) (Oral)   11/15/18 98.7 °F (37.1 °C) (Oral)               No Help Needed    No Help Needed           Driving No Help Neede  Learning Assessment 2014   PRIMARY LEARNER Patient   HIGHEST LEVEL OF EDUCATION - PRIMARY LEARNER  GRADUATED HIGH SCHOOL OR GED   BARRIERS PRIMARY LEARNER NONE   CO-LEARNER CAREGIVER No   PRIMARY LANGUAGE ENGLISH   LEARNER PREFERENCE PRIMARY DEMONSTRATION   ANSWERED BY patient   RELATIONSHIP SELF   r mentally threatens you?  N N   N  3 most recent PHQ Screens 6/17/2019   Little interest or pleasure in doing things Several days   Feeling down, depressed, irritable, or hopeless Not at all   Total Score PHQ 2 1   Trouble falling or staying asleep, or sleeping too much -   Feeling tired or having little energy -   Poor appetite, weight loss, or overeating -   Feeling bad about yourself - or that you are a failure or have let yourself or your family down -   Trouble concentrating on things such as school, work, reading, or watching TV -   Moving or speaking so slowly that other people could have noticed; or the opposite being so fidgety that others notice -   Thoughts of being better off dead, or hurting yourself in some way -   PHQ 9 Score -   How difficult have these problems made it for you to do your work, take care of your home and get along with others -

## 2019-06-17 NOTE — PROGRESS NOTES
This is the Subsequent Medicare Annual Wellness Exam, performed 12 months or more after the Initial AWV or the last Subsequent AWV    I have reviewed the patient's medical history in detail and updated the computerized patient record. History     Past Medical History:   Diagnosis Date    Abnormal chest x-ray 12/23/2009    Anemia 12/23/2009    Arrhythmia     SVT. Converts with [employer]    Atrial fibrillation ([address] 12/23/2009    Carpal tunnel syndrome 12/23/2009    Chronic obstructive pulmonary disease ([address]     Colitis 12/23/2009    Complex regional pain syndrome 12/23/2009    Depression 12/23/2009    Diabetes ([address]     Family history of skin cancer     brother-melanoma    GERD (gastroesophageal reflux disease) 2/8/2010    HTN (hypertension) 12/23/2009    Hypercholesteremia 12/23/2009    Hypertriglyceridemia 12/23/2009    Idiopathic peripheral neuropathy 12/23/2009    Iron deficiency anemia 12/23/2009    Osteopenia 12/23/2009    Paroxysmal atrial fibrillation ([address] 12/2/2011    Psoriasis 6/27/2012    Sarcoidosis       Past Surgical History:   Procedure Laterality Date    HX CARPAL TUNNEL RELEASE  00/00/2002    Both hands    HX CYST REMOVAL  00/00/1965    Left wrist    HX HYSTERECTOMY  00/00/1985    HX ORTHOPAEDIC  00/00/1987    back surgery (disc)    HX ORTHOPAEDIC      right foot X 3 neuromas and tarsal tunnel release    HX TONSILLECTOMY  00/00/1962    NEUROLOGICAL PROCEDURE UNLISTED      Pain pump    PAIN PUMP DEVICE  00/00/2007    in left lower abdomen (for foot pain)     Current Outpatient Medications   Medication Sig Dispense Refill    hydrocortisone (HYTONE) 2.5 % topical cream       B complx-C-folic-zinc-copper-E 642 mg-400 mcg- 24 mg-3 mg tab Take 1 Tab by mouth.  amitriptyline (ELAVIL) 100 mg tablet TAKE 1 TABLET BY MOUTH NIGHTLY 90 Tab 1    atorvastatin (LIPITOR) 40 mg tablet TAKE 1 TABLET BY MOUTH NIGHTLY.  90 Tab 3    metoprolol tartrate (LOPRESSOR) 50 mg tablet TAKE 1 TAB BY MOUTH TWO (2) TIMES A DAY. 180 Tab 3    gabapentin (NEURONTIN) 600 mg tablet Take 1 Tab by mouth two (2) times a day. 180 Tab 3    losartan (COZAAR) 100 mg tablet TAKE 1 TABLET BY MOUTH EVERY DAY 90 Tab 3    ketoconazole (NIZORAL) 2 % topical cream Apply  to affected area daily. 60 g 1    nystatin (MYCOSTATIN) powder Apply  to affected area three (3) times daily. 60 g 1    triamcinolone acetonide (KENALOG) 0.1 % topical cream Apply  to affected area two (2) times a day. use thin layer 454 g 0    fluorouracil (EFUDEX) 5 % chemo cream Apply  to affected area two (2) times a day. 40 g 0    omeprazole (PRILOSEC) 20 mg capsule Take  by mouth.  albuterol (PROVENTIL HFA, VENTOLIN HFA, PROAIR HFA) 90 mcg/actuation inhaler Take 2 Puffs by inhalation every four (4) hours as needed for Wheezing. 1 Inhaler 1    aspirin 81 mg chewable tablet Take 1 Tab by mouth daily.  90 Tab 3     Allergies   Allergen Reactions    Duragesic [Fentanyl] Other (comments)     Patient sees things when taking    Codeine Nausea Only     Family History   Problem Relation Age of Onset    Arrhythmia Father     Heart Attack Father     Cancer Brother         Melanoma    Breast Cancer Daughter         48    Breast Cancer Daughter 46    Breast Cancer Daughter 46     Social History     Tobacco Use    Smoking status: Former Smoker     Packs/day: 0.10     Years: 2.00     Pack years: 0.20     Last attempt to quit: 1975     Years since quittin.4    Smokeless tobacco: Never Used   Substance Use Topics    Alcohol use: No     Patient Active Problem List   Diagnosis Code    Headache(784.0) R51    Carpal tunnel syndrome G56.00    Hypercholesteremia E78.00    Depression F32.9    Abnormal chest x-ray R93.89    Complex regional pain syndrome [id number]    Diabetic peripheral neuropathy associated with type 2 diabetes mellitus ([facility] E11.42    Hypertension, essential I10    Controlled type 2 diabetes mellitus with diabetic polyneuropathy, without long-term current use of insulin (HCC) E11.42    Osteopenia M85.80    Vitamin D deficiency E55.9    GERD (gastroesophageal reflux disease) K21.9    Psoriasis L40.9    Paroxysmal atrial fibrillation (HCC) I48.0    Hepatic steatosis K76.0    COPD, severity to be determined ([facility] J44.9    Sarcoidosis of lymph nodes D86.1       Depression Risk Factor Screening:     3 most recent PHQ Screens 6/17/2019   Little interest or pleasure in doing things Several days   Feeling down, depressed, irritable, or hopeless Not at all   Total Score PHQ 2 1   Trouble falling or staying asleep, or sleeping too much -   Feeling tired or having little energy -   Poor appetite, weight loss, or overeating -   Feeling bad about yourself - or that you are a failure or have let yourself or your family down -   Trouble concentrating on things such as school, work, reading, or watching TV -   Moving or speaking so slowly that other people could have noticed; or the opposite being so fidgety that others notice -   Thoughts of being better off dead, or hurting yourself in some way -   PHQ 9 Score -   How difficult have these problems made it for you to do your work, take care of your home and get along with others -     Alcohol Risk Factor Screening: You do not drink alcohol or very rarely. Functional Ability and Level of Safety:   Hearing Loss  Hearing is good. Activities of Daily Living  The home contains: handrails  Patient does total self care    Fall Risk  Fall Risk Assessment, last 12 mths 6/17/2019   Able to walk? Yes   Fall in past 12 months? Yes   Fall with injury?  Yes   Number of falls in past 12 months 1   Fall Risk Score 2       Abuse Screen  Patient is not abused    Cognitive Screening   Evaluation of Cognitive Function:  Has your family/caregiver stated any concerns about your memory: no  Normal    Patient Care Team   Patient Care Team:  Noris Voss MD as PCP - General (Internal Medicine)  Edna Coronado MD (Physical Medicine and Rehab)  Lauro Trotter MD (Ophthalmology)  Jaquan Lovell MD (Neurology)    Assessment/Plan   Education and counseling provided:  Are appropriate based on today's review and evaluation    Glaucoma screening discussed.      Health Maintenance Due   Topic Date Due    Shingrix Vaccine Age 49> (1 of 2) 08/04/1993    Pneumococcal 65+ years (2 of 2 - PPSV23) 03/22/2017    EYE EXAM RETINAL OR DILATED  06/23/2017    GLAUCOMA SCREENING Q2Y  11/29/2018    MEDICARE YEARLY EXAM  04/18/2019    FOOT EXAM Q1  04/22/2019    MICROALBUMIN Q1  04/23/2019    LIPID PANEL Q1  04/23/2019    HEMOGLOBIN A1C Q6M  05/15/2019

## 2019-06-20 DIAGNOSIS — E11.42 CONTROLLED TYPE 2 DIABETES MELLITUS WITH DIABETIC POLYNEUROPATHY, WITHOUT LONG-TERM CURRENT USE OF INSULIN (HCC): ICD-10-CM

## 2019-06-20 DIAGNOSIS — E78.00 HYPERCHOLESTEREMIA: ICD-10-CM

## 2019-06-20 RX ORDER — AMITRIPTYLINE HYDROCHLORIDE 100 MG/1
100 TABLET, FILM COATED ORAL
Qty: 10 TAB | Refills: 0 | Status: SHIPPED | OUTPATIENT
Start: 2019-06-20 | End: 2019-06-30

## 2019-06-20 RX ORDER — AMITRIPTYLINE HYDROCHLORIDE 100 MG/1
TABLET, FILM COATED ORAL
Qty: 90 TAB | Refills: 1 | Status: CANCELLED | OUTPATIENT
Start: 2019-06-20

## 2019-06-20 RX ORDER — AMITRIPTYLINE HYDROCHLORIDE 100 MG/1
100 TABLET, FILM COATED ORAL
Qty: 10 TAB | Refills: 0 | Status: SHIPPED | OUTPATIENT
Start: 2019-06-20 | End: 2019-06-20 | Stop reason: SDUPTHER

## 2019-06-20 NOTE — TELEPHONE ENCOUNTER
PCP: Katherine Mayes MD     Last appt: 6/20/2019   Future Appointments   Date Time Provider Vaibhav Quintanilla   12/16/2019  8:45 AM Katherine Mayes MD [facility]        Requested Prescriptions     Pending Prescriptions Disp Refills    amitriptyline (ELAVIL) 100 mg tablet 10 Tab 0     Sig: Take 1 Tab by mouth nightly for 10 days.  notified that we did send RX as generic.

## 2019-06-20 NOTE — TELEPHONE ENCOUNTER
Pt called stating the name bread is to expensive and needs the generic brand of the bottom medication called in. Requested Prescriptions     Pending Prescriptions Disp Refills    amitriptyline (ELAVIL) 100 mg tablet 90 Tab 1        Pt also needs about 5 pills of the amitriptyline (ELAVIL) 100 mg tablet  called into [facility] to hold her off until her mail order comes in.  Thanks     Pt is aware  is out of the office

## 2019-06-21 LAB
ALBUMIN SERPL-MCNC: 4.1 G/DL (ref 3.5–4.8)
ALBUMIN/CREAT UR: 6.1 MG/G CREAT (ref 0–30)
ALBUMIN/GLOB SERPL: 1.4 {RATIO} (ref 1.2–2.2)
ALP SERPL-CCNC: 139 IU/L (ref 39–117)
ALT SERPL-CCNC: 19 IU/L (ref 0–32)
AST SERPL-CCNC: 23 IU/L (ref 0–40)
BILIRUB SERPL-MCNC: 0.3 MG/DL (ref 0–1.2)
BUN SERPL-MCNC: 10 MG/DL (ref 8–27)
BUN/CREAT SERPL: 13 (ref 12–28)
CALCIUM SERPL-MCNC: 9.4 MG/DL (ref 8.7–10.3)
CHLORIDE SERPL-SCNC: 100 MMOL/L (ref 96–106)
CHOLEST SERPL-MCNC: 108 MG/DL (ref 100–199)
CO2 SERPL-SCNC: 28 MMOL/L (ref 20–29)
CREAT SERPL-MCNC: 0.78 MG/DL (ref 0.57–1)
CREAT UR-MCNC: 171 MG/DL
GLOBULIN SER CALC-MCNC: 2.9 G/DL (ref 1.5–4.5)
GLUCOSE SERPL-MCNC: 101 MG/DL (ref 65–99)
HDLC SERPL-MCNC: 33 MG/DL
LDLC SERPL CALC-MCNC: 47 MG/DL (ref 0–99)
MICROALBUMIN UR-MCNC: 10.5 UG/ML
POTASSIUM SERPL-SCNC: 4.8 MMOL/L (ref 3.5–5.2)
PROT SERPL-MCNC: 7 G/DL (ref 6–8.5)
SODIUM SERPL-SCNC: 142 MMOL/L (ref 134–144)
TRIGL SERPL-MCNC: 140 MG/DL (ref 0–149)
VLDLC SERPL CALC-MCNC: 28 MG/DL (ref 5–40)

## 2019-06-27 NOTE — PROGRESS NOTES
Pls let patient know her labs look good. Diabetes urine test is negative. Cholesterol, kidney and liver function are WNL.

## 2019-07-01 ENCOUNTER — TELEPHONE (OUTPATIENT)
Dept: INTERNAL MEDICINE CLINIC | Facility: CLINIC | Age: 74
End: 2019-07-01

## 2019-07-01 RX ORDER — AMITRIPTYLINE HYDROCHLORIDE 100 MG/1
TABLET, FILM COATED ORAL
Qty: 90 TAB | Refills: 1 | Status: SHIPPED | OUTPATIENT
Start: 2019-07-01 | End: 2020-01-02

## 2019-07-01 NOTE — TELEPHONE ENCOUNTER
Pt called because she is concerned with getting her medication and had some questions for the nurse. Pt stated that her amitriptline is 105 through [facility] (which is where she get all her medications from) pt stated that Stephen Lindsey was calling in 10 pills at a time to [address] [facility] but now shayla is telling her that they can't fill it until sometime is August. Pt just wants her medication and was wondering if it could be written to a smaller amount but take the recommended does (she currently takes 1-100mg tab so change to 2-50mg tab) so that maybe her medication will be covered.

## 2019-07-01 NOTE — TELEPHONE ENCOUNTER
Contacted pharmacy. Pharmacy said patient has a prescription for elavil pending refilling in their system. Advised patient to contact pharmacy regarding this prescription. Patient stated understanding.

## 2019-07-18 DIAGNOSIS — E11.42 DIABETIC PERIPHERAL NEUROPATHY ASSOCIATED WITH TYPE 2 DIABETES MELLITUS (HCC): ICD-10-CM

## 2019-07-18 RX ORDER — GABAPENTIN 600 MG/1
600 TABLET ORAL 2 TIMES DAILY
Qty: 180 TAB | Refills: 1 | OUTPATIENT
Start: 2019-07-18 | End: 2020-03-21

## 2019-07-18 NOTE — TELEPHONE ENCOUNTER
printed. PCP: Emerson Hein MD     Last appt: 6/20/2019   Future Appointments   Date Time Provider Vaibhav Quintanilla   12/16/2019  8:45 AM Emerson Hein  [facility]        Requested Prescriptions     Pending Prescriptions Disp Refills    gabapentin (NEURONTIN) 600 mg tablet 180 Tab 3     Sig: Take 1 Tab by mouth two (2) times a day.

## 2019-07-18 NOTE — TELEPHONE ENCOUNTER
Refill       Requested Prescriptions     Pending Prescriptions Disp Refills    gabapentin (NEURONTIN) 600 mg tablet 180 Tab 3     Sig: Take 1 Tab by mouth two (2) times a day.

## 2020-03-15 DIAGNOSIS — I10 HYPERTENSION, ESSENTIAL: ICD-10-CM

## 2020-03-15 RX ORDER — LOSARTAN POTASSIUM 100 MG/1
TABLET ORAL
Qty: 90 TAB | Refills: 3 | Status: SHIPPED | OUTPATIENT
Start: 2020-03-15 | End: 2021-04-23

## 2020-03-19 RX ORDER — AMITRIPTYLINE HYDROCHLORIDE 100 MG/1
100 TABLET, FILM COATED ORAL
Qty: 90 TAB | Refills: 1 | Status: CANCELLED | OUTPATIENT
Start: 2020-03-19

## 2020-03-19 RX ORDER — AMITRIPTYLINE HYDROCHLORIDE 100 MG/1
TABLET, FILM COATED ORAL
Qty: 90 TAB | Refills: 1 | Status: CANCELLED | OUTPATIENT
Start: 2020-03-19

## 2020-03-19 RX ORDER — AMITRIPTYLINE HYDROCHLORIDE 100 MG/1
100 TABLET, FILM COATED ORAL
Qty: 90 TAB | Refills: 1 | Status: SHIPPED | OUTPATIENT
Start: 2020-03-19 | End: 2020-05-04 | Stop reason: SDUPTHER

## 2020-03-19 NOTE — TELEPHONE ENCOUNTER
PCP: Edison Jacobsen MD     Last appt: 6/20/2019   Future Appointments   Date Time Provider Vaibhav Quintanilla   5/4/2020 10:30 AM Edison Jacobsen  [facility]        Requested Prescriptions     Pending Prescriptions Disp Refills    amitriptyline (ELAVIL) 100 mg tablet 90 Tab 1     Sig: Take 1 Tab by mouth nightly.

## 2020-03-19 NOTE — TELEPHONE ENCOUNTER
Pt called to request the recent order for   Requested Prescriptions     Pending Prescriptions Disp Refills    amitriptyline (ELAVIL) 100 mg tablet 90 Tab 1     Sig: Take 1 Tab by mouth nightly. Be recalled & sent to the [facility] instead.

## 2020-03-20 DIAGNOSIS — E11.42 DIABETIC PERIPHERAL NEUROPATHY ASSOCIATED WITH TYPE 2 DIABETES MELLITUS (HCC): ICD-10-CM

## 2020-03-21 RX ORDER — GABAPENTIN 600 MG/1
TABLET ORAL
Qty: 180 TAB | Refills: 1 | Status: SHIPPED | OUTPATIENT
Start: 2020-03-21 | End: 2020-10-21

## 2020-04-04 DIAGNOSIS — B37.2 CUTANEOUS CANDIDIASIS: ICD-10-CM

## 2020-04-05 RX ORDER — NYSTATIN 100000 [USP'U]/G
POWDER TOPICAL 3 TIMES DAILY
Qty: 60 G | Refills: 1 | Status: SHIPPED | OUTPATIENT
Start: 2020-04-05 | End: 2021-06-11

## 2020-05-03 NOTE — PROGRESS NOTES
Akash Flores is a 66 y.o. female who was seen by synchronous (real-time) audio-video technology on 5/4/2020. Consent: Akash Flores, who was seen by synchronous (real-time) audio-video technology, and/or her healthcare decision maker, is aware that this patient-initiated, Telehealth encounter on 5/4/2020 is a billable service, with coverage as determined by her insurance carrier. She is aware that she may receive a bill and has provided verbal consent to proceed: Yes. Assessment & Plan:   Diagnoses and all orders for this visit:    1. Controlled type 2 diabetes mellitus with diabetic polyneuropathy, without long-term current use of insulin ([address]  Has been well controlled. Will need lab at next visit.   -     HEMOGLOBIN A1C WITH EAG; Future  -     MICROALBUMIN, UR, RAND W/ MICROALB/CREAT RATIO; Future  -     amitriptyline (ELAVIL) 100 mg tablet; Take 1 Tab by mouth nightly. 2. Hypertension, essential  Hypertension is controlled. 3. Hypercholesteremia  Hyperlipidemia is controlled  -     LIPID PANEL; Future  -     METABOLIC PANEL, COMPREHENSIVE; Future    4. Gastroesophageal reflux disease without esophagitis  Symptoms are adequately controlled. 5. Screening for depression-negative  -     AL DEPRESSION SCREEN ANNUAL    6. Encounter for screening mammogram for malignant neoplasm of breast  -     [facility] 3D NANCY W MAMMO BI SCREENING INCL CAD; Future        Subjective:   Akash Flores is a 66 y.o. female who was seen for No chief complaint on file. She presents for follow up of diabetes mellitus with neuropathy, hypertension, hyperlipidemia and Atrial Fibrillation. Diet and Lifestyle: generally follows a low fat low cholesterol diet, generally follows a low sodium diet, exercises sporadically, nonsmoker  Diabetic ROS - medication compliance: compliant all of the time,      home glucose monitoring: not done     home BP Monitoring: not done.       further diabetic ROS: no polyuria or polydipsia, no unusual visual symptoms, no hypoglycemia, no medication side effects noted, has dysesthesias in the feetCardiovascular ROS:  She denies palpitations, exertional chest pressure/discomfort, claudication, lower extremity edema, dyspnea on exertion, dizziness      She presents for follow up of gastroesophageal reflux. She denies dysphagia or heartburn. Patient Active Problem List   Diagnosis Code    Headache(784.0) R51    Carpal tunnel syndrome G56.00    Hypercholesteremia E78.00    Depression F32.9    Abnormal chest x-ray R93.89    Complex regional pain syndrome [id number]    Diabetic peripheral neuropathy associated with type 2 diabetes mellitus ([address] E11.42    Hypertension, essential I10    Controlled type 2 diabetes mellitus with diabetic polyneuropathy, without long-term current use of insulin (HCC) E11.42    Osteopenia M85.80    Vitamin D deficiency E55.9    GERD (gastroesophageal reflux disease) K21.9    Psoriasis L40.9    Paroxysmal atrial fibrillation (HCC) I48.0    Hepatic steatosis K76.0    COPD, severity to be determined ([address] J44.9    Sarcoidosis of lymph nodes D86.1     Past Medical History:   Diagnosis Date    Abnormal chest x-ray 12/23/2009    Anemia 12/23/2009    Arrhythmia     SVT.  Converts with Adenocard    Atrial fibrillation (HCC) 12/23/2009    Carpal tunnel syndrome 12/23/2009    Chronic obstructive pulmonary disease ([address]     Colitis 12/23/2009    Complex regional pain syndrome 12/23/2009    Depression 12/23/2009    Diabetes ([address]     Family history of skin cancer     brother-melanoma    GERD (gastroesophageal reflux disease) 2/8/2010    HTN (hypertension) 12/23/2009    Hypercholesteremia 12/23/2009    Hypertriglyceridemia 12/23/2009    Idiopathic peripheral neuropathy 12/23/2009    Iron deficiency anemia 12/23/2009    Osteopenia 12/23/2009    Paroxysmal atrial fibrillation ([address] 12/2/2011    Psoriasis 6/27/2012    Sarcoidosis      Past Surgical History: Procedure Laterality Date    HX CARPAL TUNNEL RELEASE      Both hands    HX CYST REMOVAL      Left wrist    HX HYSTERECTOMY      HX ORTHOPAEDIC      back surgery (disc)    HX ORTHOPAEDIC      right foot X 3 neuromas and tarsal tunnel release    HX TONSILLECTOMY      NEUROLOGICAL PROCEDURE UNLISTED      Pain pump    PAIN PUMP DEVICE      in left lower abdomen (for foot pain)     Social History     Socioeconomic History    Marital status:      Spouse name: Not on file    Number of children: Not on file    Years of education: Not on file    Highest education level: Not on file   Tobacco Use    Smoking status: Former Smoker     Packs/day: 0.10     Years: 2.00     Pack years: 0.20     Last attempt to quit: 1975     Years since quittin.3    Smokeless tobacco: Never Used   Substance and Sexual Activity    Alcohol use: No    Drug use: No     Family History   Problem Relation Age of Onset    Arrhythmia Father     Heart Attack Father     Cancer Brother         Melanoma    Breast Cancer Daughter         48    Breast Cancer Daughter 46    Breast Cancer Daughter 46     Allergies   Allergen Reactions    Duragesic [Fentanyl] Other (comments)     Patient sees things when taking    Codeine Nausea Only     Current Outpatient Medications   Medication Sig Dispense Refill    amitriptyline (ELAVIL) 100 mg tablet Take 1 Tab by mouth nightly. 90 Tab 1    nystatin (MYCOSTATIN) powder APPLY TO AFFECTED AREA THREE (3) TIMES DAILY. 60 g 1    gabapentin (NEURONTIN) 600 mg tablet TAKE 1 TABLET BY MOUTH TWO  TIMES DAILY 180 Tab 1    losartan (COZAAR) 100 mg tablet TAKE 1 TABLET BY MOUTH  EVERY DAY 90 Tab 3    atorvastatin (LIPITOR) 40 mg tablet TAKE 1 TABLET BY MOUTH  NIGHTLY 90 Tab 0    hydrocortisone (HYTONE) 2.5 % topical cream       B complx-C-folic-zinc-copper-E 434 mg-400 mcg- 24 mg-3 mg tab Take 1 Tab by mouth.       metoprolol tartrate (LOPRESSOR) 50 mg tablet TAKE 1 TAB BY MOUTH TWO (2) TIMES A DAY. 180 Tab 3    ketoconazole (NIZORAL) 2 % topical cream Apply  to affected area daily. 60 g 1    triamcinolone acetonide (KENALOG) 0.1 % topical cream Apply  to affected area two (2) times a day. use thin layer 454 g 0    fluorouracil (EFUDEX) 5 % chemo cream Apply  to affected area two (2) times a day. 40 g 0    omeprazole (PRILOSEC) 20 mg capsule Take  by mouth.  albuterol (PROVENTIL HFA, VENTOLIN HFA, PROAIR HFA) 90 mcg/actuation inhaler Take 2 Puffs by inhalation every four (4) hours as needed for Wheezing. 1 Inhaler 1    aspirin 81 mg chewable tablet Take 1 Tab by mouth daily. 90 Tab 3       Review of Systems   Constitutional: Negative for malaise/fatigue and weight loss. Gastrointestinal: Negative for constipation, diarrhea and heartburn. Musculoskeletal: Negative for back pain and joint pain. Neurological: Negative for dizziness, tingling and focal weakness. Objective:   Vital Signs: (As obtained by patient/caregiver at home)  There were no vitals taken for this visit.      [INSTRUCTIONS:  \"[x]\" Indicates a positive item  \"[]\" Indicates a negative item  -- DELETE ALL ITEMS NOT EXAMINED]    Constitutional: [x] Appears well-developed and well-nourished [x] No apparent distress      [] Abnormal -     Mental status: [x] Alert and awake  [x] Oriented to person/place/time [x] Able to follow commands    [] Abnormal -     Eyes:   EOM    [x]  Normal    [] Abnormal -   Sclera  [x]  Normal    [] Abnormal -          Discharge [x]  None visible   [] Abnormal -     HENT: [x] Normocephalic, atraumatic  [] Abnormal -   [x] Mouth/Throat: Mucous membranes are moist    External Ears [x] Normal  [] Abnormal -    Neck: [x] No visualized mass [] Abnormal -     Pulmonary/Chest: [x] Respiratory effort normal   [x] No visualized signs of difficulty breathing or respiratory distress        [] Abnormal -      Musculoskeletal:   [x] Normal gait with no signs of ataxia         [x] Normal range of motion of neck        [] Abnormal -     Neurological:        [x] No Facial Asymmetry (Cranial nerve 7 motor function) (limited exam due to video visit)          [x] No gaze palsy        [] Abnormal -          Skin:        [x] No significant exanthematous lesions or discoloration noted on facial skin         [] Abnormal -            Psychiatric:       [x] Normal Affect [] Abnormal -        [x] No Hallucinations    Other pertinent observable physical exam findings:-    Lab Results   Component Value Date/Time    Hemoglobin A1c 6.6 (H) 02/01/2016 11:04 AM    Hemoglobin A1c (POC) 6.2 06/17/2019 09:35 AM     Results for orders placed or performed in visit on 18/25/31   METABOLIC PANEL, COMPREHENSIVE   Result Value Ref Range    Glucose 101 (H) 65 - 99 mg/dL    BUN 10 8 - 27 mg/dL    Creatinine 0.78 0.57 - 1.00 mg/dL    GFR est non-AA 75 >59 mL/min/1.73    GFR est AA 86 >59 mL/min/1.73    BUN/Creatinine ratio 13 12 - 28    Sodium 142 134 - 144 mmol/L    Potassium 4.8 3.5 - 5.2 mmol/L    Chloride 100 96 - 106 mmol/L    CO2 28 20 - 29 mmol/L    Calcium 9.4 8.7 - 10.3 mg/dL    Protein, total 7.0 6.0 - 8.5 g/dL    Albumin 4.1 3.5 - 4.8 g/dL    GLOBULIN, TOTAL 2.9 1.5 - 4.5 g/dL    A-G Ratio 1.4 1.2 - 2.2    Bilirubin, total 0.3 0.0 - 1.2 mg/dL    Alk.  phosphatase 139 (H) 39 - 117 IU/L    AST (SGOT) 23 0 - 40 IU/L    ALT (SGPT) 19 0 - 32 IU/L   MICROALBUMIN, UR, RAND W/ MICROALB/CREAT RATIO   Result Value Ref Range    Creatinine, urine 171.0 Not Estab. mg/dL    Microalbumin, urine 10.5 Not Estab. ug/mL    Microalb/Creat ratio (ug/mg creat.) 6.1 0.0 - 30.0 mg/g creat   LIPID PANEL   Result Value Ref Range    Cholesterol, total 108 100 - 199 mg/dL    Triglyceride 140 0 - 149 mg/dL    HDL Cholesterol 33 (L) >39 mg/dL    VLDL, calculated 28 5 - 40 mg/dL    LDL, calculated 47 0 - 99 mg/dL       We discussed the expected course, resolution and complications of the diagnosis(es) in detail. Medication risks, benefits, costs, interactions, and alternatives were discussed as indicated. I advised her to contact the office if her condition worsens, changes or fails to improve as anticipated. She expressed understanding with the diagnosis(es) and plan. Stacey Contreras is a 66 y.o. female who was evaluated by a video visit encounter for concerns as above. Patient identification was verified prior to start of the visit. A caregiver was present when appropriate. Due to this being a TeleHealth encounter (During WKUYR-22 public health emergency), evaluation of the following organ systems was limited: Vitals/Constitutional/EENT/Resp/CV/GI//MS/Neuro/Skin/Heme-Lymph-Imm. Pursuant to the emergency declaration under the [facility], 1135 waiver authority and the [employer] and [employer] Act, this Virtual  Visit was conducted, with patient's (and/or legal guardian's) consent, to reduce the patient's risk of exposure to COVID-19 and provide necessary medical care. Services were provided through a video synchronous discussion virtually to substitute for in-person clinic visit. Patient and provider were located at their individual homes.       Jennie Oakley MD

## 2020-05-04 ENCOUNTER — VIRTUAL VISIT (OUTPATIENT)
Dept: INTERNAL MEDICINE CLINIC | Facility: CLINIC | Age: 75
End: 2020-05-04

## 2020-05-04 VITALS — HEIGHT: 67 IN | WEIGHT: 177 LBS | BODY MASS INDEX: 27.78 KG/M2

## 2020-05-04 DIAGNOSIS — K21.9 GASTROESOPHAGEAL REFLUX DISEASE WITHOUT ESOPHAGITIS: ICD-10-CM

## 2020-05-04 DIAGNOSIS — E11.42 DIABETIC PERIPHERAL NEUROPATHY ASSOCIATED WITH TYPE 2 DIABETES MELLITUS (HCC): ICD-10-CM

## 2020-05-04 DIAGNOSIS — E78.00 HYPERCHOLESTEREMIA: ICD-10-CM

## 2020-05-04 DIAGNOSIS — Z13.31 SCREENING FOR DEPRESSION: ICD-10-CM

## 2020-05-04 DIAGNOSIS — E11.42 CONTROLLED TYPE 2 DIABETES MELLITUS WITH DIABETIC POLYNEUROPATHY, WITHOUT LONG-TERM CURRENT USE OF INSULIN (HCC): Primary | ICD-10-CM

## 2020-05-04 DIAGNOSIS — I10 HYPERTENSION, ESSENTIAL: ICD-10-CM

## 2020-05-04 DIAGNOSIS — Z12.31 ENCOUNTER FOR SCREENING MAMMOGRAM FOR MALIGNANT NEOPLASM OF BREAST: ICD-10-CM

## 2020-05-04 RX ORDER — AMITRIPTYLINE HYDROCHLORIDE 100 MG/1
100 TABLET, FILM COATED ORAL
Qty: 90 TAB | Refills: 1 | Status: SHIPPED | OUTPATIENT
Start: 2020-05-04 | End: 2021-06-21 | Stop reason: SDUPTHER

## 2020-05-04 NOTE — PROGRESS NOTES
Jacqulynn Ganser    Chief Complaint   Patient presents with    Diabetes    Hypertension           Reviewed record In preparation for visit and have obtained necessary documentation    1. Have you been to the ER, urgent care clinic since your last visit? Hospitalized since your last visit? NO  2. Have you seen or consulted any other health care providers outside of the [address] [place] [address] since your last visit? Include any pap smears or colon screening.  NO    Patient has advance directive on file    Provider advised of reason for visit and vitals    Health Maintenance Due   Topic    DTaP/Tdap/Td series (1 - Tdap)    Shingrix Vaccine Age 49> (1 of 2)    Eye Exam Retinal or Dilated     GLAUCOMA SCREENING Q2Y     A1C test (Diabetic or Prediabetic)        Wt Readings from Last 3 Encounters:   05/04/20 177 lb (80.3 kg)   06/17/19 177 lb (80.3 kg)   03/20/19 178 lb 12.8 oz (81.1 kg)     Temp Readings from Last 3 Encounters:   06/17/19 98.1 °F (36.7 °C) (Oral)   03/20/19 98 °F (36.7 °C) (Oral)   11/15/18 98.7 °F (37.1 °C) (Oral)     BP Readings from Last 3 Encounters:   06/17/19 119/71   03/20/19 102/64   11/15/18 148/82     Pulse Readings from Last 3 Encounters:   06/17/19 71   03/20/19 86   11/15/18 74         Learning Assessment:  :     Learning Assessment 8/6/2014   PRIMARY LEARNER Patient   HIGHEST LEVEL OF EDUCATION - PRIMARY LEARNER  GRADUATED HIGH SCHOOL OR GED   BARRIERS PRIMARY LEARNER NONE   CO-LEARNER CAREGIVER No   PRIMARY LANGUAGE ENGLISH   LEARNER PREFERENCE PRIMARY DEMONSTRATION   ANSWERED BY patient   RELATIONSHIP SELF       Depression Screening:  :     3 most recent PHQ Screens 5/4/2020   Little interest or pleasure in doing things Not at all   Feeling down, depressed, irritable, or hopeless Not at all   Total Score PHQ 2 0   Trouble falling or staying asleep, or sleeping too much -   Feeling tired or having little energy -   Poor appetite, weight loss, or overeating -   Feeling bad about yourself - or that you are a failure or have let yourself or your family down -   Trouble concentrating on things such as school, work, reading, or watching TV -   Moving or speaking so slowly that other people could have noticed; or the opposite being so fidgety that others notice -   Thoughts of being better off dead, or hurting yourself in some way -   PHQ 9 Score -   How difficult have these problems made it for you to do your work, take care of your home and get along with others -       Fall Risk Assessment:  :     Fall Risk Assessment, last 12 mths 5/4/2020   Able to walk? Yes   Fall in past 12 months? Yes   Fall with injury? No   Number of falls in past 12 months 1   Fall Risk Score 1       Abuse Screening:  :     Abuse Screening Questionnaire 5/4/2020 6/17/2019 4/17/2018 4/21/2017 3/22/2016 8/6/2014   Do you ever feel afraid of your partner? N N N N N N   Are you in a relationship with someone who physically or mentally threatens you? N N N N N Y   Is it safe for you to go home? Y Y Y Y Y Y       ADL Screening:  :     ADL Assessment 12/17/2014   Feeding yourself No Help Needed   Getting from bed to chair No Help Needed   Getting dressed No Help Needed   Bathing or showering No Help Needed   Walk across the room (includes cane/walker) No Help Needed   Using the telphone No Help Needed   Taking your medications No Help Needed   Preparing meals No Help Needed   Managing money (expenses/bills) No Help Needed   Moderately strenuous housework (laundry) No Help Needed   Shopping for personal items (toiletries/medicines) No Help Needed   Shopping for groceries No Help Needed   Driving No Help Needed   Climbing a flight of stairs No Help Needed   Getting to places beyond walking distances No Help Needed           Medication reconciliation up to date and corrected with patient at this time.

## 2020-10-27 ENCOUNTER — CLINICAL SUPPORT (OUTPATIENT)
Dept: INTERNAL MEDICINE CLINIC | Age: 75
End: 2020-10-27
Payer: COMMERCIAL

## 2020-10-27 DIAGNOSIS — Z23 NEEDS FLU SHOT: Primary | ICD-10-CM

## 2020-10-27 PROCEDURE — G0008 ADMIN INFLUENZA VIRUS VAC: HCPCS | Performed by: INTERNAL MEDICINE

## 2020-10-27 PROCEDURE — 90694 VACC AIIV4 NO PRSRV 0.5ML IM: CPT | Performed by: INTERNAL MEDICINE

## 2020-10-27 NOTE — PATIENT INSTRUCTIONS
Vaccine Information Statement Influenza (Flu) Vaccine (Inactivated or Recombinant): What You Need to Know Many Vaccine Information Statements are available in [place] and other languages. See www.immunize.org/vis Hojas de información sobre vacunas están disponibles en español y en muchos otros idiomas. Visite www.immunize.org/vis 1. Why get vaccinated? Influenza vaccine can prevent influenza (flu). Flu is a contagious disease that spreads around the [facility] every year, usually between October and May. Anyone can get the flu, but it is more dangerous for some people. Infants and young children, people 72years of age and older, pregnant women, and people with certain health conditions or a weakened immune system are at greatest risk of flu complications. Pneumonia, bronchitis, sinus infections and ear infections are examples of flu-related complications. If you have a medical condition, such as heart disease, cancer or diabetes, flu can make it worse. Flu can cause fever and chills, sore throat, muscle aches, fatigue, cough, headache, and runny or stuffy nose. Some people may have vomiting and diarrhea, though this is more common in children than adults. Each year thousands of people in the [facility] die from flu, and many more are hospitalized. Flu vaccine prevents millions of illnesses and flu-related visits to the doctor each year. 2. Influenza vaccines CDC recommends everyone 10months of age and older get vaccinated every flu season. Children 6 months through 6years of age may need 2 doses during a single flu season. Everyone else needs only 1 dose each flu season. It takes about 2 weeks for protection to develop after vaccination. There are many flu viruses, and they are always changing. Each year a new flu vaccine is made to protect against three or four viruses that are likely to cause disease in the upcoming flu season.  Even when the vaccine doesnt exactly match these viruses, it may still provide some protection. Influenza vaccine does not cause flu. Influenza vaccine may be given at the same time as other vaccines. 3. Talk with your health care provider Tell your vaccine provider if the person getting the vaccine: 
 Has had an allergic reaction after a previous dose of influenza vaccine, or has any severe, life-threatening allergies.  Has ever had Guillain-Barré Syndrome (also called GBS). In some cases, your health care provider may decide to postpone influenza vaccination to a future visit. People with minor illnesses, such as a cold, may be vaccinated. People who are moderately or severely ill should usually wait until they recover before getting influenza vaccine. Your health care provider can give you more information. 4. Risks of a reaction  Soreness, redness, and swelling where shot is given, fever, muscle aches, and headache can happen after influenza vaccine.  There may be a very small increased risk of Guillain-Barré Syndrome (GBS) after inactivated influenza vaccine (the flu shot). [facility] children who get the flu shot along with pneumococcal vaccine (PCV13), and/or DTaP vaccine at the same time might be slightly more likely to have a seizure caused by fever. Tell your health care provider if a child who is getting flu vaccine has ever had a seizure. People sometimes faint after medical procedures, including vaccination. Tell your provider if you feel dizzy or have vision changes or ringing in the ears. As with any medicine, there is a very remote chance of a vaccine causing a severe allergic reaction, other serious injury, or death. 5. What if there is a serious problem? An allergic reaction could occur after the vaccinated person leaves the clinic.  If you see signs of a severe allergic reaction (hives, swelling of the face and throat, difficulty breathing, a fast heartbeat, dizziness, or weakness), call 9-1-1 and get the person to the nearest hospital. 
 
For other signs that concern you, call your health care provider. Adverse reactions should be reported to the Vaccine Adverse Event Reporting System (VAERS). Your health care provider will usually file this report, or you can do it yourself. Visit the VAERS website at www.vaers. hhs.gov or call 3-763.252.7240. VAERS is only for reporting reactions, and VAERS staff do not give medical advice. 6. The National Vaccine Injury Compensation Program 
 
The [facility] Vaccine Injury Compensation Program (VICP) is a federal program that was created to compensate people who may have been injured by certain vaccines. Visit the VICP website at www.hrsa.gov/vaccinecompensation or call 1-348.844.5441 to learn about the program and about filing a claim. There is a time limit to file a claim for compensation. 7. How can I learn more?  Ask your health care provider.  Call your local or state health department.  Contact the Centers for Disease Control and Prevention (CDC): 
- Call 8-633.562.2291 (1-800-CDC-INFO) or 
- Visit CDCs influenza website at www.cdc.gov/flu Vaccine Information Statement (Interim) Inactivated Influenza Vaccine 8/15/2019 
42 SHAILA Lowery 054UZ-87 Department of [facility] and [employer] Centers for Disease Control and Prevention Office Use Only

## 2020-10-27 NOTE — PROGRESS NOTES
After verbal order read back of Dr. Dennis Patterson, patient received High Dose Flu Shot (Adjuvanted Fluad) in left deltoid. IrineoReese Castillo 47: [id number] Lot: [id number] Exp 05/29/21. Patient tolerated procedure without complaints and received VIS.
I concur with the Admission Order and I certify that services are provided in accordance with Section 42 CFR § 412.3

## 2021-03-06 ENCOUNTER — HOSPITAL ENCOUNTER (EMERGENCY)
Age: 76
Discharge: HOME | End: 2021-03-06
Attending: EMERGENCY MEDICINE
Payer: MEDICARE

## 2021-03-06 ENCOUNTER — APPOINTMENT (OUTPATIENT)
Dept: CT IMAGING | Age: 76
End: 2021-03-06
Attending: EMERGENCY MEDICINE
Payer: MEDICARE

## 2021-03-06 VITALS
OXYGEN SATURATION: 94 % | RESPIRATION RATE: 19 BRPM | DIASTOLIC BLOOD PRESSURE: 84 MMHG | HEART RATE: 80 BPM | TEMPERATURE: 98.8 F | SYSTOLIC BLOOD PRESSURE: 160 MMHG

## 2021-03-06 DIAGNOSIS — S01.01XA LACERATION OF SCALP, INITIAL ENCOUNTER: Primary | ICD-10-CM

## 2021-03-06 DIAGNOSIS — G44.319 ACUTE POST-TRAUMATIC HEADACHE, NOT INTRACTABLE: ICD-10-CM

## 2021-03-06 DIAGNOSIS — S09.90XA CLOSED HEAD INJURY, INITIAL ENCOUNTER: ICD-10-CM

## 2021-03-06 PROCEDURE — 99284 EMERGENCY DEPT VISIT MOD MDM: CPT

## 2021-03-06 PROCEDURE — 74011000250 HC RX REV CODE- 250: Performed by: PHYSICIAN ASSISTANT

## 2021-03-06 PROCEDURE — 70450 CT HEAD/BRAIN W/O DYE: CPT

## 2021-03-06 PROCEDURE — 74011250637 HC RX REV CODE- 250/637: Performed by: EMERGENCY MEDICINE

## 2021-03-06 PROCEDURE — 75810000293 HC SIMP/SUPERF WND  RPR

## 2021-03-06 PROCEDURE — 90715 TDAP VACCINE 7 YRS/> IM: CPT | Performed by: PHYSICIAN ASSISTANT

## 2021-03-06 PROCEDURE — 74011250636 HC RX REV CODE- 250/636: Performed by: PHYSICIAN ASSISTANT

## 2021-03-06 PROCEDURE — 90471 IMMUNIZATION ADMIN: CPT

## 2021-03-06 RX ORDER — HYDROCODONE BITARTRATE AND ACETAMINOPHEN 5; 325 MG/1; MG/1
1 TABLET ORAL
Qty: 10 TAB | Refills: 0 | Status: SHIPPED | OUTPATIENT
Start: 2021-03-06 | End: 2021-03-09

## 2021-03-06 RX ORDER — LIDOCAINE HYDROCHLORIDE AND EPINEPHRINE 20; 10 MG/ML; UG/ML
10 INJECTION, SOLUTION INFILTRATION; PERINEURAL
Status: COMPLETED | OUTPATIENT
Start: 2021-03-06 | End: 2021-03-06

## 2021-03-06 RX ORDER — ONDANSETRON 4 MG/1
4 TABLET, ORALLY DISINTEGRATING ORAL
Status: COMPLETED | OUTPATIENT
Start: 2021-03-06 | End: 2021-03-06

## 2021-03-06 RX ORDER — HYDROCODONE BITARTRATE AND ACETAMINOPHEN 7.5; 325 MG/1; MG/1
1 TABLET ORAL
Status: COMPLETED | OUTPATIENT
Start: 2021-03-06 | End: 2021-03-06

## 2021-03-06 RX ADMIN — LIDOCAINE HYDROCHLORIDE AND EPINEPHRINE 200 MG: 20; 10 INJECTION, SOLUTION INFILTRATION; PERINEURAL at 19:34

## 2021-03-06 RX ADMIN — ONDANSETRON 4 MG: 4 TABLET, ORALLY DISINTEGRATING ORAL at 20:21

## 2021-03-06 RX ADMIN — TETANUS TOXOID, REDUCED DIPHTHERIA TOXOID AND ACELLULAR PERTUSSIS VACCINE, ADSORBED 0.5 ML: 5; 2.5; 8; 8; 2.5 SUSPENSION INTRAMUSCULAR at 19:28

## 2021-03-06 RX ADMIN — HYDROCODONE BITARTRATE AND ACETAMINOPHEN 1 TABLET: 7.5; 325 TABLET ORAL at 20:21

## 2021-03-07 NOTE — ED PROVIDER NOTES
EMERGENCY DEPARTMENT HISTORY AND PHYSICAL EXAM      Date: 3/6/2021  Patient Name: Aydin Cross    History of Presenting Illness     Chief Complaint   Patient presents with    Laceration     pt fell bending over for slippers, denies LOC or being on a blood thinner. [facility] the laceration to her head but could not make it stop bleeding so they sent her here. Bleeding controlled with an ace wrap on arrival       History Provided By: Patient    HPI: Aydin Cross, 66 y.o. female presents to the ED with cc of scalp laceration. Pt ws bending over in a chair to put shoes on and fell over striking her head. [facility] and [facility], and referred to the ED because of size of laceration. Pt did not lose consciousness. She denies any neck or back pain. She denies any other injuries. She denies any blood thinners. There has been no recent illness. There are no other complaints, changes, or physical findings at this time. PCP: Elda Suarez MD    No current facility-administered medications on file prior to encounter. Current Outpatient Medications on File Prior to Encounter   Medication Sig Dispense Refill    gabapentin (NEURONTIN) 600 mg tablet TAKE 1 TABLET BY MOUTH  TWICE DAILY 180 Tab 0    atorvastatin (LIPITOR) 40 mg tablet TAKE 1 TABLET BY MOUTH  NIGHTLY 90 Tab 3    amitriptyline (ELAVIL) 100 mg tablet Take 1 Tab by mouth nightly. 90 Tab 1    nystatin (MYCOSTATIN) powder APPLY TO AFFECTED AREA THREE (3) TIMES DAILY. 60 g 1    losartan (COZAAR) 100 mg tablet TAKE 1 TABLET BY MOUTH  EVERY DAY 90 Tab 3    hydrocortisone (HYTONE) 2.5 % topical cream       B complx-C-folic-zinc-copper-E 850 mg-400 mcg- 24 mg-3 mg tab Take 1 Tab by mouth.  metoprolol tartrate (LOPRESSOR) 50 mg tablet TAKE 1 TAB BY MOUTH TWO (2) TIMES A DAY. 180 Tab 3    ketoconazole (NIZORAL) 2 % topical cream Apply  to affected area daily.  60 g 1    triamcinolone acetonide (KENALOG) 0.1 % topical cream Apply  to affected area two (2) times a day. use thin layer 454 g 0    fluorouracil (EFUDEX) 5 % chemo cream Apply  to affected area two (2) times a day. 40 g 0    omeprazole (PRILOSEC) 20 mg capsule Take  by mouth.  albuterol (PROVENTIL HFA, VENTOLIN HFA, PROAIR HFA) 90 mcg/actuation inhaler Take 2 Puffs by inhalation every four (4) hours as needed for Wheezing. 1 Inhaler 1    aspirin 81 mg chewable tablet Take 1 Tab by mouth daily. 80 Tab 3       Past History     Past Medical History:  Past Medical History:   Diagnosis Date    Abnormal chest x-ray 12/23/2009    Anemia 12/23/2009    Arrhythmia     SVT.  Converts with [employer]enocard    Atrial fibrillation ([address] 12/23/2009    Carpal tunnel syndrome 12/23/2009    Chronic obstructive pulmonary disease ([address]     Colitis 12/23/2009    Complex regional pain syndrome 12/23/2009    Depression 12/23/2009    Diabetes ([address]     Family history of skin cancer     brother-melanoma    GERD (gastroesophageal reflux disease) 2/8/2010    HTN (hypertension) 12/23/2009    Hypercholesteremia 12/23/2009    Hypertriglyceridemia 12/23/2009    Idiopathic peripheral neuropathy 12/23/2009    Iron deficiency anemia 12/23/2009    Osteopenia 12/23/2009    Paroxysmal atrial fibrillation ([address] 12/2/2011    Psoriasis 6/27/2012    Sarcoidosis        Past Surgical History:  Past Surgical History:   Procedure Laterality Date    HX CARPAL TUNNEL RELEASE  00/00/2002    Both hands    HX CYST REMOVAL  00/00/1965    Left wrist    HX HYSTERECTOMY  00/00/1985    HX ORTHOPAEDIC  00/00/1987    back surgery (disc)    HX ORTHOPAEDIC      right foot X 3 neuromas and tarsal tunnel release    HX TONSILLECTOMY  00/00/1962    NEUROLOGICAL PROCEDURE UNLISTED      Pain pump    PAIN PUMP DEVICE  00/00/2007    in left lower abdomen (for foot pain)       Family History:  Family History   Problem Relation Age of Onset    Arrhythmia Father     Heart Attack Father    Nikole Kurtz Daughter         48    Breast Cancer Daughter 46    Breast Cancer Daughter 46       Social History:  Social History     Tobacco Use    Smoking status: Former Smoker     Packs/day: 0.10     Years: 2.00     Pack years: 0.20     Quit date: 1975     Years since quittin.2    Smokeless tobacco: Never Used   Substance Use Topics    Alcohol use: No    Drug use: No       Allergies: Allergies   Allergen Reactions    Duragesic [Fentanyl] Other (comments)     Patient sees things when taking    Codeine Nausea Only         Review of Systems   Review of Systems   Constitutional: Negative. Negative for chills, fatigue and fever. HENT:        Scalp laceration and head injury   Respiratory: Negative. Cardiovascular: Negative. Gastrointestinal: Negative. Negative for nausea and vomiting. Musculoskeletal: Negative. Skin: Positive for wound. Neurological: Negative. Negative for dizziness, syncope, facial asymmetry and light-headedness. Hematological: Negative. Physical Exam   Physical Exam  Vitals signs and nursing note reviewed. Constitutional:       Appearance: Normal appearance. HENT:      Head: Normocephalic. Comments: 2cm gaping laceration to right parietal scalp, clot present, no active bleeding      Mouth/Throat:      Mouth: Mucous membranes are moist.   Neck:      Musculoskeletal: Normal range of motion and neck supple. No muscular tenderness. Cardiovascular:      Rate and Rhythm: Normal rate and regular rhythm. Pulses: Normal pulses. Pulmonary:      Effort: Pulmonary effort is normal. No respiratory distress. Musculoskeletal: Normal range of motion. General: No deformity or signs of injury. Skin:     General: Skin is warm and dry. Capillary Refill: Capillary refill takes less than 2 seconds.       Comments: Lac to scalp   Neurological:      Mental Status: She is alert and oriented to person, place, and time. Cranial Nerves: No cranial nerve deficit. Comments: No gross motor or sensory deficits   Psychiatric:         Mood and Affect: Mood normal.         Behavior: Behavior normal.         Diagnostic Study Results     Labs -  None    Radiologic Studies -   CT HEAD WO CONT   Final Result   1. No evidence of acute intracranial abnormality. Mild right parietal scalp   swelling/hematoma. CT Results  (Last 48 hours)    None        CXR Results  (Last 48 hours)    None          Medical Decision Making   I am the first provider for this patient. I reviewed the vital signs, available nursing notes, past medical history, past surgical history, family history and social history. Vital Signs-Reviewed the patient's vital signs. Patient Vitals for the past 12 hrs:   Temp Pulse Resp BP SpO2   03/06/21 1616     94 %   03/06/21 1600 98.8 °F (37.1 °C) 80 19 (!) 160/84 100 %       Records Reviewed: Nursing Notes    Provider Notes (Medical Decision Making):   DDx- Scalp laceration, CHI, ICH    ED Course:   Initial assessment performed. The patients presenting problems have been discussed, and they are in agreement with the care plan formulated and outlined with them. I have encouraged them to ask questions as they arise throughout their visit. PROCEDURES  Procedure Note - Laceration Repair:  Procedure by Raymond Nj PA-C, supervised by Benjie Sexton DO  Complexity: Complex  2.5cm elliptical laceration to scalp  was irrigated copiously with NS under jet lavage, prepped with Chlorehexidene and saline and draped in a sterile fashion. The area was anesthetized with 5 mLs of  Lidocaine 2% with epinephrine via local infiltration. The wound was explored with the following results: No foreign bodies found. The wound was repaired with 8 staples.  Prior to staples to 2 2-0 nylon sutures placed to better approximate the wound fot stapling, those 2 sutures removed following wound closure   The wound was closed with good hemostasis and approximation. Sterile dressing applied. Estimated blood loss: < 1mL  The procedure took 20} minutes, and pt tolerated well      Disposition:  DC home. Staples to be removed 7-10 days. DISCHARGE PLAN:  1. Discharge Medication List as of 3/6/2021  8:09 PM      CONTINUE these medications which have NOT CHANGED    Details   gabapentin (NEURONTIN) 600 mg tablet TAKE 1 TABLET BY MOUTH  TWICE DAILY, Normal, Disp-180 Tab,R-0Requesting 1 year supply. Not able to give 1 yr supply because she needs an appointment. atorvastatin (LIPITOR) 40 mg tablet TAKE 1 TABLET BY MOUTH  NIGHTLY, Normal, Disp-90 Tab,R-3      amitriptyline (ELAVIL) 100 mg tablet Take 1 Tab by mouth nightly., Normal, Disp-90 Tab, R-1      nystatin (MYCOSTATIN) powder APPLY TO AFFECTED AREA THREE (3) TIMES DAILY., Normal, Disp-60 g, R-1      losartan (COZAAR) 100 mg tablet TAKE 1 TABLET BY MOUTH  EVERY DAY, Normal, Disp-90 Tab, R-3      hydrocortisone (HYTONE) 2.5 % topical cream Historical Med      B complx-C-folic-zinc-copper-E 538 mg-400 mcg- 24 mg-3 mg tab Take 1 Tab by mouth., Historical Med      metoprolol tartrate (LOPRESSOR) 50 mg tablet TAKE 1 TAB BY MOUTH TWO (2) TIMES A DAY., Normal, Disp-180 Tab, R-3      ketoconazole (NIZORAL) 2 % topical cream Apply  to affected area daily. , Normal, Disp-60 g, R-1      triamcinolone acetonide (KENALOG) 0.1 % topical cream Apply  to affected area two (2) times a day. use thin layer, Normal, Disp-454 g, R-0      fluorouracil (EFUDEX) 5 % chemo cream Apply  to affected area two (2) times a day., Normal, Disp-40 g, R-0      omeprazole (PRILOSEC) 20 mg capsule Take  by mouth., Historical Med      albuterol (PROVENTIL HFA, VENTOLIN HFA, PROAIR HFA) 90 mcg/actuation inhaler Take 2 Puffs by inhalation every four (4) hours as needed for Wheezing., Normal, Disp-1 Inhaler, R-1      aspirin 81 mg chewable tablet Take 1 Tab by mouth daily. , No Print, Disp-90 Tab, R-3           2. Follow-up Information     Follow up With Specialties Details Why Contact Info    Claudia Coello MD Internal Medicine  FOR STAPLE REMOVAL [address] [phone]      [facility] EMERGENCY DEPT Emergency Medicine   [address]   [address] [address] [phone]        3. Return to ED if worse     Diagnosis     Clinical Impression:   1. Laceration of scalp, initial encounter    2. Closed head injury, initial encounter    3. Acute post-traumatic headache, not intractable        Attestations:    Brtini Nelson, DO    Please note that this dictation was completed with [employer], the computer voice recognition software. Quite often unanticipated grammatical, syntax, homophones, and other interpretive errors are inadvertently transcribed by the computer software. Please disregard these errors. Please excuse any errors that have escaped final proofreading. Thank you.

## 2021-03-18 ENCOUNTER — OFFICE VISIT (OUTPATIENT)
Dept: INTERNAL MEDICINE CLINIC | Age: 76
End: 2021-03-18
Payer: MEDICARE

## 2021-03-18 VITALS
TEMPERATURE: 97.6 F | OXYGEN SATURATION: 93 % | HEART RATE: 72 BPM | RESPIRATION RATE: 16 BRPM | WEIGHT: 169 LBS | SYSTOLIC BLOOD PRESSURE: 139 MMHG | DIASTOLIC BLOOD PRESSURE: 77 MMHG | HEIGHT: 67 IN | BODY MASS INDEX: 26.53 KG/M2

## 2021-03-18 DIAGNOSIS — S01.01XA LACERATION OF SCALP WITHOUT FOREIGN BODY, INITIAL ENCOUNTER: ICD-10-CM

## 2021-03-18 DIAGNOSIS — Z48.02 ENCOUNTER FOR STAPLE REMOVAL: Primary | ICD-10-CM

## 2021-03-18 PROCEDURE — 1100F PTFALLS ASSESS-DOCD GE2>/YR: CPT | Performed by: NURSE PRACTITIONER

## 2021-03-18 PROCEDURE — 99213 OFFICE O/P EST LOW 20 MIN: CPT | Performed by: NURSE PRACTITIONER

## 2021-03-18 PROCEDURE — 3288F FALL RISK ASSESSMENT DOCD: CPT | Performed by: NURSE PRACTITIONER

## 2021-03-18 PROCEDURE — G8752 SYS BP LESS 140: HCPCS | Performed by: NURSE PRACTITIONER

## 2021-03-18 PROCEDURE — G8427 DOCREV CUR MEDS BY ELIG CLIN: HCPCS | Performed by: NURSE PRACTITIONER

## 2021-03-18 PROCEDURE — G8536 NO DOC ELDER MAL SCRN: HCPCS | Performed by: NURSE PRACTITIONER

## 2021-03-18 PROCEDURE — 1090F PRES/ABSN URINE INCON ASSESS: CPT | Performed by: NURSE PRACTITIONER

## 2021-03-18 PROCEDURE — G9717 DOC PT DX DEP/BP F/U NT REQ: HCPCS | Performed by: NURSE PRACTITIONER

## 2021-03-18 PROCEDURE — G8419 CALC BMI OUT NRM PARAM NOF/U: HCPCS | Performed by: NURSE PRACTITIONER

## 2021-03-18 PROCEDURE — G8399 PT W/DXA RESULTS DOCUMENT: HCPCS | Performed by: NURSE PRACTITIONER

## 2021-03-18 PROCEDURE — G8754 DIAS BP LESS 90: HCPCS | Performed by: NURSE PRACTITIONER

## 2021-03-18 NOTE — PATIENT INSTRUCTIONS
Learning About Stitches and Staples Removal 
When are stitches and staples removed? Your doctor will tell you when to have your stitches or staples removed, usually in 7 to 14 days. How long you'll be told to wait will depend on things like where the wound is located, how big and how deep the wound is, and what your general health is like. Do not remove the stitches on your own. Stitches on the face are usually removed within a week. But stitches and staples on other areas of the body, such as on the back or belly or over a joint, may need to stay in place longer, often a week or two. Be sure to follow your doctor's instructions. How are stitches and staples removed? It usually doesn't hurt when the doctor removes the stitches or staples. You may feel a tug as each stitch or staple is removed. · You will either be seated or lying down. · To remove stitches, the doctor will use scissors to cut each of the knots and then pull the threads out. · To remove staples, the doctor will use a tool to take out the staples one at a time. · The area may still feel tender after the stitches or staples are gone. But it should feel better within a few minutes or up to a few hours. What can you expect after stitches and staples are removed? Depending on the type and location of the cut, you will have a scar. Scars usually fade over time. Keep the area clean, but you won't need a bandage. When should you call for help? Call your doctor now or seek immediate medical care if : 
· You have new pain, or your pain gets worse. · You have trouble moving the area near the scar. · You have symptoms of infection, such as: 
? Increased pain, swelling, warmth, or redness around the scar. ? Red streaks leading from the scar. ? Pus draining from the scar. ? A fever. Watch closely for changes in your health, and be sure to contact your doctor if: · The scar opens. · You do not get better as expected.  
Follow-up care is a key part of your treatment and safety. Be sure to make and go to all appointments, and call your doctor if you do not get better as expected. It's also a good idea to keep a list of the medicines you take. Where can you learn more? Go to http://www.gray.com/ Enter W585 in the search box to learn more about \"Learning About Stitches and Staples Removal.\" Current as of: June 26, 2019               Content Version: 12.6 © 7952-0373 [employer]. Care instructions adapted under license by [employer] (which disclaims liability or warranty for this information). If you have questions about a medical condition or this instruction, always ask your healthcare professional. [address] any warranty or liability for your use of this information. Cuts: Care Instructions Your Care Instructions A cut can happen anywhere on your body. Stitches, staples, skin adhesives, or pieces of tape called Steri-Strips are sometimes used to keep the edges of a cut together and help it heal. Steri-Strips can be used by themselves or with stitches or staples. Sometimes cuts are left open. If the cut went deep and through the skin, the doctor may have closed the cut in two layers. A deeper layer of stitches brings the deep part of the cut together. These stitches will dissolve and don't need to be removed. The upper layer closure, which could be stitches, staples, Steri-Strips, or adhesive, is what you see on the cut. A cut is often covered by a bandage. The doctor has checked you carefully, but problems can develop later. If you notice any problems or new symptoms, get medical treatment right away. Follow-up care is a key part of your treatment and safety. Be sure to make and go to all appointments, and call your doctor if you are having problems. It's also a good idea to know your test results and keep a list of the medicines you take.  
How can you care for yourself at home? If a cut is open or closed · Prop up the sore area on a pillow anytime you sit or lie down during the next 3 days. Try to keep it above the level of your heart. This will help reduce swelling. · Keep the cut dry for the first 24 to 48 hours. After this, you can shower if your doctor okays it. Pat the cut dry. · Don't soak the cut, such as in a bathtub. Your doctor will tell you when it's safe to get the cut wet. · After the first 24 to 48 hours, clean the cut with soap and water 2 times a day unless your doctor gives you different instructions. ? Don't use hydrogen peroxide or alcohol, which can slow healing. ? You may cover the cut with a thin layer of petroleum jelly and a nonstick bandage. ? If the doctor put a bandage over the cut, put on a new bandage after cleaning the cut or if the bandage gets wet or dirty. · Avoid any activity that could cause your cut to reopen. · Be safe with medicines. Read and follow all instructions on the label. ? If the doctor gave you a prescription medicine for pain, take it as prescribed. ? If you are not taking a prescription pain medicine, ask your doctor if you can take an over-the-counter medicine. If the cut is closed with stitches, staples, or Steri-Strips · Follow the above instructions for open or closed cuts. · Do not remove the stitches or staples on your own. Your doctor will tell you when to come back to have the stitches or staples removed. · Leave Steri-Strips on until they fall off. If the cut is closed with a skin adhesive · Follow the above instructions for open or closed cuts. · Leave the skin adhesive on your skin until it falls off on its own. This may take 5 to 10 days. · Do not scratch, rub, or pick at the adhesive. · Do not put the sticky part of a bandage directly on the adhesive. · Do not put any kind of ointment, cream, or lotion over the area. This can make the adhesive fall off too soon.  Do not use hydrogen peroxide or alcohol, which can slow healing. When should you call for help? Call your doctor now or seek immediate medical care if: 
  · You have new pain, or your pain gets worse.  
  · The skin near the cut is cold or pale or changes color.  
  · You have tingling, weakness, or numbness near the cut.  
  · The cut starts to bleed, and blood soaks through the bandage. Oozing small amounts of blood is normal.  
  · You have trouble moving the area near the cut.  
  · You have symptoms of infection, such as: 
? Increased pain, swelling, warmth, or redness around the cut. 
? Red streaks leading from the cut. 
? Pus draining from the cut. 
? A fever. Watch closely for changes in your health, and be sure to contact your doctor if: 
  · The cut reopens.  
  · You do not get better as expected. Where can you learn more? Go to http://www.gray.com/ Enter M735 in the search box to learn more about \"Cuts: Care Instructions. \" Current as of: June 26, 2019               Content Version: 12.6 © 4303-6920 [employer]. Care instructions adapted under license by [employer] (which disclaims liability or warranty for this information). If you have questions about a medical condition or this instruction, always ask your healthcare professional. [address] any warranty or liability for your use of this information.

## 2021-03-18 NOTE — PROGRESS NOTES
Devon Jacques is a 66 y.o. female     Chief Complaint   Patient presents with    Staple Removal     Patient states she is here for removal of staples      1. Have you been to the ER, urgent care clinic since your last visit? Hospitalized since your last visit? Yes When: Patient was seen at [facility] from a fall where she hit her head. [facility] was unable to stop the bleeding for sutures so she was sent to [address] [place] [address]. 2. Have you seen or consulted any other health care providers outside of the [address] [place] [address] since your last visit? Include any pap smears or colon screening.  No     Visit Vitals  Temp 97.6 °F (36.4 °C) (Temporal)   Ht 5' 7\" (1.702 m)   Wt 169 lb (76.7 kg)   BMI 26.47 kg/m²

## 2021-03-18 NOTE — PROGRESS NOTES
HPI  Brooke Cheng is a 66y.o. year old female patient of Nelia Alvarado NP who presents with c/o suture removal.    Pt has history of has Headache(784.0), Carpal tunnel syndrome, Hypercholesteremia, Depression, Abnormal chest x-ray, Complex regional pain syndrome, Diabetic peripheral neuropathy associated with type 2 diabetes mellitus ([address] Hypertension, essential, Controlled type 2 diabetes mellitus with diabetic polyneuropathy, without long-term current use of insulin ([address] Osteopenia, Vitamin D deficiency, GERD (gastroesophageal reflux disease), Psoriasis, Paroxysmal atrial fibrillation ([address] Hepatic steatosis, COPD, severity to be determined ([address] and Sarcoidosis of lymph nodes on their problem list..    Pt had 8 staples placed on 3-8 following scalp laceration after GLF, was seen at [address], she is here for removal.  Has not had any drainage or bleeding from site. States area is very sore to the touch. Not taking anything for pain. CT Results (most recent):  Results from Hospital Encounter encounter on 03/06/21   CT HEAD WO CONT    Narrative EXAM:  CT HEAD WO CONT    INDICATION:   [facility]    COMPARISON: CT head 7/19/2018. TECHNIQUE: Unenhanced CT of the head was performed using 5 mm images. Brain and  bone windows were generated. CT dose reduction was achieved through use of a  standardized protocol tailored for this examination and automatic exposure  control for dose modulation. FINDINGS:  The ventricles are normal in size and position. 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 Basilar cisterns are patent. No midline shift. There is no evidence of acute infarct, hemorrhage, or extraaxial fluid  collection. The paranasal sinuses, mastoid air cells, and middle ears are clear. The orbital  contents are within normal limits with bilateral lens implants. Mild right  parietal scalp swelling/hematoma. Impression 1. No evidence of acute intracranial abnormality. Mild right parietal scalp  swelling/hematoma. Patient Active Problem List   Diagnosis Code    Headache(784.0) R51    Carpal tunnel syndrome G56.00    Hypercholesteremia E78.00    Depression F32.9    Abnormal chest x-ray R93.89    Complex regional pain syndrome [id number]    Diabetic peripheral neuropathy associated with type 2 diabetes mellitus ([address] E11.42    Hypertension, essential I10    Controlled type 2 diabetes mellitus with diabetic polyneuropathy, without long-term current use of insulin ([facility]) E11.42    Osteopenia M85.80    Vitamin D deficiency E55.9    GERD (gastroesophageal reflux disease) K21.9    Psoriasis L40.9    Paroxysmal atrial fibrillation (HCC) I48.0    Hepatic steatosis K76.0    COPD, severity to be determined ([address] J44.9    Sarcoidosis of lymph nodes D86.1     Past Medical History:   Diagnosis Date    Abnormal chest x-ray 12/23/2009    Anemia 12/23/2009    Arrhythmia     SVT.  Converts with Adenocard    Atrial fibrillation ([address] 12/23/2009    Carpal tunnel syndrome 12/23/2009    Chronic obstructive pulmonary disease ([address]     Colitis 12/23/2009    Complex regional pain syndrome 12/23/2009    Depression 12/23/2009    Diabetes ([address]     Family history of skin cancer     brother-melanoma    GERD (gastroesophageal reflux disease) 2/8/2010    HTN (hypertension) 12/23/2009    Hypercholesteremia 12/23/2009    Hypertriglyceridemia 12/23/2009    Idiopathic peripheral neuropathy 12/23/2009    Iron deficiency anemia 12/23/2009    Osteopenia 12/23/2009    Paroxysmal atrial fibrillation ([address] 12/2/2011    Psoriasis 6/27/2012    Sarcoidosis      Past Surgical History:   Procedure Laterality Date    HX CARPAL TUNNEL RELEASE  00/00/2002    Both hands    HX CYST REMOVAL  00/00/1965    Left wrist    HX HYSTERECTOMY  00/00/1985    HX ORTHOPAEDIC  00/00/1987    back surgery (disc)    HX ORTHOPAEDIC      right foot X 3 neuromas and tarsal tunnel release    HX TONSILLECTOMY      NEUROLOGICAL PROCEDURE UNLISTED      Pain pump    PAIN PUMP DEVICE      in left lower abdomen (for foot pain)     Social History     Socioeconomic History    Marital status:      Spouse name: Not on file    Number of children: Not on file    Years of education: Not on file    Highest education level: Not on file   Tobacco Use    Smoking status: Former Smoker     Packs/day: 0.10     Years: 2.00     Pack years: 0.20     Quit date: 1975     Years since quittin.2    Smokeless tobacco: Never Used   Substance and Sexual Activity    Alcohol use: No    Drug use: No     Family History   Problem Relation Age of Onset    Arrhythmia Father     Heart Attack Father     Cancer Brother         Melanoma    Breast Cancer Daughter         48    Breast Cancer Daughter 46    Breast Cancer Daughter 46     Allergies   Allergen Reactions    Duragesic [Fentanyl] Other (comments)     Patient sees things when taking    Codeine Nausea Only       MEDICATIONS  Current Outpatient Medications   Medication Sig    gabapentin (NEURONTIN) 600 mg tablet TAKE 1 TABLET BY MOUTH  TWICE DAILY    atorvastatin (LIPITOR) 40 mg tablet TAKE 1 TABLET BY MOUTH  NIGHTLY    amitriptyline (ELAVIL) 100 mg tablet Take 1 Tab by mouth nightly.  nystatin (MYCOSTATIN) powder APPLY TO AFFECTED AREA THREE (3) TIMES DAILY.  losartan (COZAAR) 100 mg tablet TAKE 1 TABLET BY MOUTH  EVERY DAY    hydrocortisone (HYTONE) 2.5 % topical cream     B complx-C-folic-zinc-copper-E 710 mg-400 mcg- 24 mg-3 mg tab Take 1 Tab by mouth.  metoprolol tartrate (LOPRESSOR) 50 mg tablet TAKE 1 TAB BY MOUTH TWO (2) TIMES A DAY.  ketoconazole (NIZORAL) 2 % topical cream Apply  to affected area daily.  triamcinolone acetonide (KENALOG) 0.1 % topical cream Apply  to affected area two (2) times a day. use thin layer    fluorouracil (EFUDEX) 5 % chemo cream Apply  to affected area two (2) times a day.     omeprazole (PRILOSEC) 20 mg capsule Take  by mouth.  albuterol (PROVENTIL HFA, VENTOLIN HFA, PROAIR HFA) 90 mcg/actuation inhaler Take 2 Puffs by inhalation every four (4) hours as needed for Wheezing.  aspirin 81 mg chewable tablet Take 1 Tab by mouth daily. No current facility-administered medications for this visit. REVIEW OF SYSTEMS  Per HPI        Visit Vitals  /77 (BP 1 Location: Right upper arm, BP Patient Position: Sitting, BP Cuff Size: Large adult)   Pulse 72   Temp 97.6 °F (36.4 °C) (Temporal)   Resp 16   Ht 5' 7\" (1.702 m)   Wt 169 lb (76.7 kg)   SpO2 93%   BMI 26.47 kg/m²         General: Well-developed, well-nourished. In no distress. A&O x 3. Head: Normocephalic, atraumatic. Eyes: Conjunctiva clear. Skin: 8 staples removed from right parietal scalp, incision is well healed, tiny amount of blood discharge controlled with gauze dressing  Musculoskeletal: Gait normal.  Psychiatric: Normal mood and affect. Behavior is normal.       No results found for any visits on 03/18/21. ASSESSMENT and PLAN  Diagnoses and all orders for this visit:    1. Encounter for staple removal    2. Laceration of scalp without foreign body, initial encounter  Well healed  Follow instructions below          Patient Instructions          Learning About Stitches and Staples Removal  When are stitches and staples removed? Your doctor will tell you when to have your stitches or staples removed, usually in 7 to 14 days. How long you'll be told to wait will depend on things like where the wound is located, how big and how deep the wound is, and what your general health is like. Do not remove the stitches on your own. Stitches on the face are usually removed within a week. But stitches and staples on other areas of the body, such as on the back or belly or over a joint, may need to stay in place longer, often a week or two. Be sure to follow your doctor's instructions.   How are stitches and staples removed? It usually doesn't hurt when the doctor removes the stitches or staples. You may feel a tug as each stitch or staple is removed. · You will either be seated or lying down. · To remove stitches, the doctor will use scissors to cut each of the knots and then pull the threads out. · To remove staples, the doctor will use a tool to take out the staples one at a time. · The area may still feel tender after the stitches or staples are gone. But it should feel better within a few minutes or up to a few hours. What can you expect after stitches and staples are removed? Depending on the type and location of the cut, you will have a scar. Scars usually fade over time. Keep the area clean, but you won't need a bandage. When should you call for help? Call your doctor now or seek immediate medical care if :  · You have new pain, or your pain gets worse. · You have trouble moving the area near the scar. · You have symptoms of infection, such as:  ? Increased pain, swelling, warmth, or redness around the scar. ? Red streaks leading from the scar. ? Pus draining from the scar. ? A fever. Watch closely for changes in your health, and be sure to contact your doctor if:   · The scar opens. · You do not get better as expected. Follow-up care is a key part of your treatment and safety. Be sure to make and go to all appointments, and call your doctor if you do not get better as expected. It's also a good idea to keep a list of the medicines you take. Where can you learn more? Go to http://www.gray.com/  Enter L659 in the search box to learn more about \"Learning About Stitches and Staples Removal.\"  Current as of: June 26, 2019               Content Version: 12.6  © 0990-9079 [employer]. Care instructions adapted under license by [employer] (which disclaims liability or warranty for this information).  If you have questions about a medical condition or this instruction, always ask your healthcare professional. [place] [address] any warranty or liability for your use of this information. Cuts: Care Instructions  Your Care Instructions  A cut can happen anywhere on your body. Stitches, staples, skin adhesives, or pieces of tape called Steri-Strips are sometimes used to keep the edges of a cut together and help it heal. Steri-Strips can be used by themselves or with stitches or staples. Sometimes cuts are left open. If the cut went deep and through the skin, the doctor may have closed the cut in two layers. A deeper layer of stitches brings the deep part of the cut together. These stitches will dissolve and don't need to be removed. The upper layer closure, which could be stitches, staples, Steri-Strips, or adhesive, is what you see on the cut. A cut is often covered by a bandage. The doctor has checked you carefully, but problems can develop later. If you notice any problems or new symptoms, get medical treatment right away. Follow-up care is a key part of your treatment and safety. Be sure to make and go to all appointments, and call your doctor if you are having problems. It's also a good idea to know your test results and keep a list of the medicines you take. How can you care for yourself at home? If a cut is open or closed  · Prop up the sore area on a pillow anytime you sit or lie down during the next 3 days. Try to keep it above the level of your heart. This will help reduce swelling. · Keep the cut dry for the first 24 to 48 hours. After this, you can shower if your doctor okays it. Pat the cut dry. · Don't soak the cut, such as in a bathtub. Your doctor will tell you when it's safe to get the cut wet. · After the first 24 to 48 hours, clean the cut with soap and water 2 times a day unless your doctor gives you different instructions. ? Don't use hydrogen peroxide or alcohol, which can slow healing.   ? You may cover the cut with a thin layer of petroleum jelly and a nonstick bandage. ? If the doctor put a bandage over the cut, put on a new bandage after cleaning the cut or if the bandage gets wet or dirty. · Avoid any activity that could cause your cut to reopen. · Be safe with medicines. Read and follow all instructions on the label. ? If the doctor gave you a prescription medicine for pain, take it as prescribed. ? If you are not taking a prescription pain medicine, ask your doctor if you can take an over-the-counter medicine. If the cut is closed with stitches, staples, or Steri-Strips  · Follow the above instructions for open or closed cuts. · Do not remove the stitches or staples on your own. Your doctor will tell you when to come back to have the stitches or staples removed. · Leave Steri-Strips on until they fall off. If the cut is closed with a skin adhesive  · Follow the above instructions for open or closed cuts. · Leave the skin adhesive on your skin until it falls off on its own. This may take 5 to 10 days. · Do not scratch, rub, or pick at the adhesive. · Do not put the sticky part of a bandage directly on the adhesive. · Do not put any kind of ointment, cream, or lotion over the area. This can make the adhesive fall off too soon. Do not use hydrogen peroxide or alcohol, which can slow healing. When should you call for help? Call your doctor now or seek immediate medical care if:    · You have new pain, or your pain gets worse.     · The skin near the cut is cold or pale or changes color.     · You have tingling, weakness, or numbness near the cut.     · The cut starts to bleed, and blood soaks through the bandage. Oozing small amounts of blood is normal.     · You have trouble moving the area near the cut.     · You have symptoms of infection, such as:  ? Increased pain, swelling, warmth, or redness around the cut.  ? Red streaks leading from the cut.  ? Pus draining from the cut.  ? A fever.    Watch closely for changes in your health, and be sure to contact your doctor if:    · The cut reopens.     · You do not get better as expected. Where can you learn more? Go to http://www.gray.com/  Enter M735 in the search box to learn more about \"Cuts: Care Instructions. \"  Current as of: June 26, 2019               Content Version: 12.6  © 0839-2666 [employer]. Care instructions adapted under license by [employer] (which disclaims liability or warranty for this information). If you have questions about a medical condition or this instruction, always ask your healthcare professional. [place] [address] any warranty or liability for your use of this information. Please keep your follow-up appointment with Lito Lux NP. Health Maintenance Due   Topic Date Due    COVID-19 Vaccine (1) Never done    Shingrix Vaccine Age 50> (1 of 2) Never done    Eye Exam Retinal or Dilated  06/23/2017    A1C test (Diabetic or Prediabetic)  12/17/2019    Medicare Yearly Exam  06/17/2020    Foot Exam Q1  06/17/2020    MICROALBUMIN Q1  06/20/2020    Lipid Screen  06/20/2020       I have discussed the diagnosis with the patient and the intended plan as seen in the above orders. Patient is in agreement. The patient has received an after-visit summary and questions were answered concerning future plans. I have discussed medication side effects and warnings with the patient as well. Warning signs for the above conditions were discussed including when to call our office or go to the emergency room. The nurse provided the patient and/or family with advanced directive information if needed and encouraged the patient to provide a copy to the office when available.

## 2021-03-31 DIAGNOSIS — E11.42 DIABETIC PERIPHERAL NEUROPATHY ASSOCIATED WITH TYPE 2 DIABETES MELLITUS (HCC): ICD-10-CM

## 2021-04-02 RX ORDER — GABAPENTIN 600 MG/1
TABLET ORAL
Qty: 180 TAB | Refills: 3 | OUTPATIENT
Start: 2021-04-02

## 2021-04-23 DIAGNOSIS — I10 HYPERTENSION, ESSENTIAL: ICD-10-CM

## 2021-04-23 DIAGNOSIS — E78.00 HYPERCHOLESTEREMIA: ICD-10-CM

## 2021-04-23 RX ORDER — ATORVASTATIN CALCIUM 40 MG/1
TABLET, FILM COATED ORAL
Qty: 90 TAB | Refills: 3 | Status: SHIPPED | OUTPATIENT
Start: 2021-04-23 | End: 2022-02-11

## 2021-04-23 RX ORDER — LOSARTAN POTASSIUM 100 MG/1
TABLET ORAL
Qty: 90 TAB | Refills: 3 | Status: SHIPPED | OUTPATIENT
Start: 2021-04-23 | End: 2022-02-11

## 2021-06-07 DIAGNOSIS — E11.42 DIABETIC PERIPHERAL NEUROPATHY ASSOCIATED WITH TYPE 2 DIABETES MELLITUS (HCC): ICD-10-CM

## 2021-06-07 RX ORDER — GABAPENTIN 600 MG/1
600 TABLET ORAL 2 TIMES DAILY
Qty: 8 TABLET | Refills: 0 | Status: SHIPPED | OUTPATIENT
Start: 2021-06-07 | End: 2021-06-11 | Stop reason: SDUPTHER

## 2021-06-07 NOTE — TELEPHONE ENCOUNTER
PCP: Windy Reyes NP     Last appt: 3/18/2021   Future Appointments   Date Time Provider Vaibhav Quintanilla   6/11/2021  2:30 PM Windy Reyes NP [facility]        Requested Prescriptions     Pending Prescriptions Disp Refills    gabapentin (NEURONTIN) 600 mg tablet 8 Tablet 0     Sig: Take 1 Tablet by mouth two (2) times a day. Max Daily Amount: 1,200 mg.

## 2021-06-07 NOTE — TELEPHONE ENCOUNTER
----- Message from Angie Pierson sent at 6/7/2021 10:11 AM EDT -----  Regarding: NP Tess/ Telephone  General Message/Vendor Calls    Caller's first and last name:pt      Reason for call:Gabapentin      Callback required yes/no and why:no      Best contact number(s):[phone]      Details to clarify the request: Pt has made her an appt for this Friday but would like to know if she can get a refill to last her until Friday.       Angie Pierson

## 2021-06-10 NOTE — PROGRESS NOTES
SANDRO Hensley is a 66y.o. year old female patient of Adriana Lindsay NP who presents with c/o   Chief Complaint   Patient presents with    Medication Refill     Room 1B //       Pt has history of has Headache(784.0), Carpal tunnel syndrome, Hypercholesteremia, Depression, Abnormal chest x-ray, Complex regional pain syndrome, Diabetic peripheral neuropathy associated with type 2 diabetes mellitus ([address] Hypertension, essential, Controlled type 2 diabetes mellitus with diabetic polyneuropathy, without long-term current use of insulin ([address] Osteopenia, Vitamin D deficiency, GERD (gastroesophageal reflux disease), Psoriasis, Paroxysmal atrial fibrillation ([address] Hepatic steatosis, COPD, severity to be determined ([address] and Sarcoidosis of lymph nodes on their problem list..    Pt here to est care from Dr. Candance Antonio  Last seen virtual: May 2020  Last seen in person: 2019  Last labs: June 2019    Has been falling more, losing her balance. Has had two falls this year. Most recent time foot got caught on weeds in garden and fell and hit head on stepping stone, no LOC, denies feeling lightheaded dizzy or altered cognitively after. Denies acute memory issues. No regular exercise. Doesn't add salt to foods, avoids fried foods.     She has hx of:  HTN- managed with losartan, metoprolol  AFIB- follows with Cards Dr. Rex Hanks- on metoprolol, aspirin  DM with neuropathy- diet controlled, takes gabapentin for neuropathy  Sarcoidosis  Hepatic Stetosis  Complex regional pain syndrome- follows with pain mgmt, has pain pump,  takes amitriptyline   XOL- managed on statin          3 most recent PHQ Screens 6/11/2021   Little interest or pleasure in doing things Not at all   Feeling down, depressed, irritable, or hopeless Not at all   Total Score PHQ 2 0   Trouble falling or staying asleep, or sleeping too much -   Feeling tired or having little energy -   Poor appetite, weight loss, or overeating -   Feeling bad about yourself - or that you are a failure or have let yourself or your family down -   Trouble concentrating on things such as school, work, reading, or watching TV -   Moving or speaking so slowly that other people could have noticed; or the opposite being so fidgety that others notice -   Thoughts of being better off dead, or hurting yourself in some way -   PHQ 9 Score -   How difficult have these problems made it for you to do your work, take care of your home and get along with others -         Health Maintenance Annel Padilla2 Maintenance Due   Topic Date Due    COVID-19 Vaccine (1) Never done    Shingrix Vaccine Age 50> (1 of 2) Never done    Eye Exam Retinal or Dilated  06/23/2017    A1C test (Diabetic or Prediabetic)  12/17/2019    Medicare Yearly Exam  06/17/2020    Foot Exam Q1  06/17/2020    MICROALBUMIN Q1  06/20/2020    Lipid Screen  06/20/2020   Follows with Dr. Sherrell Redd, eye      Depression Screen  3 most recent PHQ Screens 5/4/2020   Little interest or pleasure in doing things Not at all   Feeling down, depressed, irritable, or hopeless Not at all   Total Score PHQ 2 0   Trouble falling or staying asleep, or sleeping too much -   Feeling tired or having little energy -   Poor appetite, weight loss, or overeating -   Feeling bad about yourself - or that you are a failure or have let yourself or your family down -   Trouble concentrating on things such as school, work, reading, or watching TV -   Moving or speaking so slowly that other people could have noticed; or the opposite being so fidgety that others notice -   Thoughts of being better off dead, or hurting yourself in some way -   PHQ 9 Score -   How difficult have these problems made it for you to do your work, take care of your home and get along with others -     Lab Results   Component Value Date/Time    Hemoglobin A1c 6.6 (H) 02/01/2016 11:04 AM    Hemoglobin A1c (POC) 6.2 06/17/2019 09:35 AM     Lab Results   Component Value Date/Time    Sodium 142 06/20/2019 08:59 AM    Potassium 4.8 06/20/2019 08:59 AM    Chloride 100 06/20/2019 08:59 AM    CO2 28 06/20/2019 08:59 AM    Anion gap 10 07/18/2018 11:19 PM    Glucose 101 (H) 06/20/2019 08:59 AM    BUN 10 06/20/2019 08:59 AM    Creatinine 0.78 06/20/2019 08:59 AM    BUN/Creatinine ratio 13 06/20/2019 08:59 AM    GFR est AA 86 06/20/2019 08:59 AM    GFR est non-AA 75 06/20/2019 08:59 AM    Calcium 9.4 06/20/2019 08:59 AM    Bilirubin, total 0.3 06/20/2019 08:59 AM    Alk. phosphatase 139 (H) 06/20/2019 08:59 AM    Protein, total 7.0 06/20/2019 08:59 AM    Albumin 4.1 06/20/2019 08:59 AM    Globulin 3.7 07/18/2018 11:19 PM    A-G Ratio 1.4 06/20/2019 08:59 AM    ALT (SGPT) 19 06/20/2019 08:59 AM    AST (SGOT) 23 06/20/2019 08:59 AM     Lab Results   Component Value Date/Time    Cholesterol, total 108 06/20/2019 08:59 AM    HDL Cholesterol 33 (L) 06/20/2019 08:59 AM    LDL, calculated 47 06/20/2019 08:59 AM    VLDL, calculated 28 06/20/2019 08:59 AM    Triglyceride 140 06/20/2019 08:59 AM    CHOL/HDL Ratio 3.3 02/08/2010 01:53 PM             Patient Active Problem List   Diagnosis Code    Headache(784.0) R51    Carpal tunnel syndrome G56.00    Hypercholesteremia E78.00    Depression F32.9    Abnormal chest x-ray R93.89    Complex regional pain syndrome [id number]    Diabetic peripheral neuropathy associated with type 2 diabetes mellitus (HCC) E11.42    Hypertension, essential I10    Controlled type 2 diabetes mellitus with diabetic polyneuropathy, without long-term current use of insulin (HCC) E11.42    Osteopenia M85.80    Vitamin D deficiency E55.9    GERD (gastroesophageal reflux disease) K21.9    Psoriasis L40.9    Paroxysmal atrial fibrillation (HCC) I48.0    Hepatic steatosis K76.0    COPD, severity to be determined ([address] J44.9    Sarcoidosis of lymph nodes D86.1     Past Medical History:   Diagnosis Date    Abnormal chest x-ray 12/23/2009    Anemia 12/23/2009    Arrhythmia     SVT.  Converts with [facility]    Atrial fibrillation ([facility] 2009    Carpal tunnel syndrome 2009    Chronic obstructive pulmonary disease [facility]     Colitis 2009    Complex regional pain syndrome 2009    Depression 2009    Diabetes ([facility]     Family history of skin cancer     brother-melanoma    GERD (gastroesophageal reflux disease) 2010    HTN (hypertension) 2009    Hypercholesteremia 2009    Hypertriglyceridemia 2009    Idiopathic peripheral neuropathy 2009    Iron deficiency anemia 2009    Osteopenia 2009    Paroxysmal atrial fibrillation ([facility] 2011    Psoriasis 2012    Sarcoidosis      Past Surgical History:   Procedure Laterality Date    HX CARPAL TUNNEL RELEASE      Both hands    HX CYST REMOVAL      Left wrist    HX HYSTERECTOMY      HX ORTHOPAEDIC      back surgery (disc)    HX ORTHOPAEDIC      right foot X 3 neuromas and tarsal tunnel release    HX TONSILLECTOMY      NEUROLOGICAL PROCEDURE UNLISTED      Pain pump    PAIN PUMP DEVICE      in left lower abdomen (for foot pain)     Social History     Socioeconomic History    Marital status:      Spouse name: Not on file    Number of children: Not on file    Years of education: Not on file    Highest education level: Not on file   Tobacco Use    Smoking status: Former Smoker     Packs/day: 0.10     Years: 2.00     Pack years: 0.20     Quit date: 1975     Years since quittin.4    Smokeless tobacco: Never Used   Substance and Sexual Activity    Alcohol use: No    Drug use: No     Social Determinants of Health     Financial Resource Strain:     Difficulty of Paying Living Expenses:    Food Insecurity:     Worried About Running Out of Food in the Last Year:     Ran Out of Food in the Last Year:    Transportation Needs:     Lack of Transportation (Medical):      Lack of Transportation (Non-Medical):    Physical Activity:     Days of Exercise per Week:     Minutes of Exercise per Session:    Stress:     Feeling of Stress :    Social Connections:     Frequency of Communication with Friends and Family:     Frequency of Social Gatherings with Friends and Family:     Attends [employer] Services:     Active Member of Clubs or Organizations:     Attends Club or Organization Meetings:     Marital Status:      Family History   Problem Relation Age of Onset    Arrhythmia Father     Heart Attack Father     Cancer Brother         Melanoma    Breast Cancer Daughter         48    Breast Cancer Daughter 46    Breast Cancer Daughter 46     Allergies   Allergen Reactions    Duragesic [Fentanyl] Other (comments)     Patient sees things when taking    Codeine Nausea Only       MEDICATIONS  Current Outpatient Medications   Medication Sig    gabapentin (NEURONTIN) 600 mg tablet Take 1 Tablet by mouth two (2) times a day. Max Daily Amount: 1,200 mg.    losartan (COZAAR) 100 mg tablet TAKE 1 TABLET BY MOUTH  DAILY    atorvastatin (LIPITOR) 40 mg tablet TAKE 1 TABLET BY MOUTH AT  NIGHT    amitriptyline (ELAVIL) 100 mg tablet Take 1 Tab by mouth nightly.  hydrocortisone (HYTONE) 2.5 % topical cream     metoprolol tartrate (LOPRESSOR) 50 mg tablet TAKE 1 TAB BY MOUTH TWO (2) TIMES A DAY.  fluorouracil (EFUDEX) 5 % chemo cream Apply  to affected area two (2) times a day.  omeprazole (PRILOSEC) 20 mg capsule Take  by mouth.  albuterol (PROVENTIL HFA, VENTOLIN HFA, PROAIR HFA) 90 mcg/actuation inhaler Take 2 Puffs by inhalation every four (4) hours as needed for Wheezing. (Patient taking differently: Take 2 Puffs by inhalation every four (4) hours as needed for Wheezing.)    aspirin 81 mg chewable tablet Take 1 Tab by mouth daily.  nystatin (MYCOSTATIN) powder APPLY TO AFFECTED AREA THREE (3) TIMES DAILY.  (Patient not taking: Reported on 6/11/2021)    B complx-C-folic-zinc-copper-E 043 mg-400 mcg- 24 mg-3 mg tab Take 1 Tab by mouth. (Patient not taking: Reported on 6/11/2021)    ketoconazole (NIZORAL) 2 % topical cream Apply  to affected area daily. (Patient not taking: Reported on 6/11/2021)    triamcinolone acetonide (KENALOG) 0.1 % topical cream Apply  to affected area two (2) times a day. use thin layer (Patient not taking: Reported on 6/11/2021)     No current facility-administered medications for this visit. REVIEW OF SYSTEMS  Per HPI        Visit Vitals  BP (!) 140/85   Pulse 80   Temp 98.2 °F (36.8 °C) (Oral)   Resp 16   Ht 5' 7\" (1.702 m)   Wt 170 lb (77.1 kg)   SpO2 95%   BMI 26.63 kg/m²         General: Well-developed, well-nourished. In no distress. A&O x 3. Head: Normocephalic, atraumatic. Eyes: Conjunctiva clear. Ecchymosis below left eye that is healing. Neck: Supple, symmetrical, trachea midline  Lungs: Clear to auscultation bilaterally. No crackles or wheezes. No use of accessory muscles. Speaks in full sentences without SOB. Chest Wall: No tenderness or deformity. Heart: RRR, normal S1 and S2, no murmur, click, rub, or gallop. Skin: No rashes or lesions. Neurological: CN II-XII intact. Neurovasc: No edema appreciated. Dorsalis pedis pulses are 2+ on the right side, and 2+ on the left side. Posterior tibial pulses are 2+ on the right side, and 2+ on the left side. Musculoskeletal: Gait normal.   Psychiatric: Normal mood and affect.  Behavior is normal.   Diabetic foot exam:     Left Foot:   Visual Exam: normal    Pulse DP: 2+ (normal)   Filament test: 2/6   Vibratory sensation: Vibratory sensation: normal       Right Foot:   Visual Exam: normal    Pulse DP: 2+ (normal)   Filament test: 2/6   Vibratory sensation: Vibratory sensation: normal        CT Results (most recent):  Results from Hospital Encounter encounter on 03/06/21    CT HEAD WO CONT    Narrative  EXAM:  CT HEAD WO CONT    INDICATION:   [facility]    COMPARISON: CT head 7/19/2018. TECHNIQUE: Unenhanced CT of the head was performed using 5 mm images. Brain and  bone windows were generated. CT dose reduction was achieved through use of a  standardized protocol tailored for this examination and automatic exposure  control for dose modulation. FINDINGS:  The ventricles are normal in size and position. 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 Basilar cisterns are patent. No midline shift. There is no evidence of acute infarct, hemorrhage, or extraaxial fluid  collection. The paranasal sinuses, mastoid air cells, and middle ears are clear. The orbital  contents are within normal limits with bilateral lens implants. Mild right  parietal scalp swelling/hematoma. Impression  1. No evidence of acute intracranial abnormality. Mild right parietal scalp  swelling/hematoma. Lab Results   Component Value Date/Time    Vitamin B12 450 08/31/2015 02:46 PM         Results for orders placed or performed in visit on 06/11/21   AMB POC HEMOGLOBIN A1C   Result Value Ref Range    Hemoglobin A1c (POC) 6.1 %       ASSESSMENT and PLAN  Diagnoses and all orders for this visit:    1. Medicare annual wellness visit, subsequent    2. Diabetic peripheral neuropathy associated with type 2 diabetes mellitus (HCC)  -     METABOLIC PANEL, COMPREHENSIVE; Future  -     MICROALBUMIN, UR, RAND W/ MICROALB/CREAT RATIO; Future  -     LIPID PANEL; Future  -      DIABETES FOOT EXAM  -     AMB POC HEMOGLOBIN A1C  -     gabapentin (NEURONTIN) 600 mg tablet; Take 1 Tablet by mouth two (2) times a day. Max Daily Amount: 1,200 mg. A1C looks great, will get addtl labs as above  Neuropathy stable on gabapentin    3. Hypertension, essential  -     AMB SUPPLY ORDER  Within goal for age    3. Paroxysmal atrial fibrillation (HCC)  Follows with Cards, asymptomatic    5. Hypercholesteremia  -     LIPID PANEL; Future    6.  At high risk for falls  Head CT in ED normal. Suspect falls due to clumsiness and chronic neuropathy. Discussed using her cane when she goes out, watching where she steps, wearing solid shoes at all times. Consider PT for balance/gait if has additional falls. Patient Instructions       Medicare Wellness Visit, Female     The best way to live healthy is to have a lifestyle where you eat a well-balanced diet, exercise regularly, limit alcohol use, and quit all forms of tobacco/nicotine, if applicable. Regular preventive services are another way to keep healthy. Preventive services (vaccines, screening tests, monitoring & exams) can help personalize your care plan, which helps you manage your own care. Screening tests can find health problems at the earliest stages, when they are easiest to treat. Mike follows the current, evidence-based guidelines published by the [facility] ([facility]) when recommending preventive services for our patients. Because we follow these guidelines, sometimes recommendations change over time as research supports it. (For example, mammograms used to be recommended annually. Even though Medicare will still pay for an annual mammogram, the newer guidelines recommend a mammogram every two years for women of average risk). Of course, you and your doctor may decide to screen more often for some diseases, based on your risk and your co-morbidities (chronic disease you are already diagnosed with). Preventive services for you include:  - Medicare offers their members a free annual wellness visit, which is time for you and your primary care provider to discuss and plan for your preventive service needs. Take advantage of this benefit every year!  -All adults over the age of 72 should receive the recommended pneumonia vaccines.  Current USPSTF guidelines recommend a series of two vaccines for the best pneumonia protection.   -All adults should have a flu vaccine yearly and a tetanus vaccine every 10 years.   -All adults age 48 and older should receive the shingles vaccines (series of two vaccines). -All adults age 38-68 who are overweight should have a diabetes screening test once every three years.   -All adults born between [birth year] and 1965 should be screened once for Hepatitis C.  -Other screening tests and preventive services for persons with diabetes include: an eye exam to screen for diabetic retinopathy, a kidney function test, a foot exam, and stricter control over your cholesterol.   -Cardiovascular screening for adults with routine risk involves an electrocardiogram (ECG) at intervals determined by your doctor.   -Colorectal cancer screenings should be done for adults age 54-65 with no increased risk factors for colorectal cancer. There are a number of acceptable methods of screening for this type of cancer. Each test has its own benefits and drawbacks. Discuss with your doctor what is most appropriate for you during your annual wellness visit. The different tests include: colonoscopy (considered the best screening method), a fecal occult blood test, a fecal DNA test, and sigmoidoscopy.    -A bone mass density test is recommended when a woman turns 65 to screen for osteoporosis. This test is only recommended one time, as a screening. Some providers will use this same test as a disease monitoring tool if you already have osteoporosis. -Breast cancer screenings are recommended every other year for women of normal risk, age 54-69.  -Cervical cancer screenings for women over age 72 are only recommended with certain risk factors.      Here is a list of your current Health Maintenance items (your personalized list of preventive services) with a due date:  Health Maintenance Due   Topic Date Due    COVID-19 Vaccine (1) Never done    Shingles Vaccine (1 of 2) Never done    Eye Exam  06/23/2017    Hemoglobin A1C    12/17/2019    Diabetic Foot Care  06/17/2020    Albumin Urine Test 06/20/2020    Cholesterol Test   06/20/2020            High Blood Pressure: Care Instructions  Overview     It's normal for blood pressure to go up and down throughout the day. But if it stays up, you have high blood pressure. Another name for high blood pressure is hypertension. Despite what a lot of people think, high blood pressure usually doesn't cause headaches or make you feel dizzy or lightheaded. It usually has no symptoms. But it does increase your risk of stroke, heart attack, and other problems. You and your doctor will talk about your risks of these problems based on your blood pressure. Your doctor will give you a goal for your blood pressure. Your goal will be based on your health and your age. Lifestyle changes, such as eating healthy and being active, are always important to help lower blood pressure. You might also take medicine to reach your blood pressure goal.  Follow-up care is a key part of your treatment and safety. Be sure to make and go to all appointments, and call your doctor if you are having problems. It's also a good idea to know your test results and keep a list of the medicines you take. How can you care for yourself at home? Medical treatment  · If you stop taking your medicine, your blood pressure will go back up. You may take one or more types of medicine to lower your blood pressure. Be safe with medicines. Take your medicine exactly as prescribed. Call your doctor if you think you are having a problem with your medicine. · Talk to your doctor before you start taking aspirin every day. Aspirin can help certain people lower their risk of a heart attack or stroke. But taking aspirin isn't right for everyone, because it can cause serious bleeding. · See your doctor regularly. You may need to see the doctor more often at first or until your blood pressure comes down.   · If you are taking blood pressure medicine, talk to your doctor before you take decongestants or anti-inflammatory medicine, such as ibuprofen. Some of these medicines can raise blood pressure. · Learn how to check your blood pressure at home. Lifestyle changes  · Stay at a healthy weight. This is especially important if you put on weight around the waist. Losing even 10 pounds can help you lower your blood pressure. · If your doctor recommends it, get more exercise. Walking is a good choice. Bit by bit, increase the amount you walk every day. Try for at least 30 minutes on most days of the week. You also may want to swim, bike, or do other activities. · Avoid or limit alcohol. Talk to your doctor about whether you can drink any alcohol. · Try to limit how much sodium you eat to less than 2,300 milligrams (mg) a day. Your doctor may ask you to try to eat less than 1,500 mg a day. · Eat plenty of fruits (such as bananas and oranges), vegetables, legumes, whole grains, and low-fat dairy products. · Lower the amount of saturated fat in your diet. Saturated fat is found in animal products such as milk, cheese, and meat. Limiting these foods may help you lose weight and also lower your risk for heart disease. · Do not smoke. Smoking increases your risk for heart attack and stroke. If you need help quitting, talk to your doctor about stop-smoking programs and medicines. These can increase your chances of quitting for good. When should you call for help? Call  911 anytime you think you may need emergency care. This may mean having symptoms that suggest that your blood pressure is causing a serious heart or blood vessel problem. Your blood pressure may be over 180/120. For example, call 911 if:    · You have symptoms of a heart attack. These may include:  ? Chest pain or pressure, or a strange feeling in the chest.  ? Sweating. ? Shortness of breath. ? Nausea or vomiting. ? Pain, pressure, or a strange feeling in the back, neck, jaw, or upper belly or in one or both shoulders or arms.   ? Lightheadedness or sudden weakness. ? A fast or irregular heartbeat.     · You have symptoms of a stroke. These may include:  ? Sudden numbness, tingling, weakness, or loss of movement in your face, arm, or leg, especially on only one side of your body. ? Sudden vision changes. ? Sudden trouble speaking. ? Sudden confusion or trouble understanding simple statements. ? Sudden problems with walking or balance. ? A sudden, severe headache that is different from past headaches.     · You have severe back or belly pain. Do not wait until your blood pressure comes down on its own. Get help right away. Call your doctor now or seek immediate care if:    · Your blood pressure is much higher than normal (such as 180/120 or higher), but you don't have symptoms.     · You think high blood pressure is causing symptoms, such as:  ? Severe headache.  ? Blurry vision. Watch closely for changes in your health, and be sure to contact your doctor if:    · Your blood pressure measures higher than your doctor recommends at least 2 times. That means the top number is higher or the bottom number is higher, or both.     · You think you may be having side effects from your blood pressure medicine. Where can you learn more? Go to http://www.gray.com/  Enter [id number] in the search box to learn more about \"High Blood Pressure: Care Instructions. \"  Current as of: August 31, 2020               Content Version: 12.8  © 9444-3028 [employer]. Care instructions adapted under license by [employer] (which disclaims liability or warranty for this information). If you have questions about a medical condition or this instruction, always ask your healthcare professional. [place] [address] any warranty or liability for your use of this information.              Preventing Falls: Care Instructions  Your Care Instructions    Getting around your home safely can be a challenge if you have injuries or health problems that make it easy for you to fall. Loose rugs and furniture in walkways are among the dangers for many older people who have problems walking or who have poor eyesight. People who have conditions such as arthritis, osteoporosis, or dementia also have to be careful not to fall. You can make your home safer with a few simple measures. Follow-up care is a key part of your treatment and safety. Be sure to make and go to all appointments, and call your doctor if you are having problems. It's also a good idea to know your test results and keep a list of the medicines you take. How can you care for yourself at home? Taking care of yourself  · You may get dizzy if you do not drink enough water. To prevent dehydration, drink plenty of fluids, enough so that your urine is light yellow or clear like water. Choose water and other caffeine-free clear liquids. If you have kidney, heart, or liver disease and have to limit fluids, talk with your doctor before you increase the amount of fluids you drink. · Exercise regularly to improve your strength, muscle tone, and balance. Walk if you can. Swimming may be a good choice if you cannot walk easily. · Have your vision and hearing checked each year or any time you notice a change. If you have trouble seeing and hearing, you might not be able to avoid objects and could lose your balance. · Know the side effects of the medicines you take. Ask your doctor or pharmacist whether the medicines you take can affect your balance. Sleeping pills or sedatives can affect your balance. · Limit the amount of alcohol you drink. Alcohol can impair your balance and other senses. · Ask your doctor whether calluses or corns on your feet need to be removed. If you wear loose-fitting shoes because of calluses or corns, you can lose your balance and fall. · Talk to your doctor if you have numbness in your feet.   Preventing falls at home  · Remove raised doorway thresholds, throw rugs, and clutter. Repair loose carpet or raised areas in the floor. · Move furniture and electrical cords to keep them out of walking paths. · Use nonskid floor wax, and wipe up spills right away, especially on ceramic tile floors. · If you use a walker or cane, put rubber tips on it. If you use crutches, clean the bottoms of them regularly with an abrasive pad, such as steel wool. · Keep your house well lit, especially [place], and outside walkways. Use night-lights in areas such as hallways and bathrooms. Add extra light switches or use remote switches (such as switches that go on or off when you clap your hands) to make it easier to turn lights on if you have to get up during the night. · Install sturdy handrails on stairways. · Move items in your cabinets so that the things you use a lot are on the lower shelves (about waist level). · Keep a cordless phone and a flashlight with new batteries by your bed. If possible, put a phone in each of the main rooms of your house, or carry a cell phone in case you fall and cannot reach a phone. Or, you can wear a device around your neck or wrist. You push a button that sends a signal for help. · Wear low-heeled shoes that fit well and give your feet good support. Use footwear with nonskid soles. Check the heels and soles of your shoes for wear. Repair or replace worn heels or soles. · Do not wear socks without shoes on wood floors. · Walk on the grass when the sidewalks are slippery. If you live in an area that gets snow and ice in the winter, sprinkle salt on slippery steps and sidewalks. Preventing falls in the bath  · Install grab bars and nonskid mats inside and outside your shower or tub and near the toilet and sinks. · Use shower chairs and bath benches. · Use a hand-held shower head that will allow you to sit while showering.   · Get into a tub or shower by putting the weaker leg in first. Get out of a tub or shower with your strong side first.  · Repair loose toilet seats and consider installing a raised toilet seat to make getting on and off the toilet easier. · Keep your bathroom door unlocked while you are in the shower. Where can you learn more? Go to http://www.[employer].com/. Enter [phone] in the search box to learn more about \"Preventing Falls: Care Instructions. \"  Current as of: March 16, 2018  Content Version: 11.8  © 0576-1301 [employer] / [employer]. Care instructions adapted under license by [employer] (which disclaims liability or warranty for this information). If you have questions about a medical condition or this instruction, always ask your healthcare professional. [place] [address] any warranty or liability for your use of this information. Please keep your follow-up appointment with Mary Beth Carty NP. Health Maintenance Due   Topic Date Due    COVID-19 Vaccine (1) Never done    Shingrix Vaccine Age 50> (1 of 2) Never done    Eye Exam Retinal or Dilated  06/23/2017    A1C test (Diabetic or Prediabetic)  12/17/2019    Medicare Yearly Exam  06/17/2020    Foot Exam Q1  06/17/2020    MICROALBUMIN Q1  06/20/2020    Lipid Screen  06/20/2020       I have discussed the diagnosis with the patient and the intended plan as seen in the above orders. Patient is in agreement. The patient has received an after-visit summary and questions were answered concerning future plans. I have discussed medication side effects and warnings with the patient as well. Warning signs for the above conditions were discussed including when to call our office or go to the emergency room. The nurse provided the patient and/or family with advanced directive information if needed and encouraged the patient to provide a copy to the office when available.   This is the Subsequent Medicare Annual Wellness Exam, performed 12 months or more after the Initial AWV or the last Subsequent MARLA    I have reviewed the patient's medical history in detail and updated the computerized patient record. Assessment/Plan   Education and counseling provided:  Are appropriate based on today's review and evaluation         Depression Risk Factor Screening     3 most recent PHQ Screens 6/11/2021   Little interest or pleasure in doing things Not at all   Feeling down, depressed, irritable, or hopeless Not at all   Total Score PHQ 2 0   Trouble falling or staying asleep, or sleeping too much -   Feeling tired or having little energy -   Poor appetite, weight loss, or overeating -   Feeling bad about yourself - or that you are a failure or have let yourself or your family down -   Trouble concentrating on things such as school, work, reading, or watching TV -   Moving or speaking so slowly that other people could have noticed; or the opposite being so fidgety that others notice -   Thoughts of being better off dead, or hurting yourself in some way -   PHQ 9 Score -   How difficult have these problems made it for you to do your work, take care of your home and get along with others -       Alcohol Risk Screen    Do you average more than 1 drink per night or more than 7 drinks a week:  No    On any one occasion in the past three months have you have had more than 3 drinks containing alcohol:  No        Functional Ability and Level of Safety    Hearing: Hearing is good. Activities of Daily Living: The home contains: no safety equipment. Patient does total self care      Ambulation: uses cane when goes out     Fall Risk:  Fall Risk Assessment, last 12 mths 6/11/2021   Able to walk? Yes   Fall in past 12 months? 1   Do you feel unsteady? 1   Are you worried about falling 1   Number of falls in past 12 months 2   Fall with injury?  1      Abuse Screen:  Patient is not abused       Cognitive Screening    Has your family/caregiver stated any concerns about your memory: no     Cognitive Screening: not indicated    Health Maintenance Due     Health Maintenance Due   Topic Date Due    COVID-19 Vaccine (1) Never done    Shingrix Vaccine Age 50> (1 of 2) Never done    Eye Exam Retinal or Dilated  06/23/2017    A1C test (Diabetic or Prediabetic)  12/17/2019    Foot Exam Q1  06/17/2020    MICROALBUMIN Q1  06/20/2020    Lipid Screen  06/20/2020       Patient Care Team   Patient Care Team:  Delicia Garcia NP as PCP - General (Nurse Practitioner)  Delicia Garcia NP as PCP - [facility] Provider  Arna Bamberger, MD (Physical Medicine and Rehabilitation)  Warren Narayan MD (Ophthalmology)    History     Patient Active Problem List   Diagnosis Code    Headache(784.0) R51    Carpal tunnel syndrome G56.00    Hypercholesteremia E78.00    Depression F32.9    Abnormal chest x-ray R93.89    Complex regional pain syndrome [id number]    Diabetic peripheral neuropathy associated with type 2 diabetes mellitus ([address] E11.42    Hypertension, essential I10    Controlled type 2 diabetes mellitus with diabetic polyneuropathy, without long-term current use of insulin ([address] E11.42    Osteopenia M85.80    Vitamin D deficiency E55.9    GERD (gastroesophageal reflux disease) K21.9    Psoriasis L40.9    Paroxysmal atrial fibrillation (HCC) I48.0    Hepatic steatosis K76.0    COPD, severity to be determined ([address] J44.9    Sarcoidosis of lymph nodes D86.1     Past Medical History:   Diagnosis Date    Abnormal chest x-ray 12/23/2009    Anemia 12/23/2009    Arrhythmia     SVT.  Converts with [employer]    Atrial fibrillation ([address] 12/23/2009    Carpal tunnel syndrome 12/23/2009    Chronic obstructive pulmonary disease ([address]     Colitis 12/23/2009    Complex regional pain syndrome 12/23/2009    Depression 12/23/2009    Diabetes ([address]     Family history of skin cancer     brother-melanoma    GERD (gastroesophageal reflux disease) 2/8/2010    HTN (hypertension) 12/23/2009    Hypercholesteremia 12/23/2009  Hypertriglyceridemia 12/23/2009    Idiopathic peripheral neuropathy 12/23/2009    Iron deficiency anemia 12/23/2009    Osteopenia 12/23/2009    Paroxysmal atrial fibrillation ([address] 12/2/2011    Psoriasis 6/27/2012    Sarcoidosis       Past Surgical History:   Procedure Laterality Date    HX CARPAL TUNNEL RELEASE  00/00/2002    Both hands    HX CYST REMOVAL  00/00/1965    Left wrist    HX HYSTERECTOMY  00/00/1985    HX ORTHOPAEDIC  00/00/1987    back surgery (disc)    HX ORTHOPAEDIC      right foot X 3 neuromas and tarsal tunnel release    HX TONSILLECTOMY  00/00/1962    NEUROLOGICAL PROCEDURE UNLISTED      Pain pump    PAIN PUMP DEVICE  00/00/2007    in left lower abdomen (for foot pain)     Current Outpatient Medications   Medication Sig Dispense Refill    gabapentin (NEURONTIN) 600 mg tablet Take 1 Tablet by mouth two (2) times a day. Max Daily Amount: 1,200 mg. 8 Tablet 0    losartan (COZAAR) 100 mg tablet TAKE 1 TABLET BY MOUTH  DAILY 90 Tab 3    atorvastatin (LIPITOR) 40 mg tablet TAKE 1 TABLET BY MOUTH AT  NIGHT 90 Tab 3    amitriptyline (ELAVIL) 100 mg tablet Take 1 Tab by mouth nightly. 90 Tab 1    hydrocortisone (HYTONE) 2.5 % topical cream       metoprolol tartrate (LOPRESSOR) 50 mg tablet TAKE 1 TAB BY MOUTH TWO (2) TIMES A DAY. 180 Tab 3    fluorouracil (EFUDEX) 5 % chemo cream Apply  to affected area two (2) times a day. 40 g 0    omeprazole (PRILOSEC) 20 mg capsule Take  by mouth.  albuterol (PROVENTIL HFA, VENTOLIN HFA, PROAIR HFA) 90 mcg/actuation inhaler Take 2 Puffs by inhalation every four (4) hours as needed for Wheezing. (Patient taking differently: Take 2 Puffs by inhalation every four (4) hours as needed for Wheezing.) 1 Inhaler 1    aspirin 81 mg chewable tablet Take 1 Tab by mouth daily. 90 Tab 3    nystatin (MYCOSTATIN) powder APPLY TO AFFECTED AREA THREE (3) TIMES DAILY.  (Patient not taking: Reported on 6/11/2021) 60 g 1    B complx-C-folic-zinc-copper-E 500 mg-400 mcg- 24 mg-3 mg tab Take 1 Tab by mouth. (Patient not taking: Reported on 2021)      ketoconazole (NIZORAL) 2 % topical cream Apply  to affected area daily. (Patient not taking: Reported on 2021) 60 g 1    triamcinolone acetonide (KENALOG) 0.1 % topical cream Apply  to affected area two (2) times a day.  use thin layer (Patient not taking: Reported on 2021) 454 g 0     Allergies   Allergen Reactions    Duragesic [Fentanyl] Other (comments)     Patient sees things when taking    Codeine Nausea Only       Family History   Problem Relation Age of Onset    Arrhythmia Father     Heart Attack Father     Cancer Brother         Melanoma    Breast Cancer Daughter         48    Breast Cancer Daughter 46    Breast Cancer Daughter 46     Social History     Tobacco Use    Smoking status: Former Smoker     Packs/day: 0.10     Years: 2.00     Pack years: 0.20     Quit date: 1975     Years since quittin.4    Smokeless tobacco: Never Used   Substance Use Topics    Alcohol use: No         Sherman Agarwal NP

## 2021-06-11 ENCOUNTER — OFFICE VISIT (OUTPATIENT)
Dept: INTERNAL MEDICINE CLINIC | Age: 76
End: 2021-06-11
Payer: MEDICARE

## 2021-06-11 VITALS
DIASTOLIC BLOOD PRESSURE: 85 MMHG | HEIGHT: 67 IN | RESPIRATION RATE: 16 BRPM | TEMPERATURE: 98.2 F | SYSTOLIC BLOOD PRESSURE: 140 MMHG | HEART RATE: 80 BPM | WEIGHT: 170 LBS | OXYGEN SATURATION: 95 % | BODY MASS INDEX: 26.68 KG/M2

## 2021-06-11 DIAGNOSIS — I48.0 PAROXYSMAL ATRIAL FIBRILLATION (HCC): ICD-10-CM

## 2021-06-11 DIAGNOSIS — Z91.81 AT HIGH RISK FOR FALLS: ICD-10-CM

## 2021-06-11 DIAGNOSIS — E11.42 DIABETIC PERIPHERAL NEUROPATHY ASSOCIATED WITH TYPE 2 DIABETES MELLITUS (HCC): ICD-10-CM

## 2021-06-11 DIAGNOSIS — I10 HYPERTENSION, ESSENTIAL: ICD-10-CM

## 2021-06-11 DIAGNOSIS — Z00.00 MEDICARE ANNUAL WELLNESS VISIT, SUBSEQUENT: Primary | ICD-10-CM

## 2021-06-11 DIAGNOSIS — E78.00 HYPERCHOLESTEREMIA: ICD-10-CM

## 2021-06-11 LAB — HBA1C MFR BLD HPLC: 6.1 %

## 2021-06-11 PROCEDURE — 1101F PT FALLS ASSESS-DOCD LE1/YR: CPT | Performed by: NURSE PRACTITIONER

## 2021-06-11 PROCEDURE — G8753 SYS BP > OR = 140: HCPCS | Performed by: NURSE PRACTITIONER

## 2021-06-11 PROCEDURE — G8754 DIAS BP LESS 90: HCPCS | Performed by: NURSE PRACTITIONER

## 2021-06-11 PROCEDURE — G8399 PT W/DXA RESULTS DOCUMENT: HCPCS | Performed by: NURSE PRACTITIONER

## 2021-06-11 PROCEDURE — 99214 OFFICE O/P EST MOD 30 MIN: CPT | Performed by: NURSE PRACTITIONER

## 2021-06-11 PROCEDURE — 1090F PRES/ABSN URINE INCON ASSESS: CPT | Performed by: NURSE PRACTITIONER

## 2021-06-11 PROCEDURE — G8427 DOCREV CUR MEDS BY ELIG CLIN: HCPCS | Performed by: NURSE PRACTITIONER

## 2021-06-11 PROCEDURE — G8536 NO DOC ELDER MAL SCRN: HCPCS | Performed by: NURSE PRACTITIONER

## 2021-06-11 PROCEDURE — G0439 PPPS, SUBSEQ VISIT: HCPCS | Performed by: NURSE PRACTITIONER

## 2021-06-11 PROCEDURE — 83036 HEMOGLOBIN GLYCOSYLATED A1C: CPT | Performed by: NURSE PRACTITIONER

## 2021-06-11 PROCEDURE — G8419 CALC BMI OUT NRM PARAM NOF/U: HCPCS | Performed by: NURSE PRACTITIONER

## 2021-06-11 PROCEDURE — G9717 DOC PT DX DEP/BP F/U NT REQ: HCPCS | Performed by: NURSE PRACTITIONER

## 2021-06-11 RX ORDER — GABAPENTIN 600 MG/1
600 TABLET ORAL 2 TIMES DAILY
Qty: 180 TABLET | Refills: 0 | Status: SHIPPED | OUTPATIENT
Start: 2021-06-11 | End: 2021-09-20 | Stop reason: SDUPTHER

## 2021-06-11 NOTE — PATIENT INSTRUCTIONS
Medicare Wellness Visit, Female The best way to live healthy is to have a lifestyle where you eat a well-balanced diet, exercise regularly, limit alcohol use, and quit all forms of tobacco/nicotine, if applicable. Regular preventive services are another way to keep healthy. Preventive services (vaccines, screening tests, monitoring & exams) can help personalize your care plan, which helps you manage your own care. Screening tests can find health problems at the earliest stages, when they are easiest to treat. [facility] follows the current, evidence-based guidelines published by the [facility] ([facility]) when recommending preventive services for our patients. Because we follow these guidelines, sometimes recommendations change over time as research supports it. (For example, mammograms used to be recommended annually. Even though Medicare will still pay for an annual mammogram, the newer guidelines recommend a mammogram every two years for women of average risk). Of course, you and your doctor may decide to screen more often for some diseases, based on your risk and your co-morbidities (chronic disease you are already diagnosed with). Preventive services for you include: - Medicare offers their members a free annual wellness visit, which is time for you and your primary care provider to discuss and plan for your preventive service needs. Take advantage of this benefit every year! 
-All adults over the age of 72 should receive the recommended pneumonia vaccines. Current USPSTF guidelines recommend a series of two vaccines for the best pneumonia protection.  
-All adults should have a flu vaccine yearly and a tetanus vaccine every 10 years.  
-All adults age 48 and older should receive the shingles vaccines (series of two vaccines).      
-All adults age 38-68 who are overweight should have a diabetes screening test once every three years.  
-All adults born between [birth year] and 1967 should be screened once for Hepatitis C. 
-Other screening tests and preventive services for persons with diabetes include: an eye exam to screen for diabetic retinopathy, a kidney function test, a foot exam, and stricter control over your cholesterol.  
-Cardiovascular screening for adults with routine risk involves an electrocardiogram (ECG) at intervals determined by your doctor.  
-Colorectal cancer screenings should be done for adults age 54-65 with no increased risk factors for colorectal cancer. There are a number of acceptable methods of screening for this type of cancer. Each test has its own benefits and drawbacks. Discuss with your doctor what is most appropriate for you during your annual wellness visit. The different tests include: colonoscopy (considered the best screening method), a fecal occult blood test, a fecal DNA test, and sigmoidoscopy. 
 
-A bone mass density test is recommended when a woman turns 65 to screen for osteoporosis. This test is only recommended one time, as a screening. Some providers will use this same test as a disease monitoring tool if you already have osteoporosis. -Breast cancer screenings are recommended every other year for women of normal risk, age 54-69. 
-Cervical cancer screenings for women over age 72 are only recommended with certain risk factors. Here is a list of your current Health Maintenance items (your personalized list of preventive services) with a due date: 
Health Maintenance Due Topic Date Due  
 COVID-19 Vaccine (1) Never done  Shingles Vaccine (1 of 2) Never done [facility] Eye Exam  06/23/2017  Hemoglobin A1C    12/17/2019 [facility] Diabetic Foot Care  06/17/2020  Albumin Urine Test  06/20/2020  Cholesterol Test   06/20/2020 High Blood Pressure: Care Instructions Overview It's normal for blood pressure to go up and down throughout the day. But if it stays up, you have high blood pressure.  Another name for high blood pressure is hypertension. Despite what a lot of people think, high blood pressure usually doesn't cause headaches or make you feel dizzy or lightheaded. It usually has no symptoms. But it does increase your risk of stroke, heart attack, and other problems. You and your doctor will talk about your risks of these problems based on your blood pressure. Your doctor will give you a goal for your blood pressure. Your goal will be based on your health and your age. Lifestyle changes, such as eating healthy and being active, are always important to help lower blood pressure. You might also take medicine to reach your blood pressure goal. 
Follow-up care is a key part of your treatment and safety. Be sure to make and go to all appointments, and call your doctor if you are having problems. It's also a good idea to know your test results and keep a list of the medicines you take. How can you care for yourself at home? Medical treatment · If you stop taking your medicine, your blood pressure will go back up. You may take one or more types of medicine to lower your blood pressure. Be safe with medicines. Take your medicine exactly as prescribed. Call your doctor if you think you are having a problem with your medicine. · Talk to your doctor before you start taking aspirin every day. Aspirin can help certain people lower their risk of a heart attack or stroke. But taking aspirin isn't right for everyone, because it can cause serious bleeding. · See your doctor regularly. You may need to see the doctor more often at first or until your blood pressure comes down. · If you are taking blood pressure medicine, talk to your doctor before you take decongestants or anti-inflammatory medicine, such as ibuprofen. Some of these medicines can raise blood pressure. · Learn how to check your blood pressure at home. Lifestyle changes · Stay at a healthy weight.  This is especially important if you put on weight around the waist. Losing even 10 pounds can help you lower your blood pressure. · If your doctor recommends it, get more exercise. Walking is a good choice. Bit by bit, increase the amount you walk every day. Try for at least 30 minutes on most days of the week. You also may want to swim, bike, or do other activities. · Avoid or limit alcohol. Talk to your doctor about whether you can drink any alcohol. · Try to limit how much sodium you eat to less than 2,300 milligrams (mg) a day. Your doctor may ask you to try to eat less than 1,500 mg a day. · Eat plenty of fruits (such as bananas and oranges), vegetables, legumes, whole grains, and low-fat dairy products. · Lower the amount of saturated fat in your diet. Saturated fat is found in animal products such as milk, cheese, and meat. Limiting these foods may help you lose weight and also lower your risk for heart disease. · Do not smoke. Smoking increases your risk for heart attack and stroke. If you need help quitting, talk to your doctor about stop-smoking programs and medicines. These can increase your chances of quitting for good. When should you call for help? Call  911 anytime you think you may need emergency care. This may mean having symptoms that suggest that your blood pressure is causing a serious heart or blood vessel problem. Your blood pressure may be over 180/120. For example, call 911 if: 
  · You have symptoms of a heart attack. These may include: 
? Chest pain or pressure, or a strange feeling in the chest. 
? Sweating. ? Shortness of breath. ? Nausea or vomiting. ? Pain, pressure, or a strange feeling in the back, neck, jaw, or upper belly or in one or both shoulders or arms. ? Lightheadedness or sudden weakness. ? A fast or irregular heartbeat.  
  · You have symptoms of a stroke. These may include: 
? Sudden numbness, tingling, weakness, or loss of movement in your face, arm, or leg, especially on only one side of your body.  
? Sudden vision changes. ? Sudden trouble speaking. ? Sudden confusion or trouble understanding simple statements. ? Sudden problems with walking or balance. ? A sudden, severe headache that is different from past headaches.  
  · You have severe back or belly pain. Do not wait until your blood pressure comes down on its own. Get help right away. Call your doctor now or seek immediate care if: 
  · Your blood pressure is much higher than normal (such as 180/120 or higher), but you don't have symptoms.  
  · You think high blood pressure is causing symptoms, such as: 
? Severe headache. 
? Blurry vision. Watch closely for changes in your health, and be sure to contact your doctor if: 
  · Your blood pressure measures higher than your doctor recommends at least 2 times. That means the top number is higher or the bottom number is higher, or both.  
  · You think you may be having side effects from your blood pressure medicine. Where can you learn more? Go to http://www.gray.com/ Enter H474 in the search box to learn more about \"High Blood Pressure: Care Instructions. \" Current as of: August 31, 2020               Content Version: 12.8 © 4248-8381 [employer]. Care instructions adapted under license by [employer] (which disclaims liability or warranty for this information). If you have questions about a medical condition or this instruction, always ask your healthcare professional. [place] [address] any warranty or liability for your use of this information. Preventing Falls: Care Instructions Your Care Instructions Getting around your home safely can be a challenge if you have injuries or health problems that make it easy for you to fall. Loose rugs and furniture in walkways are among the dangers for many older people who have problems walking or who have poor eyesight.  People who have conditions such as arthritis, osteoporosis, or dementia also have to be careful not to fall. You can make your home safer with a few simple measures. Follow-up care is a key part of your treatment and safety. Be sure to make and go to all appointments, and call your doctor if you are having problems. It's also a good idea to know your test results and keep a list of the medicines you take. How can you care for yourself at home? Taking care of yourself · You may get dizzy if you do not drink enough water. To prevent dehydration, drink plenty of fluids, enough so that your urine is light yellow or clear like water. Choose water and other caffeine-free clear liquids. If you have kidney, heart, or liver disease and have to limit fluids, talk with your doctor before you increase the amount of fluids you drink. · Exercise regularly to improve your strength, muscle tone, and balance. Walk if you can. Swimming may be a good choice if you cannot walk easily. · Have your vision and hearing checked each year or any time you notice a change. If you have trouble seeing and hearing, you might not be able to avoid objects and could lose your balance. · Know the side effects of the medicines you take. Ask your doctor or pharmacist whether the medicines you take can affect your balance. Sleeping pills or sedatives can affect your balance. · Limit the amount of alcohol you drink. Alcohol can impair your balance and other senses. · Ask your doctor whether calluses or corns on your feet need to be removed. If you wear loose-fitting shoes because of calluses or corns, you can lose your balance and fall. · Talk to your doctor if you have numbness in your feet. Preventing falls at home · Remove raised doorway thresholds, throw rugs, and clutter. Repair loose carpet or raised areas in the floor. · Move furniture and electrical cords to keep them out of walking paths.  
· Use nonskid floor wax, and wipe up spills right away, especially on ceramic tile floors. · If you use a walker or cane, put rubber tips on it. If you use crutches, clean the bottoms of them regularly with an abrasive pad, such as steel wool. · Keep your house well lit, especially [place], and outside walkways. Use night-lights in areas such as hallways and bathrooms. Add extra light switches or use remote switches (such as switches that go on or off when you clap your hands) to make it easier to turn lights on if you have to get up during the night. · Install sturdy handrails on stairways. · Move items in your cabinets so that the things you use a lot are on the lower shelves (about waist level). · Keep a cordless phone and a flashlight with new batteries by your bed. If possible, put a phone in each of the main rooms of your house, or carry a cell phone in case you fall and cannot reach a phone. Or, you can wear a device around your neck or wrist. You push a button that sends a signal for help. · Wear low-heeled shoes that fit well and give your feet good support. Use footwear with nonskid soles. Check the heels and soles of your shoes for wear. Repair or replace worn heels or soles. · Do not wear socks without shoes on wood floors. · Walk on the grass when the sidewalks are slippery. If you live in an area that gets snow and ice in the winter, sprinkle salt on slippery steps and sidewalks. Preventing falls in the bath · Install grab bars and nonskid mats inside and outside your shower or tub and near the toilet and sinks. · Use shower chairs and bath benches. · Use a hand-held shower head that will allow you to sit while showering. · Get into a tub or shower by putting the weaker leg in first. Get out of a tub or shower with your strong side first. 
· Repair loose toilet seats and consider installing a raised toilet seat to make getting on and off the toilet easier. · Keep your bathroom door unlocked while you are in the shower. Where can you learn more?  
Go to http://www.gray.com/. Enter 0476 [id number] in the search box to learn more about \"Preventing Falls: Care Instructions. \" Current as of: March 16, 2018 Content Version: 11.8 © 4515-5240 [employer]. Care instructions adapted under license by [employer] (which disclaims liability or warranty for this information). If you have questions about a medical condition or this instruction, always ask your healthcare professional. [place] [address] any warranty or liability for your use of this information.

## 2021-06-11 NOTE — PROGRESS NOTES
Gabrielle Madden  Identified pt with two pt identifiers(name and ). Chief Complaint   Patient presents with    Medication Refill     Room 1B //       Reviewed record In preparation for visit and have obtained necessary documentation. 1. Have you been to the ER, urgent care clinic or hospitalized since your last visit? Yes. [address]    2. Have you seen or consulted any other health care providers outside of the [address] [place] [address] since your last visit? Include any pap smears or colon screening. No    Patient does not have an advance directive. Vitals reviewed with provider.     Health Maintenance reviewed:     Health Maintenance Due   Topic    COVID-19 Vaccine (1)    Shingrix Vaccine Age 49> (1 of 2)    Eye Exam Retinal or Dilated     A1C test (Diabetic or Prediabetic)     Foot Exam Q1     MICROALBUMIN Q1     Lipid Screen           Wt Readings from Last 3 Encounters:   21 170 lb (77.1 kg)   21 169 lb (76.7 kg)   20 177 lb (80.3 kg)        Temp Readings from Last 3 Encounters:   21 98.2 °F (36.8 °C) (Oral)   21 97.6 °F (36.4 °C) (Temporal)   21 98.8 °F (37.1 °C)        BP Readings from Last 3 Encounters:   21 (!) 155/117   21 139/77   21 (!) 160/84        Pulse Readings from Last 3 Encounters:   21 80   21 72   21 80        Vitals:    21 1428   BP: (!) 155/117   Pulse: 80   Resp: 16   Temp: 98.2 °F (36.8 °C)   TempSrc: Oral   SpO2: 95%   Weight: 170 lb (77.1 kg)   Height: 5' 7\" (1.702 m)   PainSc:   6   PainLoc: Head          Learning Assessment:   :       Learning Assessment 2014   PRIMARY LEARNER Patient   HIGHEST LEVEL OF EDUCATION - PRIMARY LEARNER  GRADUATED HIGH SCHOOL OR GED   BARRIERS PRIMARY LEARNER NONE   CO-LEARNER CAREGIVER No   PRIMARY LANGUAGE ENGLISH   LEARNER PREFERENCE PRIMARY DEMONSTRATION   ANSWERED BY patient   RELATIONSHIP SELF        Depression Screening:   :       3 most recent PHQ Screens 6/11/2021   Little interest or pleasure in doing things Not at all   Feeling down, depressed, irritable, or hopeless Not at all   Total Score PHQ 2 0   Trouble falling or staying asleep, or sleeping too much -   Feeling tired or having little energy -   Poor appetite, weight loss, or overeating -   Feeling bad about yourself - or that you are a failure or have let yourself or your family down -   Trouble concentrating on things such as school, work, reading, or watching TV -   Moving or speaking so slowly that other people could have noticed; or the opposite being so fidgety that others notice -   Thoughts of being better off dead, or hurting yourself in some way -   PHQ 9 Score -   How difficult have these problems made it for you to do your work, take care of your home and get along with others -        Fall Risk Assessment:   :       Fall Risk Assessment, last 12 mths 6/11/2021   Able to walk? Yes   Fall in past 12 months? 1   Do you feel unsteady? 1   Are you worried about falling 1   Number of falls in past 12 months 2   Fall with injury? 1        Abuse Screening:   :       Abuse Screening Questionnaire 6/11/2021 5/4/2020 6/17/2019 4/17/2018 4/21/2017 3/22/2016 8/6/2014   Do you ever feel afraid of your partner? N N N N N N N   Are you in a relationship with someone who physically or mentally threatens you? N N N N N N Y   Is it safe for you to go home?  Y Y Y Y Y Y Y        ADL Screening:   :       ADL Assessment 6/11/2021   Feeding yourself No Help Needed   Getting from bed to chair No Help Needed   Getting dressed No Help Needed   Bathing or showering No Help Needed   Walk across the room (includes cane/walker) No Help Needed   Using the telphone No Help Needed   Taking your medications No Help Needed   Preparing meals No Help Needed   Managing money (expenses/bills) No Help Needed   Moderately strenuous housework (laundry) No Help Needed   Shopping for personal items (toiletries/medicines) No Help Needed   Shopping for groceries No Help Needed   Driving No Help Needed   Climbing a flight of stairs No Help Needed   Getting to places beyond walking distances No Help Needed

## 2021-06-21 DIAGNOSIS — E11.42 CONTROLLED TYPE 2 DIABETES MELLITUS WITH DIABETIC POLYNEUROPATHY, WITHOUT LONG-TERM CURRENT USE OF INSULIN (HCC): ICD-10-CM

## 2021-06-21 RX ORDER — AMITRIPTYLINE HYDROCHLORIDE 100 MG/1
100 TABLET, FILM COATED ORAL
Qty: 90 TABLET | Refills: 1 | Status: SHIPPED | OUTPATIENT
Start: 2021-06-21 | End: 2021-12-05

## 2021-06-21 NOTE — TELEPHONE ENCOUNTER
[address] on file sent a fax requesting a refill of   Requested Prescriptions     Pending Prescriptions Disp Refills    amitriptyline (ELAVIL) 100 mg tablet 90 Tablet 1     Sig: Take 1 Tablet by mouth nightly.

## 2021-06-21 NOTE — TELEPHONE ENCOUNTER
PCP: Guy Ny NP     Last appt: 6/11/2021   Future Appointments   Date Time Provider Vaibhav Quintanilla   12/16/2021  2:30 PM Guy Ny NP [facility]        Requested Prescriptions     Pending Prescriptions Disp Refills    amitriptyline (ELAVIL) 100 mg tablet 90 Tablet 1     Sig: Take 1 Tablet by mouth nightly.

## 2021-07-13 ENCOUNTER — OFFICE VISIT (OUTPATIENT)
Dept: INTERNAL MEDICINE CLINIC | Age: 76
End: 2021-07-13
Payer: MEDICARE

## 2021-07-13 VITALS
HEIGHT: 67 IN | TEMPERATURE: 98.9 F | HEART RATE: 81 BPM | SYSTOLIC BLOOD PRESSURE: 138 MMHG | RESPIRATION RATE: 14 BRPM | BODY MASS INDEX: 27.31 KG/M2 | OXYGEN SATURATION: 91 % | DIASTOLIC BLOOD PRESSURE: 78 MMHG | WEIGHT: 174 LBS

## 2021-07-13 DIAGNOSIS — J44.1 COPD EXACERBATION (HCC): ICD-10-CM

## 2021-07-13 DIAGNOSIS — J20.9 ACUTE BRONCHITIS, UNSPECIFIED ORGANISM: Primary | ICD-10-CM

## 2021-07-13 DIAGNOSIS — M25.511 ACUTE PAIN OF RIGHT SHOULDER: ICD-10-CM

## 2021-07-13 PROBLEM — J20.8 ACUTE BRONCHITIS DUE TO OTHER SPECIFIED ORGANISMS: Status: ACTIVE | Noted: 2021-07-13

## 2021-07-13 PROCEDURE — G9717 DOC PT DX DEP/BP F/U NT REQ: HCPCS | Performed by: INTERNAL MEDICINE

## 2021-07-13 PROCEDURE — G8427 DOCREV CUR MEDS BY ELIG CLIN: HCPCS | Performed by: INTERNAL MEDICINE

## 2021-07-13 PROCEDURE — G8399 PT W/DXA RESULTS DOCUMENT: HCPCS | Performed by: INTERNAL MEDICINE

## 2021-07-13 PROCEDURE — 99213 OFFICE O/P EST LOW 20 MIN: CPT | Performed by: INTERNAL MEDICINE

## 2021-07-13 PROCEDURE — 1101F PT FALLS ASSESS-DOCD LE1/YR: CPT | Performed by: INTERNAL MEDICINE

## 2021-07-13 PROCEDURE — G8536 NO DOC ELDER MAL SCRN: HCPCS | Performed by: INTERNAL MEDICINE

## 2021-07-13 PROCEDURE — G8752 SYS BP LESS 140: HCPCS | Performed by: INTERNAL MEDICINE

## 2021-07-13 PROCEDURE — G8419 CALC BMI OUT NRM PARAM NOF/U: HCPCS | Performed by: INTERNAL MEDICINE

## 2021-07-13 PROCEDURE — 1090F PRES/ABSN URINE INCON ASSESS: CPT | Performed by: INTERNAL MEDICINE

## 2021-07-13 PROCEDURE — G8754 DIAS BP LESS 90: HCPCS | Performed by: INTERNAL MEDICINE

## 2021-07-13 RX ORDER — BENZONATATE 100 MG/1
100 CAPSULE ORAL
Qty: 21 CAPSULE | Refills: 0 | Status: SHIPPED | OUTPATIENT
Start: 2021-07-13 | End: 2021-07-13 | Stop reason: SDUPTHER

## 2021-07-13 RX ORDER — AMOXICILLIN AND CLAVULANATE POTASSIUM 875; 125 MG/1; MG/1
1 TABLET, FILM COATED ORAL EVERY 12 HOURS
Qty: 14 TABLET | Refills: 0 | Status: SHIPPED | OUTPATIENT
Start: 2021-07-13 | End: 2021-07-20

## 2021-07-13 RX ORDER — AMOXICILLIN AND CLAVULANATE POTASSIUM 875; 125 MG/1; MG/1
1 TABLET, FILM COATED ORAL EVERY 12 HOURS
Qty: 14 TABLET | Refills: 0 | Status: SHIPPED | OUTPATIENT
Start: 2021-07-13 | End: 2021-07-13 | Stop reason: SDUPTHER

## 2021-07-13 RX ORDER — PREDNISONE 20 MG/1
TABLET ORAL
Qty: 10 TABLET | Refills: 0 | Status: SHIPPED | OUTPATIENT
Start: 2021-07-13 | End: 2021-07-13 | Stop reason: SDUPTHER

## 2021-07-13 RX ORDER — BENZONATATE 100 MG/1
100 CAPSULE ORAL
Qty: 21 CAPSULE | Refills: 0 | Status: SHIPPED | OUTPATIENT
Start: 2021-07-13 | End: 2021-07-20

## 2021-07-13 RX ORDER — PREDNISONE 20 MG/1
TABLET ORAL
Qty: 10 TABLET | Refills: 0 | Status: SHIPPED | OUTPATIENT
Start: 2021-07-13 | End: 2022-03-03

## 2021-07-13 NOTE — PATIENT INSTRUCTIONS
Bronchitis: Care Instructions  Your Care Instructions     Bronchitis is inflammation of the bronchial tubes, which carry air to the lungs. The tubes swell and produce mucus, or phlegm. The mucus and inflamed bronchial tubes make you cough. You may have trouble breathing. Most cases of bronchitis are caused by viruses like those that cause colds. Antibiotics usually do not help and they may be harmful. Bronchitis usually develops rapidly and lasts about 2 to 3 weeks in otherwise healthy people. Follow-up care is a key part of your treatment and safety. Be sure to make and go to all appointments, and call your doctor if you are having problems. It's also a good idea to know your test results and keep a list of the medicines you take. How can you care for yourself at home? · Take all medicines exactly as prescribed. Call your doctor if you think you are having a problem with your medicine. · Get some extra rest.  · Take an over-the-counter pain medicine, such as acetaminophen (Tylenol), ibuprofen (Advil, Motrin), or naproxen (Aleve) to reduce fever and relieve body aches. Read and follow all instructions on the label. · Do not take two or more pain medicines at the same time unless the doctor told you to. Many pain medicines have acetaminophen, which is Tylenol. Too much acetaminophen (Tylenol) can be harmful. · Take an over-the-counter cough medicine that contains dextromethorphan to help quiet a dry, hacking cough so that you can sleep. Avoid cough medicines that have more than one active ingredient. Read and follow all instructions on the label. · Breathe moist air from a humidifier, hot shower, or sink filled with hot water. The heat and moisture will thin mucus so you can cough it out. · Do not smoke. Smoking can make bronchitis worse. If you need help quitting, talk to your doctor about stop-smoking programs and medicines. These can increase your chances of quitting for good.   When should you call for help? Call 911 anytime you think you may need emergency care. For example, call if:    · You have severe trouble breathing. Call your doctor now or seek immediate medical care if:    · You have new or worse trouble breathing.     · You cough up dark brown or bloody mucus (sputum).     · You have a new or higher fever.     · You have a new rash. Watch closely for changes in your health, and be sure to contact your doctor if:    · You cough more deeply or more often, especially if you notice more mucus or a change in the color of your mucus.     · You are not getting better as expected. Where can you learn more? Go to http://www.gray.com/  Enter H333 in the search box to learn more about \"Bronchitis: Care Instructions. \"  Current as of: October 26, 2020               Content Version: 12.8  © 4599-4101 [employer]. Care instructions adapted under license by [employer] (which disclaims liability or warranty for this information). If you have questions about a medical condition or this instruction, always ask your healthcare professional. [address] any warranty or liability for your use of this information.

## 2021-07-13 NOTE — PROGRESS NOTES
CC:   Chief Complaint   Patient presents with    Cold Symptoms    Shoulder Pain     x 1 month        HISTORY OF PRESENT ILLNESS  Lashonda Aguilar is a 66 y.o. female. Patient complains of 3-4 day history of chest congestion and cough sometimes productive of green sputum. Hurts to cough. Denies fevers, chills, sinus pain, SOB, wheezing, CP, or hemoptysis. Treatment to date:  None. Patient reports no ill contacts. She has history of COPD. Also complains of right shoulder pain for the past one month. Denies injury or trauma. Patient Active Problem List   Diagnosis Code    Headache(784.0) R51    Carpal tunnel syndrome G56.00    Hypercholesteremia E78.00    Depression F32.9    Abnormal chest x-ray R93.89    Complex regional pain syndrome [id number]    Diabetic peripheral neuropathy associated with type 2 diabetes mellitus ([address] E11.42    Hypertension, essential I10    Controlled type 2 diabetes mellitus with diabetic polyneuropathy, without long-term current use of insulin (HCC) E11.42    Osteopenia M85.80    Vitamin D deficiency E55.9    GERD (gastroesophageal reflux disease) K21.9    Psoriasis L40.9    Paroxysmal atrial fibrillation (HCC) I48.0    Hepatic steatosis K76.0    COPD, severity to be determined ([address] J44.9    Sarcoidosis of lymph nodes D86.1    At high risk for falls Z91.81     Past Medical History:   Diagnosis Date    Abnormal chest x-ray 12/23/2009    Anemia 12/23/2009    Arrhythmia     SVT.  Converts with Adenocard    Atrial fibrillation ([address] 12/23/2009    Carpal tunnel syndrome 12/23/2009    Chronic obstructive pulmonary disease ([address]     Colitis 12/23/2009    Complex regional pain syndrome 12/23/2009    Depression 12/23/2009    Diabetes ([address]     Family history of skin cancer     brother-melanoma    GERD (gastroesophageal reflux disease) 2/8/2010    HTN (hypertension) 12/23/2009    Hypercholesteremia 12/23/2009    Hypertriglyceridemia 12/23/2009    Idiopathic peripheral neuropathy 12/23/2009    Iron deficiency anemia 12/23/2009    Osteopenia 12/23/2009    Paroxysmal atrial fibrillation ([facility] 12/2/2011    Psoriasis 6/27/2012    Sarcoidosis      Allergies   Allergen Reactions    Duragesic [Fentanyl] Other (comments)     Patient sees things when taking    Codeine Nausea Only       Current Outpatient Medications   Medication Sig Dispense Refill    amitriptyline (ELAVIL) 100 mg tablet Take 1 Tablet by mouth nightly. 90 Tablet 1    gabapentin (NEURONTIN) 600 mg tablet Take 1 Tablet by mouth two (2) times a day. Max Daily Amount: 1,200 mg. 180 Tablet 0    losartan (COZAAR) 100 mg tablet TAKE 1 TABLET BY MOUTH  DAILY 90 Tab 3    atorvastatin (LIPITOR) 40 mg tablet TAKE 1 TABLET BY MOUTH AT  NIGHT 90 Tab 3    hydrocortisone (HYTONE) 2.5 % topical cream       metoprolol tartrate (LOPRESSOR) 50 mg tablet TAKE 1 TAB BY MOUTH TWO (2) TIMES A DAY. 180 Tab 3    fluorouracil (EFUDEX) 5 % chemo cream Apply  to affected area two (2) times a day. 40 g 0    omeprazole (PRILOSEC) 20 mg capsule Take  by mouth.  aspirin 81 mg chewable tablet Take 1 Tab by mouth daily. 90 Tab 3         PHYSICAL EXAM  Visit Vitals  /78 (BP 1 Location: Right arm, BP Patient Position: Sitting, BP Cuff Size: Large adult)   Pulse 81   Temp 98.9 °F (37.2 °C) (Oral)   Resp 14   Ht 5' 7\" (1.702 m)   Wt 174 lb (78.9 kg)   SpO2 91%   BMI 27.25 kg/m²       General: Well-developed and well-nourished, no distress. HEENT:  Head normocephalic/atraumatic, no scleral icterus  Lungs:  Clear to ausculation bilaterally. Good air movement. No accessory respiratory muscle use. Able to speak in complete sentences. Heart:  Regular rate and rhythm, normal S1 and S2, no murmur, gallop, or rub  Extremities: Right shoulder with decreased ROM with abduction and external rotation. Neurological: Alert and oriented. Psychiatric: Normal mood and affect.  Behavior is normal.         ASSESSMENT AND PLAN ICD-10-CM ICD-9-CM    1. Acute bronchitis, unspecified organism  J20.9 466.0 amoxicillin-clavulanate (AUGMENTIN) 875-125 mg per tablet      DISCONTINUED: amoxicillin-clavulanate (AUGMENTIN) 875-125 mg per tablet   2. COPD exacerbation ([facility])  J44.1 491.21 predniSONE (DELTASONE) 20 mg tablet      benzonatate (TESSALON) 100 mg capsule      DISCONTINUED: predniSONE (DELTASONE) 20 mg tablet      DISCONTINUED: benzonatate (TESSALON) 100 mg capsule   3. Acute pain of right shoulder  M25.511 719.41 REFERRAL TO ORTHOPEDICS     Diagnoses and all orders for this visit:    1. Acute bronchitis, unspecified organism  -     Start amoxicillin-clavulanate (AUGMENTIN) 875-125 mg per tablet; Take 1 Tablet by mouth every twelve (12) hours for 7 days. 2. COPD exacerbation ([facility]  -     Start predniSONE (DELTASONE) 20 mg tablet; Take 2 tablets by mouth once daily for 5 days.  -     Start benzonatate (TESSALON) 100 mg capsule; Take 1 Capsule by mouth three (3) times daily as needed for Cough for up to 7 days. 3. Acute pain of right shoulder  Probable rotator cuff tendinitis versus OA. Prednisone will help shoulder pain. -     REFERRAL TO ORTHOPEDICS (Dr. Tu Blackman)      Follow-up and Dispositions    · Return if symptoms worsen or fail to improve, for Scheduled appointment with Fartun Garcia NP on 12/16/21. I have discussed the diagnosis with the patient and the intended plan as seen in the above orders. Patient is in agreement. The patient has received an after-visit summary and questions were answered concerning future plans. I have discussed medication side effects and warnings with the patient as well.

## 2021-07-13 NOTE — PROGRESS NOTES
Identified pt with two pt identifiers. Reviewed record in preparation for visit and have obtained necessary documentation. All patient medications has been reviewed. Chief Complaint   Patient presents with    Cold Symptoms    Shoulder Pain     x 1 month      Additional information about chief complaint:    Visit Vitals  /78 (BP 1 Location: Right arm, BP Patient Position: Sitting, BP Cuff Size: Large adult)   Pulse 81   Temp 98.9 °F (37.2 °C) (Oral)   Resp 14   Ht 5' 7\" (1.702 m)   Wt 174 lb (78.9 kg)   SpO2 91%   BMI 27.25 kg/m²       Health Maintenance Due   Topic    COVID-19 Vaccine (1)    Shingrix Vaccine Age 50> (1 of 2)    Eye Exam Retinal or Dilated     MICROALBUMIN Q1     Lipid Screen        1. Have you been to the ER, urgent care clinic since your last visit? Hospitalized since your last visit? No     2. Have you seen or consulted any other health care providers outside of the [address] [place] [address] since your last visit? Include any pap smears or colon screening.   No   bdg

## 2021-08-12 DIAGNOSIS — M25.511 RIGHT SHOULDER PAIN, UNSPECIFIED CHRONICITY: Primary | ICD-10-CM

## 2021-08-13 ENCOUNTER — HOSPITAL ENCOUNTER (OUTPATIENT)
Dept: GENERAL RADIOLOGY | Age: 76
Discharge: HOME | End: 2021-08-13
Payer: MEDICARE

## 2021-08-13 ENCOUNTER — OFFICE VISIT (OUTPATIENT)
Dept: ORTHOPEDIC SURGERY | Age: 76
End: 2021-08-13
Payer: MEDICARE

## 2021-08-13 VITALS
SYSTOLIC BLOOD PRESSURE: 166 MMHG | BODY MASS INDEX: 27.47 KG/M2 | DIASTOLIC BLOOD PRESSURE: 89 MMHG | HEART RATE: 71 BPM | WEIGHT: 175 LBS | HEIGHT: 67 IN | TEMPERATURE: 96.6 F | OXYGEN SATURATION: 91 %

## 2021-08-13 DIAGNOSIS — M25.511 RIGHT SHOULDER PAIN, UNSPECIFIED CHRONICITY: ICD-10-CM

## 2021-08-13 DIAGNOSIS — M75.41 IMPINGEMENT SYNDROME OF RIGHT SHOULDER: Primary | ICD-10-CM

## 2021-08-13 PROCEDURE — G8536 NO DOC ELDER MAL SCRN: HCPCS | Performed by: ORTHOPAEDIC SURGERY

## 2021-08-13 PROCEDURE — G9717 DOC PT DX DEP/BP F/U NT REQ: HCPCS | Performed by: ORTHOPAEDIC SURGERY

## 2021-08-13 PROCEDURE — 1101F PT FALLS ASSESS-DOCD LE1/YR: CPT | Performed by: ORTHOPAEDIC SURGERY

## 2021-08-13 PROCEDURE — G8753 SYS BP > OR = 140: HCPCS | Performed by: ORTHOPAEDIC SURGERY

## 2021-08-13 PROCEDURE — G8399 PT W/DXA RESULTS DOCUMENT: HCPCS | Performed by: ORTHOPAEDIC SURGERY

## 2021-08-13 PROCEDURE — G8754 DIAS BP LESS 90: HCPCS | Performed by: ORTHOPAEDIC SURGERY

## 2021-08-13 PROCEDURE — 73030 X-RAY EXAM OF SHOULDER: CPT

## 2021-08-13 PROCEDURE — G8427 DOCREV CUR MEDS BY ELIG CLIN: HCPCS | Performed by: ORTHOPAEDIC SURGERY

## 2021-08-13 PROCEDURE — 20610 DRAIN/INJ JOINT/BURSA W/O US: CPT | Performed by: ORTHOPAEDIC SURGERY

## 2021-08-13 PROCEDURE — G8419 CALC BMI OUT NRM PARAM NOF/U: HCPCS | Performed by: ORTHOPAEDIC SURGERY

## 2021-08-13 PROCEDURE — 99203 OFFICE O/P NEW LOW 30 MIN: CPT | Performed by: ORTHOPAEDIC SURGERY

## 2021-08-13 PROCEDURE — 1090F PRES/ABSN URINE INCON ASSESS: CPT | Performed by: ORTHOPAEDIC SURGERY

## 2021-08-13 RX ORDER — BETAMETHASONE SODIUM PHOSPHATE AND BETAMETHASONE ACETATE 3; 3 MG/ML; MG/ML
6 INJECTION, SUSPENSION INTRA-ARTICULAR; INTRALESIONAL; INTRAMUSCULAR; SOFT TISSUE ONCE
Status: COMPLETED | OUTPATIENT
Start: 2021-08-13 | End: 2021-08-13

## 2021-08-13 RX ORDER — MELOXICAM 15 MG/1
15 TABLET ORAL DAILY
Qty: 60 TABLET | Refills: 1 | Status: SHIPPED | OUTPATIENT
Start: 2021-08-13 | End: 2021-08-20 | Stop reason: ALTCHOICE

## 2021-08-13 RX ADMIN — BETAMETHASONE SODIUM PHOSPHATE AND BETAMETHASONE ACETATE 6 MG: 3; 3 INJECTION, SUSPENSION INTRA-ARTICULAR; INTRALESIONAL; INTRAMUSCULAR; SOFT TISSUE at 10:28

## 2021-08-13 NOTE — PROGRESS NOTES
Identified pt with two pt identifiers (name and ). Reviewed chart in preparation for visit and have obtained necessary documentation. Pete Rajan is a 64 y.o. female  Chief Complaint   Patient presents with    Shoulder Pain     RT shoulder     Visit Vitals  BP (!) 166/89 (BP 1 Location: Left upper arm, BP Patient Position: Sitting, BP Cuff Size: Large adult)   Pulse 71   Temp (!) 96.6 °F (35.9 °C) (Tympanic)   Ht 5' 7\" (1.702 m)   Wt 175 lb (79.4 kg)   SpO2 91%   BMI 27.41 kg/m²     1. Have you been to the ER, urgent care clinic since your last visit? Hospitalized since your last visit? No    2. Have you seen or consulted any other health care providers outside of the [address] [place] [address] since your last visit? Include any pap smears or colon screening.  No

## 2021-08-13 NOTE — LETTER
8/13/2021    Patient: Robel Shankar   YOB: 1945   Date of Visit: 8/13/2021     [address] Cardinal, [address]  [facility] [address]  [address] [facility] [address]    Dear Bird Triplett, NP,      Thank you for referring Ms. Benjamin [place] to [facility] for evaluation. My notes for this consultation are attached. If you have questions, please do not hesitate to call me. I look forward to following your patient along with you.       Sincerely,    Jessica Berman, DO

## 2021-08-13 NOTE — PROGRESS NOTES
Patient8/13/2021      CC: Right shoulder pain    HPI:      This is a 64y.o. year old patient who complains of right shoulder pain. This pain has been going on for one month. This is activity dependent pain. The pain is in the shoulder, anteriorly as well as laterally. This pain is worse with activity and better with rest.  The pain is severe in nature. So far, the patient has tried no treatment. The patient is right hand dominant. PMH:  Past Medical History:   Diagnosis Date    Abnormal chest x-ray 12/23/2009    Anemia 12/23/2009    Arrhythmia     SVT. Converts with Adenocard    Atrial fibrillation ([address] 12/23/2009    Carpal tunnel syndrome 12/23/2009    Chronic obstructive pulmonary disease ([address]     Colitis 12/23/2009    Complex regional pain syndrome 12/23/2009    Depression 12/23/2009    Diabetes ([address]     Family history of skin cancer     brother-melanoma    GERD (gastroesophageal reflux disease) 2/8/2010    HTN (hypertension) 12/23/2009    Hypercholesteremia 12/23/2009    Hypertriglyceridemia 12/23/2009    Idiopathic peripheral neuropathy 12/23/2009    Iron deficiency anemia 12/23/2009    Osteopenia 12/23/2009    Paroxysmal atrial fibrillation ([address] 12/2/2011    Psoriasis 6/27/2012    Sarcoidosis        PSxHx:  Past Surgical History:   Procedure Laterality Date    HX CARPAL TUNNEL RELEASE  00/00/2002    Both hands    HX CYST REMOVAL  00/00/1965    Left wrist    HX HYSTERECTOMY  00/00/1985    HX ORTHOPAEDIC  00/00/1987    back surgery (disc)    HX ORTHOPAEDIC      right foot X 3 neuromas and tarsal tunnel release    HX TONSILLECTOMY  00/00/1962    NEUROLOGICAL PROCEDURE UNLISTED      Pain pump    PAIN PUMP DEVICE  00/00/2007    in left lower abdomen (for foot pain)       Meds:    Current Outpatient Medications:     meloxicam (MOBIC) 15 mg tablet, Take 1 Tablet by mouth daily. , Disp: 60 Tablet, Rfl: 1    amitriptyline (ELAVIL) 100 mg tablet, Take 1 Tablet by mouth nightly., Disp: 90 Tablet, Rfl: 1    gabapentin (NEURONTIN) 600 mg tablet, Take 1 Tablet by mouth two (2) times a day. Max Daily Amount: 1,200 mg., Disp: 180 Tablet, Rfl: 0    losartan (COZAAR) 100 mg tablet, TAKE 1 TABLET BY MOUTH  DAILY, Disp: 90 Tab, Rfl: 3    atorvastatin (LIPITOR) 40 mg tablet, TAKE 1 TABLET BY MOUTH AT  NIGHT, Disp: 90 Tab, Rfl: 3    metoprolol tartrate (LOPRESSOR) 50 mg tablet, TAKE 1 TAB BY MOUTH TWO (2) TIMES A DAY., Disp: 180 Tab, Rfl: 3    fluorouracil (EFUDEX) 5 % chemo cream, Apply  to affected area two (2) times a day., Disp: 40 g, Rfl: 0    omeprazole (PRILOSEC) 20 mg capsule, Take  by mouth., Disp: , Rfl:     aspirin 81 mg chewable tablet, Take 1 Tab by mouth daily. , Disp: 90 Tab, Rfl: 3    predniSONE (DELTASONE) 20 mg tablet, Take 2 tablets by mouth once daily for 5 days. (Patient not taking: Reported on 2021), Disp: 10 Tablet, Rfl: 0    hydrocortisone (HYTONE) 2.5 % topical cream, , Disp: , Rfl:   No current facility-administered medications for this visit.     All:  Allergies   Allergen Reactions    Duragesic [Fentanyl] Other (comments)     Patient sees things when taking    Codeine Nausea Only       Social Hx:  Social History     Socioeconomic History    Marital status:      Spouse name: Not on file    Number of children: Not on file    Years of education: Not on file    Highest education level: Not on file   Tobacco Use    Smoking status: Former Smoker     Packs/day: 0.10     Years: 2.00     Pack years: 0.20     Quit date: 1975     Years since quittin.6    Smokeless tobacco: Never Used   Vaping Use    Vaping Use: Never used   Substance and Sexual Activity    Alcohol use: No    Drug use: No     Social Determinants of Health     Financial Resource Strain:     Difficulty of Paying Living Expenses:    Food Insecurity:     Worried About Running Out of Food in the Last Year:     [address] in the Last Year:    Transportation Needs:     Lack of Transportation (Medical):  Lack of Transportation (Non-Medical):    Physical Activity:     Days of Exercise per Week:     Minutes of Exercise per Session:    Stress:     Feeling of Stress :    Social Connections:     Frequency of Communication with Friends and Family:     Frequency of Social Gatherings with Friends and Family:     Attends [employer] Services:     Active Member of Clubs or Organizations:     Attends Club or Organization Meetings:     Marital Status:        Family Hx:  Family History   Problem Relation Age of Onset    Arrhythmia Father     Heart Attack Father     Cancer Brother         Melanoma    Breast Cancer Daughter         48    Breast Cancer Daughter 46    Breast Cancer Daughter 46         Review of Systems:       General: Denies headache, lethargy, fever, weight loss  Ears/Nose/Throat: Denies ear discharge, drainage, nosebleeds, hoarse voice, dental problems  Cardiovascular: Denies chest pain, shortness of breath  Lungs: Denies chest pain, breathing problems, wheezing, pneumonia  Stomach: Denies stomach pain, heartburn, constipation, irritable bowel  Skin: Denies rash, sores, open wounds  Musculoskeletal: Right shoulder pain which is activity dependent, it is anteriorly on the shoulder  Genitourinary: Denies dysuria, hematuria, polyuria  Gastrointestinal: Denies constipation, obstipation, diarrhea  Neurological: Denies changes in sight, smell, hearing, taste, seizures. Denies loss of consciousness.   Psychiatric: Denies depression, sleep pattern changes, anxiety, change in personality  Endocrine: Denies mood swings, heat or cold intolerance  Hematologic/Lymphatic: Denies anemia, purpura, petechia  Allergic/Immunologic: Denies swelling of throat, pain or swelling at lymph nodes      Physical Examination:    Visit Vitals  BP (!) 166/89 (BP 1 Location: Left upper arm, BP Patient Position: Sitting, BP Cuff Size: Large adult)   Pulse 71   Temp (!) 96.6 °F (35.9 °C) (Tympanic)   Ht 5' 7\" (1.702 m)   Wt 175 lb (79.4 kg)   SpO2 91%   BMI 27.41 kg/m²        General: AOX3, no apparent distress  Psychiatric: mood and affect appropriate  Lungs: breathing is symmetric and unlabored bilaterally  Heart: regular rate and rhythm  Abdomen: no guarding  Head: normocephalic, atraumatic  Skin: No significant abnormalities, good turgor  Sensation intact to light touch: C5-T1 dermatomes  Muscular exam: 5/5 strength in all major muscle groups unless noted in specialty exam.    Extremities      Right upper extremity: Full active and passive range of motion without pain, deformity, no open wound, strength 5/5 in all major muscle groups. Left upper extremity:  Full active and passive range of motion without pain, deformity, no open wound, strength 5/5 in all major muscle groups. Right lower extremity: Patient complains of mild tenderness at the lateral proximal humerus. Range of motion is limited secondary to pain, open can test is positive, impingement maneuver is positive, Speeds and Yergason's tests are equivocal.  Bearhug test is negative. There is no specific tenderness to palpation along the acromioclavicular joint. There is no gross deformity, no obvious muscular atrophy. Sensation is intact light touch in the C5-T1 dermatomes. Cervical range of motion is full and pain-free and does not reproduce any of the symptoms consistent with cervical pathology. Strength is 4 out of 5 with abduction of the shoulder, 5 out of 5 with forward flexion, biceps and triceps as well as hand intrinsics are 5 out of 5 strength. Capillary refill is less than 2 seconds distally. Left lower extremity:  Full active and passive range of motion without pain, deformity, no open wound, strength 5/5 in all major muscle groups. Diagnostics:    Pertinent Xrays:  Xrays are available of the shoulder. These indicate no fractures, dislocations, or osseous abnormalities.   Soft tissue is within expected limits. Hooked acromion, calcification of lateral supraspinatus tendon    Assessment: Impingement syndrome, right shoulder    Plan: This patient and I did discuss options for this particular pathology. As there is no indication of overt tearing of the rotator cuff, and that in the setting conservative treatment is the standard of care, I have recommended that some combination of oral analgesics, ideally anti-inflammatories, physical therapy exercises, subacromial injections perhaps other topical forms of treatment would be the first-line of treatment for this particular problem. Patient stated their understanding the pathology as well as the anatomy and treatment options. We have agreed to injection, PT, nsaids. The patient will follow-up approximately 1 week for a clinical check should any symptoms remain. No x-rays are necessary on follow-up. Procedures performed: PROCEDURE NOTE :    Consent was obtained from the patient. The correct site was identified after confirmation with the patient. Following identification and confirmation of the correct site with the patient, the area was prepped in the normal sterile fashion. An injection of a mixture of 6 mg of betamethasone and 1% lidocaine without epinephrine was administered to the right shoulder subacromial space. The needle was then withdrawn and the injection site dressed with a sterile bandage at the conclusion. The procedure was well tolerated by the patient. No immediate adverse reactions were noted. Post injection instructions were given. Ms. Diony Bertrand has a reminder for a \"due or due soon\" health maintenance. I have asked that she contact her primary care provider for follow-up on this health maintenance.

## 2021-08-20 ENCOUNTER — OFFICE VISIT (OUTPATIENT)
Dept: ORTHOPEDIC SURGERY | Age: 76
End: 2021-08-20
Payer: MEDICARE

## 2021-08-20 VITALS
OXYGEN SATURATION: 95 % | HEART RATE: 72 BPM | HEIGHT: 67 IN | BODY MASS INDEX: 26.37 KG/M2 | TEMPERATURE: 96.4 F | WEIGHT: 168 LBS | DIASTOLIC BLOOD PRESSURE: 84 MMHG | SYSTOLIC BLOOD PRESSURE: 146 MMHG

## 2021-08-20 DIAGNOSIS — M75.41 IMPINGEMENT SYNDROME OF RIGHT SHOULDER: Primary | ICD-10-CM

## 2021-08-20 PROCEDURE — G8419 CALC BMI OUT NRM PARAM NOF/U: HCPCS | Performed by: ORTHOPAEDIC SURGERY

## 2021-08-20 PROCEDURE — 99213 OFFICE O/P EST LOW 20 MIN: CPT | Performed by: ORTHOPAEDIC SURGERY

## 2021-08-20 PROCEDURE — G8754 DIAS BP LESS 90: HCPCS | Performed by: ORTHOPAEDIC SURGERY

## 2021-08-20 PROCEDURE — 1090F PRES/ABSN URINE INCON ASSESS: CPT | Performed by: ORTHOPAEDIC SURGERY

## 2021-08-20 PROCEDURE — 1101F PT FALLS ASSESS-DOCD LE1/YR: CPT | Performed by: ORTHOPAEDIC SURGERY

## 2021-08-20 PROCEDURE — G8753 SYS BP > OR = 140: HCPCS | Performed by: ORTHOPAEDIC SURGERY

## 2021-08-20 PROCEDURE — G8536 NO DOC ELDER MAL SCRN: HCPCS | Performed by: ORTHOPAEDIC SURGERY

## 2021-08-20 PROCEDURE — G9717 DOC PT DX DEP/BP F/U NT REQ: HCPCS | Performed by: ORTHOPAEDIC SURGERY

## 2021-08-20 PROCEDURE — G8399 PT W/DXA RESULTS DOCUMENT: HCPCS | Performed by: ORTHOPAEDIC SURGERY

## 2021-08-20 PROCEDURE — G8427 DOCREV CUR MEDS BY ELIG CLIN: HCPCS | Performed by: ORTHOPAEDIC SURGERY

## 2021-08-20 RX ORDER — CELECOXIB 200 MG/1
200 CAPSULE ORAL 2 TIMES DAILY
Qty: 180 CAPSULE | Refills: 0 | Status: SHIPPED | OUTPATIENT
Start: 2021-08-20 | End: 2021-09-17 | Stop reason: ALTCHOICE

## 2021-08-20 NOTE — PROGRESS NOTES
8/20/2021      CC: Left shoulder and neck pain    HPI:      This is a 64y.o. year old female who presents for a follow up visit. The patient was last seen and diagnosed with impingement syndrome of the shoulder. The patient's treatments since the most recent visit have comprised of physical therapy, they have started to focus on her neck as well. The patient has had some relief of the chief complaint. She continues to work with therapy. PMH:  Past Medical History:   Diagnosis Date    Abnormal chest x-ray 12/23/2009    Anemia 12/23/2009    Arrhythmia     SVT.  Converts with Adenocard    Atrial fibrillation ([address] 12/23/2009    Carpal tunnel syndrome 12/23/2009    Chronic obstructive pulmonary disease ([address]     Colitis 12/23/2009    Complex regional pain syndrome 12/23/2009    Depression 12/23/2009    Diabetes ([address]     Family history of skin cancer     brother-melanoma    GERD (gastroesophageal reflux disease) 2/8/2010    HTN (hypertension) 12/23/2009    Hypercholesteremia 12/23/2009    Hypertriglyceridemia 12/23/2009    Idiopathic peripheral neuropathy 12/23/2009    Iron deficiency anemia 12/23/2009    Osteopenia 12/23/2009    Paroxysmal atrial fibrillation ([address] 12/2/2011    Psoriasis 6/27/2012    Sarcoidosis        PSxHx:  Past Surgical History:   Procedure Laterality Date    HX CARPAL TUNNEL RELEASE  00/00/2002    Both hands    HX CYST REMOVAL  00/00/1965    Left wrist    HX HYSTERECTOMY  00/00/1985    HX ORTHOPAEDIC  00/00/1987    back surgery (disc)    HX ORTHOPAEDIC      right foot X 3 neuromas and tarsal tunnel release    HX TONSILLECTOMY  00/00/1962    NEUROLOGICAL PROCEDURE UNLISTED      Pain pump    PAIN PUMP DEVICE  00/00/2007    in left lower abdomen (for foot pain)       Meds:    Current Outpatient Medications:     celecoxib (CELEBREX) 200 mg capsule, Take 1 Capsule by mouth two (2) times a day., Disp: 180 Capsule, Rfl: 0    amitriptyline (ELAVIL) 100 mg tablet, Take 1 Tablet by mouth nightly., Disp: 90 Tablet, Rfl: 1    gabapentin (NEURONTIN) 600 mg tablet, Take 1 Tablet by mouth two (2) times a day. Max Daily Amount: 1,200 mg., Disp: 180 Tablet, Rfl: 0    losartan (COZAAR) 100 mg tablet, TAKE 1 TABLET BY MOUTH  DAILY, Disp: 90 Tab, Rfl: 3    atorvastatin (LIPITOR) 40 mg tablet, TAKE 1 TABLET BY MOUTH AT  NIGHT, Disp: 90 Tab, Rfl: 3    metoprolol tartrate (LOPRESSOR) 50 mg tablet, TAKE 1 TAB BY MOUTH TWO (2) TIMES A DAY., Disp: 180 Tab, Rfl: 3    fluorouracil (EFUDEX) 5 % chemo cream, Apply  to affected area two (2) times a day., Disp: 40 g, Rfl: 0    omeprazole (PRILOSEC) 20 mg capsule, Take  by mouth., Disp: , Rfl:     aspirin 81 mg chewable tablet, Take 1 Tab by mouth daily. , Disp: 90 Tab, Rfl: 3    predniSONE (DELTASONE) 20 mg tablet, Take 2 tablets by mouth once daily for 5 days.  (Patient not taking: Reported on 2021), Disp: 10 Tablet, Rfl: 0    hydrocortisone (HYTONE) 2.5 % topical cream, , Disp: , Rfl:     All:  Allergies   Allergen Reactions    Duragesic [Fentanyl] Other (comments)     Patient sees things when taking    Codeine Nausea Only       Social Hx:  Social History     Socioeconomic History    Marital status:      Spouse name: Not on file    Number of children: Not on file    Years of education: Not on file    Highest education level: Not on file   Tobacco Use    Smoking status: Former Smoker     Packs/day: 0.10     Years: 2.00     Pack years: 0.20     Quit date: 1975     Years since quittin.6    Smokeless tobacco: Never Used   Vaping Use    Vaping Use: Never used   Substance and Sexual Activity    Alcohol use: No    Drug use: No     Social Determinants of Health     Financial Resource Strain:     Difficulty of Paying Living Expenses:    Food Insecurity:     Worried About Running Out of Food in the Last Year:     Ran Out of Food in the Last Year:    Transportation Needs:     Lack of Transportation (Medical):    [employer] Lack of Transportation (Non-Medical):    Physical Activity:     Days of Exercise per Week:     Minutes of Exercise per Session:    Stress:     Feeling of Stress :    Social Connections:     Frequency of Communication with Friends and Family:     Frequency of Social Gatherings with Friends and Family:     Attends Muslim Services:     Active Member of Clubs or Organizations:     Attends Club or Organization Meetings:     Marital Status:        Family Hx:  Family History   Problem Relation Age of Onset    Arrhythmia Father     Heart Attack Father     Cancer Brother         Melanoma    Breast Cancer Daughter         48    Breast Cancer Daughter 46    Breast Cancer Daughter 46         Review of Systems:       General: Denies headache, lethargy, fever, weight loss  Ears/Nose/Throat: Denies ear discharge, drainage, nosebleeds, hoarse voice, dental problems  Cardiovascular: Denies chest pain, shortness of breath  Lungs: Denies chest pain, breathing problems, wheezing, pneumonia  Stomach: Denies stomach pain, heartburn, constipation, irritable bowel  Skin: Denies rash, sores, open wounds  Musculoskeletal: Neck and shoulder pain bilaterally  Genitourinary: Denies dysuria, hematuria, polyuria  Gastrointestinal: Denies constipation, obstipation, diarrhea  Neurological: Denies changes in sight, smell, hearing, taste, seizures. Denies loss of consciousness.   Psychiatric: Denies depression, sleep pattern changes, anxiety, change in personality  Endocrine: Denies mood swings, heat or cold intolerance  Hematologic/Lymphatic: Denies anemia, purpura, petechia  Allergic/Immunologic: Denies swelling of throat, pain or swelling at lymph nodes      Physical Examination:    Visit Vitals  BP (!) 146/84 (BP 1 Location: Left upper arm, BP Patient Position: Sitting, BP Cuff Size: Large adult)   Pulse 72   Temp (!) 96.4 °F (35.8 °C) (Tympanic)   Ht 5' 7\" (1.702 m)   Wt 168 lb (76.2 kg)   SpO2 95%   BMI 26.31 kg/m² General: AOX3, no apparent distress  Psychiatric: mood and affect appropriate  Lungs: breathing is symmetric and unlabored bilaterally  Heart: regular rate and rhythm  Abdomen: no guarding  Head: normocephalic, atraumatic  Skin: No significant abnormalities, good turgor  Sensation intact to light touch: C5-T1 dermatomes  Muscular exam: 5/5 strength in all major muscle groups unless noted in specialty exam.    Extremities      Right upper extremity: Extremity is examined, no evidence of gross deformity, no gross motor or sensory deficit. Full active and passive range of motion is noted. Muscle tone and bulk is within normal expected limits. Capillary refill is less than 2 seconds distally. Left upper extremity: Extremity is examined, no evidence of gross deformity, no gross motor or sensory deficit. Full active and passive range of motion is noted. Muscle tone and bulk is within normal expected limits. Capillary refill is less than 2 seconds distally. Diagnostics:    Pertinent Diagnostics: None today    Assessment: Impingement bilateral shoulders and possible neck strain  Plan: This patient is doing well, she continues to make improvements with physical therapy. There is no indication to adjust the plan at this point except for the fact that she has not yet been medicated for this on an as-needed basis. The plan will be to have her proceed with Celebrex, I have prescribed this and managed her prescriptions today. Plan to be to have her follow-up with me in 1 month for repeat clinical check after she has completed more physical therapy. Ms. Micah Yeh has a reminder for a \"due or due soon\" health maintenance. I have asked that she contact her primary care provider for follow-up on this health maintenance.

## 2021-08-20 NOTE — PROGRESS NOTES
Identified pt with two pt identifiers (name and ). Reviewed chart in preparation for visit and have obtained necessary documentation. Valentine Liner is a 64 y.o. female  Chief Complaint   Patient presents with    Follow-up     RT shoulder     Visit Vitals  BP (!) 146/84 (BP 1 Location: Left upper arm, BP Patient Position: Sitting, BP Cuff Size: Large adult)   Pulse 72   Temp (!) 96.4 °F (35.8 °C) (Tympanic)   Ht 5' 7\" (1.702 m)   Wt 168 lb (76.2 kg)   SpO2 95%   BMI 26.31 kg/m²     1. Have you been to the ER, urgent care clinic since your last visit? Hospitalized since your last visit? No    2. Have you seen or consulted any other health care providers outside of the [address] [place] [address] since your last visit? Include any pap smears or colon screening.  No

## 2021-09-17 ENCOUNTER — OFFICE VISIT (OUTPATIENT)
Dept: ORTHOPEDIC SURGERY | Age: 76
End: 2021-09-17
Payer: MEDICARE

## 2021-09-17 VITALS
TEMPERATURE: 98.2 F | WEIGHT: 174 LBS | HEIGHT: 67 IN | OXYGEN SATURATION: 94 % | DIASTOLIC BLOOD PRESSURE: 82 MMHG | SYSTOLIC BLOOD PRESSURE: 151 MMHG | BODY MASS INDEX: 27.31 KG/M2 | HEART RATE: 73 BPM

## 2021-09-17 DIAGNOSIS — M75.41 IMPINGEMENT SYNDROME OF RIGHT SHOULDER: Primary | ICD-10-CM

## 2021-09-17 PROCEDURE — G8419 CALC BMI OUT NRM PARAM NOF/U: HCPCS | Performed by: ORTHOPAEDIC SURGERY

## 2021-09-17 PROCEDURE — G8399 PT W/DXA RESULTS DOCUMENT: HCPCS | Performed by: ORTHOPAEDIC SURGERY

## 2021-09-17 PROCEDURE — G8536 NO DOC ELDER MAL SCRN: HCPCS | Performed by: ORTHOPAEDIC SURGERY

## 2021-09-17 PROCEDURE — G8754 DIAS BP LESS 90: HCPCS | Performed by: ORTHOPAEDIC SURGERY

## 2021-09-17 PROCEDURE — 99213 OFFICE O/P EST LOW 20 MIN: CPT | Performed by: ORTHOPAEDIC SURGERY

## 2021-09-17 PROCEDURE — 1090F PRES/ABSN URINE INCON ASSESS: CPT | Performed by: ORTHOPAEDIC SURGERY

## 2021-09-17 PROCEDURE — G8753 SYS BP > OR = 140: HCPCS | Performed by: ORTHOPAEDIC SURGERY

## 2021-09-17 PROCEDURE — 1101F PT FALLS ASSESS-DOCD LE1/YR: CPT | Performed by: ORTHOPAEDIC SURGERY

## 2021-09-17 PROCEDURE — G8427 DOCREV CUR MEDS BY ELIG CLIN: HCPCS | Performed by: ORTHOPAEDIC SURGERY

## 2021-09-17 PROCEDURE — G9717 DOC PT DX DEP/BP F/U NT REQ: HCPCS | Performed by: ORTHOPAEDIC SURGERY

## 2021-09-17 RX ORDER — MELOXICAM 15 MG/1
15 TABLET ORAL DAILY
Qty: 60 TABLET | Refills: 1 | Status: SHIPPED | OUTPATIENT
Start: 2021-09-17 | End: 2022-01-27

## 2021-09-17 NOTE — PROGRESS NOTES
Identified pt with two pt identifiers (name and ). Reviewed chart in preparation for visit and have obtained necessary documentation. Marcell Severino is a 64 y.o. female  Chief Complaint   Patient presents with    Follow-up     RT shoulder     Visit Vitals  BP (!) 151/82 (BP 1 Location: Right arm, BP Patient Position: Sitting, BP Cuff Size: Large adult)   Pulse 73   Temp 98.2 °F (36.8 °C) (Tympanic)   Ht 5' 7\" (1.702 m)   Wt 174 lb (78.9 kg)   SpO2 94%   BMI 27.25 kg/m²     1. Have you been to the ER, urgent care clinic since your last visit? Hospitalized since your last visit? No    2. Have you seen or consulted any other health care providers outside of the [address] [place] [address] since your last visit? Include any pap smears or colon screening.  No

## 2021-09-17 NOTE — LETTER
9/20/2021    Patient: Alexa Larkin   YOB: 1945   Date of Visit: 9/17/2021     Jett Sandoval NP  [address] [address]  [address] [facility] [address]    Dear Jett Sandoval NP,      Thank you for referring Ms. Brent Keith to [facility] for evaluation. My notes for this consultation are attached. If you have questions, please do not hesitate to call me. I look forward to following your patient along with you.       Sincerely,    Yuki Cummings, DO

## 2021-09-20 DIAGNOSIS — E11.42 DIABETIC PERIPHERAL NEUROPATHY ASSOCIATED WITH TYPE 2 DIABETES MELLITUS (HCC): ICD-10-CM

## 2021-09-20 RX ORDER — GABAPENTIN 600 MG/1
600 TABLET ORAL 2 TIMES DAILY
Qty: 180 TABLET | Refills: 0 | Status: SHIPPED | OUTPATIENT
Start: 2021-09-20 | End: 2021-11-11

## 2021-09-20 NOTE — TELEPHONE ENCOUNTER
[facility] requests refill of   Requested Prescriptions     Pending Prescriptions Disp Refills    gabapentin (NEURONTIN) 600 mg tablet 180 Tablet 0     Sig: Take 1 Tablet by mouth two (2) times a day. Max Daily Amount: 1,200 mg.

## 2021-09-20 NOTE — TELEPHONE ENCOUNTER
PCP: Shara Crain NP     Last appt: 7/13/2021   Future Appointments   Date Time Provider Vaibhav Quintanilla   10/15/2021 10:00 AM DO JUANY Cooley   12/16/2021  2:30 PM Shara Crain NP [facility]ATERS        Requested Prescriptions     Pending Prescriptions Disp Refills    gabapentin (NEURONTIN) 600 mg tablet 180 Tablet 0     Sig: Take 1 Tablet by mouth two (2) times a day. Max Daily Amount: 1,200 mg.

## 2021-09-20 NOTE — PROGRESS NOTES
9/20/2021      CC: right shoulder pain    HPI:      This is a 64y.o. year old female who presents for a follow up visit. The patient was last seen and diagnosed with right shoulder impingement. The patient's treatments since the most recent visit have comprised of PT, nsaids. The patient has had some relief of the chief complaint. PMH:  Past Medical History:   Diagnosis Date    Abnormal chest x-ray 12/23/2009    Anemia 12/23/2009    Arrhythmia     SVT. Converts with Adenocard    Atrial fibrillation ([address] 12/23/2009    Carpal tunnel syndrome 12/23/2009    Chronic obstructive pulmonary disease ([address]     Colitis 12/23/2009    Complex regional pain syndrome 12/23/2009    Depression 12/23/2009    Diabetes ([address]     Family history of skin cancer     brother-melanoma    GERD (gastroesophageal reflux disease) 2/8/2010    HTN (hypertension) 12/23/2009    Hypercholesteremia 12/23/2009    Hypertriglyceridemia 12/23/2009    Idiopathic peripheral neuropathy 12/23/2009    Iron deficiency anemia 12/23/2009    Osteopenia 12/23/2009    Paroxysmal atrial fibrillation ([address] 12/2/2011    Psoriasis 6/27/2012    Sarcoidosis        PSxHx:  Past Surgical History:   Procedure Laterality Date    HX CARPAL TUNNEL RELEASE  00/00/2002    Both hands    HX CYST REMOVAL  00/00/1965    Left wrist    HX HYSTERECTOMY  00/00/1985    HX ORTHOPAEDIC  00/00/1987    back surgery (disc)    HX ORTHOPAEDIC      right foot X 3 neuromas and tarsal tunnel release    HX TONSILLECTOMY  00/00/1962    NEUROLOGICAL PROCEDURE UNLISTED      Pain pump    PAIN PUMP DEVICE  00/00/2007    in left lower abdomen (for foot pain)       Meds:    Current Outpatient Medications:     meloxicam (MOBIC) 15 mg tablet, Take 1 Tablet by mouth daily. , Disp: 60 Tablet, Rfl: 1    amitriptyline (ELAVIL) 100 mg tablet, Take 1 Tablet by mouth nightly., Disp: 90 Tablet, Rfl: 1    gabapentin (NEURONTIN) 600 mg tablet, Take 1 Tablet by mouth two (2) times a day. Max Daily Amount: 1,200 mg., Disp: 180 Tablet, Rfl: 0    losartan (COZAAR) 100 mg tablet, TAKE 1 TABLET BY MOUTH  DAILY, Disp: 90 Tab, Rfl: 3    atorvastatin (LIPITOR) 40 mg tablet, TAKE 1 TABLET BY MOUTH AT  NIGHT, Disp: 90 Tab, Rfl: 3    metoprolol tartrate (LOPRESSOR) 50 mg tablet, TAKE 1 TAB BY MOUTH TWO (2) TIMES A DAY., Disp: 180 Tab, Rfl: 3    fluorouracil (EFUDEX) 5 % chemo cream, Apply  to affected area two (2) times a day., Disp: 40 g, Rfl: 0    omeprazole (PRILOSEC) 20 mg capsule, Take  by mouth., Disp: , Rfl:     aspirin 81 mg chewable tablet, Take 1 Tab by mouth daily. , Disp: 90 Tab, Rfl: 3    predniSONE (DELTASONE) 20 mg tablet, Take 2 tablets by mouth once daily for 5 days. (Patient not taking: Reported on 2021), Disp: 10 Tablet, Rfl: 0    hydrocortisone (HYTONE) 2.5 % topical cream, , Disp: , Rfl:     All:  Allergies   Allergen Reactions    Duragesic [Fentanyl] Other (comments)     Patient sees things when taking    Codeine Nausea Only       Social Hx:  Social History     Socioeconomic History    Marital status:      Spouse name: Not on file    Number of children: Not on file    Years of education: Not on file    Highest education level: Not on file   Tobacco Use    Smoking status: Former Smoker     Packs/day: 0.10     Years: 2.00     Pack years: 0.20     Quit date: 1975     Years since quittin.7    Smokeless tobacco: Never Used   Vaping Use    Vaping Use: Never used   Substance and Sexual Activity    Alcohol use: No    Drug use: No     Social Determinants of Health     Financial Resource Strain:     Difficulty of Paying Living Expenses:    Food Insecurity:     Worried About Running Out of Food in the Last Year:     Ran Out of Food in the Last Year:    Transportation Needs:     Lack of Transportation (Medical):      Lack of Transportation (Non-Medical):    Physical Activity:     Days of Exercise per Week:     Minutes of Exercise per Session: Stress:     Feeling of Stress :    Social Connections:     Frequency of Communication with Friends and Family:     Frequency of Social Gatherings with Friends and Family:     Attends Zoroastrian Services:     Active Member of Clubs or Organizations:     Attends Club or Organization Meetings:     Marital Status:        Family Hx:  Family History   Problem Relation Age of Onset    Arrhythmia Father     Heart Attack Father     Cancer Brother         Melanoma    Breast Cancer Daughter         48    Breast Cancer Daughter 46    Breast Cancer Daughter 46         Review of Systems:       General: Denies headache, lethargy, fever, weight loss  Ears/Nose/Throat: Denies ear discharge, drainage, nosebleeds, hoarse voice, dental problems  Cardiovascular: Denies chest pain, shortness of breath  Lungs: Denies chest pain, breathing problems, wheezing, pneumonia  Stomach: Denies stomach pain, heartburn, constipation, irritable bowel  Skin: Denies rash, sores, open wounds  Musculoskeletal: right shoulder pain  Genitourinary: Denies dysuria, hematuria, polyuria  Gastrointestinal: Denies constipation, obstipation, diarrhea  Neurological: Denies changes in sight, smell, hearing, taste, seizures. Denies loss of consciousness.   Psychiatric: Denies depression, sleep pattern changes, anxiety, change in personality  Endocrine: Denies mood swings, heat or cold intolerance  Hematologic/Lymphatic: Denies anemia, purpura, petechia  Allergic/Immunologic: Denies swelling of throat, pain or swelling at lymph nodes      Physical Examination:    Visit Vitals  BP (!) 151/82 (BP 1 Location: Right arm, BP Patient Position: Sitting, BP Cuff Size: Large adult)   Pulse 73   Temp 98.2 °F (36.8 °C) (Tympanic)   Ht 5' 7\" (1.702 m)   Wt 174 lb (78.9 kg)   SpO2 94%   BMI 27.25 kg/m²        General: AOX3, no apparent distress  Psychiatric: mood and affect appropriate  Lungs: breathing is symmetric and unlabored bilaterally  Heart: regular rate and rhythm  Abdomen: no guarding  Head: normocephalic, atraumatic  Skin: No significant abnormalities, good turgor  Sensation intact to light touch: C5-T1 dermatomes  Muscular exam: 5/5 strength in all major muscle groups unless noted in specialty exam.    Extremities      Right upper extremity: No gross deformity. No restriction to passive range of motion of the shoulder, elbow, wrist.  Positive impingement maneuver. Open can test is positive. Negative hornblower's maneuver. Belly press is negative for weakness, and internal rotation is to L5. Neck range of motion is full and pain free. Muscle bulk is appropriate without wasting. Sensation is intact to light touch in the C5-T1 dermatomes. Capillary refill is less than 2 seconds in the fingers. Strength testing is 5/5 at the major muscle groups of the shoulder, elbow, and wrist.        Left upper extremity:  No gross deformity. No restriction to range of motion of the hip, knee, ankle. Muscle bulk is appropriate without wasting. Sensation is intact to light touch in the L1-S1 dermatomes. Capillary refill is less than 2 seconds in the fingers. Strength testing is 5/5 at the major muscle groups of the hip, knee, ankle. Diagnostics:    Pertinent Diagnostics: none today    Assessment: right shoulder impingement  Plan: This patient I had a long discussion regarding her treatment options, she can do more with her shoulder at this point, it is logical to continue the therapy as it had been prescribed previously. Additionally, the plan be to have her proceed with more aggressive therapy and NSAID therapy, I have prescribed for her meloxicam.  Plan will be to have her follow-up with me in 4 to 6 weeks if her symptoms persist.  She stated her understanding and satisfaction. Ms. Jordan Me has a reminder for a \"due or due soon\" health maintenance. I have asked that she contact her primary care provider for follow-up on this health maintenance.

## 2021-10-08 ENCOUNTER — CLINICAL SUPPORT (OUTPATIENT)
Dept: INTERNAL MEDICINE CLINIC | Age: 76
End: 2021-10-08
Payer: MEDICARE

## 2021-10-08 DIAGNOSIS — Z23 NEEDS FLU SHOT: Primary | ICD-10-CM

## 2021-10-08 PROCEDURE — 90694 VACC AIIV4 NO PRSRV 0.5ML IM: CPT | Performed by: INTERNAL MEDICINE

## 2021-10-08 PROCEDURE — G0008 ADMIN INFLUENZA VIRUS VAC: HCPCS | Performed by: INTERNAL MEDICINE

## 2021-10-08 NOTE — PROGRESS NOTES
After verbal order read back of Dr. Chica Madden, patient received High Dose Flu Shot (Adjuvanted Fluad) in left deltoid. [facility]: [id number] Lot: [id number] Exp: 05/24/2022. Patient tolerated procedure without complaints and received VIS.

## 2021-10-08 NOTE — PATIENT INSTRUCTIONS
Vaccine Information Statement    Influenza (Flu) Vaccine (Inactivated or Recombinant): What You Need to Know    Many vaccine information statements are available in [place] and other languages. See www.immunize.org/vis. Hojas de información sobre vacunas están disponibles en español y en muchos otros idiomas. Visite www.immunize.org/vis. 1. Why get vaccinated? Influenza vaccine can prevent influenza (flu). Flu is a contagious disease that spreads around the [facility] every year, usually between October and May. Anyone can get the flu, but it is more dangerous for some people. Infants and young children, people 72 years and older, pregnant people, and people with certain health conditions or a weakened immune system are at greatest risk of flu complications. Pneumonia, bronchitis, sinus infections, and ear infections are examples of flu-related complications. If you have a medical condition, such as heart disease, cancer, or diabetes, flu can make it worse. Flu can cause fever and chills, sore throat, muscle aches, fatigue, cough, headache, and runny or stuffy nose. Some people may have vomiting and diarrhea, though this is more common in children than adults. In an average year, thousands of people in the [facility] die from flu, and many more are hospitalized. Flu vaccine prevents millions of illnesses and flu-related visits to the doctor each year. 2. Influenza vaccines     CDC recommends everyone 6 months and older get vaccinated every flu season. Children 6 months through 6years of age may need 2 doses during a single flu season. Everyone else needs only 1 dose each flu season. It takes about 2 weeks for protection to develop after vaccination. There are many flu viruses, and they are always changing. Each year a new flu vaccine is made to protect against the influenza viruses believed to be likely to cause disease in the upcoming flu season.  Even when the vaccine doesnt exactly match these viruses, it may still provide some protection. Influenza vaccine does not cause flu. Influenza vaccine may be given at the same time as other vaccines. 3. Talk with your health care provider    Tell your vaccination provider if the person getting the vaccine:   Has had an allergic reaction after a previous dose of influenza vaccine, or has any severe, life-threatening allergies    Has ever had Guillain-Barré Syndrome (also called GBS)    In some cases, your health care provider may decide to postpone influenza vaccination until a future visit. Influenza vaccine can be administered at any time during pregnancy. People who are or will be pregnant during influenza season should receive inactivated influenza vaccine. People with minor illnesses, such as a cold, may be vaccinated. People who are moderately or severely ill should usually wait until they recover before getting influenza vaccine. Your health care provider can give you more information. 4. Risks of a vaccine reaction     Soreness, redness, and swelling where the shot is given, fever, muscle aches, and headache can happen after influenza vaccination.  There may be a very small increased risk of Guillain-Barré Syndrome (GBS) after inactivated influenza vaccine (the flu shot). Trinh Francisco children who get the flu shot along with pneumococcal vaccine (PCV13) and/or DTaP vaccine at the same time might be slightly more likely to have a seizure caused by fever. Tell your health care provider if a child who is getting flu vaccine has ever had a seizure. People sometimes faint after medical procedures, including vaccination. Tell your provider if you feel dizzy or have vision changes or ringing in the ears. As with any medicine, there is a very remote chance of a vaccine causing a severe allergic reaction, other serious injury, or death. 5. What if there is a serious problem?     An allergic reaction could occur after the vaccinated person leaves the clinic. If you see signs of a severe allergic reaction (hives, swelling of the face and throat, difficulty breathing, a fast heartbeat, dizziness, or weakness), call 9-1-1 and get the person to the nearest hospital.    For other signs that concern you, call your health care provider. Adverse reactions should be reported to the Vaccine Adverse Event Reporting System (VAERS). Your health care provider will usually file this report, or you can do it yourself. Visit the VAERS website at www.vaers. [facility].gov or call 6-903.267.6171. VAERS is only for reporting reactions, and VAERS staff members do not give medical advice. 6. The National Vaccine Injury Compensation Program    The [facility] Vaccine Injury Compensation Program (VICP) is a federal program that was created to compensate people who may have been injured by certain vaccines. Claims regarding alleged injury or death due to vaccination have a time limit for filing, which may be as short as two years. Visit the VICP website at www.[facility].gov/vaccinecompensation or call 0-667.139.7656 to learn about the program and about filing a claim. 7. How can I learn more?  Ask your health care provider.  Call your local or state health department.  Visit the website of the Food and Drug Administration (FDA) for vaccine package inserts and additional information at www.fda.gov/vaccines-blood-biologics/vaccines.  Contact the Centers for Disease Control and Prevention (CDC):  - Call 6-647.408.1051 (1-800-CDC-INFO) or  - Visit CDCs influenza website at www.cdc.gov/flu. Vaccine Information Statement   Inactivated Influenza Vaccine   8/6/2021  42 U. Letta Shells [id number]-05   Department of Health and Human Services  Centers for Disease Control and Prevention    Office Use Only

## 2021-10-15 ENCOUNTER — OFFICE VISIT (OUTPATIENT)
Dept: ORTHOPEDIC SURGERY | Age: 76
End: 2021-10-15
Payer: MEDICARE

## 2021-10-15 VITALS
WEIGHT: 174 LBS | OXYGEN SATURATION: 91 % | HEIGHT: 67 IN | BODY MASS INDEX: 27.31 KG/M2 | SYSTOLIC BLOOD PRESSURE: 171 MMHG | DIASTOLIC BLOOD PRESSURE: 83 MMHG | TEMPERATURE: 97 F | HEART RATE: 73 BPM

## 2021-10-15 DIAGNOSIS — M75.41 IMPINGEMENT SYNDROME OF RIGHT SHOULDER: Primary | ICD-10-CM

## 2021-10-15 PROCEDURE — 1101F PT FALLS ASSESS-DOCD LE1/YR: CPT | Performed by: ORTHOPAEDIC SURGERY

## 2021-10-15 PROCEDURE — G8427 DOCREV CUR MEDS BY ELIG CLIN: HCPCS | Performed by: ORTHOPAEDIC SURGERY

## 2021-10-15 PROCEDURE — G8753 SYS BP > OR = 140: HCPCS | Performed by: ORTHOPAEDIC SURGERY

## 2021-10-15 PROCEDURE — G8399 PT W/DXA RESULTS DOCUMENT: HCPCS | Performed by: ORTHOPAEDIC SURGERY

## 2021-10-15 PROCEDURE — 1090F PRES/ABSN URINE INCON ASSESS: CPT | Performed by: ORTHOPAEDIC SURGERY

## 2021-10-15 PROCEDURE — 99212 OFFICE O/P EST SF 10 MIN: CPT | Performed by: ORTHOPAEDIC SURGERY

## 2021-10-15 PROCEDURE — G8754 DIAS BP LESS 90: HCPCS | Performed by: ORTHOPAEDIC SURGERY

## 2021-10-15 PROCEDURE — G8419 CALC BMI OUT NRM PARAM NOF/U: HCPCS | Performed by: ORTHOPAEDIC SURGERY

## 2021-10-15 PROCEDURE — G9717 DOC PT DX DEP/BP F/U NT REQ: HCPCS | Performed by: ORTHOPAEDIC SURGERY

## 2021-10-15 PROCEDURE — G8536 NO DOC ELDER MAL SCRN: HCPCS | Performed by: ORTHOPAEDIC SURGERY

## 2021-10-15 NOTE — PROGRESS NOTES
Identified pt with two pt identifiers (name and ). Reviewed chart in preparation for visit and have obtained necessary documentation. Jensen Barron is a 64 y.o. female  Chief Complaint   Patient presents with    Follow-up     RT shoulder     Visit Vitals  BP (!) 171/83 (BP 1 Location: Right arm, BP Patient Position: Sitting, BP Cuff Size: Large adult)   Pulse 73   Temp 97 °F (36.1 °C) (Tympanic)   Ht 5' 7\" (1.702 m)   Wt 174 lb (78.9 kg)   SpO2 91%   BMI 27.25 kg/m²     1. Have you been to the ER, urgent care clinic since your last visit? Hospitalized since your last visit? No    2. Have you seen or consulted any other health care providers outside of the [address] [place] [address] since your last visit? Include any pap smears or colon screening.  No

## 2021-10-19 NOTE — PROGRESS NOTES
10/19/2021      CC: Right shoulder pain    HPI:      This is a 64y.o. year old female who presents for a follow up visit. The patient was last seen and diagnosed with right shoulder impingement, cervical radiculopathy. The patient's treatments since the most recent visit have comprised of physical therapy. The patient has had no relief of the chief complaint. PMH:  Past Medical History:   Diagnosis Date    Abnormal chest x-ray 12/23/2009    Anemia 12/23/2009    Arrhythmia     SVT. Converts with Adenocard    Atrial fibrillation ([address] 12/23/2009    Carpal tunnel syndrome 12/23/2009    Chronic obstructive pulmonary disease ([address]     Colitis 12/23/2009    Complex regional pain syndrome 12/23/2009    Depression 12/23/2009    Diabetes ([address]     Family history of skin cancer     brother-melanoma    GERD (gastroesophageal reflux disease) 2/8/2010    HTN (hypertension) 12/23/2009    Hypercholesteremia 12/23/2009    Hypertriglyceridemia 12/23/2009    Idiopathic peripheral neuropathy 12/23/2009    Iron deficiency anemia 12/23/2009    Osteopenia 12/23/2009    Paroxysmal atrial fibrillation ([address] 12/2/2011    Psoriasis 6/27/2012    Sarcoidosis        PSxHx:  Past Surgical History:   Procedure Laterality Date    HX CARPAL TUNNEL RELEASE  00/00/2002    Both hands    HX CYST REMOVAL  00/00/1965    Left wrist    HX HYSTERECTOMY  00/00/1985    HX ORTHOPAEDIC  00/00/1987    back surgery (disc)    HX ORTHOPAEDIC      right foot X 3 neuromas and tarsal tunnel release    HX TONSILLECTOMY  00/00/1962    NEUROLOGICAL PROCEDURE UNLISTED      Pain pump    PAIN PUMP DEVICE  00/00/2007    in left lower abdomen (for foot pain)       Meds:    Current Outpatient Medications:     gabapentin (NEURONTIN) 600 mg tablet, Take 1 Tablet by mouth two (2) times a day. Max Daily Amount: 1,200 mg., Disp: 180 Tablet, Rfl: 0    meloxicam (MOBIC) 15 mg tablet, Take 1 Tablet by mouth daily. , Disp: 60 Tablet, Rfl: 1   amitriptyline (ELAVIL) 100 mg tablet, Take 1 Tablet by mouth nightly., Disp: 90 Tablet, Rfl: 1    losartan (COZAAR) 100 mg tablet, TAKE 1 TABLET BY MOUTH  DAILY, Disp: 90 Tab, Rfl: 3    atorvastatin (LIPITOR) 40 mg tablet, TAKE 1 TABLET BY MOUTH AT  NIGHT, Disp: 90 Tab, Rfl: 3    metoprolol tartrate (LOPRESSOR) 50 mg tablet, TAKE 1 TAB BY MOUTH TWO (2) TIMES A DAY., Disp: 180 Tab, Rfl: 3    fluorouracil (EFUDEX) 5 % chemo cream, Apply  to affected area two (2) times a day., Disp: 40 g, Rfl: 0    omeprazole (PRILOSEC) 20 mg capsule, Take  by mouth., Disp: , Rfl:     aspirin 81 mg chewable tablet, Take 1 Tab by mouth daily. , Disp: 90 Tab, Rfl: 3    predniSONE (DELTASONE) 20 mg tablet, Take 2 tablets by mouth once daily for 5 days. (Patient not taking: Reported on 2021), Disp: 10 Tablet, Rfl: 0    hydrocortisone (HYTONE) 2.5 % topical cream, , Disp: , Rfl:     All:  Allergies   Allergen Reactions    Duragesic [Fentanyl] Other (comments)     Patient sees things when taking    Codeine Nausea Only       Social Hx:  Social History     Socioeconomic History    Marital status:      Spouse name: Not on file    Number of children: Not on file    Years of education: Not on file    Highest education level: Not on file   Tobacco Use    Smoking status: Former Smoker     Packs/day: 0.10     Years: 2.00     Pack years: 0.20     Quit date: 1975     Years since quittin.8    Smokeless tobacco: Never Used   Vaping Use    Vaping Use: Never used   Substance and Sexual Activity    Alcohol use: No    Drug use: No     Social Determinants of Health     Financial Resource Strain:     Difficulty of Paying Living Expenses:    Food Insecurity:     Worried About Running Out of Food in the Last Year:     Ran Out of Food in the Last Year:    Transportation Needs:     Lack of Transportation (Medical):      Lack of Transportation (Non-Medical):    Physical Activity:     Days of Exercise per Week:     Minutes of Exercise per Session:    Stress:     Feeling of Stress :    Social Connections:     Frequency of Communication with Friends and Family:     Frequency of Social Gatherings with Friends and Family:     Attends [employer] Services:     Active Member of Clubs or Organizations:     Attends Club or Organization Meetings:     Marital Status:        Family Hx:  Family History   Problem Relation Age of Onset    Arrhythmia Father     Heart Attack Father     Cancer Brother         Melanoma    Breast Cancer Daughter         48    Breast Cancer Daughter 46    Breast Cancer Daughter 46         Review of Systems:       General: Denies headache, lethargy, fever, weight loss  Ears/Nose/Throat: Denies ear discharge, drainage, nosebleeds, hoarse voice, dental problems  Cardiovascular: Denies chest pain, shortness of breath  Lungs: Denies chest pain, breathing problems, wheezing, pneumonia  Stomach: Denies stomach pain, heartburn, constipation, irritable bowel  Skin: Denies rash, sores, open wounds  Musculoskeletal: Right shoulder pain, right neck pain  Genitourinary: Denies dysuria, hematuria, polyuria  Gastrointestinal: Denies constipation, obstipation, diarrhea  Neurological: Denies changes in sight, smell, hearing, taste, seizures. Denies loss of consciousness.   Psychiatric: Denies depression, sleep pattern changes, anxiety, change in personality  Endocrine: Denies mood swings, heat or cold intolerance  Hematologic/Lymphatic: Denies anemia, purpura, petechia  Allergic/Immunologic: Denies swelling of throat, pain or swelling at lymph nodes      Physical Examination:    Visit Vitals  BP (!) 171/83 (BP 1 Location: Right arm, BP Patient Position: Sitting, BP Cuff Size: Large adult)   Pulse 73   Temp 97 °F (36.1 °C) (Tympanic)   Ht 5' 7\" (1.702 m)   Wt 174 lb (78.9 kg)   SpO2 91%   BMI 27.25 kg/m²        General: AOX3, no apparent distress  Psychiatric: mood and affect appropriate  Lungs: breathing is symmetric and unlabored bilaterally  Heart: regular rate and rhythm  Abdomen: no guarding  Head: normocephalic, atraumatic  Skin: No significant abnormalities, good turgor  Sensation intact to light touch: C5-T1 dermatomes  Muscular exam: 5/5 strength in all major muscle groups unless noted in specialty exam.    Extremities      Right upper extremity: Extremity is examined, stiffness is noted in the neck to rotational maneuvers. No evidence of gross deformity, no gross motor or sensory deficit. Full active and passive range of motion is noted. Muscle tone and bulk is within normal expected limits. Capillary refill is less than 2 seconds distally. Left upper extremity: Extremity is examined, no evidence of gross deformity, no gross motor or sensory deficit. Full active and passive range of motion is noted. Muscle tone and bulk is within normal expected limits. Capillary refill is less than 2 seconds distally. Diagnostics:    Pertinent Diagnostics: None today    Assessment: Cervical radiculopathy versus right shoulder impingement  Plan: This patient continues to have difficulty with her shoulder. In general, she has been through now 2 months of physical therapy. They are suggesting dry needling, have given a prescription for this. The plan will be to have her proceed with weightbearing to tolerance, continued month more of physical therapy. Should this fail, I will obtain imaging of the shoulder, possibly the cervical spine as well. She stated understanding and satisfaction. Ms. Reinaldo Arrieta has a reminder for a \"due or due soon\" health maintenance. I have asked that she contact her primary care provider for follow-up on this health maintenance.

## 2021-11-10 DIAGNOSIS — E11.42 DIABETIC PERIPHERAL NEUROPATHY ASSOCIATED WITH TYPE 2 DIABETES MELLITUS (HCC): ICD-10-CM

## 2021-11-11 RX ORDER — GABAPENTIN 600 MG/1
TABLET ORAL
Qty: 180 TABLET | Refills: 3 | Status: SHIPPED | OUTPATIENT
Start: 2021-11-11 | End: 2022-03-09 | Stop reason: DRUGHIGH

## 2021-12-04 DIAGNOSIS — E11.42 CONTROLLED TYPE 2 DIABETES MELLITUS WITH DIABETIC POLYNEUROPATHY, WITHOUT LONG-TERM CURRENT USE OF INSULIN (HCC): ICD-10-CM

## 2021-12-05 RX ORDER — AMITRIPTYLINE HYDROCHLORIDE 100 MG/1
100 TABLET, FILM COATED ORAL
Qty: 90 TABLET | Refills: 0 | Status: SHIPPED | OUTPATIENT
Start: 2021-12-05 | End: 2021-12-13

## 2021-12-11 DIAGNOSIS — E11.42 CONTROLLED TYPE 2 DIABETES MELLITUS WITH DIABETIC POLYNEUROPATHY, WITHOUT LONG-TERM CURRENT USE OF INSULIN (HCC): ICD-10-CM

## 2021-12-13 RX ORDER — AMITRIPTYLINE HYDROCHLORIDE 100 MG/1
100 TABLET, FILM COATED ORAL
Qty: 90 TABLET | Refills: 0 | Status: SHIPPED | OUTPATIENT
Start: 2021-12-13 | End: 2022-06-13 | Stop reason: SDUPTHER

## 2021-12-20 NOTE — ED NOTES
Dave Gonzalez DO reviewed discharge instructions with the patient. The patient verbalized understanding. All questions and concerns were addressed. The patient is discharged by wheelchair with instructions and prescriptions in hand. Pt is alert and oriented x 4. Respirations are clear and unlabored.
Patient to CT by chelsie
Pt arrives from home reporting persistent bleeding from a head lac sustained today. Pt presented to [facility] for treatment and was given 2 stiches. Pt lac continued to bleed and she was told by staff to present to ED. Pt denies anticoagulant use. Pt denies dizziness, weakness leading up to her fall. Pt is afebrile.
Report received from Jaida Peoples, [facility]. They advised of the patient's chief complaint, current status, orders completed (to include IV access/medications/radiology testing), outstanding orders that still need to be completed, and the treatment plan. Questions asked and answered prior to assumption of care.
Negative

## 2022-01-27 ENCOUNTER — OFFICE VISIT (OUTPATIENT)
Dept: ORTHOPEDIC SURGERY | Age: 77
End: 2022-01-27
Payer: MEDICARE

## 2022-01-27 VITALS
OXYGEN SATURATION: 93 % | BODY MASS INDEX: 27.31 KG/M2 | HEIGHT: 67 IN | DIASTOLIC BLOOD PRESSURE: 83 MMHG | TEMPERATURE: 97.5 F | HEART RATE: 74 BPM | WEIGHT: 174 LBS | SYSTOLIC BLOOD PRESSURE: 156 MMHG

## 2022-01-27 DIAGNOSIS — M75.41 IMPINGEMENT SYNDROME OF RIGHT SHOULDER: Primary | ICD-10-CM

## 2022-01-27 PROCEDURE — 99213 OFFICE O/P EST LOW 20 MIN: CPT | Performed by: ORTHOPAEDIC SURGERY

## 2022-01-27 PROCEDURE — G8419 CALC BMI OUT NRM PARAM NOF/U: HCPCS | Performed by: ORTHOPAEDIC SURGERY

## 2022-01-27 PROCEDURE — G9717 DOC PT DX DEP/BP F/U NT REQ: HCPCS | Performed by: ORTHOPAEDIC SURGERY

## 2022-01-27 PROCEDURE — G8427 DOCREV CUR MEDS BY ELIG CLIN: HCPCS | Performed by: ORTHOPAEDIC SURGERY

## 2022-01-27 PROCEDURE — G8536 NO DOC ELDER MAL SCRN: HCPCS | Performed by: ORTHOPAEDIC SURGERY

## 2022-01-27 PROCEDURE — G8753 SYS BP > OR = 140: HCPCS | Performed by: ORTHOPAEDIC SURGERY

## 2022-01-27 PROCEDURE — G8399 PT W/DXA RESULTS DOCUMENT: HCPCS | Performed by: ORTHOPAEDIC SURGERY

## 2022-01-27 PROCEDURE — 1090F PRES/ABSN URINE INCON ASSESS: CPT | Performed by: ORTHOPAEDIC SURGERY

## 2022-01-27 PROCEDURE — G8754 DIAS BP LESS 90: HCPCS | Performed by: ORTHOPAEDIC SURGERY

## 2022-01-27 PROCEDURE — 1101F PT FALLS ASSESS-DOCD LE1/YR: CPT | Performed by: ORTHOPAEDIC SURGERY

## 2022-01-27 RX ORDER — DICLOFENAC SODIUM 75 MG/1
75 TABLET, DELAYED RELEASE ORAL 2 TIMES DAILY
Qty: 60 TABLET | Refills: 0 | Status: SHIPPED | OUTPATIENT
Start: 2022-01-27 | End: 2022-02-24 | Stop reason: SDUPTHER

## 2022-01-27 NOTE — PROGRESS NOTES
Identified pt with two pt identifiers (name and ). Reviewed chart in preparation for visit and have obtained necessary documentation. Denisse Garcia is a 64 y.o. female  Chief Complaint   Patient presents with    Follow-up     RT shoulder     Visit Vitals  BP (!) 156/83 (BP 1 Location: Left upper arm, BP Patient Position: Sitting, BP Cuff Size: Large adult)   Pulse 74   Temp 97.5 °F (36.4 °C) (Tympanic)   Ht 5' 7\" (1.702 m)   Wt 174 lb (78.9 kg)   SpO2 93%   BMI 27.25 kg/m²     1. Have you been to the ER, urgent care clinic since your last visit? Hospitalized since your last visit? No    2. Have you seen or consulted any other health care providers outside of the [address] [place] [address] since your last visit? Include any pap smears or colon screening.  No

## 2022-01-27 NOTE — PROGRESS NOTES
1/27/2022      CC: right shoulder and neck pain    HPI:      This is a 64y.o. year old female who presents for a follow up visit. The patient was last seen and diagnosed with impingement and possible cervical radiculopathy. The patient's treatments since the most recent visit have comprised of meloxicam, injection, pt. The patient has had no relief of the chief complaint. PMH:  Past Medical History:   Diagnosis Date    Abnormal chest x-ray 12/23/2009    Anemia 12/23/2009    Arrhythmia     SVT.  Converts with Adenocard    Atrial fibrillation ([address] 12/23/2009    Carpal tunnel syndrome 12/23/2009    Chronic obstructive pulmonary disease ([address]     Colitis 12/23/2009    Complex regional pain syndrome 12/23/2009    Depression 12/23/2009    Diabetes ([address]     Pt states she is not diabetic     Family history of skin cancer     brother-melanoma    GERD (gastroesophageal reflux disease) 2/8/2010    HTN (hypertension) 12/23/2009    Hypercholesteremia 12/23/2009    Hypertriglyceridemia 12/23/2009    Idiopathic peripheral neuropathy 12/23/2009    Iron deficiency anemia 12/23/2009    Osteopenia 12/23/2009    Paroxysmal atrial fibrillation ([address] 12/2/2011    Psoriasis 6/27/2012    Sarcoidosis        PSxHx:  Past Surgical History:   Procedure Laterality Date    HX CARPAL TUNNEL RELEASE  00/00/2002    Both hands    HX CYST REMOVAL  00/00/1965    Left wrist    HX HYSTERECTOMY  00/00/1985    HX ORTHOPAEDIC  00/00/1987    back surgery (disc)    HX ORTHOPAEDIC      right foot X 3 neuromas and tarsal tunnel release    HX TONSILLECTOMY  00/00/1962    NEUROLOGICAL PROCEDURE UNLISTED      Pain pump    PAIN PUMP DEVICE  00/00/2007    in left lower abdomen (for foot pain)       Meds:    Current Outpatient Medications:     diclofenac EC (VOLTAREN) 75 mg EC tablet, Take 1 Tablet by mouth two (2) times a day., Disp: 60 Tablet, Rfl: 0    amitriptyline (ELAVIL) 100 mg tablet, Take 1 tablet by mouth nightly, Disp: 90 Tablet, Rfl: 0    gabapentin (NEURONTIN) 600 mg tablet, TAKE 1 TABLET BY MOUTH  TWICE DAILY MAX DAILY  AMOUNT 1,200 MG, Disp: 180 Tablet, Rfl: 3    losartan (COZAAR) 100 mg tablet, TAKE 1 TABLET BY MOUTH  DAILY, Disp: 90 Tab, Rfl: 3    atorvastatin (LIPITOR) 40 mg tablet, TAKE 1 TABLET BY MOUTH AT  NIGHT, Disp: 90 Tab, Rfl: 3    metoprolol tartrate (LOPRESSOR) 50 mg tablet, TAKE 1 TAB BY MOUTH TWO (2) TIMES A DAY., Disp: 180 Tab, Rfl: 3    fluorouracil (EFUDEX) 5 % chemo cream, Apply  to affected area two (2) times a day., Disp: 40 g, Rfl: 0    omeprazole (PRILOSEC) 20 mg capsule, Take  by mouth., Disp: , Rfl:     aspirin 81 mg chewable tablet, Take 1 Tab by mouth daily. , Disp: 90 Tab, Rfl: 3    predniSONE (DELTASONE) 20 mg tablet, Take 2 tablets by mouth once daily for 5 days.  (Patient not taking: Reported on 2021), Disp: 10 Tablet, Rfl: 0    hydrocortisone (HYTONE) 2.5 % topical cream, , Disp: , Rfl:     All:  Allergies   Allergen Reactions    Duragesic [Fentanyl] Other (comments)     Patient sees things when taking    Codeine Nausea Only       Social Hx:  Social History     Socioeconomic History    Marital status:    Tobacco Use    Smoking status: Former Smoker     Packs/day: 0.10     Years: 2.00     Pack years: 0.20     Quit date: 1975     Years since quittin.1    Smokeless tobacco: Never Used   Vaping Use    Vaping Use: Never used   Substance and Sexual Activity    Alcohol use: No    Drug use: No       Family Hx:  Family History   Problem Relation Age of Onset    Arrhythmia Father     Heart Attack Father     Cancer Brother         Melanoma    Breast Cancer Daughter         48    Breast Cancer Daughter 46    Breast Cancer Daughter 46         Review of Systems:       General: Denies headache, lethargy, fever, weight loss  Ears/Nose/Throat: Denies ear discharge, drainage, nosebleeds, hoarse voice, dental problems  Cardiovascular: Denies chest pain, shortness of breath  Lungs: Denies chest pain, breathing problems, wheezing, pneumonia  Stomach: Denies stomach pain, heartburn, constipation, irritable bowel  Skin: Denies rash, sores, open wounds  Musculoskeletal: right shoulder pain  Genitourinary: Denies dysuria, hematuria, polyuria  Gastrointestinal: Denies constipation, obstipation, diarrhea  Neurological: Denies changes in sight, smell, hearing, taste, seizures. Denies loss of consciousness. Psychiatric: Denies depression, sleep pattern changes, anxiety, change in personality  Endocrine: Denies mood swings, heat or cold intolerance  Hematologic/Lymphatic: Denies anemia, purpura, petechia  Allergic/Immunologic: Denies swelling of throat, pain or swelling at lymph nodes      Physical Examination:    Visit Vitals  BP (!) 156/83 (BP 1 Location: Left upper arm, BP Patient Position: Sitting, BP Cuff Size: Large adult)   Pulse 74   Temp 97.5 °F (36.4 °C) (Tympanic)   Ht 5' 7\" (1.702 m)   Wt 174 lb (78.9 kg)   SpO2 93%   BMI 27.25 kg/m²        General: AOX3, no apparent distress  Psychiatric: mood and affect appropriate  Lungs: breathing is symmetric and unlabored bilaterally  Heart: regular rate and rhythm  Abdomen: no guarding  Head: normocephalic, atraumatic  Skin: No significant abnormalities, good turgor  Sensation intact to light touch: C5-T1 dermatomes  Muscular exam: 5/5 strength in all major muscle groups unless noted in specialty exam.    Extremities        Left upper extremity:  No gross deformity. No restriction to range of motion of the shoulder, elbow, wrist.  Neck range of motion is full and pain free. Muscle bulk is appropriate without wasting. Sensation is intact to light touch in the C5-T1 dermatomes. Capillary refill is less than 2 seconds in the fingers. Strength testing is 5/5 at the major muscle groups of the shoulder, elbow, and wrist.      Right upper extremity: No gross deformity.   No restriction to passive range of motion of the shoulder, elbow, wrist.  Positive impingement maneuver. Open can test is positive. Negative hornblower's maneuver. Belly press is negative for weakness, and internal rotation is to L 5. Neck range of motion is full and pain free. Muscle bulk is appropriate without wasting. Sensation is intact to light touch in the C5-T1 dermatomes. Capillary refill is less than 2 seconds in the fingers. Strength testing is 5/5 at the major muscle groups of the shoulder, elbow, and wrist.              Diagnostics:    Pertinent Diagnostics: none today    Assessment: right shoulder impingement, less likely cervical radiculopathy  Plan: This patient I had a long discussion regarding her treatment, she does have a pain pump in, she cannot have an MRI, however we will have to proceed with imaging based on her failure of conservative measures, she is willing to have surgery and comes to it and is indicated, she would like to proceed with imaging, I will prescribe for her diclofenac at this point for pain control, she will follow-up with me once this is completed as an imaging study. Ms. Dax Raymundo has a reminder for a \"due or due soon\" health maintenance. I have asked that she contact her primary care provider for follow-up on this health maintenance.

## 2022-01-31 ENCOUNTER — HOSPITAL ENCOUNTER (OUTPATIENT)
Dept: CT IMAGING | Age: 77
Discharge: HOME | End: 2022-01-31
Attending: ORTHOPAEDIC SURGERY
Payer: MEDICARE

## 2022-01-31 DIAGNOSIS — M75.41 IMPINGEMENT SYNDROME OF RIGHT SHOULDER: ICD-10-CM

## 2022-01-31 PROCEDURE — 73200 CT UPPER EXTREMITY W/O DYE: CPT

## 2022-02-10 ENCOUNTER — OFFICE VISIT (OUTPATIENT)
Dept: ORTHOPEDIC SURGERY | Age: 77
End: 2022-02-10
Payer: MEDICARE

## 2022-02-10 VITALS
TEMPERATURE: 97.3 F | HEIGHT: 67 IN | SYSTOLIC BLOOD PRESSURE: 116 MMHG | OXYGEN SATURATION: 94 % | WEIGHT: 173 LBS | HEART RATE: 81 BPM | DIASTOLIC BLOOD PRESSURE: 71 MMHG | BODY MASS INDEX: 27.15 KG/M2

## 2022-02-10 DIAGNOSIS — M75.41 IMPINGEMENT SYNDROME OF RIGHT SHOULDER: Primary | ICD-10-CM

## 2022-02-10 PROCEDURE — 99213 OFFICE O/P EST LOW 20 MIN: CPT | Performed by: ORTHOPAEDIC SURGERY

## 2022-02-10 PROCEDURE — G8419 CALC BMI OUT NRM PARAM NOF/U: HCPCS | Performed by: ORTHOPAEDIC SURGERY

## 2022-02-10 PROCEDURE — G9717 DOC PT DX DEP/BP F/U NT REQ: HCPCS | Performed by: ORTHOPAEDIC SURGERY

## 2022-02-10 PROCEDURE — G8752 SYS BP LESS 140: HCPCS | Performed by: ORTHOPAEDIC SURGERY

## 2022-02-10 PROCEDURE — G8754 DIAS BP LESS 90: HCPCS | Performed by: ORTHOPAEDIC SURGERY

## 2022-02-10 PROCEDURE — 1101F PT FALLS ASSESS-DOCD LE1/YR: CPT | Performed by: ORTHOPAEDIC SURGERY

## 2022-02-10 PROCEDURE — G8399 PT W/DXA RESULTS DOCUMENT: HCPCS | Performed by: ORTHOPAEDIC SURGERY

## 2022-02-10 PROCEDURE — G8536 NO DOC ELDER MAL SCRN: HCPCS | Performed by: ORTHOPAEDIC SURGERY

## 2022-02-10 PROCEDURE — G8427 DOCREV CUR MEDS BY ELIG CLIN: HCPCS | Performed by: ORTHOPAEDIC SURGERY

## 2022-02-10 PROCEDURE — 1090F PRES/ABSN URINE INCON ASSESS: CPT | Performed by: ORTHOPAEDIC SURGERY

## 2022-02-10 NOTE — PROGRESS NOTES
Identified pt with two pt identifiers (name and ). Reviewed chart in preparation for visit and have obtained necessary documentation. Evaristo Mims is a 64 y.o. female  Chief Complaint   Patient presents with    Results     CT results RT shoulder     Visit Vitals  /71 (BP 1 Location: Right arm, BP Patient Position: Sitting, BP Cuff Size: Large adult)   Pulse 81   Temp 97.3 °F (36.3 °C) (Tympanic)   Ht 5' 7\" (1.702 m)   Wt 173 lb (78.5 kg)   SpO2 94%   BMI 27.10 kg/m²     1. Have you been to the ER, urgent care clinic since your last visit? Hospitalized since your last visit? No    2. Have you seen or consulted any other health care providers outside of the [address] [place] [address] since your last visit? Include any pap smears or colon screening.  No

## 2022-02-11 ENCOUNTER — TELEPHONE (OUTPATIENT)
Dept: ORTHOPEDIC SURGERY | Age: 77
End: 2022-02-11

## 2022-02-11 DIAGNOSIS — E78.00 HYPERCHOLESTEREMIA: ICD-10-CM

## 2022-02-11 DIAGNOSIS — I10 HYPERTENSION, ESSENTIAL: ICD-10-CM

## 2022-02-11 RX ORDER — ATORVASTATIN CALCIUM 40 MG/1
TABLET, FILM COATED ORAL
Qty: 90 TABLET | Refills: 3 | Status: SHIPPED | OUTPATIENT
Start: 2022-02-11 | End: 2022-10-17 | Stop reason: SDUPTHER

## 2022-02-11 RX ORDER — LOSARTAN POTASSIUM 100 MG/1
TABLET ORAL
Qty: 90 TABLET | Refills: 3 | Status: SHIPPED | OUTPATIENT
Start: 2022-02-11 | End: 2022-10-17 | Stop reason: SDUPTHER

## 2022-02-11 NOTE — PROGRESS NOTES
2/11/2022      CC: Right shoulder pain    HPI:      This is a 64y.o. year old patient who presents for CT follow up of the right shoulder. The patient states their pain is still consistent per the previous visit. PMH:  Past Medical History:   Diagnosis Date    Abnormal chest x-ray 12/23/2009    Anemia 12/23/2009    Arrhythmia     SVT.  Converts with Adenocard    Atrial fibrillation ([address] 12/23/2009    Carpal tunnel syndrome 12/23/2009    Chronic obstructive pulmonary disease ([address]     Colitis 12/23/2009    Complex regional pain syndrome 12/23/2009    Depression 12/23/2009    Diabetes ([address]     Pt states she is not diabetic     Family history of skin cancer     brother-melanoma    GERD (gastroesophageal reflux disease) 2/8/2010    HTN (hypertension) 12/23/2009    Hypercholesteremia 12/23/2009    Hypertriglyceridemia 12/23/2009    Idiopathic peripheral neuropathy 12/23/2009    Iron deficiency anemia 12/23/2009    Osteopenia 12/23/2009    Paroxysmal atrial fibrillation ([address] 12/2/2011    Psoriasis 6/27/2012    Sarcoidosis        PSxHx:  Past Surgical History:   Procedure Laterality Date    HX CARPAL TUNNEL RELEASE  00/00/2002    Both hands    HX CYST REMOVAL  00/00/1965    Left wrist    HX HYSTERECTOMY  00/00/1985    HX ORTHOPAEDIC  00/00/1987    back surgery (disc)    HX ORTHOPAEDIC      right foot X 3 neuromas and tarsal tunnel release    HX TONSILLECTOMY  00/00/1962    NEUROLOGICAL PROCEDURE UNLISTED      Pain pump    PAIN PUMP DEVICE  00/00/2007    in left lower abdomen (for foot pain)       Meds:    Current Outpatient Medications:     diclofenac EC (VOLTAREN) 75 mg EC tablet, Take 1 Tablet by mouth two (2) times a day., Disp: 60 Tablet, Rfl: 0    amitriptyline (ELAVIL) 100 mg tablet, Take 1 tablet by mouth nightly, Disp: 90 Tablet, Rfl: 0    gabapentin (NEURONTIN) 600 mg tablet, TAKE 1 TABLET BY MOUTH  TWICE DAILY MAX DAILY  AMOUNT 1,200 MG, Disp: 180 Tablet, Rfl: 3   losartan (COZAAR) 100 mg tablet, TAKE 1 TABLET BY MOUTH  DAILY, Disp: 90 Tab, Rfl: 3    atorvastatin (LIPITOR) 40 mg tablet, TAKE 1 TABLET BY MOUTH AT  NIGHT, Disp: 90 Tab, Rfl: 3    metoprolol tartrate (LOPRESSOR) 50 mg tablet, TAKE 1 TAB BY MOUTH TWO (2) TIMES A DAY., Disp: 180 Tab, Rfl: 3    fluorouracil (EFUDEX) 5 % chemo cream, Apply  to affected area two (2) times a day., Disp: 40 g, Rfl: 0    omeprazole (PRILOSEC) 20 mg capsule, Take  by mouth., Disp: , Rfl:     aspirin 81 mg chewable tablet, Take 1 Tab by mouth daily. , Disp: 90 Tab, Rfl: 3    predniSONE (DELTASONE) 20 mg tablet, Take 2 tablets by mouth once daily for 5 days.  (Patient not taking: Reported on 2021), Disp: 10 Tablet, Rfl: 0    hydrocortisone (HYTONE) 2.5 % topical cream, , Disp: , Rfl:     All:  Allergies   Allergen Reactions    Duragesic [Fentanyl] Other (comments)     Patient sees things when taking    Codeine Nausea Only       Social Hx:  Social History     Socioeconomic History    Marital status:    Tobacco Use    Smoking status: Former Smoker     Packs/day: 0.10     Years: 2.00     Pack years: 0.20     Quit date: 1975     Years since quittin.1    Smokeless tobacco: Never Used   Vaping Use    Vaping Use: Never used   Substance and Sexual Activity    Alcohol use: No    Drug use: No       Family Hx:  Family History   Problem Relation Age of Onset    Arrhythmia Father     Heart Attack Father     Cancer Brother         Melanoma    Breast Cancer Daughter         48    Breast Cancer Daughter 46    Breast Cancer Daughter 46         Review of Systems:       General: Denies headache, lethargy, fever, weight loss  Ears/Nose/Throat: Denies ear discharge, drainage, nosebleeds, hoarse voice, dental problems  Cardiovascular: Denies chest pain, shortness of breath  Lungs: Denies chest pain, breathing problems, wheezing, pneumonia  Stomach: Denies stomach pain, heartburn, constipation, irritable bowel  Skin: Denies rash, sores, open wounds  Musculoskeletal: Right shoulder pain  Genitourinary: Denies dysuria, hematuria, polyuria  Gastrointestinal: Denies constipation, obstipation, diarrhea  Neurological: Denies changes in sight, smell, hearing, taste, seizures. Denies loss of consciousness. Psychiatric: Denies depression, sleep pattern changes, anxiety, change in personality  Endocrine: Denies mood swings, heat or cold intolerance  Hematologic/Lymphatic: Denies anemia, purpura, petechia  Allergic/Immunologic: Denies swelling of throat, pain or swelling at lymph nodes      Physical Examination:    Visit Vitals  /71 (BP 1 Location: Right arm, BP Patient Position: Sitting, BP Cuff Size: Large adult)   Pulse 81   Temp 97.3 °F (36.3 °C) (Tympanic)   Ht 5' 7\" (1.702 m)   Wt 173 lb (78.5 kg)   SpO2 94%   BMI 27.10 kg/m²        General: AOX3, no apparent distress  Psychiatric: mood and affect appropriate  Lungs: breathing is symmetric and unlabored bilaterally  Heart: regular rate and rhythm  Abdomen: no guarding  Head: normocephalic, atraumatic  Skin: No significant abnormalities, good turgor  Sensation intact to light touch: C5-T1 dermatomes  Muscular exam: 5/5 strength in all major muscle groups unless noted in specialty exam.    Extremities      Right upper extremity: No gross deformity. No restriction to passive range of motion of the shoulder, elbow, wrist.  Positive impingement maneuver. Open can test is positive. Negative hornblower's maneuver. Belly press is negative for weakness, and internal rotation is to L 5. Neck range of motion is full and pain free. Muscle bulk is appropriate without wasting. Sensation is intact to light touch in the C5-T1 dermatomes. Capillary refill is less than 2 seconds in the fingers.   Strength testing is 5/5 at the major muscle groups of the shoulder, elbow, and wrist.        Left upper extremity: Extremity is examined, no evidence of gross deformity, no gross motor or sensory deficit. Full active and passive range of motion is noted. Muscle tone and bulk is within normal expected limits. Capillary refill is less than 2 seconds distally. Diagnostics:    Pertinent Diagnostics: CT is available of the right shoulder as she cannot have an MRI. This does indicate acromioclavicular joint arthrosis, subacromial impingement, no distinctive rotator cuff tear, though this is a CT and therefore less perfect for rotator cuff pathology. Mild glenohumeral arthrosis    Assessment: Impingement without rotator cuff tear, right shoulder  Plan: This patient feels as though she has failed her nonoperative management at this point and would like to proceed with surgical intervention of a subacromial decompression and likely distal clavicular excision. We did discuss the risks of surgery which include but are not limited to infection, nerve or blood vessel damage, failure of fixation, failure of any possible implant, need for reoperation, postoperative pain and swelling, intra-or postoperative fracture, postoperative dislocation, leg length inequality, need for reoperation, implant failure, death, disability, organ dysfunction, wound healing issues, DVT, PE, and the need for further procedures. The patient did freely state their understanding and satisfaction with our discussion. We will proceed after medical clearances. The patient was counseled at length about the risks of ilya Covid-19 during their perioperative period and any recovery window from their procedure. The patient was made aware that ilya Covid-19  may worsen their prognosis for recovering from their procedure and lend to a higher morbidity and/or mortality risk. All material risks, benefits, and reasonable alternatives including postponing the procedure were discussed. The patient does  wish to proceed with the procedure at this time. Ms. Madden has a reminder for a \"due or due soon\" health maintenance. I have asked that she contact her primary care provider for follow-up on this health maintenance.

## 2022-02-14 ENCOUNTER — TELEPHONE (OUTPATIENT)
Dept: ORTHOPEDIC SURGERY | Age: 77
End: 2022-02-14

## 2022-02-24 DIAGNOSIS — M75.41 IMPINGEMENT SYNDROME OF RIGHT SHOULDER: ICD-10-CM

## 2022-02-25 RX ORDER — DICLOFENAC SODIUM 75 MG/1
75 TABLET, DELAYED RELEASE ORAL 2 TIMES DAILY
Qty: 60 TABLET | Refills: 0 | Status: SHIPPED | OUTPATIENT
Start: 2022-02-25 | End: 2022-05-11 | Stop reason: SDUPTHER

## 2022-03-03 NOTE — PERIOP NOTES
[facility]  Preoperative Instructions      COVID TEST 03/10/22  Anytime between 7 am and 11:45 am  MOB 1 [address] side    urgery Date 03/14/22         Time of Arrival --to be called with time  Contact #[phone]  1. On the day of your surgery, please report to the Surgical Services Registration Desk and sign in at your designated time. The Surgery Center is located to the right of the Emergency Room. 2. You must have someone with you to drive you home. You should not drive a car for 24 hours following surgery. Please make arrangements for a friend or family member to stay with you for the first 24 hours after your surgery. 3. Do not have anything to eat or drink (including water, gum, mints, coffee, juice) after midnight ?03/13/22     . ? This may not apply to medications prescribed by your physician. ?(Please note below the special instructions with medications to take the morning of your procedure.)    4. We recommend you do not drink any alcoholic beverages for 24 hours before and after your surgery. 5. Contact your surgeons office for instructions on the following medications: non-steroidal anti-inflammatory drugs (i.e. Advil, Aleve), vitamins, and supplements. (Some surgeons will want you to stop these medications prior to surgery and others may allow you to take them)  **If you are currently taking Plavix, Coumadin, Aspirin and/or other blood-thinning agents, contact your surgeon for instructions. ** Your surgeon will partner with the physician prescribing these medications to determine if it is safe to stop or if you need to continue taking. Please do not stop taking these medications without instructions from your surgeon    6. Wear comfortable clothes. Wear glasses instead of contacts. Do not bring any money or jewelry. Please bring picture ID, insurance card, and any prearranged co-payment or hospital payment.   Do not wear make-up, particularly mascara the morning of your surgery. Do not wear nail polish, particularly if you are having foot /hand surgery. Wear your hair loose or down, no ponytails, buns, antonio pins or clips. All body piercings must be removed. Please shower with antibacterial soap for three consecutive days before and on the morning of surgery, but do not apply any lotions, powders or deodorants after the shower on the day of surgery. Please use a fresh towels after each shower. Please sleep in clean clothes and change bed linens the night before surgery. Please do not shave for 48 hours prior to surgery. Shaving of the face is acceptable. 7. You should understand that if you do not follow these instructions your surgery may be cancelled. If your physical condition changes (I.e. fever, cold or flu) please contact your surgeon as soon as possible. 8. It is important that you be on time. If a situation occurs where you may be late, please call [phone] (OR Holding Area). 9. If you have any questions and or problems, please call [phone] (Pre-admission Testing). 10. Your surgery time may be subject to change. You will receive a phone call the evening prior if your time changes. 11.  If having outpatient surgery, you must have someone to drive you here, stay with you during the duration of your stay, and to drive you home at time of discharge. 12.  Due to current COVID restrictions, only ONE adult may accompany you the day of the procedure. We have limited seating available. If our waiting room is at capacity, your ride may be asked to remain in their vehicle. No children are allowed in the waiting room  Special Instructions:     TAKE ALL MEDICATIONS DAY OF SURGERY EXCEPT:no restrictions (follow surgeon instructions for stopping NSAIDS) no topicals      I understand a pre-operative phone call will be made to verify my surgery time.   In the event that I am not available, I give permission for a message to be left on my answering service and/or with another person?   yes          ___________________      __________   _________    (Signature of Patient)             (Witness)                (Date and Time)

## 2022-03-04 ENCOUNTER — TELEPHONE (OUTPATIENT)
Dept: INTERNAL MEDICINE CLINIC | Age: 77
End: 2022-03-04

## 2022-03-04 NOTE — TELEPHONE ENCOUNTER
[address] with ED [facility] pre-admission called and wanted to make sure that the pt got and ekg and lab work done at the time of her pre-op appt.  For any questions please call [phone]

## 2022-03-08 NOTE — PROGRESS NOTES
Preoperative Evaluation    Date of Exam: 3/9/2022     Visit Vitals  BP (!) 148/78 (BP 1 Location: Left upper arm, BP Patient Position: Sitting, BP Cuff Size: Adult)   Pulse 75   Temp 98.1 °F (36.7 °C) (Oral)   Resp 12   Ht 5' 7\" (1.702 m)   Wt 172 lb (78 kg)   SpO2 95%   BMI 26.94 kg/m²        Krunal Estes is a 64 y.o. female (:1945) who presents for preoperative evaluation. Procedure/Surgery:R shoulder repair  Date of Procedure/Surgery: 3/14/22  Surgeon: [facility] Facility: [address] [place] [address]  Primary Physician: Elin Angel MD    Questions:  -Normal state of health today? yes  -Do you usually get chest pain or breathlessness when you climb up two flights of stairs at normal speed? No  Latex Allergy: No      Patient Active Problem List   Diagnosis Code    Headache(784.0) R51    Carpal tunnel syndrome G56.00    Hypercholesteremia E78.00    Depression F32. A    Abnormal chest x-ray R93.89    Complex regional pain syndrome [id number]    Diabetic peripheral neuropathy associated with type 2 diabetes mellitus ([address] E11.42    Hypertension, essential I10    Controlled type 2 diabetes mellitus with diabetic polyneuropathy, without long-term current use of insulin (HCC) E11.42    Osteopenia M85.80    Vitamin D deficiency E55.9    GERD (gastroesophageal reflux disease) K21.9    Psoriasis L40.9    Paroxysmal atrial fibrillation (HCC) I48.0    Hepatic steatosis K76.0    COPD, severity to be determined ([address] J44.9    Sarcoidosis of lymph nodes D86.1    At high risk for falls Z91.81    Acute bronchitis J20.9     Past Medical History:   Diagnosis Date    Abnormal chest x-ray 2009    Anemia 2009    Arrhythmia     SVT.  Converts with Adenocard    Atrial fibrillation ([address] 2009    Carpal tunnel syndrome 2009    Chronic obstructive pulmonary disease ([address]     Colitis 2009    Complex regional pain syndrome 2009    Depression 2009    Diabetes ([address] no meds    Family history of skin cancer     brother-melanoma    GERD (gastroesophageal reflux disease) 2010    HTN (hypertension) 2009    Hypercholesteremia 2009    Hypertriglyceridemia 2009    Idiopathic peripheral neuropathy 2009    Iron deficiency anemia 2009    Osteopenia 2009    Paroxysmal atrial fibrillation ([address] 2011    Psoriasis 2012    Sarcoidosis      Past Surgical History:   Procedure Laterality Date    HX CARPAL TUNNEL RELEASE      Both hands    HX CYST REMOVAL      Left wrist    HX HYSTERECTOMY      HX ORTHOPAEDIC      back surgery (disc)    HX ORTHOPAEDIC      right foot X 3 neuromas and tarsal tunnel release    HX TONSILLECTOMY      NEUROLOGICAL PROCEDURE UNLISTED      Pain pump    PAIN PUMP DEVICE      in left lower abdomen (for foot pain)     Social History     Socioeconomic History    Marital status:    Tobacco Use    Smoking status: Former Smoker     Packs/day: 0.10     Years: 2.00     Pack years: 0.20     Quit date: 1975     Years since quittin.3    Smokeless tobacco: Never Used   Vaping Use    Vaping Use: Never used   Substance and Sexual Activity    Alcohol use: No    Drug use: No     Family History   Problem Relation Age of Onset    Arrhythmia Father     Heart Attack Father     Cancer Brother         Melanoma    Breast Cancer Daughter         48    Breast Cancer Daughter 46    Breast Cancer Daughter 46     Allergies   Allergen Reactions    Duragesic [Fentanyl] Other (comments)     Patient sees things when taking    Codeine Nausea Only       Current Outpatient Medications   Medication Sig    gabapentin (NEURONTIN) 800 mg tablet Take 800 mg by mouth three (3) times daily.  diclofenac EC (VOLTAREN) 75 mg EC tablet Take 1 Tablet by mouth two (2) times a day.     losartan (COZAAR) 100 mg tablet TAKE 1 TABLET BY MOUTH  DAILY (Patient taking differently: 100 mg daily. TAKE 1 TABLET BY MOUTH  DAILY)    atorvastatin (LIPITOR) 40 mg tablet TAKE 1 TABLET BY MOUTH AT  NIGHT    amitriptyline (ELAVIL) 100 mg tablet Take 1 tablet by mouth nightly    metoprolol tartrate (LOPRESSOR) 50 mg tablet TAKE 1 TAB BY MOUTH TWO (2) TIMES A DAY.  fluorouracil (EFUDEX) 5 % chemo cream Apply  to affected area two (2) times a day.  omeprazole (PRILOSEC) 20 mg capsule Take 20 mg by mouth daily.  gabapentin (NEURONTIN) 600 mg tablet TAKE 1 TABLET BY MOUTH  TWICE DAILY MAX DAILY  AMOUNT 1,200 MG (Patient taking differently: 800 mg.)     No current facility-administered medications for this visit. Recent use of:  NSAIDS or steroids   None    Tetanus up to date:   UTD    Anesthesia Complications: No  History of abnormal bleeding : No  History of Blood Transfusions: No  Health Care Directive or Living Will:     REVIEW OF SYSTEMS:  Constitutional: Negative for recent fever and chills. Skin: Negative for rash or skin lesions. HENT: Negative review. Eyes: Negative for visual changes. Cardiovascular:  Negative for chest pain, exertional dyspnea and palpitations. Respiratory: Negative for cough and hemoptysis, shortness of breath, orthopnea, PND. Gastrointestinal: Negative for nausea and vomitting. Negative for abdominal pain   diarrhea or constipation. No melena. Genitourinary: Negative for dysuria, blood in the urine, frequency or burning. Lymphoreticular: No lymphadenopathy. Musculoskeletal: Negative  for arthralgias, back pain or neck pain. History of edema: -  Neurological: Negative for dizziness, seizures or syncopal episodes   Psychiatric: Negative.      Physical Examination:   Vital signs:   Visit Vitals  BP (!) 148/78 (BP 1 Location: Left upper arm, BP Patient Position: Sitting, BP Cuff Size: Adult)   Pulse 75   Temp 98.1 °F (36.7 °C) (Oral)   Resp 12   Ht 5' 7\" (1.702 m)   Wt 172 lb (78 kg)   SpO2 95%   BMI 26.94 kg/m²     General appearance - alert, well appearing, and in no distress  Mental status - alert, oriented to person, place, and time, normal mood, behavior, speech, dress, motor activity, and thought processes  Eyes - pupils equal and reactive, extraocular eye movements intact  Ears - bilateral TM's and external ear canals normal  Nose - normal and patent, no erythema, discharge or polyps  Mouth - mucous membranes moist, pharynx normal without lesions  Neck - supple, no significant adenopathy, no thyromegaly  Chest - clear to auscultation, no wheezes, rales or rhonchi, symmetric air entry  Heart - normal rate, regular rhythm, normal S1, S2, no murmurs, rubs, clicks or gallops  Abdomen - soft, nontender, nondistended, no masses or organomegaly, bowel sounds normal and present  Neurological - alert, oriented, normal speech, no focal findings or movement disorder noted, cranial nerves II through XII intact,  motor and sensory grossly normal bilaterally  Musculoskeletal - no joint tenderness, deformity or swelling  Extremities - peripheral pulses normal, no pedal edema, no clubbing or cyanosis  Skin - normal coloration and turgor, no rashes, no suspicious skin lesions noted       DIAGNOSTICS:   ekg NSR  Labs ordered      No results found for any visits on 03/09/22. ASSESSMENT and PLAN  Diagnoses and all orders for this visit:    1. Controlled type 2 diabetes mellitus with diabetic polyneuropathy, without long-term current use of insulin ([facility])  -     LIPID PANEL; Future  -     METABOLIC PANEL, COMPREHENSIVE; Future  -     CBC WITH AUTOMATED DIFF; Future  -     HEMOGLOBIN A1C WITH EAG; Future  -     MICROALBUMIN, UR, RAND W/ MICROALB/CREAT RATIO; Future  Diet controlled. 2. Diabetic peripheral neuropathy associated with type 2 diabetes mellitus ([facility]  -     LIPID PANEL; Future  -     METABOLIC PANEL, COMPREHENSIVE; Future  -     CBC WITH AUTOMATED DIFF; Future  -     HEMOGLOBIN A1C WITH EAG;  Future  -     MICROALBUMIN, UR, RAND W/ MICROALB/CREAT RATIO; Future    3. Paroxysmal atrial fibrillation (HCC)  -     AMB POC EKG ROUTINE W/ 12 LEADS, INTER & REP    4. COPD, severity to be determined ([address]  Stable today  5. Pre-op evaluation  -     AMB POC EKG ROUTINE W/ 12 LEADS, INTER & REP  Based on ACC/AHA guidelines patient is at acceptable risk for scheduled level of surgery pending lab work  6. Hypertension, essential  -     AMB POC EKG ROUTINE W/ 12 LEADS, INTER & REP  -     LIPID PANEL; Future  -     METABOLIC PANEL, COMPREHENSIVE; Future  -     CBC WITH AUTOMATED DIFF; Future  Not at goal per current readings. Home readings reported 130/80. Will need close PCP follow up after surgery                         There are no Patient Instructions on file for this visit. Please keep your follow-up appointment with Jamaal Torres MD.     Follow-up and Dispositions    · Return in about 7 weeks (around 4/27/2022) for HTN WITH PCP. Health Maintenance Due   Topic Date Due    Shingrix Vaccine Age 49> (1 of 2) Never done    Eye Exam Retinal or Dilated  06/23/2017    MICROALBUMIN Q1  06/20/2020    Lipid Screen  06/20/2020    A1C test (Diabetic or Prediabetic)  12/11/2021       I have discussed the diagnosis with the patient and the intended plan as seen in the above orders. Patient is in agreement. The patient has received an after-visit summary and questions were answered concerning future plans. I have discussed medication side effects and warnings with the patient as well. Warning signs for the above conditions were discussed including when to call our office or go to the emergency room. The nurse provided the patient and/or family with advanced directive information if needed and encouraged the patient to provide a copy to the office when available.

## 2022-03-09 ENCOUNTER — OFFICE VISIT (OUTPATIENT)
Dept: INTERNAL MEDICINE CLINIC | Age: 77
End: 2022-03-09
Payer: MEDICARE

## 2022-03-09 VITALS
HEART RATE: 75 BPM | WEIGHT: 172 LBS | BODY MASS INDEX: 27 KG/M2 | SYSTOLIC BLOOD PRESSURE: 148 MMHG | RESPIRATION RATE: 12 BRPM | DIASTOLIC BLOOD PRESSURE: 78 MMHG | OXYGEN SATURATION: 95 % | HEIGHT: 67 IN | TEMPERATURE: 98.1 F

## 2022-03-09 DIAGNOSIS — I48.0 PAROXYSMAL ATRIAL FIBRILLATION (HCC): ICD-10-CM

## 2022-03-09 DIAGNOSIS — J44.9 COPD, SEVERITY TO BE DETERMINED (HCC): ICD-10-CM

## 2022-03-09 DIAGNOSIS — E11.42 CONTROLLED TYPE 2 DIABETES MELLITUS WITH DIABETIC POLYNEUROPATHY, WITHOUT LONG-TERM CURRENT USE OF INSULIN (HCC): Primary | ICD-10-CM

## 2022-03-09 DIAGNOSIS — Z01.818 PRE-OP EVALUATION: ICD-10-CM

## 2022-03-09 DIAGNOSIS — E11.42 DIABETIC PERIPHERAL NEUROPATHY ASSOCIATED WITH TYPE 2 DIABETES MELLITUS (HCC): ICD-10-CM

## 2022-03-09 DIAGNOSIS — I10 HYPERTENSION, ESSENTIAL: ICD-10-CM

## 2022-03-09 PROCEDURE — 1101F PT FALLS ASSESS-DOCD LE1/YR: CPT | Performed by: PHYSICIAN ASSISTANT

## 2022-03-09 PROCEDURE — 1090F PRES/ABSN URINE INCON ASSESS: CPT | Performed by: PHYSICIAN ASSISTANT

## 2022-03-09 PROCEDURE — G8753 SYS BP > OR = 140: HCPCS | Performed by: PHYSICIAN ASSISTANT

## 2022-03-09 PROCEDURE — G8536 NO DOC ELDER MAL SCRN: HCPCS | Performed by: PHYSICIAN ASSISTANT

## 2022-03-09 PROCEDURE — G8427 DOCREV CUR MEDS BY ELIG CLIN: HCPCS | Performed by: PHYSICIAN ASSISTANT

## 2022-03-09 PROCEDURE — 99214 OFFICE O/P EST MOD 30 MIN: CPT | Performed by: PHYSICIAN ASSISTANT

## 2022-03-09 PROCEDURE — G9717 DOC PT DX DEP/BP F/U NT REQ: HCPCS | Performed by: PHYSICIAN ASSISTANT

## 2022-03-09 PROCEDURE — G8399 PT W/DXA RESULTS DOCUMENT: HCPCS | Performed by: PHYSICIAN ASSISTANT

## 2022-03-09 PROCEDURE — 93010 ELECTROCARDIOGRAM REPORT: CPT | Performed by: PHYSICIAN ASSISTANT

## 2022-03-09 PROCEDURE — G8419 CALC BMI OUT NRM PARAM NOF/U: HCPCS | Performed by: PHYSICIAN ASSISTANT

## 2022-03-09 PROCEDURE — G8754 DIAS BP LESS 90: HCPCS | Performed by: PHYSICIAN ASSISTANT

## 2022-03-09 RX ORDER — GABAPENTIN 800 MG/1
800 TABLET ORAL 3 TIMES DAILY
COMMUNITY
Start: 2022-02-20 | End: 2022-06-13

## 2022-03-09 NOTE — PROGRESS NOTES
John Madden  Identified pt with two pt identifiers(name and ). Chief Complaint   Patient presents with    Pre-op Exam       Reviewed record In preparation for visit and have obtained necessary documentation. 1. Have you been to the ER, urgent care clinic or hospitalized since your last visit? No     2. Have you seen or consulted any other health care providers outside of the [address] [place] [address] since your last visit? Include any pap smears or colon screening. No    Vitals reviewed with provider.     Health Maintenance reviewed:     Health Maintenance Due   Topic    Shingrix Vaccine Age 49> (1 of 2)    Eye Exam Retinal or Dilated     MICROALBUMIN Q1     Lipid Screen     A1C test (Diabetic or Prediabetic)           Wt Readings from Last 3 Encounters:   22 172 lb (78 kg)   02/10/22 173 lb (78.5 kg)   22 174 lb (78.9 kg)        Temp Readings from Last 3 Encounters:   22 98.1 °F (36.7 °C) (Oral)   02/10/22 97.3 °F (36.3 °C) (Tympanic)   22 97.5 °F (36.4 °C) (Tympanic)        BP Readings from Last 3 Encounters:   22 (!) 159/82   02/10/22 116/71   22 (!) 156/83        Pulse Readings from Last 3 Encounters:   22 75   02/10/22 81   22 74        Vitals:    22 1142   BP: (!) 159/82   Pulse: 75   Resp: 12   Temp: 98.1 °F (36.7 °C)   TempSrc: Oral   SpO2: 95%   Weight: 172 lb (78 kg)   Height: 5' 7\" (1.702 m)   PainSc:   1   PainLoc: Foot          Learning Assessment:   :       Learning Assessment 2014   PRIMARY LEARNER Patient   HIGHEST LEVEL OF EDUCATION - PRIMARY LEARNER  GRADUATED HIGH SCHOOL OR GED   BARRIERS PRIMARY LEARNER NONE   CO-LEARNER CAREGIVER No   PRIMARY LANGUAGE ENGLISH   LEARNER PREFERENCE PRIMARY DEMONSTRATION   ANSWERED BY patient   RELATIONSHIP SELF        Depression Screening:   :       3 most recent PHQ Screens 2/10/2022   Little interest or pleasure in doing things Not at all   Feeling down, depressed, irritable, or hopeless Not at all   Total Score PHQ 2 0   Trouble falling or staying asleep, or sleeping too much -   Feeling tired or having little energy -   Poor appetite, weight loss, or overeating -   Feeling bad about yourself - or that you are a failure or have let yourself or your family down -   Trouble concentrating on things such as school, work, reading, or watching TV -   Moving or speaking so slowly that other people could have noticed; or the opposite being so fidgety that others notice -   Thoughts of being better off dead, or hurting yourself in some way -   PHQ 9 Score -   How difficult have these problems made it for you to do your work, take care of your home and get along with others -        Fall Risk Assessment:   :       Fall Risk Assessment, last 12 mths 2/10/2022   Able to walk? Yes   Fall in past 12 months? -   Do you feel unsteady? -   Are you worried about falling -   Number of falls in past 12 months -   Fall with injury? -        Abuse Screening:   :       Abuse Screening Questionnaire 6/11/2021 5/4/2020 6/17/2019 4/17/2018 4/21/2017 3/22/2016 8/6/2014   Do you ever feel afraid of your partner? N N N N N N N   Are you in a relationship with someone who physically or mentally threatens you? N N N N N N Y   Is it safe for you to go home?  Y Y Y Y Y Y Y        ADL Screening:   :       ADL Assessment 6/11/2021   Feeding yourself No Help Needed   Getting from bed to chair No Help Needed   Getting dressed No Help Needed   Bathing or showering No Help Needed   Walk across the room (includes cane/walker) No Help Needed   Using the telphone No Help Needed   Taking your medications No Help Needed   Preparing meals No Help Needed   Managing money (expenses/bills) No Help Needed   Moderately strenuous housework (laundry) No Help Needed   Shopping for personal items (toiletries/medicines) No Help Needed   Shopping for groceries No Help Needed   Driving No Help Needed   Climbing a flight of stairs No Help Needed Getting to places beyond walking distances No Help Needed

## 2022-03-10 ENCOUNTER — HOSPITAL ENCOUNTER (OUTPATIENT)
Dept: PREADMISSION TESTING | Age: 77
Discharge: HOME | End: 2022-03-10
Payer: MEDICARE

## 2022-03-10 PROCEDURE — U0005 INFEC AGEN DETEC AMPLI PROBE: HCPCS

## 2022-03-11 LAB
ALBUMIN SERPL-MCNC: 4.2 G/DL (ref 3.7–4.7)
ALBUMIN/CREAT UR: 4 MG/G CREAT (ref 0–29)
ALBUMIN/GLOB SERPL: 1.5 {RATIO} (ref 1.2–2.2)
ALP SERPL-CCNC: 130 IU/L (ref 44–121)
ALT SERPL-CCNC: 26 IU/L (ref 0–32)
AST SERPL-CCNC: 19 IU/L (ref 0–40)
BASOPHILS # BLD AUTO: 0.1 X10E3/UL (ref 0–0.2)
BASOPHILS NFR BLD AUTO: 1 %
BILIRUB SERPL-MCNC: 0.2 MG/DL (ref 0–1.2)
BUN SERPL-MCNC: 14 MG/DL (ref 8–27)
BUN/CREAT SERPL: 16 (ref 12–28)
CALCIUM SERPL-MCNC: 9.2 MG/DL (ref 8.7–10.3)
CHLORIDE SERPL-SCNC: 100 MMOL/L (ref 96–106)
CHOLEST SERPL-MCNC: 113 MG/DL (ref 100–199)
CO2 SERPL-SCNC: 25 MMOL/L (ref 20–29)
CREAT SERPL-MCNC: 0.88 MG/DL (ref 0.57–1)
CREAT UR-MCNC: 122.9 MG/DL
EGFR: 67 ML/MIN/1.73
EOSINOPHIL # BLD AUTO: 0.8 X10E3/UL (ref 0–0.4)
EOSINOPHIL NFR BLD AUTO: 7 %
ERYTHROCYTE [DISTWIDTH] IN BLOOD BY AUTOMATED COUNT: 14.7 % (ref 11.7–15.4)
EST. AVERAGE GLUCOSE BLD GHB EST-MCNC: 134 MG/DL
GLOBULIN SER CALC-MCNC: 2.8 G/DL (ref 1.5–4.5)
GLUCOSE SERPL-MCNC: 129 MG/DL (ref 65–99)
HBA1C MFR BLD: 6.3 % (ref 4.8–5.6)
HCT VFR BLD AUTO: 33.8 % (ref 34–46.6)
HDLC SERPL-MCNC: 29 MG/DL
HGB BLD-MCNC: 10.6 G/DL (ref 11.1–15.9)
IMM GRANULOCYTES # BLD AUTO: 0.2 X10E3/UL (ref 0–0.1)
IMM GRANULOCYTES NFR BLD AUTO: 1 %
LDLC SERPL CALC-MCNC: 57 MG/DL (ref 0–99)
LYMPHOCYTES # BLD AUTO: 2.3 X10E3/UL (ref 0.7–3.1)
LYMPHOCYTES NFR BLD AUTO: 22 %
MCH RBC QN AUTO: 26.7 PG (ref 26.6–33)
MCHC RBC AUTO-ENTMCNC: 31.4 G/DL (ref 31.5–35.7)
MCV RBC AUTO: 85 FL (ref 79–97)
MICROALBUMIN UR-MCNC: 4.7 UG/ML
MONOCYTES # BLD AUTO: 0.8 X10E3/UL (ref 0.1–0.9)
MONOCYTES NFR BLD AUTO: 7 %
NEUTROPHILS # BLD AUTO: 6.4 X10E3/UL (ref 1.4–7)
NEUTROPHILS NFR BLD AUTO: 62 %
PLATELET # BLD AUTO: 349 X10E3/UL (ref 150–450)
POTASSIUM SERPL-SCNC: 4.2 MMOL/L (ref 3.5–5.2)
PROT SERPL-MCNC: 7 G/DL (ref 6–8.5)
RBC # BLD AUTO: 3.97 X10E6/UL (ref 3.77–5.28)
SARS-COV-2, XPLCVT: NOT DETECTED
SODIUM SERPL-SCNC: 144 MMOL/L (ref 134–144)
SOURCE, COVRS: NORMAL
TRIGL SERPL-MCNC: 154 MG/DL (ref 0–149)
VLDLC SERPL CALC-MCNC: 27 MG/DL (ref 5–40)
WBC # BLD AUTO: 10.5 X10E3/UL (ref 3.4–10.8)

## 2022-03-13 NOTE — H&P
CC: Right shoulder pain     HPI:      This is a 64y.o. year old patient who presents for CT follow up of the right shoulder. The patient states their pain is still consistent per the previous visit.          PMH:       Past Medical History:   Diagnosis Date    Abnormal chest x-ray 12/23/2009    Anemia 12/23/2009    Arrhythmia       SVT.  Converts with [employer]    Atrial fibrillation ([address] 12/23/2009    Carpal tunnel syndrome 12/23/2009    Chronic obstructive pulmonary disease (HCC)      Colitis 12/23/2009    Complex regional pain syndrome 12/23/2009    Depression 12/23/2009    Diabetes ([address]       Pt states she is not diabetic     Family history of skin cancer       brother-melanoma    GERD (gastroesophageal reflux disease) 2/8/2010    HTN (hypertension) 12/23/2009    Hypercholesteremia 12/23/2009    Hypertriglyceridemia 12/23/2009    Idiopathic peripheral neuropathy 12/23/2009    Iron deficiency anemia 12/23/2009    Osteopenia 12/23/2009    Paroxysmal atrial fibrillation (HCC) 12/2/2011    Psoriasis 6/27/2012    Sarcoidosis           PSxHx:        Past Surgical History:   Procedure Laterality Date    HX CARPAL TUNNEL RELEASE   00/00/2002     Both hands    HX CYST REMOVAL   00/00/1965     Left wrist    HX HYSTERECTOMY   00/00/1985    HX ORTHOPAEDIC   00/00/1987     back surgery (disc)    HX ORTHOPAEDIC         right foot X 3 neuromas and tarsal tunnel release    HX TONSILLECTOMY   00/00/1962    NEUROLOGICAL PROCEDURE UNLISTED         Pain pump    PAIN PUMP DEVICE   00/00/2007     in left lower abdomen (for foot pain)         Meds:     Current Outpatient Medications:     diclofenac EC (VOLTAREN) 75 mg EC tablet, Take 1 Tablet by mouth two (2) times a day., Disp: 60 Tablet, Rfl: 0    amitriptyline (ELAVIL) 100 mg tablet, Take 1 tablet by mouth nightly, Disp: 90 Tablet, Rfl: 0    gabapentin (NEURONTIN) 600 mg tablet, TAKE 1 TABLET BY MOUTH  TWICE DAILY MAX DAILY  AMOUNT 1,200 MG, Disp: 180 Tablet, Rfl: 3    losartan (COZAAR) 100 mg tablet, TAKE 1 TABLET BY MOUTH  DAILY, Disp: 90 Tab, Rfl: 3    atorvastatin (LIPITOR) 40 mg tablet, TAKE 1 TABLET BY MOUTH AT  NIGHT, Disp: 90 Tab, Rfl: 3    metoprolol tartrate (LOPRESSOR) 50 mg tablet, TAKE 1 TAB BY MOUTH TWO (2) TIMES A DAY., Disp: 180 Tab, Rfl: 3    fluorouracil (EFUDEX) 5 % chemo cream, Apply  to affected area two (2) times a day., Disp: 40 g, Rfl: 0    omeprazole (PRILOSEC) 20 mg capsule, Take  by mouth., Disp: , Rfl:     aspirin 81 mg chewable tablet, Take 1 Tab by mouth daily. , Disp: 90 Tab, Rfl: 3    predniSONE (DELTASONE) 20 mg tablet, Take 2 tablets by mouth once daily for 5 days. (Patient not taking: Reported on 2021), Disp: 10 Tablet, Rfl: 0    hydrocortisone (HYTONE) 2.5 % topical cream, , Disp: , Rfl:      All: Allergies   Allergen Reactions    Duragesic [Fentanyl] Other (comments)       Patient sees things when taking    Codeine Nausea Only         Social Hx:  Social History               Socioeconomic History    Marital status:    Tobacco Use    Smoking status: Former Smoker       Packs/day: 0.10       Years: 2.00       Pack years: 0.20       Quit date: 1975       Years since quittin.3    Smokeless tobacco: Never Used   Vaping Use    Vaping Use: Never used   Substance and Sexual Activity    Alcohol use: No    Drug use:  No            Family Hx:        Family History   Problem Relation Age of Onset    Arrhythmia Father      Heart Attack Father      Cancer Brother           Melanoma    Breast Cancer Daughter           48    Breast Cancer Daughter 46    Breast Cancer Daughter 46            Review of Systems:         General: Denies headache, lethargy, fever, weight loss  Ears/Nose/Throat: Denies ear discharge, drainage, nosebleeds, hoarse voice, dental problems  Cardiovascular: Denies chest pain, shortness of breath  Lungs: Denies chest pain, breathing problems, wheezing, pneumonia  Stomach: Denies stomach pain, heartburn, constipation, irritable bowel  Skin: Denies rash, sores, open wounds  Musculoskeletal: Right shoulder pain  Genitourinary: Denies dysuria, hematuria, polyuria  Gastrointestinal: Denies constipation, obstipation, diarrhea  Neurological: Denies changes in sight, smell, hearing, taste, seizures. Denies loss of consciousness. Psychiatric: Denies depression, sleep pattern changes, anxiety, change in personality  Endocrine: Denies mood swings, heat or cold intolerance  Hematologic/Lymphatic: Denies anemia, purpura, petechia  Allergic/Immunologic: Denies swelling of throat, pain or swelling at lymph nodes        Physical Examination:     Visit Vitals  /71 (BP 1 Location: Right arm, BP Patient Position: Sitting, BP Cuff Size: Large adult)   Pulse 81   Temp 97.3 °F (36.3 °C) (Tympanic)   Ht 5' 7\" (1.702 m)   Wt 173 lb (78.5 kg)   SpO2 94%   BMI 27.10 kg/m²         General: AOX3, no apparent distress  Psychiatric: mood and affect appropriate  Lungs: breathing is symmetric and unlabored bilaterally  Heart: regular rate and rhythm  Abdomen: no guarding  Head: normocephalic, atraumatic  Skin: No significant abnormalities, good turgor  Sensation intact to light touch: C5-T1 dermatomes  Muscular exam: 5/5 strength in all major muscle groups unless noted in specialty exam.    Extremities        Right upper extremity: No gross deformity. No restriction to passive range of motion of the shoulder, elbow, wrist.  Positive impingement maneuver. Open can test is positive. Negative hornblower's maneuver. Belly press is negative for weakness, and internal rotation is to L 5. Neck range of motion is full and pain free. Muscle bulk is appropriate without wasting. Sensation is intact to light touch in the C5-T1 dermatomes. Capillary refill is less than 2 seconds in the fingers.   Strength testing is 5/5 at the major muscle groups of the shoulder, elbow, and wrist.          Left upper extremity: Extremity is examined, no evidence of gross deformity, no gross motor or sensory deficit. Full active and passive range of motion is noted. Muscle tone and bulk is within normal expected limits. Capillary refill is less than 2 seconds distally.        Diagnostics:     Pertinent Diagnostics: CT is available of the right shoulder as she cannot have an MRI. This does indicate acromioclavicular joint arthrosis, subacromial impingement, no distinctive rotator cuff tear, though this is a CT and therefore less perfect for rotator cuff pathology. Mild glenohumeral arthrosis     Assessment: Impingement without rotator cuff tear, right shoulder  Plan:     This patient feels as though she has failed her nonoperative management at this point and would like to proceed with surgical intervention of a subacromial decompression and likely distal clavicular excision. We did discuss the risks of surgery which include but are not limited to infection, nerve or blood vessel damage, failure of fixation, failure of any possible implant, need for reoperation, postoperative pain and swelling, intra-or postoperative fracture, postoperative dislocation, leg length inequality, need for reoperation, implant failure, death, disability, organ dysfunction, wound healing issues, DVT, PE, and the need for further procedures. The patient did freely state their understanding and satisfaction with our discussion. We will proceed after medical clearances. The patient was counseled at length about the risks of ilya Covid-19 during their perioperative period and any recovery window from their procedure.  The patient was made aware that ilya Covid-19  may worsen their prognosis for recovering from their procedure and lend to a higher morbidity and/or mortality risk.  All material risks, benefits, and reasonable alternatives including postponing the procedure were discussed.  The patient does  wish to proceed with the procedure at this time.

## 2022-03-14 ENCOUNTER — ANESTHESIA EVENT (OUTPATIENT)
Dept: SURGERY | Age: 77
End: 2022-03-14
Payer: MEDICARE

## 2022-03-14 ENCOUNTER — ANESTHESIA (OUTPATIENT)
Dept: SURGERY | Age: 77
End: 2022-03-14
Payer: MEDICARE

## 2022-03-14 ENCOUNTER — HOSPITAL ENCOUNTER (OUTPATIENT)
Age: 77
Setting detail: OBSERVATION
Discharge: HOME | End: 2022-03-15
Attending: ORTHOPAEDIC SURGERY | Admitting: ORTHOPAEDIC SURGERY
Payer: MEDICARE

## 2022-03-14 DIAGNOSIS — Z47.89 ORTHOPEDIC AFTERCARE: Primary | ICD-10-CM

## 2022-03-14 PROBLEM — Z98.890 POSTOPERATIVE HYPOXIA: Status: ACTIVE | Noted: 2022-03-14

## 2022-03-14 PROBLEM — R09.02 POSTOPERATIVE HYPOXIA: Status: ACTIVE | Noted: 2022-03-14

## 2022-03-14 LAB
GLUCOSE BLD STRIP.AUTO-MCNC: 94 MG/DL (ref 65–117)
SERVICE CMNT-IMP: NORMAL

## 2022-03-14 PROCEDURE — 74011250636 HC RX REV CODE- 250/636: Performed by: ORTHOPAEDIC SURGERY

## 2022-03-14 PROCEDURE — 76210000004 HC OR PH I REC 4.5 TO 5 HR: Performed by: ORTHOPAEDIC SURGERY

## 2022-03-14 PROCEDURE — 74011250636 HC RX REV CODE- 250/636: Performed by: NURSE ANESTHETIST, CERTIFIED REGISTERED

## 2022-03-14 PROCEDURE — 74011000250 HC RX REV CODE- 250: Performed by: NURSE ANESTHETIST, CERTIFIED REGISTERED

## 2022-03-14 PROCEDURE — 74011250636 HC RX REV CODE- 250/636: Performed by: ANESTHESIOLOGY

## 2022-03-14 PROCEDURE — 74011000250 HC RX REV CODE- 250: Performed by: ORTHOPAEDIC SURGERY

## 2022-03-14 PROCEDURE — 76010000153 HC OR TIME 1.5 TO 2 HR: Performed by: ORTHOPAEDIC SURGERY

## 2022-03-14 PROCEDURE — 74011250636 HC RX REV CODE- 250/636

## 2022-03-14 PROCEDURE — 77030031139 HC SUT VCRL2 J&J -A: Performed by: ORTHOPAEDIC SURGERY

## 2022-03-14 PROCEDURE — 77030026438 HC STYL ET INTUB CARD -A: Performed by: NURSE ANESTHETIST, CERTIFIED REGISTERED

## 2022-03-14 PROCEDURE — 77030018834: Performed by: ORTHOPAEDIC SURGERY

## 2022-03-14 PROCEDURE — 77030018673: Performed by: ORTHOPAEDIC SURGERY

## 2022-03-14 PROCEDURE — 77030008496 HC TBNG ARTHSC IRR S&N -B: Performed by: ORTHOPAEDIC SURGERY

## 2022-03-14 PROCEDURE — G0378 HOSPITAL OBSERVATION PER HR: HCPCS

## 2022-03-14 PROCEDURE — 77030004451 HC BUR SHV S&N -B: Performed by: ORTHOPAEDIC SURGERY

## 2022-03-14 PROCEDURE — 2709999900 HC NON-CHARGEABLE SUPPLY: Performed by: ORTHOPAEDIC SURGERY

## 2022-03-14 PROCEDURE — 74011000250 HC RX REV CODE- 250

## 2022-03-14 PROCEDURE — 82962 GLUCOSE BLOOD TEST: CPT

## 2022-03-14 PROCEDURE — 74011250637 HC RX REV CODE- 250/637: Performed by: ORTHOPAEDIC SURGERY

## 2022-03-14 PROCEDURE — 77030012711 HC WND ARTHRO ABLT S&N -D: Performed by: ORTHOPAEDIC SURGERY

## 2022-03-14 PROCEDURE — 77030019908 HC STETH ESOPH SIMS -A: Performed by: NURSE ANESTHETIST, CERTIFIED REGISTERED

## 2022-03-14 PROCEDURE — 77030008684 HC TU ET CUF COVD -B: Performed by: NURSE ANESTHETIST, CERTIFIED REGISTERED

## 2022-03-14 PROCEDURE — 77030002916 HC SUT ETHLN J&J -A: Performed by: ORTHOPAEDIC SURGERY

## 2022-03-14 PROCEDURE — 76060000034 HC ANESTHESIA 1.5 TO 2 HR: Performed by: ORTHOPAEDIC SURGERY

## 2022-03-14 RX ORDER — MIDAZOLAM HYDROCHLORIDE 1 MG/ML
INJECTION, SOLUTION INTRAMUSCULAR; INTRAVENOUS
Status: COMPLETED | OUTPATIENT
Start: 2022-03-14 | End: 2022-03-14

## 2022-03-14 RX ORDER — OXYCODONE HYDROCHLORIDE 5 MG/1
10 TABLET ORAL
Status: DISCONTINUED | OUTPATIENT
Start: 2022-03-14 | End: 2022-03-15 | Stop reason: HOSPADM

## 2022-03-14 RX ORDER — EPHEDRINE SULFATE/0.9% NACL/PF 50 MG/5 ML
SYRINGE (ML) INTRAVENOUS AS NEEDED
Status: DISCONTINUED | OUTPATIENT
Start: 2022-03-14 | End: 2022-03-14 | Stop reason: HOSPADM

## 2022-03-14 RX ORDER — PROPOFOL 10 MG/ML
INJECTION, EMULSION INTRAVENOUS AS NEEDED
Status: DISCONTINUED | OUTPATIENT
Start: 2022-03-14 | End: 2022-03-14 | Stop reason: HOSPADM

## 2022-03-14 RX ORDER — ROPIVACAINE HYDROCHLORIDE 5 MG/ML
INJECTION, SOLUTION EPIDURAL; INFILTRATION; PERINEURAL
Status: COMPLETED | OUTPATIENT
Start: 2022-03-14 | End: 2022-03-14

## 2022-03-14 RX ORDER — AMITRIPTYLINE HYDROCHLORIDE 50 MG/1
100 TABLET, FILM COATED ORAL
Status: DISCONTINUED | OUTPATIENT
Start: 2022-03-14 | End: 2022-03-15 | Stop reason: HOSPADM

## 2022-03-14 RX ORDER — FENTANYL CITRATE 50 UG/ML
25 INJECTION, SOLUTION INTRAMUSCULAR; INTRAVENOUS
Status: DISCONTINUED | OUTPATIENT
Start: 2022-03-14 | End: 2022-03-14 | Stop reason: HOSPADM

## 2022-03-14 RX ORDER — MIDAZOLAM HYDROCHLORIDE 1 MG/ML
1 INJECTION, SOLUTION INTRAMUSCULAR; INTRAVENOUS AS NEEDED
Status: DISCONTINUED | OUTPATIENT
Start: 2022-03-14 | End: 2022-03-14 | Stop reason: HOSPADM

## 2022-03-14 RX ORDER — SUCCINYLCHOLINE CHLORIDE 20 MG/ML
INJECTION INTRAMUSCULAR; INTRAVENOUS AS NEEDED
Status: DISCONTINUED | OUTPATIENT
Start: 2022-03-14 | End: 2022-03-14 | Stop reason: HOSPADM

## 2022-03-14 RX ORDER — HYDROMORPHONE HYDROCHLORIDE 1 MG/ML
.2-.5 INJECTION, SOLUTION INTRAMUSCULAR; INTRAVENOUS; SUBCUTANEOUS
Status: DISCONTINUED | OUTPATIENT
Start: 2022-03-14 | End: 2022-03-14 | Stop reason: HOSPADM

## 2022-03-14 RX ORDER — ONDANSETRON 2 MG/ML
4 INJECTION INTRAMUSCULAR; INTRAVENOUS AS NEEDED
Status: DISCONTINUED | OUTPATIENT
Start: 2022-03-14 | End: 2022-03-14 | Stop reason: HOSPADM

## 2022-03-14 RX ORDER — ACETAMINOPHEN 500 MG
1000 TABLET ORAL EVERY 6 HOURS
Status: DISCONTINUED | OUTPATIENT
Start: 2022-03-14 | End: 2022-03-15 | Stop reason: HOSPADM

## 2022-03-14 RX ORDER — SODIUM CHLORIDE, SODIUM LACTATE, POTASSIUM CHLORIDE, CALCIUM CHLORIDE 600; 310; 30; 20 MG/100ML; MG/100ML; MG/100ML; MG/100ML
25 INJECTION, SOLUTION INTRAVENOUS CONTINUOUS
Status: DISCONTINUED | OUTPATIENT
Start: 2022-03-14 | End: 2022-03-14 | Stop reason: HOSPADM

## 2022-03-14 RX ORDER — DEXAMETHASONE SODIUM PHOSPHATE 4 MG/ML
INJECTION, SOLUTION INTRA-ARTICULAR; INTRALESIONAL; INTRAMUSCULAR; INTRAVENOUS; SOFT TISSUE AS NEEDED
Status: DISCONTINUED | OUTPATIENT
Start: 2022-03-14 | End: 2022-03-14 | Stop reason: HOSPADM

## 2022-03-14 RX ORDER — ASPIRIN 325 MG
325 TABLET, DELAYED RELEASE (ENTERIC COATED) ORAL 2 TIMES DAILY
Status: DISCONTINUED | OUTPATIENT
Start: 2022-03-14 | End: 2022-03-15 | Stop reason: HOSPADM

## 2022-03-14 RX ORDER — ASPIRIN 325 MG
325 TABLET, DELAYED RELEASE (ENTERIC COATED) ORAL 2 TIMES DAILY
Qty: 60 TABLET | Refills: 0 | Status: SHIPPED | OUTPATIENT
Start: 2022-03-14 | End: 2022-05-11 | Stop reason: ALTCHOICE

## 2022-03-14 RX ORDER — LIDOCAINE HYDROCHLORIDE 10 MG/ML
0.1 INJECTION, SOLUTION EPIDURAL; INFILTRATION; INTRACAUDAL; PERINEURAL AS NEEDED
Status: DISCONTINUED | OUTPATIENT
Start: 2022-03-14 | End: 2022-03-14 | Stop reason: HOSPADM

## 2022-03-14 RX ORDER — ACETAMINOPHEN 325 MG/1
650 TABLET ORAL
Status: DISCONTINUED | OUTPATIENT
Start: 2022-03-14 | End: 2022-03-14 | Stop reason: SDUPTHER

## 2022-03-14 RX ORDER — TRAMADOL HYDROCHLORIDE 50 MG/1
50 TABLET ORAL
Status: DISCONTINUED | OUTPATIENT
Start: 2022-03-14 | End: 2022-03-14

## 2022-03-14 RX ORDER — DIPHENHYDRAMINE HYDROCHLORIDE 50 MG/ML
12.5 INJECTION, SOLUTION INTRAMUSCULAR; INTRAVENOUS
Status: DISCONTINUED | OUTPATIENT
Start: 2022-03-14 | End: 2022-03-15 | Stop reason: HOSPADM

## 2022-03-14 RX ORDER — ACETAMINOPHEN 500 MG
1000 TABLET ORAL ONCE
Status: COMPLETED | OUTPATIENT
Start: 2022-03-14 | End: 2022-03-14

## 2022-03-14 RX ORDER — ONDANSETRON 2 MG/ML
INJECTION INTRAMUSCULAR; INTRAVENOUS AS NEEDED
Status: DISCONTINUED | OUTPATIENT
Start: 2022-03-14 | End: 2022-03-14 | Stop reason: HOSPADM

## 2022-03-14 RX ORDER — FENTANYL CITRATE 50 UG/ML
INJECTION, SOLUTION INTRAMUSCULAR; INTRAVENOUS AS NEEDED
Status: DISCONTINUED | OUTPATIENT
Start: 2022-03-14 | End: 2022-03-14 | Stop reason: HOSPADM

## 2022-03-14 RX ORDER — SODIUM CHLORIDE 0.9 % (FLUSH) 0.9 %
5-40 SYRINGE (ML) INJECTION AS NEEDED
Status: DISCONTINUED | OUTPATIENT
Start: 2022-03-14 | End: 2022-03-15 | Stop reason: HOSPADM

## 2022-03-14 RX ORDER — ATORVASTATIN CALCIUM 40 MG/1
40 TABLET, FILM COATED ORAL
Status: DISCONTINUED | OUTPATIENT
Start: 2022-03-14 | End: 2022-03-15 | Stop reason: HOSPADM

## 2022-03-14 RX ORDER — TRAMADOL HYDROCHLORIDE 50 MG/1
50 TABLET ORAL
Status: DISCONTINUED | OUTPATIENT
Start: 2022-03-14 | End: 2022-03-15 | Stop reason: HOSPADM

## 2022-03-14 RX ORDER — FENTANYL CITRATE 50 UG/ML
INJECTION, SOLUTION INTRAMUSCULAR; INTRAVENOUS
Status: COMPLETED
Start: 2022-03-14 | End: 2022-03-14

## 2022-03-14 RX ORDER — IPRATROPIUM BROMIDE AND ALBUTEROL SULFATE 2.5; .5 MG/3ML; MG/3ML
SOLUTION RESPIRATORY (INHALATION)
Status: COMPLETED
Start: 2022-03-14 | End: 2022-03-14

## 2022-03-14 RX ORDER — METOPROLOL TARTRATE 50 MG/1
50 TABLET ORAL 2 TIMES DAILY
Status: DISCONTINUED | OUTPATIENT
Start: 2022-03-14 | End: 2022-03-15 | Stop reason: HOSPADM

## 2022-03-14 RX ORDER — LIDOCAINE HYDROCHLORIDE 20 MG/ML
INJECTION, SOLUTION EPIDURAL; INFILTRATION; INTRACAUDAL; PERINEURAL AS NEEDED
Status: DISCONTINUED | OUTPATIENT
Start: 2022-03-14 | End: 2022-03-14 | Stop reason: HOSPADM

## 2022-03-14 RX ORDER — SODIUM CHLORIDE 0.9 % (FLUSH) 0.9 %
5-40 SYRINGE (ML) INJECTION EVERY 8 HOURS
Status: DISCONTINUED | OUTPATIENT
Start: 2022-03-14 | End: 2022-03-15 | Stop reason: HOSPADM

## 2022-03-14 RX ORDER — TRAMADOL HYDROCHLORIDE 50 MG/1
50 TABLET ORAL
Qty: 42 TABLET | Refills: 0 | Status: SHIPPED | OUTPATIENT
Start: 2022-03-14 | End: 2022-03-21

## 2022-03-14 RX ORDER — LOSARTAN POTASSIUM 100 MG/1
100 TABLET ORAL DAILY
Status: DISCONTINUED | OUTPATIENT
Start: 2022-03-15 | End: 2022-03-15 | Stop reason: HOSPADM

## 2022-03-14 RX ORDER — IPRATROPIUM BROMIDE AND ALBUTEROL SULFATE 2.5; .5 MG/3ML; MG/3ML
3 SOLUTION RESPIRATORY (INHALATION)
Status: COMPLETED | OUTPATIENT
Start: 2022-03-14 | End: 2022-03-14

## 2022-03-14 RX ADMIN — Medication 3 AMPULE: at 08:12

## 2022-03-14 RX ADMIN — Medication 1000 MG: at 08:12

## 2022-03-14 RX ADMIN — ROPIVACAINE HYDROCHLORIDE 25 ML: 5 INJECTION, SOLUTION EPIDURAL; INFILTRATION; PERINEURAL at 08:39

## 2022-03-14 RX ADMIN — PROPOFOL 30 MG: 10 INJECTION, EMULSION INTRAVENOUS at 09:06

## 2022-03-14 RX ADMIN — SODIUM CHLORIDE 10 MCG/MIN: 900 INJECTION, SOLUTION INTRAVENOUS at 09:24

## 2022-03-14 RX ADMIN — LIDOCAINE HYDROCHLORIDE 80 MG: 20 INJECTION, SOLUTION EPIDURAL; INFILTRATION; INTRACAUDAL; PERINEURAL at 08:51

## 2022-03-14 RX ADMIN — SODIUM CHLORIDE, POTASSIUM CHLORIDE, SODIUM LACTATE AND CALCIUM CHLORIDE 25 ML/HR: 600; 310; 30; 20 INJECTION, SOLUTION INTRAVENOUS at 08:24

## 2022-03-14 RX ADMIN — ONDANSETRON HYDROCHLORIDE 4 MG: 2 INJECTION, SOLUTION INTRAMUSCULAR; INTRAVENOUS at 10:08

## 2022-03-14 RX ADMIN — IPRATROPIUM BROMIDE AND ALBUTEROL SULFATE 3 ML: 2.5; .5 SOLUTION RESPIRATORY (INHALATION) at 12:35

## 2022-03-14 RX ADMIN — FENTANYL CITRATE 25 MCG: 50 INJECTION, SOLUTION INTRAMUSCULAR; INTRAVENOUS at 08:52

## 2022-03-14 RX ADMIN — Medication 10 MG: at 09:27

## 2022-03-14 RX ADMIN — PROPOFOL 100 MG: 10 INJECTION, EMULSION INTRAVENOUS at 08:52

## 2022-03-14 RX ADMIN — AMITRIPTYLINE HYDROCHLORIDE 100 MG: 50 TABLET, FILM COATED ORAL at 21:49

## 2022-03-14 RX ADMIN — Medication 10 MG: at 09:17

## 2022-03-14 RX ADMIN — GABAPENTIN 800 MG: 100 CAPSULE ORAL at 18:23

## 2022-03-14 RX ADMIN — SUCCINYLCHOLINE CHLORIDE 160 MG: 20 INJECTION, SOLUTION INTRAMUSCULAR; INTRAVENOUS at 08:53

## 2022-03-14 RX ADMIN — ASPIRIN 325 MG: 325 TABLET, COATED ORAL at 18:23

## 2022-03-14 RX ADMIN — WATER 2 G: 1 INJECTION INTRAMUSCULAR; INTRAVENOUS; SUBCUTANEOUS at 18:34

## 2022-03-14 RX ADMIN — METOPROLOL TARTRATE 50 MG: 50 TABLET ORAL at 18:26

## 2022-03-14 RX ADMIN — FENTANYL CITRATE 25 MCG: 50 INJECTION, SOLUTION INTRAMUSCULAR; INTRAVENOUS at 11:30

## 2022-03-14 RX ADMIN — ACETAMINOPHEN 1000 MG: 500 TABLET ORAL at 18:23

## 2022-03-14 RX ADMIN — Medication 5 MG: at 09:21

## 2022-03-14 RX ADMIN — Medication 5 MG: at 09:24

## 2022-03-14 RX ADMIN — WATER 2 G: 1 INJECTION INTRAMUSCULAR; INTRAVENOUS; SUBCUTANEOUS at 08:55

## 2022-03-14 RX ADMIN — FENTANYL CITRATE 25 MCG: 50 INJECTION, SOLUTION INTRAMUSCULAR; INTRAVENOUS at 11:38

## 2022-03-14 RX ADMIN — SODIUM CHLORIDE, PRESERVATIVE FREE 10 ML: 5 INJECTION INTRAVENOUS at 23:30

## 2022-03-14 RX ADMIN — ACETAMINOPHEN 1000 MG: 500 TABLET ORAL at 23:29

## 2022-03-14 RX ADMIN — DEXAMETHASONE SODIUM PHOSPHATE 8 MG: 4 INJECTION, SOLUTION INTRAMUSCULAR; INTRAVENOUS at 08:58

## 2022-03-14 RX ADMIN — ATORVASTATIN CALCIUM 40 MG: 40 TABLET, FILM COATED ORAL at 21:49

## 2022-03-14 RX ADMIN — Medication 1 AMPULE: at 23:31

## 2022-03-14 RX ADMIN — IPRATROPIUM BROMIDE AND ALBUTEROL SULFATE 3 ML: .5; 3 SOLUTION RESPIRATORY (INHALATION) at 12:35

## 2022-03-14 RX ADMIN — MIDAZOLAM HYDROCHLORIDE 2 MG: 1 INJECTION, SOLUTION INTRAMUSCULAR; INTRAVENOUS at 08:39

## 2022-03-14 RX ADMIN — GABAPENTIN 800 MG: 100 CAPSULE ORAL at 21:49

## 2022-03-14 NOTE — PERIOP NOTES
TRANSFER - OUT REPORT:    Verbal report given to [facility] RN(name) on [employer] being transferred to [facility](unit) for routine post - op       Report consisted of patient's Situation, Background, Assessment and   Recommendations(SBAR). Information from the following report(s) SBAR, OR Summary, Intake/Output, MAR and Cardiac Rhythm NSR was reviewed with the receiving nurse. Opportunity for questions and clarification was provided. Patient transported with:   O2 @ 2 liters  Tech     Belongings bags x2 on stretcher.  notified of transfer.

## 2022-03-14 NOTE — PERIOP NOTES
18 - PT'S COVID TEST RESULTED NEG - PT DENIES FEVER, COLD, COUGH, SOB, N/V, DIARRHEA. .... PRE-OP TCHING DONE - PT VERBALIZES UNDERSTANDING. STRETCHER IN LOWEST POSITION, CB IN PLACE AND SR UP X2.    0833 - TIMEOUT DONE - DR. Albert Cr IN TO PLACE RIGHT SHOULDER BLOCK - PT FLORESITA WELL.  VSS.

## 2022-03-14 NOTE — DISCHARGE INSTRUCTIONS
If you have specific questions: CALL OUR OFFICE: [phone]    DRESSING:  Keep dressing on, clean, and dry for three days. Three days after your surgery, you may remove the dressing and wash hand with soap and water. You are not allowed to place this underwater, and once it is cleaned, you should put an occlusive dressing over the wound. ACTIVITY:    Please move your shoulder as much as comfortable, you can wear the sling if necessary    You can remove the sling when you are more comfortable to do so    Keep the operative hand elevated as much as possible for the next two weeks    It is ok to begin to wiggle the fingers, elbow, and shoulder, move the hand and wrist to your tolerance, but do not do any lifting more than food or basic hygiene. GENERAL PAIN CONTROL:    IF YOU HAVE HAD A NERVE BLOCK: remember, you will have an increase in pain sometime in the first 24 hours after your surgery. This can be significant, make sure you are consistently medicating and expect that increase in pain. This is normal, but will require you to be proactive in your pain control. If this increase in pain persists - call Dr. Dilshad Ritchie office. Take pain medications as needed and prescribed. Never exceed the maximum dosage. It is reasonable to take something for pain prior to night time on the first night to avoid severe pain in the night. Ice is an important part of your recovery, and best if you do not apply ice or ice pack directly to skin, but use a towel or cloth on your skin. Do this for twenty minutes on the skin, then at least twenty minutes off of your skin. General Postoperative Instructions: If you have specific questions: CALL OUR OFFICE: [phone]    Diet: It is OK to resume your regular diet postoperatively. Start with light liquids and then slowly increase what you eat to avoid postoperative nausea and vomiting.   If you do experience nausea, restrict fatty or greasy foods until this passes. If you have continued issues, please call our office number for help: [phone]. Dressings: In general, dressings should remain dry and clean. In many cases, these should stay on until the postoperative visit, unless you've been directed to do so otherwise. Additionally, if the dressing becomes soiled or bloody, please call our office for further instructions. Ice instructions:  Ice is important for pain relief, but can be dangerous if improperly applied. Always make sure that NO ice has direct contact with the skin, and make sure that it never stays on the affected area more than 20 minutes. It is important to have ice off of the area for more than 20 minutes after a session to allow the skin to normalize temperature. It is ok to ice through casts, splints, and dressings, and can still be helpful. Weight bearing: Please refer to the specific surgical weight bearing instructions. Please do not exceed the weight limits as prescribed, as this could cause a surgical complication, wound healing problem, or other issue that could otherwise be avoided. Warning signs: The patient should call Dr. Esau Cardoso office or go to an emergency department if they note a fever over 101.1 degrees fahrenheit, more or unrelieved pain, more or new swelling at the operative site, discoloration of the extremity any drainage from the wound.

## 2022-03-14 NOTE — ANESTHESIA PROCEDURE NOTES
Peripheral Block    Start time: 3/14/2022 8:34 AM  End time: 3/14/2022 8:39 AM  Performed by: Papo Castellanos MD  Authorized by: Papo Castellanos MD       Pre-procedure: Indications: at surgeon's request and post-op pain management    Preanesthetic Checklist: patient identified, risks and benefits discussed, site marked, timeout performed, anesthesia consent given and patient being monitored      Block Type:   Block Type:   Interscalene  Laterality:  Right  Monitoring:  Standard ASA monitoring, continuous pulse ox, frequent vital sign checks, heart rate, responsive to questions and oxygen  Injection Technique:  Single shot  Procedures: ultrasound guided    Patient Position: supine  Prep: chlorhexidine    Location:  Interscalene  Needle Type:  Stimuplex  Needle Gauge:  22 G  Needle Localization:  Ultrasound guidance  Medication Injected:  Midazolam (VERSED) injection, 2 mg  ropivacaine (PF) (NAROPIN)(0.5%) 5 mg/mL injection, 25 mL  Med Admin Time: 3/14/2022 8:39 AM    Assessment:  Number of attempts:  1  Injection Assessment:  Incremental injection every 5 mL, negative aspiration for CSF, no paresthesia, ultrasound image on chart, local visualized surrounding nerve on ultrasound, negative aspiration for blood and no intravascular symptoms  Patient tolerance:  Patient tolerated the procedure well with no immediate complications

## 2022-03-14 NOTE — ANESTHESIA PREPROCEDURE EVALUATION
Relevant Problems   No relevant active problems       Anesthetic History   No history of anesthetic complications            Review of Systems / Medical History  Patient summary reviewed, nursing notes reviewed and pertinent labs reviewed    Pulmonary    COPD: mild               Neuro/Psych              Cardiovascular    Hypertension        Dysrhythmias : atrial fibrillation  Hyperlipidemia         GI/Hepatic/Renal     GERD: well controlled           Endo/Other    Diabetes: type 2    Anemia     Other Findings              Physical Exam    Airway  Mallampati: III  TM Distance: 4 - 6 cm  Neck ROM: normal range of motion   Mouth opening: Normal     Cardiovascular    Rhythm: regular  Rate: normal         Dental    Dentition: Edentulous     Pulmonary  Breath sounds clear to auscultation               Abdominal  GI exam deferred       Other Findings            Anesthetic Plan    ASA: 3  Anesthesia type: general      Post-op pain plan if not by surgeon: peripheral nerve block single    Induction: Intravenous  Anesthetic plan and risks discussed with: Patient

## 2022-03-14 NOTE — PERIOP NOTES
/Nikita updated. Plan to continue with discharge to home when respiratory function has improved. Consulted Dr. Luis E Schroeder r/t recurrent desaturation on room air. Current Incentive spirometer efforts & lung sounds. Order for Duoneb via jet neb.

## 2022-03-14 NOTE — DISCHARGE SUMMARY
Date of Admission: 3/14/2022  Date of Discharge: 3/14/2022    Attending on admission and discharge: Dr. Felix Manzanares    Admitting Diagnosis: left shoulder impingement  Discharge Diagnosis: same  Procedure Performed: left shoulder debridement    Hospital Course and Treatment:     The patient was admitted through the preop holding area. The patient tolerated the procedure well and was taken to the pacu for further observation and treatment. The patient was maintained on IV fluids until tolerating a PO diet. They were also maintained on sequential compression devices and DVT prophylaxis. The diet was advanced as tolerated. The patient was mobilized. There were no complications during the course of the hospital stay, and the patient was deemed medically stable for discharge to home. After consultation with physical therapy, the patient was cleared for discharge. Discharge instructions: The patient should not be in a pool or tub, or otherwise immerse the wound in water. The patient has been counseled on the importance of mobilizing and moving at least every two hours to help prevent blood clots. The patient should call Dr. Sammy Fragoso office or go to an emergency department if they note a fever over 101.1 degrees fahrenheit, more or unrelieved pain, more or new swelling at the operative site, or any drainage from the wound. Condition at Discharge: Stable    Discharge medications:  Resume regular home medications  Additional medications to be prescribed on discharge    Tramadol  aspirin      Follow up instructions: The patient should follow up in two  weeks for a wound check and xrays with Dr. Paulette Joseph

## 2022-03-14 NOTE — PERIOP NOTES
1310- Dr. Em Davey at bedside. Discussed pt's oxygenation and desaturation on room air. Continue with incentive spirometer and nasal cannula oxygen for now. Will reevaluate need for admission in the next hour.

## 2022-03-14 NOTE — ANESTHESIA POSTPROCEDURE EVALUATION
Post-Anesthesia Evaluation and Assessment    Patient: Nicky Collins MRN: [id number]  SSN: [id number]    YOB: 1945  Age: 64 y.o. Sex: female      I have evaluated the patient and they are stable and ready for discharge from the PACU. Cardiovascular Function/Vital Signs  Visit Vitals  BP (!) 150/69   Pulse 84   Temp 36.7 °C (98 °F)   Resp 23   Ht 5' 7\" (1.702 m)   Wt 78 kg (171 lb 15.3 oz)   SpO2 96%   BMI 26.93 kg/m²       Patient is status post General anesthesia for Procedure(s):  RIGHT SUBACROMIAL DECOMPRESSION, DISTAL CLAVICULAR EXCISION. Nausea/Vomiting: None    Postoperative hydration reviewed and adequate. Pain:  Pain Scale 1: Numeric (0 - 10) (03/14/22 1230)  Pain Intensity 1: 3 (03/14/22 1230)   Managed    Neurological Status:   Neuro (WDL): Exceptions to WDL (03/14/22 1027)  Neuro  Neurologic State: Lethargic;Eyes open to stimulus (03/14/22 1027)  Orientation Level: Unable to verbalize (03/14/22 1027)  Cognition: Decreased attention/concentration; Follows commands (03/14/22 1027)  LUE Motor Response: Purposeful (03/14/22 1027)  LLE Motor Response: Purposeful (03/14/22 1027)  RUE Motor Response: Purposeful;Weak (03/14/22 1027)  RLE Motor Response: Purposeful (03/14/22 1027)   At baseline, ISC block in place    Mental Status, Level of Consciousness: Alert and  oriented to person, place, and time    Pulmonary Status:   O2 Device: Nasal cannula (03/14/22 1330)   Adequate oxygenation and airway patent, requiring NC O2, will need to be admitted obs until oxygen requirement resolves    Complications related to anesthesia: None    Post-anesthesia assessment completed. No concerns    Signed By: Osorio Vora MD     March 14, 2022              Procedure(s):  RIGHT SUBACROMIAL DECOMPRESSION, DISTAL CLAVICULAR EXCISION.     general    <BSHSIANPOST>    INITIAL Post-op Vital signs:   Vitals Value Taken Time   /69 03/14/22 1500   Temp 36.7 °C (98 °F) 03/14/22 1138   Pulse 80 03/14/22 1503 Resp 14 03/14/22 1503   SpO2 96 % 03/14/22 1503   Vitals shown include unvalidated device data.

## 2022-03-14 NOTE — PERIOP NOTES
Handoff Report from Operating Room to PACU    Report received from BECKA Amado RN and Manasa Lindsay CRNA regarding Erasto . Surgeon(s):  Sharen Goltz, DO  And Procedure(s) (LRB):  RIGHT SUBACROMIAL DECOMPRESSION, DISTAL CLAVICULAR EXCISION (Right)  confirmed   with allergies and dressings discussed. Anesthesia type, drugs, patient history, complications, estimated blood loss, vital signs, intake and output, and last pain medication, lines and temperature were reviewed.

## 2022-03-14 NOTE — PERIOP NOTES
Consulted Dr. Liz Corrigan & call to Dr. Reva Duarte to report continued oxygen desaturation on RA. Orders to admit for overnight observation.  called to update.

## 2022-03-15 VITALS
HEIGHT: 67 IN | TEMPERATURE: 98.1 F | DIASTOLIC BLOOD PRESSURE: 51 MMHG | SYSTOLIC BLOOD PRESSURE: 113 MMHG | WEIGHT: 171.96 LBS | RESPIRATION RATE: 16 BRPM | BODY MASS INDEX: 26.99 KG/M2 | OXYGEN SATURATION: 98 % | HEART RATE: 67 BPM

## 2022-03-15 PROCEDURE — 94761 N-INVAS EAR/PLS OXIMETRY MLT: CPT

## 2022-03-15 PROCEDURE — 97162 PT EVAL MOD COMPLEX 30 MIN: CPT

## 2022-03-15 PROCEDURE — 77010033678 HC OXYGEN DAILY

## 2022-03-15 PROCEDURE — 74011250637 HC RX REV CODE- 250/637: Performed by: ORTHOPAEDIC SURGERY

## 2022-03-15 PROCEDURE — 29826 SHO ARTHRS SRG DECOMPRESSION: CPT | Performed by: ORTHOPAEDIC SURGERY

## 2022-03-15 PROCEDURE — 97161 PT EVAL LOW COMPLEX 20 MIN: CPT

## 2022-03-15 PROCEDURE — 74011250636 HC RX REV CODE- 250/636: Performed by: ORTHOPAEDIC SURGERY

## 2022-03-15 PROCEDURE — 74011000250 HC RX REV CODE- 250: Performed by: ORTHOPAEDIC SURGERY

## 2022-03-15 PROCEDURE — 97116 GAIT TRAINING THERAPY: CPT

## 2022-03-15 PROCEDURE — 29824 SHO ARTHRS SRG DSTL CLAVICLC: CPT | Performed by: ORTHOPAEDIC SURGERY

## 2022-03-15 PROCEDURE — 94760 N-INVAS EAR/PLS OXIMETRY 1: CPT

## 2022-03-15 PROCEDURE — 97530 THERAPEUTIC ACTIVITIES: CPT

## 2022-03-15 PROCEDURE — G0378 HOSPITAL OBSERVATION PER HR: HCPCS

## 2022-03-15 RX ADMIN — METOPROLOL TARTRATE 50 MG: 50 TABLET ORAL at 09:17

## 2022-03-15 RX ADMIN — GABAPENTIN 800 MG: 100 CAPSULE ORAL at 09:16

## 2022-03-15 RX ADMIN — WATER 2 G: 1 INJECTION INTRAMUSCULAR; INTRAVENOUS; SUBCUTANEOUS at 01:22

## 2022-03-15 RX ADMIN — Medication 1 AMPULE: at 09:17

## 2022-03-15 RX ADMIN — SODIUM CHLORIDE, PRESERVATIVE FREE 10 ML: 5 INJECTION INTRAVENOUS at 06:40

## 2022-03-15 RX ADMIN — ACETAMINOPHEN 1000 MG: 500 TABLET ORAL at 06:38

## 2022-03-15 RX ADMIN — LOSARTAN POTASSIUM 100 MG: 100 TABLET, FILM COATED ORAL at 09:17

## 2022-03-15 RX ADMIN — ASPIRIN 325 MG: 325 TABLET, COATED ORAL at 09:17

## 2022-03-15 NOTE — PROGRESS NOTES
PHYSICAL THERAPY EVALUATION/DISCHARGE  Patient: Erasto Mcgill (32 y.o. female)  Date: 3/15/2022  Primary Diagnosis: Postoperative hypoxia [R09.02, Z98.890]  Procedure(s) (LRB):  RIGHT SUBACROMIAL DECOMPRESSION, DISTAL CLAVICULAR EXCISION (Right) 1 Day Post-Op   Precautions:          ASSESSMENT  Based on the objective data described below, the patient presents with decreased independence with functional mobility post-op day 1 R subacromial decompression and distal clavicular excision. Education is provided on surgical precautions including no ER past neutral, pendulum exercises and shoulder flexion via passive ROM. Per patient she has been instructed to not wear her sling. Patient ambulates with R elbow bent and close to the body demonstrating minor path deviations and reaching for external support. Overall she demonstrates the need for SBA for safety. Education is reinforced on gentle movement and ROM and relaxing the arm by the body for improved balance and reduce the risk of frozen shoulder. Other factors to consider for discharge: medical stability, decreased balance     Further skilled acute physical therapy is not indicated at this time. PLAN :  Recommendation for discharge: (in order for the patient to meet his/her long term goals)  Outpatient physical therapy follow up recommended for R shoulder ROM and strength    This discharge recommendation:  Has been made in collaboration with the attending provider and/or case management    IF patient discharges home will need the following DME: patient owns DME required for discharge       SUBJECTIVE:   Patient stated I'll be alright.     OBJECTIVE DATA SUMMARY:   HISTORY:    Past Medical History:   Diagnosis Date    Abnormal chest x-ray 12/23/2009    Anemia 12/23/2009    Arrhythmia     SVT.  Converts with [employer]    Atrial fibrillation ([address] 12/23/2009    Carpal tunnel syndrome 12/23/2009    Chronic obstructive pulmonary disease (HCC)     Colitis 12/23/2009    Complex regional pain syndrome 12/23/2009    Depression 12/23/2009    Diabetes ([facility] [address]     no meds    Family history of skin cancer     brother-melanoma    GERD (gastroesophageal reflux disease) 2/8/2010    HTN (hypertension) 12/23/2009    Hypercholesteremia 12/23/2009    Hypertriglyceridemia 12/23/2009    Idiopathic peripheral neuropathy 12/23/2009    Iron deficiency anemia 12/23/2009    Osteopenia 12/23/2009    Paroxysmal atrial fibrillation ([facility] [address] 12/2/2011    Psoriasis 6/27/2012    Sarcoidosis      Past Surgical History:   Procedure Laterality Date    HX CARPAL TUNNEL RELEASE  00/00/2002    Both hands    HX CYST REMOVAL  00/00/1965    Left wrist    HX HYSTERECTOMY  00/00/1985    HX ORTHOPAEDIC  00/00/1987    back surgery (disc)    HX ORTHOPAEDIC      right foot X 3 neuromas and tarsal tunnel release    HX TONSILLECTOMY  00/00/1962    NEUROLOGICAL PROCEDURE UNLISTED      Pain pump    PAIN PUMP DEVICE  00/00/2007    in left lower abdomen (for foot pain)       Prior level of function: independent   Personal factors and/or comorbidities impacting plan of care: please see above    Home Situation  Home Environment: Private residence  # Steps to Enter: 4  One/Two Story Residence: One story  Living Alone: No  Support Systems: Spouse/Significant Other  Patient Expects to be Discharged to[de-identified] Home  Current DME Used/Available at Home: Blood pressure cuff    EXAMINATION/PRESENTATION/DECISION MAKING:   Critical Behavior:  Neurologic State: Alert  Orientation Level: Oriented X4  Cognition: Appropriate decision making     Hearing:   Auditory  Auditory Impairment: None  Hearing Aids/Status: Does not own  Skin:    Edema:   Range Of Motion:  AROM: Generally decreased, functional (R UE )           PROM: Generally decreased, functional (R UE )           Strength:    Strength: Generally decreased, functional (R UE )                    Tone & Sensation:   Tone: Normal Coordination:  Coordination: Generally decreased, functional  Vision:      Functional Mobility:  Bed Mobility:     Supine to Sit: Independent  Sit to Supine: Independent  Scooting: Independent  Transfers:  Sit to Stand: Modified independent  Stand to Sit: Modified independent                       Balance:   Sitting: Intact; Without support  Standing: Impaired; Without support  Standing - Static: Good  Standing - Dynamic : Fair  Ambulation/Gait Training:  Distance (ft): 200 Feet (ft)  Assistive Device: Gait belt  Ambulation - Level of Assistance: Stand-by assistance        Gait Abnormalities: Decreased step clearance; Path deviations        Base of Support: Narrowed     Speed/Noemi: Slow  Step Length: Right shortened;Left shortened                     Stairs:  Number of Stairs Trained: 4  Stairs - Level of Assistance: Contact guard assistance   Rail Use: Left     Therapeutic Exercises:              Physical Therapy Evaluation Charge Determination   History Examination Presentation Decision-Making   MEDIUM  Complexity : 1-2 comorbidities / personal factors will impact the outcome/ POC  LOW Complexity : 1-2 Standardized tests and measures addressing body structure, function, activity limitation and / or participation in recreation  MEDIUM Complexity : Evolving with changing characteristics  LOW Complexity : FOTO score of       Based on the above components, the patient evaluation is determined to be of the following complexity level: LOW     Pain Rating:      Activity Tolerance:   Good      After treatment patient left in no apparent distress:   Supine in bed and Call bell within reach    COMMUNICATION/EDUCATION:   The patients plan of care was discussed with: Registered nurse and Case management. Fall prevention education was provided and the patient/caregiver indicated understanding., Patient/family have participated as able in goal setting and plan of care.  and Patient/family agree to work toward stated goals and plan of care.     Thank you for this referral.  Dagmar Emmanuel, PT   Time Calculation: 20 mins

## 2022-03-15 NOTE — PROGRESS NOTES
Problem: Falls - Risk of  Goal: *Absence of Falls  Description: Document Kenya Machado Fall Risk and appropriate interventions in the flowsheet.   Outcome: Progressing Towards Goal  Note: Fall Risk Interventions:  Mobility Interventions: Assess mobility with egress test,Patient to call before getting OOB         Medication Interventions: Patient to call before getting OOB,Teach patient to arise slowly         History of Falls Interventions: Door open when patient unattended,Investigate reason for fall         Problem: Patient Education: Go to Patient Education Activity  Goal: Patient/Family Education  Outcome: Progressing Towards Goal

## 2022-03-15 NOTE — OP NOTES
Date of operation: 3/14/2020. Preoperative Diagnosis: Right shoulder impingement, osteoarthritis acromioclavicular joint  Postoperative Diagnosis: Same  Procedure Performed: Right shoulder arthroscopy, subacromial decompression, distal clavicle excision  Surgeon: Jitendra Morales DO  Assistant: None  Anesthesia: General  Estimated Blood Loss: 5 cc  Specimens: None  Drains: None  Complications: None  Implants: None      Operative Indications: This is a 64 y.o. female who failed nonoperative management of impingement, no sign of rotator cuff tear, debridement was indicated. We did discuss the risks of surgery which include but are not limited to infection, nerve or blood vessel damage, failure of fixation, failure of any possible implant, need for reoperation, postoperative pain and swelling, intra-or postoperative fracture, postoperative mechanical failure, need for reoperation, implant failure, death, disability, organ dysfunction, wound healing issues, DVT, PE, and the need for further procedures. The patient did freely state their understanding and satisfaction with our discussion. Appropriate medical clearances have been obtained. Description of Procedure:  After the correct site and side were identified by myself in the holding area, the patient was transported to the surgical suite, where, after induction of general anesthesia, the patient was positioned in the beachchair position. The right was then prepped and draped in the usual sterile orthopedic fashion. After appropriate timeout, and confirmation of 2 g of Ancef have been infused prior to incision, standard posterior portal was accessed with use of a spinal needle, the joint was then infused with saline, I then utilized a stab incision and then a blunt trocar to enter the joint, diagnostic arthroscopy indicated intact rotator cuff, mild to moderate degenerative changes at the humeral head as well as glenoid and labrum.   Anterior portal was established under direct visualization, diagnostic arthroscopy indicated minor debridement was necessary, however no repairs. I then utilized a trocar and entered the subacromial space with use of the camera posteriorly, I then under direct visualization and a spinal needle inserted the lateral portal, I then utilized the dale and an to perform a bursectomy, I then performed a subacromial decompression with use of a shaver. Once this was performed, attention was turned to the [facility] joint, this was significantly arthritic and it was difficult to insert a spinal needle, as my preference would have been a windshield wiper technique for distal clavicle excision, however due to difficulty with spinal needle insertion, I converted to a minor open procedure, a 2 cm incision was made over the acromioclavicular joint, care was taken to preserve the coracoclavicular ligaments, I then performed a distal clavicle excision, 1 cm was excised. I then used a probe in the subacromial space to ensure that there were no osteophytes or impingement remaining, once this was performed, wounds were irrigated. 2-0 Vicryl close the stab incisions and superior incision. 3-0 nylon. Sterile dressing was applied. She was placed into a sling, patient was then awoken and taken to PACU in stable condition without complication. Postoperative plan: Patient will be weightbearing as tolerated, she will begin physical therapy immediately. Plan will be to have her follow-up with me in 2 weeks for repeat clinical check. No x-rays on follow-up. Pinky Angles

## 2022-03-15 NOTE — PROGRESS NOTES
End of Shift Note    Bedside shift change report given to [facility] (oncoming nurse) by Estrella Garcia (offgoing nurse). Report included the following information SBAR and Kardex    Shift worked:  [id number]     Shift summary and any significant changes:     Patient using incentive spirometer, needs encouragement. On 2L oxygen still. Concerns for physician to address:        Zone phone for oncoming shift:   [phone]       Activity:  Activity Level: Up with Assistance  Number times ambulated in hallways past shift: 0  Number of times OOB to chair past shift: 0    Cardiac:   Cardiac Monitoring: No      Cardiac Rhythm: Sinus Rhythm    Access:   Current line(s): PIV     Genitourinary:   Urinary status: voiding    Respiratory:   O2 Device: Nasal cannula  Chronic home O2 use?: NO  Incentive spirometer at bedside: YES  Actual Volume (ml): 1250 ml    GI:  Last Bowel Movement Date: 03/13/22  Current diet:  ADULT DIET Regular  Passing flatus: YES  Tolerating current diet: YES       Pain Management:   Patient states pain is manageable on current regimen: YES    Skin:  Neri Score: 20  Interventions: increase time out of bed    Patient Safety:  Fall Score:  Total Score: 3  Interventions: gripper socks and pt to call before getting OOB  High Fall Risk: Yes    Length of Stay:  Expected LOS: - - -  Actual LOS: 0      Rosalia M [facility]Carra[facility]

## 2022-03-15 NOTE — PROGRESS NOTES
Problem: Falls - Risk of  Goal: *Absence of Falls  Description: Document Venus Fail Fall Risk and appropriate interventions in the flowsheet.   Outcome: Resolved/Met  Note: Fall Risk Interventions:  Mobility Interventions: Assess mobility with egress test         Medication Interventions: Patient to call before getting OOB         History of Falls Interventions: Bed/chair exit alarm

## 2022-03-16 ENCOUNTER — PATIENT OUTREACH (OUTPATIENT)
Dept: CASE MANAGEMENT | Age: 77
End: 2022-03-16

## 2022-03-16 NOTE — PROGRESS NOTES
Care Transitions Initial Call    Call within 2 business days of discharge: Yes     Patient: Sandra Troy Patient : 1945 MRN: [id number]    Last Discharge [address]       Complaint Diagnosis Description Type Department Provider    3/14/22  Orthopedic aftercare Admission (Discharged) Jazmín Loya, DO          Was this an external facility discharge? Yes, ED [facility] 3/14=3/15 Discharge Facility    Challenges to be reviewed by the provider   Additional needs identified to be addressed with provider: no  [facility] 3/14-3/15 Right should impingement-s/p arthroscopy,subacromial decomprssion,distal clavicle excision         Method of communication with provider : chart routing    Discussed COVID-19 related testing which was not done at this time. Advance Care Planning:   Does patient have an Advance Directive:  yes; reviewed and current     Inpatient Readmission Risk score: No data recorded  Was this a readmission? no   Patient stated reason for the admission: shoulder surgery scheduled    Patients top risk factors for readmission: medical condition-s/p right shoulder arthroplasty; h/o: Afib,anemia,COPD,colitis,DM,HTN. Interventions to address risk factors: Scheduled appointment with PCP- has called and LM for pcp for PHILIPPE visit, Scheduled appointment with Specialist- will call and schedule and Obtained and reviewed discharge summary and/or continuity of care documents    Care Transition Nurse (CTN) contacted the patient by telephone to perform post hospital discharge assessment. Verified name and  with patient as identifiers. Provided introduction to self, and explanation of the CTN role. Patient reports she is sore this morning, not unbearable. Did take a Tramadol 50mg tab at 7:30am. Currently sitting in recliner, reports pain #0 at this time.  Reminded to use ice to area with barrier between skin and ice, move / walk about q hour to prevent blood clots, dressing directions reviewed as well.    CTN reviewed discharge instructions, medical action plan and red flags with patient who verbalized understanding. Were discharge instructions available to patient? yes. Reviewed appropriate site of care based on symptoms and resources available to patient including: PCP, Specialist, Urgent Care Clinics, When to call 911 and [address]. Patient given an opportunity to ask questions and does not have any further questions or concerns at this time. The patient agrees to contact the PCP office for questions related to their healthcare. Medication reconciliation was performed with patient, who verbalizes understanding of administration of home medications. Advised obtaining a 90-day supply of all daily and as-needed medications. Referral to Pharm D needed: no     Home Health/Outpatient orders at discharge: [address]: na  Date of initial visit: na    Durable Medical Equipment ordered at discharge: None  (given sling at hospital)  [address] received: na    Covid Risk Education    Educated patient about risk for severe COVID-19 due to risk factors according to CDC guidelines. CTN reviewed discharge instructions, medical action plan and red flag symptoms with the patient who verbalized understanding. Discussed COVID vaccination status: yes. Education provided on COVID-19 vaccination as appropriate. Discussed exposure protocols and quarantine with CDC Guidelines. Patient was given an opportunity to verbalize any questions and concerns and agrees to contact CTN or health care provider for questions related to their healthcare. Was patient discharged with a pulse oximeter? no.      Discussed follow-up appointments. If no appointment was previously scheduled, appointment scheduling offered: yes. Is follow up appointment scheduled within 7 days of discharge? pending,  LM with pcp for PHILIPPE visit.    [facility] follow up appointment(s): Future Appointments   Date Time Provider Vaibhav Quintanilla   5/13/2022  1:40 PM Ben Lombardi MD [facility] BS [facility]     Non-[facility] follow up appointment(s): ,  will call and schedule. Plan for follow-up call in 7-10 days based on severity of symptoms and risk factors. Plan for next call: symptom management-assess current symptoms, self management-following discharge instructions, follow up appointment-saw pcp for PHILIPPE visit and medication management-taking meds as ordered. CTN provided contact information for future needs. Goals Addressed                 This Visit's Progress     Prevent complications post hospitalization. 3/16/22 [id number] 3/14-3/15 Right shoulder impingement s/p right shoulder arthroplasty   Reviewed discharge instructions with patient.  Reviewed meds, education provided on new meds using teachback.  Reviewed red flags: fever, pain despite pain meds, increased swelling/redness at site, drainage, nausea,vomiting,diarrhea,signs of blood clot in leg.  Given info on [employer] as resource.  PHILIPPE with , patient has called and LM.  Reminded to call  and schedule f/u appt.  Given CTN contact info if questions/concerns.    CTN to check back in about a week, sooner prn.mbt

## 2022-03-19 PROBLEM — R09.02 POSTOPERATIVE HYPOXIA: Status: ACTIVE | Noted: 2022-03-14

## 2022-03-19 PROBLEM — Z91.81 AT HIGH RISK FOR FALLS: Status: ACTIVE | Noted: 2021-06-11

## 2022-03-19 PROBLEM — Z98.890 POSTOPERATIVE HYPOXIA: Status: ACTIVE | Noted: 2022-03-14

## 2022-03-19 PROBLEM — J20.9 ACUTE BRONCHITIS: Status: ACTIVE | Noted: 2021-07-13

## 2022-03-20 PROBLEM — D86.1 SARCOIDOSIS OF LYMPH NODES (HCC): Status: ACTIVE | Noted: 2017-09-08

## 2022-03-23 ENCOUNTER — PATIENT OUTREACH (OUTPATIENT)
Dept: CASE MANAGEMENT | Age: 77
End: 2022-03-23

## 2022-03-23 NOTE — PROGRESS NOTES
Called and spoke to patient. Goals      Prevent complications post hospitalization. 3/16/22 ED [facility] 3/14-3/15 Right shoulder impingement s/p right shoulder arthroplasty   Reviewed discharge instructions with patient.  Reviewed meds, education provided on new meds using teachback.  Reviewed red flags: fever, pain despite pain meds, increased swelling/redness at site, drainage, nausea,vomiting,diarrhea,signs of blood clot in leg.  Given info on [facility] as resource.  PHILIPPE with , patient has called and LM.  Reminded to call  and schedule f/u appt.  Given CTN contact info if questions/concerns.  CTN to check back in about a week, sooner prn.mbt  3/23/22  Reports she is doing good. No pain unless uses arm a lot. Did not take any of the Tramadol yesterday, did take one today. Currently pain about #2. Sees pcp tomorrow to have sutures removed. Had not yet made appt with . Urged to go ahead and call his office and schedule f/u with him. No red flags noted, incision seems to be healing well. Advised continue current POC. CTN to check back in about a week. mbt

## 2022-03-29 ENCOUNTER — OFFICE VISIT (OUTPATIENT)
Dept: ORTHOPEDIC SURGERY | Age: 77
End: 2022-03-29
Payer: MEDICARE

## 2022-03-29 VITALS
SYSTOLIC BLOOD PRESSURE: 177 MMHG | HEIGHT: 67 IN | OXYGEN SATURATION: 95 % | HEART RATE: 76 BPM | WEIGHT: 174 LBS | BODY MASS INDEX: 27.31 KG/M2 | TEMPERATURE: 97.2 F | DIASTOLIC BLOOD PRESSURE: 90 MMHG

## 2022-03-29 DIAGNOSIS — Z47.89 ORTHOPEDIC AFTERCARE: Primary | ICD-10-CM

## 2022-03-29 DIAGNOSIS — M75.41 IMPINGEMENT SYNDROME OF RIGHT SHOULDER: Primary | ICD-10-CM

## 2022-03-29 PROCEDURE — 99024 POSTOP FOLLOW-UP VISIT: CPT | Performed by: ORTHOPAEDIC SURGERY

## 2022-03-29 RX ORDER — HYDROCODONE BITARTRATE AND ACETAMINOPHEN 5; 325 MG/1; MG/1
1 TABLET ORAL
Qty: 42 TABLET | Refills: 0 | Status: SHIPPED | OUTPATIENT
Start: 2022-03-29 | End: 2022-04-05

## 2022-03-29 NOTE — PROGRESS NOTES
Identified pt with two pt identifiers (name and ). Reviewed chart in preparation for visit and have obtained necessary documentation. Refugio Orantes is a 64 y.o. female  Chief Complaint   Patient presents with    Surgical Follow-up     RT shoulder     Visit Vitals  BP (!) 177/90 (BP 1 Location: Left upper arm, BP Patient Position: Sitting, BP Cuff Size: Large adult)   Pulse 76   Temp 97.2 °F (36.2 °C) (Tympanic)   Ht 5' 7\" (1.702 m)   Wt 174 lb (78.9 kg)   SpO2 95%   BMI 27.25 kg/m²     1. Have you been to the ER, urgent care clinic since your last visit? Hospitalized since your last visit? No    2. Have you seen or consulted any other health care providers outside of the [address] [place] [address] since your last visit? Include any pap smears or colon screening.  No

## 2022-03-29 NOTE — LETTER
3/29/2022    Patient: Isarua Tejada   YOB: 1945   Date of Visit: 3/29/2022     David John MD  [address] [facility] [address]  [place] [facility] [address]    Dear David John MD,      Thank you for referring Ms. Jayesh Salcido to [facility] for evaluation. My notes for this consultation are attached. If you have questions, please do not hesitate to call me. I look forward to following your patient along with you.       Sincerely,    Deni Zuñiga, DO

## 2022-03-30 NOTE — PROGRESS NOTES
Sherry Martinez  is here for a follow up visit from a right shoulder subacromial injection, distal clavicle excision. Pain has been appropriate since surgery, no major medical complications since surgery. Current Outpatient Medications on File Prior to Visit   Medication Sig Dispense Refill    aspirin delayed-release 325 mg tablet Take 1 Tablet by mouth two (2) times a day. 60 Tablet 0    gabapentin (NEURONTIN) 800 mg tablet Take 800 mg by mouth three (3) times daily.  losartan (COZAAR) 100 mg tablet TAKE 1 TABLET BY MOUTH  DAILY (Patient taking differently: 100 mg daily. TAKE 1 TABLET BY MOUTH  DAILY) 90 Tablet 3    atorvastatin (LIPITOR) 40 mg tablet TAKE 1 TABLET BY MOUTH AT  NIGHT 90 Tablet 3    amitriptyline (ELAVIL) 100 mg tablet Take 1 tablet by mouth nightly 90 Tablet 0    metoprolol tartrate (LOPRESSOR) 50 mg tablet TAKE 1 TAB BY MOUTH TWO (2) TIMES A DAY. 180 Tab 3    omeprazole (PRILOSEC) 20 mg capsule Take 20 mg by mouth daily.  diclofenac EC (VOLTAREN) 75 mg EC tablet Take 1 Tablet by mouth two (2) times a day. (Patient not taking: Reported on 3/16/2022) 60 Tablet 0    fluorouracil (EFUDEX) 5 % chemo cream Apply  to affected area two (2) times a day. (Patient not taking: Reported on 3/14/2022) 40 g 0     No current facility-administered medications on file prior to visit. ROS:  General: denies agitation, major chest pain, unexpected weakness  Patient states improving pain  Skin: healing wound is without issue or drainage   Strength: appropriate weakness of involved extremity is resolving since surgery      Physical Examination:    Blood pressure (!) 177/90, pulse 76, temperature 97.2 °F (36.2 °C), temperature source Tympanic, height 5' 7\" (1.702 m), weight 174 lb (78.9 kg), SpO2 95 %.     Dressing: none  Skin: clean, dry, intact  Sensation intact to light touch at level of wound and distally  Strength is not tested  Range of motion is not tested  Distal swelling is noted, but appropriate for postoperative course  Distal capillary refill less than 2 seconds      Imaging:    Postoperative imaging: none      Assessment: Status post right shoulder subacromial debridement, distal clavicle exision    Plan:  Patient will continue physical therapy  Wound care was reviewed  Weightbearing will be full through the arm  Deep Venous Thrombosis Prophylaxis: none    Follow up will be at 4 weeks from now,  no xrays on follow up

## 2022-04-01 ENCOUNTER — PATIENT OUTREACH (OUTPATIENT)
Dept: CASE MANAGEMENT | Age: 77
End: 2022-04-01

## 2022-04-01 ENCOUNTER — TELEPHONE (OUTPATIENT)
Dept: ORTHOPEDIC SURGERY | Age: 77
End: 2022-04-01

## 2022-04-01 NOTE — PROGRESS NOTES
Called and spoke to patient. Goals      Prevent complications post hospitalization. 3/16/22 ED [facility] 3/14-3/15 Right shoulder impingement s/p right shoulder arthroplasty   Reviewed discharge instructions with patient.  Reviewed meds, education provided on new meds using teachback.  Reviewed red flags: fever, pain despite pain meds, increased swelling/redness at site, drainage, nausea,vomiting,diarrhea,signs of blood clot in leg.  Given info on [employer] as resource.  PHILIPPE with , patient has called and LM.  Reminded to call  and schedule f/u appt.  Given CTN contact info if questions/concerns.  CTN to check back in about a week, sooner prn.mbt  3/23/22  Reports she is doing good. No pain unless uses arm a lot. Did not take any of the Tramadol yesterday, did take one today. Currently pain about #2. Sees pcp tomorrow to have sutures removed. Had not yet made appt with . Urged to go ahead and call his office and schedule f/u with him. No red flags noted, incision seems to be healing well. Advised continue current POC. CTN to check back in about a week. mbt  4/1/22  Reports she is good. Saw  on 3/29-he thought she was doing well. She had him look at the skin on top of incision, sore, he said it may take a few months to improve. Continue to use  ice. Bit more discomfort today because did too much yesterday, planting in her garden. Advised to use ice, try not to overdo with activity. F/u as advised. CTN to check back in about a week. mbt

## 2022-04-01 NOTE — TELEPHONE ENCOUNTER
PT has been icing her shoulder that was recently operated on by the doctor and would like to know if she can rotate between heat and ice now on the shoulder.

## 2022-04-13 ENCOUNTER — TELEPHONE (OUTPATIENT)
Dept: ORTHOPEDIC SURGERY | Age: 77
End: 2022-04-13

## 2022-04-13 NOTE — TELEPHONE ENCOUNTER
Tootie PT from [facility] called and asked for the PT's op note to be faxed over to them at [phone].     This was completed on 4/13/22

## 2022-04-18 ENCOUNTER — PATIENT OUTREACH (OUTPATIENT)
Dept: CASE MANAGEMENT | Age: 77
End: 2022-04-18

## 2022-04-18 NOTE — PROGRESS NOTES
Patient has graduated from the Transitions of Care Coordination  program on 4/18/22. Patient/family has the ability to self-manage at this time Care management goals have been completed. Patient was not referred to the [facility] team for further management. Goals Addressed                 This Visit's Progress     COMPLETED: Prevent complications post hospitalization. 3/16/22 [facility] 3/14-3/15 Right shoulder impingement s/p right shoulder arthroplasty   Reviewed discharge instructions with patient.  Reviewed meds, education provided on new meds using teachback.  Reviewed red flags: fever, pain despite pain meds, increased swelling/redness at site, drainage, nausea,vomiting,diarrhea,signs of blood clot in leg.  Given info on [employer] as resource.  PHILIPPE with , patient has called and LM.  Reminded to call  and schedule f/u appt.  Given CTN contact info if questions/concerns.  CTN to check back in about a week, sooner [email or web address]  3/23/22  Reports she is doing good. No pain unless uses arm a lot. Did not take any of the Tramadol yesterday, did take one today. Currently pain about #2. Sees pcp tomorrow to have sutures removed. Had not yet made appt with . Urged to go ahead and call his office and schedule f/u with him. No red flags noted, incision seems to be healing well. Advised continue current POC. CTN to check back in about a week. mbt  4/1/22  Reports she is good. Saw  on 3/29-he thought she was doing well. She had him look at the skin on top of incision, sore, he said it may take a few months to improve. Continue to use  ice. Bit more discomfort today because did too much yesterday, planting in her garden. Advised to use ice, try not to overdo with activity. F/u as advised. CTN to check back in about a week. mbt  4/18/22  Reports she is good. Healing well. Maybe has been doing too much activity with gardening.   Just sore, tender on shoulder. Icing as recommended. No red flags noted. Advised to continue to f/u with . Wished her well! mbt              Patient has Care Transition Nurse's contact information for any further questions, concerns, or needs.   Patients upcoming visits:    Future Appointments   Date Time Provider Vaibhav uQintanilla   4/29/2022 11:30 AM DO AUSTYN FosterOS BS AMB   5/13/2022  1:40 PM MD PEDRO Romano BS AMB

## 2022-05-11 ENCOUNTER — OFFICE VISIT (OUTPATIENT)
Dept: ORTHOPEDIC SURGERY | Age: 77
End: 2022-05-11
Payer: MEDICARE

## 2022-05-11 VITALS
OXYGEN SATURATION: 93 % | DIASTOLIC BLOOD PRESSURE: 89 MMHG | TEMPERATURE: 97.3 F | HEART RATE: 73 BPM | SYSTOLIC BLOOD PRESSURE: 162 MMHG | BODY MASS INDEX: 27.47 KG/M2 | HEIGHT: 67 IN | WEIGHT: 175 LBS

## 2022-05-11 DIAGNOSIS — Z47.89 ORTHOPEDIC AFTERCARE: Primary | ICD-10-CM

## 2022-05-11 DIAGNOSIS — M75.41 IMPINGEMENT SYNDROME OF RIGHT SHOULDER: ICD-10-CM

## 2022-05-11 PROCEDURE — 99024 POSTOP FOLLOW-UP VISIT: CPT | Performed by: ORTHOPAEDIC SURGERY

## 2022-05-11 RX ORDER — DICLOFENAC SODIUM 75 MG/1
75 TABLET, DELAYED RELEASE ORAL 2 TIMES DAILY
Qty: 60 TABLET | Refills: 0 | Status: SHIPPED | OUTPATIENT
Start: 2022-05-11

## 2022-05-11 NOTE — PROGRESS NOTES
Identified pt with two pt identifiers (name and ). Reviewed chart in preparation for visit and have obtained necessary documentation. Scarlett Christina is a 64 y.o. female  Chief Complaint   Patient presents with    Surgical Follow-up     RT Shoulder     Visit Vitals  BP (!) 162/89 (BP 1 Location: Left upper arm, BP Patient Position: Sitting, BP Cuff Size: Large adult)   Pulse 73   Temp 97.3 °F (36.3 °C) (Tympanic)   Ht 5' 7\" (1.702 m)   Wt 175 lb (79.4 kg)   SpO2 93%   BMI 27.41 kg/m²     1. Have you been to the ER, urgent care clinic since your last visit? Hospitalized since your last visit? No    2. Have you seen or consulted any other health care providers outside of the [address] [place] [address] since your last visit? Include any pap smears or colon screening.  No

## 2022-05-11 NOTE — PROGRESS NOTES
Vipul Wallace  is here for a follow up visit from a right shoulder subacromial decompression and distal clavicle excision. Pain has been appropriate since surgery, no major medical complications since surgery. Current Outpatient Medications on File Prior to Visit   Medication Sig Dispense Refill    aspirin delayed-release 325 mg tablet Take 1 Tablet by mouth two (2) times a day. 60 Tablet 0    gabapentin (NEURONTIN) 800 mg tablet Take 800 mg by mouth three (3) times daily.  diclofenac EC (VOLTAREN) 75 mg EC tablet Take 1 Tablet by mouth two (2) times a day. (Patient not taking: Reported on 3/16/2022) 60 Tablet 0    losartan (COZAAR) 100 mg tablet TAKE 1 TABLET BY MOUTH  DAILY (Patient taking differently: 100 mg daily. TAKE 1 TABLET BY MOUTH  DAILY) 90 Tablet 3    atorvastatin (LIPITOR) 40 mg tablet TAKE 1 TABLET BY MOUTH AT  NIGHT 90 Tablet 3    amitriptyline (ELAVIL) 100 mg tablet Take 1 tablet by mouth nightly 90 Tablet 0    metoprolol tartrate (LOPRESSOR) 50 mg tablet TAKE 1 TAB BY MOUTH TWO (2) TIMES A DAY. 180 Tab 3    fluorouracil (EFUDEX) 5 % chemo cream Apply  to affected area two (2) times a day. (Patient not taking: Reported on 3/14/2022) 40 g 0    omeprazole (PRILOSEC) 20 mg capsule Take 20 mg by mouth daily. No current facility-administered medications on file prior to visit. ROS:  General: denies agitation, major chest pain, unexpected weakness  Patient states improved pain and range of motion  Skin: healing wound is without issue or drainage   Strength: appropriate weakness of involved extremity is resolving since surgery      Physical Examination:    There were no vitals taken for this visit.     Dressing: none  Skin: clean, dry, intact  Sensation intact to light touch at level of wound and distally  Strength is full at shoulder motion, negative impingement  Range of motion is full  Distal swelling is noted, but appropriate for postoperative course  Distal capillary refill less than 2 seconds      Imaging:    Postoperative imaging: none      Assessment: Status post right shoulder subacromial decompression and distal clavicle excision    Plan:  Patient will continue physical therapy  Wound care was reviewed  Weightbearing will be full through the arm  Deep Venous Thrombosis Prophylaxis: none    Follow up will be at 6 weeks from now,  no xrays on follow up

## 2022-05-13 ENCOUNTER — OFFICE VISIT (OUTPATIENT)
Dept: INTERNAL MEDICINE CLINIC | Age: 77
End: 2022-05-13
Payer: MEDICARE

## 2022-05-13 VITALS
DIASTOLIC BLOOD PRESSURE: 78 MMHG | BODY MASS INDEX: 27.55 KG/M2 | OXYGEN SATURATION: 95 % | HEIGHT: 67 IN | HEART RATE: 77 BPM | RESPIRATION RATE: 12 BRPM | WEIGHT: 175.5 LBS | SYSTOLIC BLOOD PRESSURE: 164 MMHG | TEMPERATURE: 98.5 F

## 2022-05-13 DIAGNOSIS — E11.42 CONTROLLED TYPE 2 DIABETES MELLITUS WITH DIABETIC POLYNEUROPATHY, WITHOUT LONG-TERM CURRENT USE OF INSULIN (HCC): ICD-10-CM

## 2022-05-13 DIAGNOSIS — I10 HYPERTENSION, ESSENTIAL: Primary | ICD-10-CM

## 2022-05-13 PROBLEM — J20.9 ACUTE BRONCHITIS: Status: RESOLVED | Noted: 2021-07-13 | Resolved: 2022-05-13

## 2022-05-13 PROBLEM — Z91.81 AT HIGH RISK FOR FALLS: Status: RESOLVED | Noted: 2021-06-11 | Resolved: 2022-05-13

## 2022-05-13 PROBLEM — Z98.890 POSTOPERATIVE HYPOXIA: Status: RESOLVED | Noted: 2022-03-14 | Resolved: 2022-05-13

## 2022-05-13 PROBLEM — R09.02 POSTOPERATIVE HYPOXIA: Status: RESOLVED | Noted: 2022-03-14 | Resolved: 2022-05-13

## 2022-05-13 PROCEDURE — G8536 NO DOC ELDER MAL SCRN: HCPCS | Performed by: INTERNAL MEDICINE

## 2022-05-13 PROCEDURE — G8399 PT W/DXA RESULTS DOCUMENT: HCPCS | Performed by: INTERNAL MEDICINE

## 2022-05-13 PROCEDURE — 1090F PRES/ABSN URINE INCON ASSESS: CPT | Performed by: INTERNAL MEDICINE

## 2022-05-13 PROCEDURE — 1101F PT FALLS ASSESS-DOCD LE1/YR: CPT | Performed by: INTERNAL MEDICINE

## 2022-05-13 PROCEDURE — 3044F HG A1C LEVEL LT 7.0%: CPT | Performed by: INTERNAL MEDICINE

## 2022-05-13 PROCEDURE — G8427 DOCREV CUR MEDS BY ELIG CLIN: HCPCS | Performed by: INTERNAL MEDICINE

## 2022-05-13 PROCEDURE — G9717 DOC PT DX DEP/BP F/U NT REQ: HCPCS | Performed by: INTERNAL MEDICINE

## 2022-05-13 PROCEDURE — G8754 DIAS BP LESS 90: HCPCS | Performed by: INTERNAL MEDICINE

## 2022-05-13 PROCEDURE — G8753 SYS BP > OR = 140: HCPCS | Performed by: INTERNAL MEDICINE

## 2022-05-13 PROCEDURE — 99214 OFFICE O/P EST MOD 30 MIN: CPT | Performed by: INTERNAL MEDICINE

## 2022-05-13 PROCEDURE — G8419 CALC BMI OUT NRM PARAM NOF/U: HCPCS | Performed by: INTERNAL MEDICINE

## 2022-05-13 RX ORDER — GLUCOSAMINE SULFATE 1500 MG
1000 POWDER IN PACKET (EA) ORAL DAILY
COMMUNITY

## 2022-05-13 RX ORDER — HYDROCHLOROTHIAZIDE 12.5 MG/1
12.5 TABLET ORAL DAILY
Qty: 90 TABLET | Refills: 0 | Status: SHIPPED | OUTPATIENT
Start: 2022-05-13 | End: 2022-06-13 | Stop reason: ALTCHOICE

## 2022-05-13 RX ORDER — TRAMADOL HYDROCHLORIDE 50 MG/1
TABLET ORAL
COMMUNITY

## 2022-05-13 NOTE — PROGRESS NOTES
Samara Madden  Identified pt with two pt identifiers(name and ). Chief Complaint   Patient presents with    Hypertension    Diabetes       Reviewed record In preparation for visit and have obtained necessary documentation. 1. Have you been to the ER, urgent care clinic or hospitalized since your last visit? No     2. Have you seen or consulted any other health care providers outside of the [address] [place] [address] since your last visit? Include any pap smears or colon screening. No    Vitals reviewed with provider.     Health Maintenance reviewed:     Health Maintenance Due   Topic    Eye Exam Retinal or Dilated     Foot Exam Q1  Medicare Yearly Exam           Wt Readings from Last 3 Encounters:   22 175 lb 8 oz (79.6 kg)   22 175 lb (79.4 kg)   22 174 lb (78.9 kg)        Temp Readings from Last 3 Encounters:   22 98.5 °F (36.9 °C) (Oral)   22 97.3 °F (36.3 °C) (Tympanic)   22 97.2 °F (36.2 °C) (Tympanic)        BP Readings from Last 3 Encounters:   22 (!) 175/71   22 (!) 162/89   22 (!) 177/90        Pulse Readings from Last 3 Encounters:   22 77   22 73   22 76        Vitals:    22 1358   BP: (!) 175/71   Pulse: 77   Resp: 12   Temp: 98.5 °F (36.9 °C)   TempSrc: Oral   SpO2: 95%   Weight: 175 lb 8 oz (79.6 kg)   Height: 5' 7\" (1.702 m)   PainSc:   0 - No pain          Learning Assessment:   :       Learning Assessment 2014   PRIMARY LEARNER Patient   HIGHEST LEVEL OF EDUCATION - PRIMARY LEARNER  GRADUATED HIGH SCHOOL OR GED   BARRIERS PRIMARY LEARNER NONE   CO-LEARNER CAREGIVER No   PRIMARY LANGUAGE ENGLISH   LEARNER PREFERENCE PRIMARY DEMONSTRATION   ANSWERED BY patient   RELATIONSHIP SELF        Depression Screening:   :       3 most recent PHQ Screens 2022   Little interest or pleasure in doing things Not at all   Feeling down, depressed, irritable, or hopeless Not at all   Total Score PHQ 2 0   Trouble falling or staying asleep, or sleeping too much -   Feeling tired or having little energy -   Poor appetite, weight loss, or overeating -   Feeling bad about yourself - or that you are a failure or have let yourself or your family down -   Trouble concentrating on things such as school, work, reading, or watching TV -   Moving or speaking so slowly that other people could have noticed; or the opposite being so fidgety that others notice -   Thoughts of being better off dead, or hurting yourself in some way -   PHQ 9 Score -   How difficult have these problems made it for you to do your work, take care of your home and get along with others -        Fall Risk Assessment:   :       Fall Risk Assessment, last 12 mths 5/11/2022   Able to walk? Yes   Fall in past 12 months? -   Do you feel unsteady? -   Are you worried about falling -   Number of falls in past 12 months -   Fall with injury? -        Abuse Screening:   :       Abuse Screening Questionnaire 6/11/2021 5/4/2020 6/17/2019 4/17/2018 4/21/2017 3/22/2016 8/6/2014   Do you ever feel afraid of your partner? N N N N N N N   Are you in a relationship with someone who physically or mentally threatens you? N N N N N N Y   Is it safe for you to go home?  Y Y Y Y Y Y Y        ADL Screening:   :       ADL Assessment 6/11/2021   Feeding yourself No Help Needed   Getting from bed to chair No Help Needed   Getting dressed No Help Needed   Bathing or showering No Help Needed   Walk across the room (includes cane/walker) No Help Needed   Using the telphone No Help Needed   Taking your medications No Help Needed   Preparing meals No Help Needed   Managing money (expenses/bills) No Help Needed   Moderately strenuous housework (laundry) No Help Needed   Shopping for personal items (toiletries/medicines) No Help Needed   Shopping for groceries No Help Needed   Driving No Help Needed   Climbing a flight of stairs No Help Needed   Getting to places beyond walking distances No Help Needed

## 2022-05-13 NOTE — PROGRESS NOTES
CC:   Chief Complaint   Patient presents with    Hypertension    Diabetes       HISTORY OF PRESENT ILLNESS  Patricia Cox is a 64 y.o. female. Presents for 2 month follow evaluation of HTN. She has diet-controlled type 2 DM with peripheral neuropathy, HTN, hyperlipidemia, paroxysmal a-fib, complex regional pain syndrome, osteopenia, GERD, hepatic steatosis, and hx of sarcoidosis. Had right shoulder surgery (right subacromial decompression, distal clavicular excision) with Dr. Lory Davila on 3/14/2022. Recently finished PT and is doing home exercises. On tramadol post-op. No complaints. Has diet-controlled diabetes. Denies polydipsia, polyuria, or hypoglycemia. Has chronic numbness, tingling, and burning at feet. Takes gabapentin for neuropathy; prescribed by her podiatrist, Dr. Lynda Garcia. Home glucose monitoring: not done. A1c 6.3% on 3/10/22. Complains of elevated blood pressure over the past few months. Home blood pressure monitoring: Has but has not checked recently. /78 on 3/9/22 at pre-op visit. Denies headaches, CP, SOB, dizziness, muscle cramps, or leg swelling. ROS  A complete review of systems was performed and is negative except for those mentioned in the HPI. Patient Active Problem List   Diagnosis Code    Hypercholesteremia E78.00    Depression F32. A    Complex regional pain syndrome [id number]    Diabetic peripheral neuropathy associated with type 2 diabetes mellitus (HCC) E11.42    Hypertension, essential I10    Controlled type 2 diabetes mellitus with diabetic polyneuropathy, without long-term current use of insulin (HCC) E11.42    Osteopenia M85.80    Vitamin D deficiency E55.9    GERD (gastroesophageal reflux disease) K21.9    Psoriasis L40.9    Paroxysmal atrial fibrillation (HCC) I48.0    Hepatic steatosis K76.0    COPD (chronic obstructive pulmonary disease) (HCC) J44.9    Sarcoidosis of lymph nodes D86.1     Past Medical History:   Diagnosis Date    Abnormal chest x-ray 12/23/2009    Anemia 12/23/2009    Arrhythmia     SVT. Converts with [employer]    Atrial fibrillation ([address] 12/23/2009    Carpal tunnel syndrome 12/23/2009    Chronic obstructive [facility] disease [facility]     Colitis 12/23/2009    Complex regional pain syndrome 12/23/2009    Depression 12/23/2009    Diabetes ([facility]     no meds    Family history of skin cancer     brother-melanoma    GERD (gastroesophageal reflux disease) 2/8/2010    HTN (hypertension) 12/23/2009    Hypercholesteremia 12/23/2009    Hypertriglyceridemia 12/23/2009    Idiopathic peripheral neuropathy 12/23/2009    Iron deficiency anemia 12/23/2009    Osteopenia 12/23/2009    Paroxysmal atrial fibrillation ([facility] 12/2/2011    Psoriasis 6/27/2012    Sarcoidosis      Allergies   Allergen Reactions    Duragesic [Fentanyl] Other (comments)     Patient sees things when taking    Codeine Nausea Only       Current Outpatient Medications   Medication Sig Dispense Refill    traMADoL (ULTRAM) 50 mg tablet tramadol 50 mg tablet   TAKE 1 TABLET BY MOUTH EVERY 4 HOURS AS NEEDED FOR PAIN UP TO 7 DAYS, MAX DAILY AMOUNT 300 MG      cholecalciferol (VITAMIN D3) 25 mcg (1,000 unit) cap Take 1,000 Units by mouth daily.  diclofenac EC (VOLTAREN) 75 mg EC tablet Take 1 Tablet by mouth two (2) times a day. 60 Tablet 0    gabapentin (NEURONTIN) 800 mg tablet Take 800 mg by mouth three (3) times daily.  losartan (COZAAR) 100 mg tablet TAKE 1 TABLET BY MOUTH  DAILY (Patient taking differently: 100 mg daily. TAKE 1 TABLET BY MOUTH  DAILY) 90 Tablet 3    atorvastatin (LIPITOR) 40 mg tablet TAKE 1 TABLET BY MOUTH AT  NIGHT 90 Tablet 3    amitriptyline (ELAVIL) 100 mg tablet Take 1 tablet by mouth nightly 90 Tablet 0    metoprolol tartrate (LOPRESSOR) 50 mg tablet TAKE 1 TAB BY MOUTH TWO (2) TIMES A DAY. 180 Tab 3    omeprazole (PRILOSEC) 20 mg capsule Take 20 mg by mouth daily.            PHYSICAL EXAM  Visit Vitals  BP (!) 164/78 (BP 1 Location: Right upper arm, BP Patient Position: Sitting, BP Cuff Size: Large adult)   Pulse 77   Temp 98.5 °F (36.9 °C) (Oral)   Resp 12   Ht 5' 7\" (1.702 m)   Wt 175 lb 8 oz (79.6 kg)   SpO2 95%   BMI 27.49 kg/m²   Initial /71    General: Well-developed and well-nourished, no distress. HEENT:  Head normocephalic/atraumatic, no scleral icterus  Lungs:  Clear to ausculation bilaterally. Good air movement. Heart:  Regular rate and rhythm, normal S1 and S2, no murmur, gallop, or rub  Extremities: No clubbing, cyanosis, or edema. Neurological: Alert and oriented. Psychiatric: Normal mood and affect. Behavior is normal.   Diabetic foot exam:     Left Foot:   Visual Exam: callous - small callus at great toe   Pulse DP: 2+ (normal)   Filament test: absent sensation    Vibratory sensation: diminished      Right Foot:   Visual Exam: callous - 2 calluses, linear fissure at plantar surface   Pulse DP: 2+ (normal)   Filament test: absent sensation    Vibratory sensation: diminished        ASSESSMENT AND PLAN    ICD-10-CM ICD-9-CM    1. Hypertension, essential  I10 401.9 hydroCHLOROthiazide (HYDRODIURIL) 12.5 mg tablet   2. Controlled type 2 diabetes mellitus with diabetic polyneuropathy, without long-term current use of insulin (HCC)  E11.42 250.60  DIABETES FOOT EXAM     357.2      Diagnoses and all orders for this visit:    1. Hypertension, essential  Not controlled. Add HCTZ 12.5 mg daily. Continue losartan 100 mg daily and metoprolol 50 mg twice daily.  -     Start hydroCHLOROthiazide (HYDRODIURIL) 12.5 mg tablet; Take 1 Tablet by mouth daily. 2. Controlled type 2 diabetes mellitus with diabetic polyneuropathy, without long-term current use of insulin (HCC)  Controlled. Continue following with Dr. Nancy Ricks (Podiatry).   -      DIABETES FOOT EXAM      Follow-up and Dispositions    · Return in about 1 month (around 6/13/2022), or if symptoms worsen or fail to improve, for Medicare, HTN (ok to schedule 20 mins). I have discussed the diagnosis with the patient and the intended plan as seen in the above orders. Patient is in agreement. The patient has received an after-visit summary and questions were answered concerning future plans. I have discussed medication side effects and warnings with the patient as well.

## 2022-05-13 NOTE — PATIENT INSTRUCTIONS
High Blood Pressure: Care Instructions  Overview     It's normal for blood pressure to go up and down throughout the day. But if it stays up, you have high blood pressure. Another name for high blood pressure is hypertension. Despite what a lot of people think, high blood pressure usually doesn't cause headaches or make you feel dizzy or lightheaded. It usually has no symptoms. But it does increase your risk of stroke, heart attack, and other problems. You and your doctor will talk about your risks of these problems based on your blood pressure. Your doctor will give you a goal for your blood pressure. Your goal will be based on your health and your age. Lifestyle changes, such as eating healthy and being active, are always important to help lower blood pressure. You might also take medicine to reach your blood pressure goal.  Follow-up care is a key part of your treatment and safety. Be sure to make and go to all appointments, and call your doctor if you are having problems. It's also a good idea to know your test results and keep a list of the medicines you take. How can you care for yourself at home? Medical treatment  · If you stop taking your medicine, your blood pressure will go back up. You may take one or more types of medicine to lower your blood pressure. Be safe with medicines. Take your medicine exactly as prescribed. Call your doctor if you think you are having a problem with your medicine. · Talk to your doctor before you start taking aspirin every day. Aspirin can help certain people lower their risk of a heart attack or stroke. But taking aspirin isn't right for everyone, because it can cause serious bleeding. · See your doctor regularly. You may need to see the doctor more often at first or until your blood pressure comes down. · If you are taking blood pressure medicine, talk to your doctor before you take decongestants or anti-inflammatory medicine, such as ibuprofen.  Some of these medicines can raise blood pressure. · Learn how to check your blood pressure at home. Lifestyle changes  · Stay at a healthy weight. This is especially important if you put on weight around the waist. Losing even 10 pounds can help you lower your blood pressure. · If your doctor recommends it, get more exercise. Walking is a good choice. Bit by bit, increase the amount you walk every day. Try for at least 30 minutes on most days of the week. You also may want to swim, bike, or do other activities. · Avoid or limit alcohol. Talk to your doctor about whether you can drink any alcohol. · Try to limit how much sodium you eat to less than 2,300 milligrams (mg) a day. Your doctor may ask you to try to eat less than 1,500 mg a day. · Eat plenty of fruits (such as bananas and oranges), vegetables, legumes, whole grains, and low-fat dairy products. · Lower the amount of saturated fat in your diet. Saturated fat is found in animal products such as milk, cheese, and meat. Limiting these foods may help you lose weight and also lower your risk for heart disease. · Do not smoke. Smoking increases your risk for heart attack and stroke. If you need help quitting, talk to your doctor about stop-smoking programs and medicines. These can increase your chances of quitting for good. When should you call for help? Call 911  anytime you think you may need emergency care. This may mean having symptoms that suggest that your blood pressure is causing a serious heart or blood vessel problem. Your blood pressure may be over 180/120. For example, call 911 if:    · You have symptoms of a heart attack. These may include:  ? Chest pain or pressure, or a strange feeling in the chest.  ? Sweating. ? Shortness of breath. ? Nausea or vomiting. ? Pain, pressure, or a strange feeling in the back, neck, jaw, or upper belly or in one or both shoulders or arms. ? Lightheadedness or sudden weakness.   ? A fast or irregular heartbeat.     · You have symptoms of a stroke. These may include:  ? Sudden numbness, tingling, weakness, or loss of movement in your face, arm, or leg, especially on only one side of your body. ? Sudden vision changes. ? Sudden trouble speaking. ? Sudden confusion or trouble understanding simple statements. ? Sudden problems with walking or balance. ? A sudden, severe headache that is different from past headaches.     · You have severe back or belly pain. Do not wait until your blood pressure comes down on its own. Get help right away. Call your doctor now or seek immediate care if:    · Your blood pressure is much higher than normal (such as 180/120 or higher), but you don't have symptoms.     · You think high blood pressure is causing symptoms, such as:  ? Severe headache.  ? Blurry vision. Watch closely for changes in your health, and be sure to contact your doctor if:    · Your blood pressure measures higher than your doctor recommends at least 2 times. That means the top number is higher or the bottom number is higher, or both.     · You think you may be having side effects from your blood pressure medicine. Where can you learn more? Go to http://www.gray.com/  Enter [id number] in the search box to learn more about \"High Blood Pressure: Care Instructions. \"  Current as of: January 10, 2022               Content Version: 13.2  © 2006-2022 [employer]. Care instructions adapted under license by [employer] (which disclaims liability or warranty for this information). If you have questions about a medical condition or this instruction, always ask your healthcare professional. [place] [address] any warranty or liability for your use of this information.

## 2022-06-02 ENCOUNTER — TELEPHONE (OUTPATIENT)
Dept: INTERNAL MEDICINE CLINIC | Age: 77
End: 2022-06-02

## 2022-06-02 DIAGNOSIS — I10 HYPERTENSION, ESSENTIAL: ICD-10-CM

## 2022-06-02 NOTE — TELEPHONE ENCOUNTER
Pt called and stated that since taking the bp medication the hydrochlorothiazide, her bp has been low like 111/62 and she has been light headed since starting the medication.  Pt would like to discuss with provider

## 2022-06-02 NOTE — TELEPHONE ENCOUNTER
Tell her that a BP of 111/62 is excellent. It is not too low. Too low would be 90/50 or less. She can take HCTZ 12.5 mg 1/2 tablet daily. If the pill is too small to split in half, then take 12.5 mg every other day until she sees me again on 6/13/22.

## 2022-06-13 ENCOUNTER — OFFICE VISIT (OUTPATIENT)
Dept: INTERNAL MEDICINE CLINIC | Age: 77
End: 2022-06-13
Payer: MEDICARE

## 2022-06-13 VITALS
DIASTOLIC BLOOD PRESSURE: 71 MMHG | RESPIRATION RATE: 12 BRPM | HEIGHT: 67 IN | TEMPERATURE: 98.2 F | WEIGHT: 168 LBS | HEART RATE: 74 BPM | BODY MASS INDEX: 26.37 KG/M2 | OXYGEN SATURATION: 95 % | SYSTOLIC BLOOD PRESSURE: 104 MMHG

## 2022-06-13 DIAGNOSIS — Z13.31 SCREENING FOR DEPRESSION: ICD-10-CM

## 2022-06-13 DIAGNOSIS — E11.42 CONTROLLED TYPE 2 DIABETES MELLITUS WITH DIABETIC POLYNEUROPATHY, WITHOUT LONG-TERM CURRENT USE OF INSULIN (HCC): ICD-10-CM

## 2022-06-13 DIAGNOSIS — I10 HYPERTENSION, ESSENTIAL: ICD-10-CM

## 2022-06-13 DIAGNOSIS — E78.00 HYPERCHOLESTEREMIA: ICD-10-CM

## 2022-06-13 DIAGNOSIS — J44.9 CHRONIC OBSTRUCTIVE PULMONARY DISEASE, UNSPECIFIED COPD TYPE (HCC): ICD-10-CM

## 2022-06-13 DIAGNOSIS — Z00.00 MEDICARE ANNUAL WELLNESS VISIT, SUBSEQUENT: Primary | ICD-10-CM

## 2022-06-13 DIAGNOSIS — I48.0 PAROXYSMAL ATRIAL FIBRILLATION (HCC): ICD-10-CM

## 2022-06-13 DIAGNOSIS — E55.9 VITAMIN D DEFICIENCY: ICD-10-CM

## 2022-06-13 PROCEDURE — G9717 DOC PT DX DEP/BP F/U NT REQ: HCPCS | Performed by: INTERNAL MEDICINE

## 2022-06-13 PROCEDURE — G8427 DOCREV CUR MEDS BY ELIG CLIN: HCPCS | Performed by: INTERNAL MEDICINE

## 2022-06-13 PROCEDURE — G8752 SYS BP LESS 140: HCPCS | Performed by: INTERNAL MEDICINE

## 2022-06-13 PROCEDURE — G8536 NO DOC ELDER MAL SCRN: HCPCS | Performed by: INTERNAL MEDICINE

## 2022-06-13 PROCEDURE — 3044F HG A1C LEVEL LT 7.0%: CPT | Performed by: INTERNAL MEDICINE

## 2022-06-13 PROCEDURE — 99214 OFFICE O/P EST MOD 30 MIN: CPT | Performed by: INTERNAL MEDICINE

## 2022-06-13 PROCEDURE — G8417 CALC BMI ABV UP PARAM F/U: HCPCS | Performed by: INTERNAL MEDICINE

## 2022-06-13 PROCEDURE — 1101F PT FALLS ASSESS-DOCD LE1/YR: CPT | Performed by: INTERNAL MEDICINE

## 2022-06-13 PROCEDURE — G8754 DIAS BP LESS 90: HCPCS | Performed by: INTERNAL MEDICINE

## 2022-06-13 PROCEDURE — 1090F PRES/ABSN URINE INCON ASSESS: CPT | Performed by: INTERNAL MEDICINE

## 2022-06-13 PROCEDURE — 1123F ACP DISCUSS/DSCN MKR DOCD: CPT | Performed by: INTERNAL MEDICINE

## 2022-06-13 PROCEDURE — G8399 PT W/DXA RESULTS DOCUMENT: HCPCS | Performed by: INTERNAL MEDICINE

## 2022-06-13 PROCEDURE — G0439 PPPS, SUBSEQ VISIT: HCPCS | Performed by: INTERNAL MEDICINE

## 2022-06-13 RX ORDER — GABAPENTIN 800 MG/1
800 TABLET ORAL 2 TIMES DAILY
Qty: 60 TABLET | Refills: 0
Start: 2022-06-13 | End: 2022-07-18 | Stop reason: SDUPTHER

## 2022-06-13 RX ORDER — AMITRIPTYLINE HYDROCHLORIDE 100 MG/1
100 TABLET, FILM COATED ORAL
Qty: 90 TABLET | Refills: 1 | Status: SHIPPED | OUTPATIENT
Start: 2022-06-13 | End: 2022-10-17 | Stop reason: SDUPTHER

## 2022-06-13 NOTE — PROGRESS NOTES
CC:   Chief Complaint   Patient presents with    Annual Wellness Visit    Diabetes       HISTORY OF PRESENT ILLNESS  Ivone Oshea is a 64 y.o. female. Presents for Medicare AWV and 1 month follow up evaluation of HTN. She has diet-controlled type 2 DM with peripheral neuropathy, HTN, hyperlipidemia, paroxysmal a-fib, complex regional pain syndrome, osteopenia, GERD, hepatic steatosis, and hx of sarcoidosis. At last clinic visit, /78. Added HCTZ 12.5 mg to losartan 100 mg and metoprolol 50 mg BID. Complained of dizziness; had her decrease to 1/2 tab HCTZ 12.5 mg daily. Tolerating with no dizziness. Denies CP or SOB. Gets exercise gardening in flower bed. Home BP: 104/66, 108/67, 110/66, 96/73, 107/61    Has diet-controlled diabetes. Denies polydipsia, polyuria, or hypoglycemia. Has chronic numbness, tingling, and burning at feet. Takes gabapentin 800 mg twice a day for neuropathy (prescribed three times a day but she thought the dose was too high; prescribed by her podiatrist, Dr. Ella Wilkinson. Home glucose monitoring: not done. A1c 6.3% on 3/10/22. ROS  A complete review of systems was performed and is negative except for those mentioned in the HPI. Patient Active Problem List   Diagnosis Code    Hypercholesteremia E78.00    Depression F32. A    Complex regional pain syndrome [id number]    Diabetic peripheral neuropathy associated with type 2 diabetes mellitus (HCC) E11.42    Hypertension, essential I10    Controlled type 2 diabetes mellitus with diabetic polyneuropathy, without long-term current use of insulin (HCC) E11.42    Osteopenia M85.80    Vitamin D deficiency E55.9    GERD (gastroesophageal reflux disease) K21.9    Psoriasis L40.9    Paroxysmal atrial fibrillation (HCC) I48.0    Hepatic steatosis K76.0    COPD (chronic obstructive pulmonary disease) (HCC) J44.9    Sarcoidosis of lymph nodes D86.1     Past Medical History:   Diagnosis Date    Abnormal chest x-ray 12/23/2009    Anemia 12/23/2009    Arrhythmia     SVT. Converts with Adenocard    Atrial fibrillation ([facility] 12/23/2009    Carpal tunnel syndrome 12/23/2009    Chronic obstructive pulmonary disease ([facility]     Colitis 12/23/2009    Complex regional pain syndrome 12/23/2009    Depression 12/23/2009    Diabetes ([facility]     no meds    Family history of skin cancer     brother-melanoma    GERD (gastroesophageal reflux disease) 2/8/2010    HTN (hypertension) 12/23/2009    Hypercholesteremia 12/23/2009    Hypertriglyceridemia 12/23/2009    Idiopathic peripheral neuropathy 12/23/2009    Iron deficiency anemia 12/23/2009    Osteopenia 12/23/2009    Paroxysmal atrial fibrillation ([facility] 12/2/2011    Psoriasis 6/27/2012    Sarcoidosis      Allergies   Allergen Reactions    Duragesic [Fentanyl] Other (comments)     Patient sees things when taking    Codeine Nausea Only       Current Outpatient Medications   Medication Sig Dispense Refill    traMADoL (ULTRAM) 50 mg tablet tramadol 50 mg tablet   TAKE 1 TABLET BY MOUTH EVERY 4 HOURS AS NEEDED FOR PAIN UP TO 7 DAYS, MAX DAILY AMOUNT 300 MG      cholecalciferol (VITAMIN D3) 25 mcg (1,000 unit) cap Take 1,000 Units by mouth daily.  hydroCHLOROthiazide (HYDRODIURIL) 12.5 mg tablet Take 1 Tablet by mouth daily. 90 Tablet 0    diclofenac EC (VOLTAREN) 75 mg EC tablet Take 1 Tablet by mouth two (2) times a day. 60 Tablet 0    gabapentin (NEURONTIN) 800 mg tablet Take 800 mg by mouth three (3) times daily.  atorvastatin (LIPITOR) 40 mg tablet TAKE 1 TABLET BY MOUTH AT  NIGHT 90 Tablet 3    amitriptyline (ELAVIL) 100 mg tablet Take 1 tablet by mouth nightly 90 Tablet 0    metoprolol tartrate (LOPRESSOR) 50 mg tablet TAKE 1 TAB BY MOUTH TWO (2) TIMES A DAY. 180 Tab 3    omeprazole (PRILOSEC) 20 mg capsule Take 20 mg by mouth daily.  losartan (COZAAR) 100 mg tablet TAKE 1 TABLET BY MOUTH  DAILY (Patient taking differently: 100 mg daily.  TAKE 1 TABLET BY MOUTH  DAILY) 90 Tablet 3         PHYSICAL EXAM  Visit Vitals  /71 (BP 1 Location: Left upper arm, BP Patient Position: Sitting)   Pulse 74   Temp 98.2 °F (36.8 °C) (Oral)   Resp 12   Ht 5' 7\" (1.702 m)   Wt 168 lb (76.2 kg)   SpO2 95%   BMI 26.31 kg/m²       General: Well-developed and well-nourished, no distress. HEENT:  Head normocephalic/atraumatic, no scleral icterus  Lungs:  Clear to ausculation bilaterally. Good air movement. Heart:  Regular rate and rhythm, normal S1 and S2, no murmur, gallop, or rub  Extremities: No clubbing, cyanosis, or edema. Neurological: Alert and oriented. Psychiatric: Normal mood and affect. Behavior is normal.         ASSESSMENT AND PLAN    ICD-10-CM ICD-9-CM    1. Medicare annual wellness visit, subsequent  Z00.00 V70.0    2. Hypertension, essential  Z60 330.3 METABOLIC PANEL, COMPREHENSIVE      CBC WITH AUTOMATED DIFF      METABOLIC PANEL, COMPREHENSIVE      CBC WITH AUTOMATED DIFF   3. Controlled type 2 diabetes mellitus with diabetic polyneuropathy, without long-term current use of insulin (HCC)  E11.42 250.60 amitriptyline (ELAVIL) 100 mg tablet     357.2 HEMOGLOBIN A1C WITH EAG      HEMOGLOBIN A1C WITH EAG      gabapentin (NEURONTIN) 800 mg tablet   4. Hypercholesteremia  E78.00 272.0    5. Vitamin D deficiency  E55.9 268.9 VITAMIN D, 25 HYDROXY      VITAMIN D, 25 HYDROXY   6. Paroxysmal atrial fibrillation (HCC)  I48.0 427.31    7. Chronic obstructive pulmonary disease, unspecified COPD type ([facility]  J44.9 496    8. Screening for depression  Z13.31 V79.0 DEPRESSION SCREEN ANNUAL     Diagnoses and all orders for this visit:    1. Medicare annual wellness visit, subsequent    2. Hypertension, essential  BP in low normal range. Stop HCTZ and continue losartan 100 mg daily and metoprolol 50 mg twice daily.  -     METABOLIC PANEL, COMPREHENSIVE; Future  -     CBC WITH AUTOMATED DIFF; Future    3.  Controlled type 2 diabetes mellitus with diabetic polyneuropathy, without long-term current use of insulin ([facility]  Diet-controlled. Takes amitriptyline and gabapentin for neuropathy. -     Refill amitriptyline (ELAVIL) 100 mg tablet; Take 1 Tablet by mouth nightly.  -     HEMOGLOBIN A1C WITH EAG; Future  -     Change to gabapentin (NEURONTIN) 800 mg tablet; Take 1 Tablet by mouth two (2) times a day. Max Daily Amount: 1,600 mg.    4. Hypercholesteremia    5. Vitamin D deficiency  -     VITAMIN D, 25 HYDROXY; Future    6. Paroxysmal atrial fibrillation (HCC)    7. Chronic obstructive pulmonary disease, unspecified COPD type ([facility]    8. Screening for depression  -     DEPRESSION SCREEN ANNUAL      Follow-up and Dispositions    · Return in about 3 months (around 9/13/2022), or if symptoms worsen or fail to improve, for HTN, DM; have fasting labs 1 week prior to appointment. I have discussed the diagnosis with the patient and the intended plan as seen in the above orders. Patient is in agreement. The patient has received an after-visit summary and questions were answered concerning future plans. I have discussed medication side effects and warnings with the patient as well.

## 2022-06-13 NOTE — ACP (ADVANCE CARE PLANNING)
Advance Care Planning     Advance Care Planning (ACP) Physician/NP/PA Conversation      Date of Conversation: 6/13/2022  Conducted with: Patient with Decision Making Capacity    Healthcare Decision Maker:     Primary Decision Maker: Aaron Madden - Spouse - [phone]  Click here to complete [address] including selection of the Healthcare Decision Maker Relationship (ie \"Primary\")    Care Preferences:    Hospitalization: \"If your health worsens and it becomes clear that your chance of recovery is unlikely, what would be your preference regarding hospitalization? \"  The patient would prefer hospitalization. Ventilation: \"If you were unable to breathe on your own and your chance of recovery was unlikely, what would be your preference about the use of a ventilator (breathing machine) if it was available to you? \"   The patient would NOT desire the use of a ventilator. Resuscitation: \"In the event your heart stopped as a result of an underlying serious health condition, would you want attempts to be made to restart your heart, or would you prefer a natural death? \"   Yes, attempt to resuscitate.     Additional topics discussed: treatment goals    Conversation Outcomes / Follow-Up Plan:   ACP complete - no further action today  Reviewed DNR/DNI and patient elects Full Code (Attempt Resuscitation)     Length of Voluntary ACP Conversation in minutes:  <16 minutes (Non-Billable)    Kendra Pelaez MD

## 2022-06-13 NOTE — PROGRESS NOTES
This is the Subsequent Medicare Annual Wellness Exam, performed 12 months or more after the Initial AWV or the last Subsequent AWV    I have reviewed the patient's medical history in detail and updated the computerized patient record. Assessment/Plan   Education and counseling provided:  Are appropriate based on today's review and evaluation    1. Medicare annual wellness visit, subsequent  2. Hypertension, essential  -     METABOLIC PANEL, COMPREHENSIVE; Future  -     CBC WITH AUTOMATED DIFF; Future  3. Controlled type 2 diabetes mellitus with diabetic polyneuropathy, without long-term current use of insulin (HCC)  -     amitriptyline (ELAVIL) 100 mg tablet; Take 1 Tablet by mouth nightly., Normal, Disp-90 Tablet, R-1  -     HEMOGLOBIN A1C WITH EAG; Future  -     gabapentin (NEURONTIN) 800 mg tablet; Take 1 Tablet by mouth two (2) times a day. Max Daily Amount: 1,600 mg., No Print, Disp-60 Tablet, R-0  4. Hypercholesteremia  5. Vitamin D deficiency  -     VITAMIN D, 25 HYDROXY; Future  6. Paroxysmal atrial fibrillation (HCC)  7. Chronic obstructive pulmonary disease, unspecified COPD type ([facility]  8.  Screening for depression  -     DEPRESSION SCREEN ANNUAL       Depression Risk Factor Screening     3 most recent PHQ Screens 6/13/2022   Little interest or pleasure in doing things Not at all   Feeling down, depressed, irritable, or hopeless Not at all   Total Score PHQ 2 0   Trouble falling or staying asleep, or sleeping too much -   Feeling tired or having little energy -   Poor appetite, weight loss, or overeating -   Feeling bad about yourself - or that you are a failure or have let yourself or your family down -   Trouble concentrating on things such as school, work, reading, or watching TV -   Moving or speaking so slowly that other people could have noticed; or the opposite being so fidgety that others notice -   Thoughts of being better off dead, or hurting yourself in some way -   PHQ 9 Score -   How difficult have these problems made it for you to do your work, take care of your home and get along with others -       Alcohol & Drug Abuse Risk Screen    Do you average more than 1 drink per night or more than 7 drinks a week:  No    On any one occasion in the past three months have you have had more than 3 drinks containing alcohol:  No          Functional Ability and Level of Safety    Hearing: Hearing is good. Activities of Daily Living: The home contains: handrails  ADL Assessment 6/13/2022   Feeding yourself No Help Needed   Getting from bed to chair No Help Needed   Getting dressed No Help Needed   Bathing or showering No Help Needed   Walk across the room (includes cane/walker) No Help Needed   Using the telphone No Help Needed   Taking your medications No Help Needed   Preparing meals No Help Needed   Managing money (expenses/bills) No Help Needed   Moderately strenuous housework (laundry) No Help Needed   Shopping for personal items (toiletries/medicines) No Help Needed   Shopping for groceries No Help Needed   Driving No Help Needed   Climbing a flight of stairs No Help Needed   Getting to places beyond walking distances No Help Needed      Ambulation: with no difficulty      Fall Risk:  Fall Risk Assessment, last 12 mths 6/13/2022   Able to walk? Yes   Fall in past 12 months? 0   Do you feel unsteady? 1   Are you worried about falling 1   Number of falls in past 12 months -   Fall with injury?  -      Abuse Screen:  Patient is not abused       Cognitive Screening    Has your family/caregiver stated any concerns about your memory: no     Cognitive Screening: Normal - 3 word recall in 10 mins    Health Maintenance Due     Health Maintenance Due   Topic Date Due    Eye Exam Retinal or Dilated  06/23/2017       Patient Care Team   Patient Care Team:  Lanie Rangel MD as PCP - General (Internal Medicine Physician)  Lanie Rangel MD as PCP - [facility] Empaneled Provider  Marianne Jason MD (Physical Medicine and Rehabilitation Physician)  Parish Hooker MD (Ophthalmology)    History     Patient Active Problem List   Diagnosis Code    Hypercholesteremia E78.00    Depression F32. A    Complex regional pain syndrome [id number]    Diabetic peripheral neuropathy associated with type 2 diabetes mellitus (HCC) E11.42    Hypertension, essential I10    Controlled type 2 diabetes mellitus with diabetic polyneuropathy, without long-term current use of insulin (HCC) E11.42    Osteopenia M85.80    Vitamin D deficiency E55.9    GERD (gastroesophageal reflux disease) K21.9    Psoriasis L40.9    Paroxysmal atrial fibrillation (HCC) I48.0    Hepatic steatosis K76.0    COPD (chronic obstructive pulmonary disease) (HCC) J44.9    Sarcoidosis of lymph nodes D86.1     Past Medical History:   Diagnosis Date    Abnormal chest x-ray 12/23/2009    Anemia 12/23/2009    Arrhythmia     SVT.  Converts with Adenocard    Atrial fibrillation ([address] 12/23/2009    Carpal tunnel syndrome 12/23/2009    Chronic obstructive pulmonary disease ([address]     Colitis 12/23/2009    Complex regional pain syndrome 12/23/2009    Depression 12/23/2009    Diabetes ([address]     no meds    Family history of skin cancer     brother-melanoma    GERD (gastroesophageal reflux disease) 2/8/2010    HTN (hypertension) 12/23/2009    Hypercholesteremia 12/23/2009    Hypertriglyceridemia 12/23/2009    Idiopathic peripheral neuropathy 12/23/2009    Iron deficiency anemia 12/23/2009    Osteopenia 12/23/2009    Paroxysmal atrial fibrillation ([address] 12/2/2011    Psoriasis 6/27/2012    Sarcoidosis       Past Surgical History:   Procedure Laterality Date    HX CARPAL TUNNEL RELEASE  00/00/2002    Both hands    HX CYST REMOVAL  00/00/1965    Left wrist    HX HYSTERECTOMY  00/00/1985    HX ORTHOPAEDIC  00/00/1987    back surgery (disc)    HX ORTHOPAEDIC      right foot X 3 neuromas and tarsal tunnel release    HX TONSILLECTOMY  00/00/1962    NEUROLOGICAL PROCEDURE UNLISTED      Pain pump    PAIN PUMP DEVICE      in left lower abdomen (for foot pain)     Current Outpatient Medications   Medication Sig Dispense Refill    traMADoL (ULTRAM) 50 mg tablet tramadol 50 mg tablet   TAKE 1 TABLET BY MOUTH EVERY 4 HOURS AS NEEDED FOR PAIN UP TO 7 DAYS, MAX DAILY AMOUNT 300 MG      cholecalciferol (VITAMIN D3) 25 mcg (1,000 unit) cap Take 1,000 Units by mouth daily.  hydroCHLOROthiazide (HYDRODIURIL) 12.5 mg tablet Take 1 Tablet by mouth daily. 90 Tablet 0    diclofenac EC (VOLTAREN) 75 mg EC tablet Take 1 Tablet by mouth two (2) times a day. 60 Tablet 0    gabapentin (NEURONTIN) 800 mg tablet Take 800 mg by mouth three (3) times daily.  atorvastatin (LIPITOR) 40 mg tablet TAKE 1 TABLET BY MOUTH AT  NIGHT 90 Tablet 3    amitriptyline (ELAVIL) 100 mg tablet Take 1 tablet by mouth nightly 90 Tablet 0    metoprolol tartrate (LOPRESSOR) 50 mg tablet TAKE 1 TAB BY MOUTH TWO (2) TIMES A DAY. 180 Tab 3    omeprazole (PRILOSEC) 20 mg capsule Take 20 mg by mouth daily.  losartan (COZAAR) 100 mg tablet TAKE 1 TABLET BY MOUTH  DAILY (Patient taking differently: 100 mg daily.  TAKE 1 TABLET BY MOUTH  DAILY) 90 Tablet 3     Allergies   Allergen Reactions    Duragesic [Fentanyl] Other (comments)     Patient sees things when taking    Codeine Nausea Only       Family History   Problem Relation Age of Onset    Arrhythmia Father     Heart Attack Father     Cancer Brother         Melanoma    Breast Cancer Daughter         48    Breast Cancer Daughter 46    Breast Cancer Daughter 46     Social History     Tobacco Use    Smoking status: Former Smoker     Packs/day: 0.10     Years: 2.00     Pack years: 0.20     Quit date: 1975     Years since quittin.4    Smokeless tobacco: Never Used   Substance Use Topics    Alcohol use: No         Ac Tinoco MD

## 2022-06-13 NOTE — PROGRESS NOTES
Nano Madden  Identified pt with two pt identifiers(name and ). Chief Complaint   Patient presents with    Annual Wellness Visit    Diabetes       Reviewed record In preparation for visit and have obtained necessary documentation. 1. Have you been to the ER, urgent care clinic or hospitalized since your last visit? No     2. Have you seen or consulted any other health care providers outside of the [address] [place] [address] since your last visit? Include any pap smears or colon screening. No    Vitals reviewed with provider.     Health Maintenance reviewed:     Health Maintenance Due   Topic    Eye Exam Retinal or Dilated     Medicare Yearly Exam           Wt Readings from Last 3 Encounters:   22 168 lb (76.2 kg)   22 175 lb 8 oz (79.6 kg)   22 175 lb (79.4 kg)        Temp Readings from Last 3 Encounters:   22 98.2 °F (36.8 °C) (Oral)   22 98.5 °F (36.9 °C) (Oral)   22 97.3 °F (36.3 °C) (Tympanic)        BP Readings from Last 3 Encounters:   22 104/71   22 (!) 164/78   22 (!) 162/89        Pulse Readings from Last 3 Encounters:   22 74   22 77   22 73        Vitals:    22 1320   BP: 104/71   Pulse: 74   Resp: 12   Temp: 98.2 °F (36.8 °C)   TempSrc: Oral   SpO2: 95%   Weight: 168 lb (76.2 kg)   Height: 5' 7\" (1.702 m)   PainSc:   0 - No pain          Learning Assessment:   :       Learning Assessment 2014   PRIMARY LEARNER Patient   HIGHEST LEVEL OF EDUCATION - PRIMARY LEARNER  GRADUATED HIGH SCHOOL OR GED   BARRIERS PRIMARY LEARNER NONE   CO-LEARNER CAREGIVER No   PRIMARY LANGUAGE ENGLISH   LEARNER PREFERENCE PRIMARY DEMONSTRATION   ANSWERED BY patient   RELATIONSHIP SELF        Depression Screening:   :       3 most recent PHQ Screens 2022   Little interest or pleasure in doing things Not at all   Feeling down, depressed, irritable, or hopeless Not at all   Total Score PHQ 2 0   Trouble falling or staying asleep, or sleeping too much -   Feeling tired or having little energy -   Poor appetite, weight loss, or overeating -   Feeling bad about yourself - or that you are a failure or have let yourself or your family down -   Trouble concentrating on things such as school, work, reading, or watching TV -   Moving or speaking so slowly that other people could have noticed; or the opposite being so fidgety that others notice -   Thoughts of being better off dead, or hurting yourself in some way -   PHQ 9 Score -   How difficult have these problems made it for you to do your work, take care of your home and get along with others -        Fall Risk Assessment:   :       Fall Risk Assessment, last 12 mths 6/13/2022   Able to walk? Yes   Fall in past 12 months? 0   Do you feel unsteady? 1   Are you worried about falling 1   Number of falls in past 12 months -   Fall with injury? -        Abuse Screening:   :       Abuse Screening Questionnaire 6/13/2022 6/11/2021 5/4/2020 6/17/2019 4/17/2018 4/21/2017 3/22/2016   Do you ever feel afraid of your partner? N N N N N N N   Are you in a relationship with someone who physically or mentally threatens you? N N N N N N N   Is it safe for you to go home?  Y Y Y Y Y Y Y        ADL Screening:   :       ADL Assessment 6/13/2022   Feeding yourself No Help Needed   Getting from bed to chair No Help Needed   Getting dressed No Help Needed   Bathing or showering No Help Needed   Walk across the room (includes cane/walker) No Help Needed   Using the telphone No Help Needed   Taking your medications No Help Needed   Preparing meals No Help Needed   Managing money (expenses/bills) No Help Needed   Moderately strenuous housework (laundry) No Help Needed   Shopping for personal items (toiletries/medicines) No Help Needed   Shopping for groceries No Help Needed   Driving No Help Needed   Climbing a flight of stairs No Help Needed   Getting to places beyond walking distances No Help Needed

## 2022-06-13 NOTE — PATIENT INSTRUCTIONS

## 2022-07-14 DIAGNOSIS — I10 HYPERTENSION, ESSENTIAL: ICD-10-CM

## 2022-07-14 RX ORDER — HYDROCHLOROTHIAZIDE 12.5 MG/1
12.5 TABLET ORAL DAILY
Qty: 90 TABLET | Refills: 3 | Status: SHIPPED | OUTPATIENT
Start: 2022-07-14

## 2022-07-18 DIAGNOSIS — E11.42 CONTROLLED TYPE 2 DIABETES MELLITUS WITH DIABETIC POLYNEUROPATHY, WITHOUT LONG-TERM CURRENT USE OF INSULIN (HCC): ICD-10-CM

## 2022-07-18 NOTE — TELEPHONE ENCOUNTER
PCP: Vero Arrieta MD     Last appt: 6/13/2022   Future Appointments   Date Time Provider Vaibhav Quintanilla   9/26/2022  1:20 PM Vero Arrieta MD [facility] BS AMB        Requested Prescriptions     Pending Prescriptions Disp Refills    gabapentin (NEURONTIN) 800 mg tablet 60 Tablet 0     Sig: Take 1 Tablet by mouth two (2) times a day. Max Daily Amount: 1,600 mg.

## 2022-07-19 RX ORDER — GABAPENTIN 800 MG/1
800 TABLET ORAL 2 TIMES DAILY
Qty: 180 TABLET | Refills: 0 | Status: SHIPPED | OUTPATIENT
Start: 2022-07-19 | End: 2022-09-26 | Stop reason: SDUPTHER

## 2022-09-26 ENCOUNTER — OFFICE VISIT (OUTPATIENT)
Dept: INTERNAL MEDICINE CLINIC | Age: 77
End: 2022-09-26
Payer: MEDICARE

## 2022-09-26 VITALS
RESPIRATION RATE: 18 BRPM | HEART RATE: 71 BPM | WEIGHT: 168.4 LBS | HEIGHT: 67 IN | TEMPERATURE: 98.8 F | BODY MASS INDEX: 26.43 KG/M2 | SYSTOLIC BLOOD PRESSURE: 125 MMHG | DIASTOLIC BLOOD PRESSURE: 80 MMHG | OXYGEN SATURATION: 97 %

## 2022-09-26 DIAGNOSIS — Z23 NEEDS FLU SHOT: ICD-10-CM

## 2022-09-26 DIAGNOSIS — I10 HYPERTENSION, ESSENTIAL: ICD-10-CM

## 2022-09-26 DIAGNOSIS — E11.42 CONTROLLED TYPE 2 DIABETES MELLITUS WITH DIABETIC POLYNEUROPATHY, WITHOUT LONG-TERM CURRENT USE OF INSULIN (HCC): Primary | ICD-10-CM

## 2022-09-26 DIAGNOSIS — M50.30 DDD (DEGENERATIVE DISC DISEASE), CERVICAL: ICD-10-CM

## 2022-09-26 DIAGNOSIS — M47.812 CERVICAL SPONDYLOSIS: ICD-10-CM

## 2022-09-26 LAB — HBA1C MFR BLD HPLC: 6 %

## 2022-09-26 PROCEDURE — G9717 DOC PT DX DEP/BP F/U NT REQ: HCPCS | Performed by: INTERNAL MEDICINE

## 2022-09-26 PROCEDURE — 99214 OFFICE O/P EST MOD 30 MIN: CPT | Performed by: INTERNAL MEDICINE

## 2022-09-26 PROCEDURE — G8536 NO DOC ELDER MAL SCRN: HCPCS | Performed by: INTERNAL MEDICINE

## 2022-09-26 PROCEDURE — G0008 ADMIN INFLUENZA VIRUS VAC: HCPCS | Performed by: INTERNAL MEDICINE

## 2022-09-26 PROCEDURE — 1090F PRES/ABSN URINE INCON ASSESS: CPT | Performed by: INTERNAL MEDICINE

## 2022-09-26 PROCEDURE — 3044F HG A1C LEVEL LT 7.0%: CPT | Performed by: INTERNAL MEDICINE

## 2022-09-26 PROCEDURE — G8417 CALC BMI ABV UP PARAM F/U: HCPCS | Performed by: INTERNAL MEDICINE

## 2022-09-26 PROCEDURE — G8752 SYS BP LESS 140: HCPCS | Performed by: INTERNAL MEDICINE

## 2022-09-26 PROCEDURE — G8399 PT W/DXA RESULTS DOCUMENT: HCPCS | Performed by: INTERNAL MEDICINE

## 2022-09-26 PROCEDURE — 1101F PT FALLS ASSESS-DOCD LE1/YR: CPT | Performed by: INTERNAL MEDICINE

## 2022-09-26 PROCEDURE — G8754 DIAS BP LESS 90: HCPCS | Performed by: INTERNAL MEDICINE

## 2022-09-26 PROCEDURE — 90694 VACC AIIV4 NO PRSRV 0.5ML IM: CPT | Performed by: INTERNAL MEDICINE

## 2022-09-26 PROCEDURE — G8427 DOCREV CUR MEDS BY ELIG CLIN: HCPCS | Performed by: INTERNAL MEDICINE

## 2022-09-26 PROCEDURE — 83036 HEMOGLOBIN GLYCOSYLATED A1C: CPT | Performed by: INTERNAL MEDICINE

## 2022-09-26 PROCEDURE — 1123F ACP DISCUSS/DSCN MKR DOCD: CPT | Performed by: INTERNAL MEDICINE

## 2022-09-26 RX ORDER — GABAPENTIN 800 MG/1
800 TABLET ORAL 3 TIMES DAILY
Qty: 270 TABLET | Refills: 0 | Status: SHIPPED | OUTPATIENT
Start: 2022-09-26

## 2022-09-26 NOTE — PROGRESS NOTES
Azeb Madden  Identified pt with two pt identifiers(name and ). Chief Complaint   Patient presents with    Diabetes    Medication Refill       Reviewed record In preparation for visit and have obtained necessary documentation. 1. Have you been to the ER, urgent care clinic or hospitalized since your last visit? No     2. Have you seen or consulted any other health care providers outside of the [address] [place] [address] since your last visit? Include any pap smears or colon screening. No    Vitals reviewed with provider. Health Maintenance reviewed: After verbal order read back of Dr Jori Peguero, patient received High Dose Flu Shot (Adjuvanted Fluad) in left deltoid. Gu[address] Castillo 47: 29660-315-66 Lot: [id number] Exp: 2023. Patient tolerated procedure without complaints and received VIS.      Health Maintenance Due   Topic    Eye Exam Retinal or Dilated     COVID-19 Vaccine (4 - Booster for Moderna series)    Flu Vaccine (1)          Wt Readings from Last 3 Encounters:   22 168 lb 6.4 oz (76.4 kg)   22 168 lb (76.2 kg)   22 175 lb 8 oz (79.6 kg)        Temp Readings from Last 3 Encounters:   22 98.8 °F (37.1 °C) (Oral)   22 98.2 °F (36.8 °C) (Oral)   22 98.5 °F (36.9 °C) (Oral)        BP Readings from Last 3 Encounters:   22 125/80   22 104/71   22 (!) 164/78        Pulse Readings from Last 3 Encounters:   22 71   22 74   22 77        Vitals:    22 1332   BP: 125/80   Pulse: 71   Resp: 18   Temp: 98.8 °F (37.1 °C)   TempSrc: Oral   SpO2: 97%   Weight: 168 lb 6.4 oz (76.4 kg)   Height: 5' 7\" (1.702 m)   PainSc:   2   PainLoc: Foot          Learning Assessment:   :       Learning Assessment 2014   PRIMARY LEARNER Patient   HIGHEST LEVEL OF EDUCATION - PRIMARY LEARNER  GRADUATED HIGH SCHOOL OR GED   BARRIERS PRIMARY LEARNER NONE   CO-LEARNER CAREGIVER No   PRIMARY LANGUAGE ENGLISH   LEARNER PREFERENCE PRIMARY DEMONSTRATION   ANSWERED BY patient   RELATIONSHIP SELF        Depression Screening:   :       3 most recent PHQ Screens 6/13/2022   Little interest or pleasure in doing things Not at all   Feeling down, depressed, irritable, or hopeless Not at all   Total Score PHQ 2 0   Trouble falling or staying asleep, or sleeping too much -   Feeling tired or having little energy -   Poor appetite, weight loss, or overeating -   Feeling bad about yourself - or that you are a failure or have let yourself or your family down -   Trouble concentrating on things such as school, work, reading, or watching TV -   Moving or speaking so slowly that other people could have noticed; or the opposite being so fidgety that others notice -   Thoughts of being better off dead, or hurting yourself in some way -   PHQ 9 Score -   How difficult have these problems made it for you to do your work, take care of your home and get along with others -        Fall Risk Assessment:   :       Fall Risk Assessment, last 12 mths 6/13/2022   Able to walk? Yes   Fall in past 12 months? 0   Do you feel unsteady? 1   Are you worried about falling 1   Number of falls in past 12 months -   Fall with injury? -        Abuse Screening:   :       Abuse Screening Questionnaire 6/13/2022 6/11/2021 5/4/2020 6/17/2019 4/17/2018 4/21/2017 3/22/2016   Do you ever feel afraid of your partner? N N N N N N N   Are you in a relationship with someone who physically or mentally threatens you? N N N N N N N   Is it safe for you to go home?  Y Y Y Y Y Y Y        ADL Screening:   :       ADL Assessment 6/13/2022   Feeding yourself No Help Needed   Getting from bed to chair No Help Needed   Getting dressed No Help Needed   Bathing or showering No Help Needed   Walk across the room (includes cane/walker) No Help Needed   Using the telphone No Help Needed   Taking your medications No Help Needed   Preparing meals No Help Needed   Managing money (expenses/bills) No Help Needed   Moderately strenuous housework (laundry) No Help Needed   Shopping for personal items (toiletries/medicines) No Help Needed   Shopping for groceries No Help Needed   Driving No Help Needed   Climbing a flight of stairs No Help Needed   Getting to places beyond walking distances No Help Needed

## 2022-09-26 NOTE — PROGRESS NOTES
CC:   Chief Complaint   Patient presents with    Diabetes    Medication Refill       HISTORY OF PRESENT ILLNESS  Luis Gonzalez is a 76 y.o. female. Presents for 3 month follow up evaluation. She has diet-controlled type 2 DM with peripheral neuropathy, HTN, hyperlipidemia, paroxysmal a-fib, complex regional pain syndrome, osteopenia, GERD, hepatic steatosis, and hx of sarcoidosis. Complains of increased pain at neck. Head feels like a heavy weight when she is walking. Has diet-controlled diabetes. Home glucose monitoring: not done. Denies polydipsia, polyuria, or hypoglycemia. Has chronic numbness, tingling, and burning at feet. Takes gabapentin 800 mg twice a day for neuropathy (prescribed three times a day by Dr. Gunner Barrera but she thought the dose was too high). Now requests dose increase to three times a day to also help with neck pain. A1c 6.0% today (9/26/22), was 6.3% on 3/10/22. Denies CP, SOB, dizziness, or leg swelling. Gets exercise gardening in flower bed. Patient Active Problem List   Diagnosis Code    Hypercholesteremia E78.00    Depression F32. A    Complex regional pain syndrome [id number]    Diabetic peripheral neuropathy associated with type 2 diabetes mellitus (HCC) E11.42    Hypertension, essential I10    Controlled type 2 diabetes mellitus with diabetic polyneuropathy, without long-term current use of insulin (HCC) E11.42    Osteopenia M85.80    Vitamin D deficiency E55.9    GERD (gastroesophageal reflux disease) K21.9    Psoriasis L40.9    Paroxysmal atrial fibrillation (HCC) I48.0    Hepatic steatosis K76.0    COPD (chronic obstructive pulmonary disease) (HCC) J44.9    Sarcoidosis of lymph nodes D86.1     Past Medical History:   Diagnosis Date    Abnormal chest x-ray 12/23/2009    Anemia 12/23/2009    Arrhythmia     SVT.  Converts with [facility]en[facility]    Atrial fibrillation ([facility] [address] 12/23/2009    Carpal tunnel syndrome 12/23/2009    Chronic obstructive pulmonary disease ([facility] [address] Colitis 12/23/2009    Complex regional pain syndrome 12/23/2009    Depression 12/23/2009    [facility]     no meds    Family history of skin cancer     brother-melanoma    GERD (gastroesophageal reflux disease) 2/8/2010    HTN (hypertension) 12/23/2009    Hypercholesteremia 12/23/2009    Hypertriglyceridemia 12/23/2009    Idiopathic peripheral neuropathy 12/23/2009    Iron deficiency anemia 12/23/2009    Osteopenia 12/23/2009    Paroxysmal atrial fibrillation ([facility] [address] 12/2/2011    Psoriasis 6/27/2012    Sarcoidosis      Allergies   Allergen Reactions    Duragesic [Fentanyl] Other (comments)     Patient sees things when taking    Codeine Nausea Only       Current Outpatient Medications   Medication Sig Dispense Refill    gabapentin (NEURONTIN) 800 mg tablet Take 1 Tablet by mouth two (2) times a day. Max Daily Amount: 1,600 mg. 180 Tablet 0    hydroCHLOROthiazide (HYDRODIURIL) 12.5 mg tablet TAKE 1 TABLET BY MOUTH  DAILY 90 Tablet 3    amitriptyline (ELAVIL) 100 mg tablet Take 1 Tablet by mouth nightly. 90 Tablet 1    traMADoL (ULTRAM) 50 mg tablet tramadol 50 mg tablet   TAKE 1 TABLET BY MOUTH EVERY 4 HOURS AS NEEDED FOR PAIN UP TO 7 DAYS, MAX DAILY AMOUNT 300 MG      cholecalciferol (VITAMIN D3) 25 mcg (1,000 unit) cap Take 1,000 Units by mouth daily. diclofenac EC (VOLTAREN) 75 mg EC tablet Take 1 Tablet by mouth two (2) times a day. 60 Tablet 0    losartan (COZAAR) 100 mg tablet TAKE 1 TABLET BY MOUTH  DAILY (Patient taking differently: 100 mg daily. TAKE 1 TABLET BY MOUTH  DAILY) 90 Tablet 3    atorvastatin (LIPITOR) 40 mg tablet TAKE 1 TABLET BY MOUTH AT  NIGHT 90 Tablet 3    metoprolol tartrate (LOPRESSOR) 50 mg tablet TAKE 1 TAB BY MOUTH TWO (2) TIMES A DAY. 180 Tab 3    omeprazole (PRILOSEC) 20 mg capsule Take 20 mg by mouth daily.            PHYSICAL EXAM  Visit Vitals  /80 (BP 1 Location: Left upper arm, BP Patient Position: Sitting, BP Cuff Size: Large adult)   Pulse 71   Temp 98.8 °F (37.1 °C) (Oral)   Resp 18   Ht 5' 7\" (1.702 m)   Wt 168 lb 6.4 oz (76.4 kg)   SpO2 97%   BMI 26.38 kg/m²       General: Well-developed and well-nourished, no distress. HEENT:  Head normocephalic/atraumatic, no scleral icterus  Lungs:  Clear to ausculation bilaterally. Good air movement. Heart:  Regular rate and rhythm, normal S1 and S2, no murmur, gallop, or rub  Extremities: No clubbing, cyanosis, or edema. Musculoskel: Mildly decreased cervical ROM with bilateral horizontal gaze. Kyphosis at back. Neurological: Alert and oriented. Psychiatric: Normal mood and affect. Behavior is normal.     Results for orders placed or performed in visit on 09/26/22   AMB POC HEMOGLOBIN A1C   Result Value Ref Range    Hemoglobin A1c (POC) 6.0 %     Cervical spine X-ray 10/13/17: Cervical spondylosis with multilevel degenerative disc disease which is most severe at C5-C6. There is bilateral foraminal narrowing at C5-C6, right foraminal narrowing at C4-C5 and left foraminal narrowing at C3-C4. ASSESSMENT AND PLAN    ICD-10-CM ICD-9-CM    1. Controlled type 2 diabetes mellitus with diabetic polyneuropathy, without long-term current use of insulin (HCC)  E11.42 250.60 AMB POC HEMOGLOBIN A1C     357.2 gabapentin (NEURONTIN) 800 mg tablet      2. Cervical spondylosis  M47.812 721.0 REFERRAL TO PHYSICAL THERAPY      3. DDD (degenerative disc disease), cervical  M50.30 722.4 REFERRAL TO PHYSICAL THERAPY      4. Needs flu shot  Z23 V04.81 INFLUENZA, FLUAD, (AGE 65 Y+), IM, PF, 0.5 ML      5. Hypertension, essential  I10 401.9         Diagnoses and all orders for this visit:    1. Controlled type 2 diabetes mellitus with diabetic polyneuropathy, without long-term current use of insulin (HCC)  A1c 6.0%. Continue diet control.  -     AMB POC HEMOGLOBIN A1C  -     Refill with dose increase: gabapentin (NEURONTIN) 800 mg tablet; Take 1 Tablet by mouth three (3) times daily.  Max Daily Amount: 2,400 mg.    2. Cervical spondylosis  - REFERRAL TO PHYSICAL THERAPY ([facility] in [place])    3. DDD (degenerative disc disease), cervical  -     REFERRAL TO PHYSICAL THERAPY    4. Needs flu shot  -     INFLUENZA, FLUAD, (AGE 65 Y+), IM, PF, 0.5 ML    5. Hypertension, essential  Controlled. Continue losartan, HCTZ, and metoprolol. Follow-up and Dispositions    Return in about 4 months (around 1/26/2023), or if symptoms worsen or fail to improve, for CRPS, DM, HTN; have fasting labs done 1 week before next appointment. I have discussed the diagnosis with the patient and the intended plan as seen in the above orders. Patient is in agreement. The patient has received an after-visit summary and questions were answered concerning future plans. I have discussed medication side effects and warnings with the patient as well.

## 2022-09-26 NOTE — PATIENT INSTRUCTIONS
Vaccine Information Statement    Influenza (Flu) Vaccine (Inactivated or Recombinant): What You Need to Know    Many vaccine information statements are available in [place] and other languages. See www.immunize.org/vis. Hojas de información sobre vacunas están disponibles en español y en muchos otros idiomas. Visite www.immunize.org/vis. 1. Why get vaccinated? Influenza vaccine can prevent influenza (flu). Flu is a contagious disease that spreads around the [facility] every year, usually between October and May. Anyone can get the flu, but it is more dangerous for some people. Infants and young children, people 72 years and older, pregnant people, and people with certain health conditions or a weakened immune system are at greatest risk of flu complications. Pneumonia, bronchitis, sinus infections, and ear infections are examples of flu-related complications. If you have a medical condition, such as heart disease, cancer, or diabetes, flu can make it worse. Flu can cause fever and chills, sore throat, muscle aches, fatigue, cough, headache, and runny or stuffy nose. Some people may have vomiting and diarrhea, though this is more common in children than adults. In an average year, thousands of people in the [facility] die from flu, and many more are hospitalized. Flu vaccine prevents millions of illnesses and flu-related visits to the doctor each year. 2. Influenza vaccines     CDC recommends everyone 6 months and older get vaccinated every flu season. Children 6 months through 6years of age may need 2 doses during a single flu season. Everyone else needs only 1 dose each flu season. It takes about 2 weeks for protection to develop after vaccination. There are many flu viruses, and they are always changing. Each year a new flu vaccine is made to protect against the influenza viruses believed to be likely to cause disease in the upcoming flu season.  Even when the vaccine doesnt exactly match these viruses, it may still provide some protection. Influenza vaccine does not cause flu. Influenza vaccine may be given at the same time as other vaccines. 3. Talk with your health care provider    Tell your vaccination provider if the person getting the vaccine:  Has had an allergic reaction after a previous dose of influenza vaccine, or has any severe, life-threatening allergies   Has ever had Guillain-Barré Syndrome (also called GBS)    In some cases, your health care provider may decide to postpone influenza vaccination until a future visit. Influenza vaccine can be administered at any time during pregnancy. People who are or will be pregnant during influenza season should receive inactivated influenza vaccine. People with minor illnesses, such as a cold, may be vaccinated. People who are moderately or severely ill should usually wait until they recover before getting influenza vaccine. Your health care provider can give you more information. 4. Risks of a vaccine reaction    Soreness, redness, and swelling where the shot is given, fever, muscle aches, and headache can happen after influenza vaccination. There may be a very small increased risk of Guillain-Barré Syndrome (GBS) after inactivated influenza vaccine (the flu shot). Hildy Paddy children who get the flu shot along with pneumococcal vaccine (PCV13) and/or DTaP vaccine at the same time might be slightly more likely to have a seizure caused by fever. Tell your health care provider if a child who is getting flu vaccine has ever had a seizure. People sometimes faint after medical procedures, including vaccination. Tell your provider if you feel dizzy or have vision changes or ringing in the ears. As with any medicine, there is a very remote chance of a vaccine causing a severe allergic reaction, other serious injury, or death. 5. What if there is a serious problem?     An allergic reaction could occur after the vaccinated person leaves the clinic. If you see signs of a severe allergic reaction (hives, swelling of the face and throat, difficulty breathing, a fast heartbeat, dizziness, or weakness), call 9-1-1 and get the person to the nearest hospital.    For other signs that concern you, call your health care provider. Adverse reactions should be reported to the Vaccine Adverse Event Reporting System (VAERS). Your health care provider will usually file this report, or you can do it yourself. Visit the VAERS website at www.vaers. [facility].gov or call 3-891.648.6298. VAERS is only for reporting reactions, and VAERS staff members do not give medical advice. 6. The National Vaccine Injury Compensation Program    The [facility] Vaccine Injury Compensation Program (VICP) is a federal program that was created to compensate people who may have been injured by certain vaccines. Claims regarding alleged injury or death due to vaccination have a time limit for filing, which may be as short as two years. Visit the VICP website at www.[facility].gov/vaccinecompensation or call 1-150.738.5922 to learn about the program and about filing a claim. 7. How can I learn more? Ask your health care provider. Call your local or state health department. Visit the website of the Food and Drug Administration (FDA) for vaccine package inserts and additional information at www.fda.gov/vaccines-blood-biologics/vaccines. Contact the Centers for Disease Control and Prevention (CDC): Call 6-317.543.6513 (1-800-CDC-INFO) or  Visit CDCs influenza website at www.cdc.gov/flu. Vaccine Information Statement   Inactivated Influenza Vaccine   8/6/2021  42 SHAILA Burk [id number]   Department of Health and Human Services  Centers for Disease Control and Prevention    Office Use Only

## 2022-09-30 LAB
25(OH)D3+25(OH)D2 SERPL-MCNC: 32.2 NG/ML (ref 30–100)
ALBUMIN SERPL-MCNC: 4 G/DL (ref 3.7–4.7)
ALBUMIN/GLOB SERPL: 1.5 {RATIO} (ref 1.2–2.2)
ALP SERPL-CCNC: 147 IU/L (ref 44–121)
ALT SERPL-CCNC: 14 IU/L (ref 0–32)
AST SERPL-CCNC: 17 IU/L (ref 0–40)
BASOPHILS # BLD AUTO: 0.1 X10E3/UL (ref 0–0.2)
BASOPHILS NFR BLD AUTO: 1 %
BILIRUB SERPL-MCNC: 0.3 MG/DL (ref 0–1.2)
BUN SERPL-MCNC: 10 MG/DL (ref 8–27)
BUN/CREAT SERPL: 13 (ref 12–28)
CALCIUM SERPL-MCNC: 9.1 MG/DL (ref 8.7–10.3)
CHLORIDE SERPL-SCNC: 98 MMOL/L (ref 96–106)
CO2 SERPL-SCNC: 26 MMOL/L (ref 20–29)
CREAT SERPL-MCNC: 0.8 MG/DL (ref 0.57–1)
EGFR: 75 ML/MIN/1.73
EOSINOPHIL # BLD AUTO: 0.5 X10E3/UL (ref 0–0.4)
EOSINOPHIL NFR BLD AUTO: 6 %
ERYTHROCYTE [DISTWIDTH] IN BLOOD BY AUTOMATED COUNT: 14.9 % (ref 11.7–15.4)
EST. AVERAGE GLUCOSE BLD GHB EST-MCNC: 140 MG/DL
GLOBULIN SER CALC-MCNC: 2.6 G/DL (ref 1.5–4.5)
GLUCOSE SERPL-MCNC: 134 MG/DL (ref 70–99)
HBA1C MFR BLD: 6.5 % (ref 4.8–5.6)
HCT VFR BLD AUTO: 33.1 % (ref 34–46.6)
HGB BLD-MCNC: 10.2 G/DL (ref 11.1–15.9)
IMM GRANULOCYTES # BLD AUTO: 0 X10E3/UL (ref 0–0.1)
IMM GRANULOCYTES NFR BLD AUTO: 0 %
LYMPHOCYTES # BLD AUTO: 1.6 X10E3/UL (ref 0.7–3.1)
LYMPHOCYTES NFR BLD AUTO: 19 %
MCH RBC QN AUTO: 24.9 PG (ref 26.6–33)
MCHC RBC AUTO-ENTMCNC: 30.8 G/DL (ref 31.5–35.7)
MCV RBC AUTO: 81 FL (ref 79–97)
MONOCYTES # BLD AUTO: 0.6 X10E3/UL (ref 0.1–0.9)
MONOCYTES NFR BLD AUTO: 7 %
NEUTROPHILS # BLD AUTO: 5.9 X10E3/UL (ref 1.4–7)
NEUTROPHILS NFR BLD AUTO: 67 %
PLATELET # BLD AUTO: 274 X10E3/UL (ref 150–450)
POTASSIUM SERPL-SCNC: 4.3 MMOL/L (ref 3.5–5.2)
PROT SERPL-MCNC: 6.6 G/DL (ref 6–8.5)
RBC # BLD AUTO: 4.09 X10E6/UL (ref 3.77–5.28)
SODIUM SERPL-SCNC: 142 MMOL/L (ref 134–144)
WBC # BLD AUTO: 8.7 X10E3/UL (ref 3.4–10.8)

## 2022-10-02 NOTE — PROGRESS NOTES
Letter sent: Your labs showed A1c 6.5%, higher than in clinic (6.0%), but still well-controlled. You also had mild chronic anemia.  Other labs were normal, including kidney and liver tests, other blood counts, and vitamin D.

## 2022-10-10 ENCOUNTER — TELEPHONE (OUTPATIENT)
Dept: INTERNAL MEDICINE CLINIC | Age: 77
End: 2022-10-10

## 2022-10-10 NOTE — TELEPHONE ENCOUNTER
Pt call transferred from [facility] ([facility]. Verified pt name and date of birth  Pt stated for past two days bad burning sensation with urination and urine has foul odor. Denied fever, nausea and vomiting. Notified pt no appointments available for next two days and would need an appointment for lab order and antibiotic prescription. Encouraged pt to go to urgent care for UTI evaluation. Pt stated understanding.

## 2022-11-08 ENCOUNTER — TELEPHONE (OUTPATIENT)
Dept: INTERNAL MEDICINE CLINIC | Age: 77
End: 2022-11-08

## 2022-11-08 NOTE — TELEPHONE ENCOUNTER
Pt has an odd blister on the crack of her butt. She has had it for 2 weeks. Pt also thinks she is experiencing her first UTI.

## 2022-11-08 NOTE — TELEPHONE ENCOUNTER
----- Message from [facility] sent at 11/8/2022 11:33 AM EST -----  Subject: Appointment Request    Reason for Call: Established Patient Appointment needed: Semi-Routine Skin   Problem    QUESTIONS    Reason for appointment request? No appointments available during search     Additional Information for Provider? Patient has a sore blister on the top   of her butt, patient stated she had the blister about 3 weeks now and it   hasn't gone away yet. Patient also have a uti problem that started about 1   week ago and this is the first time she ever had one, screened green,   please give patient a call back to schedule further.   ---------------------------------------------------------------------------  --------------  Ronny Rodriguez JQC  [phone]; OK to leave message on voicemail  ---------------------------------------------------------------------------  --------------  SCRIPT ANSWERS  COVID Screen: Meeta Bustos

## 2022-11-09 ENCOUNTER — OFFICE VISIT (OUTPATIENT)
Dept: INTERNAL MEDICINE CLINIC | Age: 77
End: 2022-11-09
Payer: MEDICARE

## 2022-11-09 VITALS
RESPIRATION RATE: 20 BRPM | BODY MASS INDEX: 26.09 KG/M2 | SYSTOLIC BLOOD PRESSURE: 123 MMHG | HEIGHT: 67 IN | WEIGHT: 166.2 LBS | HEART RATE: 72 BPM | DIASTOLIC BLOOD PRESSURE: 77 MMHG | OXYGEN SATURATION: 98 % | TEMPERATURE: 98.2 F

## 2022-11-09 DIAGNOSIS — L02.31 GLUTEAL ABSCESS: ICD-10-CM

## 2022-11-09 DIAGNOSIS — R35.0 FREQUENCY OF URINATION: ICD-10-CM

## 2022-11-09 DIAGNOSIS — N30.00 ACUTE CYSTITIS WITHOUT HEMATURIA: Primary | ICD-10-CM

## 2022-11-09 LAB
BILIRUB UR QL STRIP: NEGATIVE
GLUCOSE UR-MCNC: NEGATIVE MG/DL
KETONES P FAST UR STRIP-MCNC: NEGATIVE MG/DL
PH UR STRIP: 6.5 [PH] (ref 4.6–8)
PROT UR QL STRIP: NEGATIVE
SP GR UR STRIP: 1.01 (ref 1–1.03)
UA UROBILINOGEN AMB POC: NORMAL (ref 0.2–1)
URINALYSIS CLARITY POC: CLEAR
URINALYSIS COLOR POC: NORMAL
URINE BLOOD POC: NEGATIVE
URINE LEUKOCYTES POC: NEGATIVE
URINE NITRITES POC: NEGATIVE

## 2022-11-09 PROCEDURE — 99213 OFFICE O/P EST LOW 20 MIN: CPT | Performed by: INTERNAL MEDICINE

## 2022-11-09 PROCEDURE — 1123F ACP DISCUSS/DSCN MKR DOCD: CPT | Performed by: INTERNAL MEDICINE

## 2022-11-09 PROCEDURE — 81002 URINALYSIS NONAUTO W/O SCOPE: CPT | Performed by: INTERNAL MEDICINE

## 2022-11-09 PROCEDURE — G9717 DOC PT DX DEP/BP F/U NT REQ: HCPCS | Performed by: INTERNAL MEDICINE

## 2022-11-09 PROCEDURE — G8417 CALC BMI ABV UP PARAM F/U: HCPCS | Performed by: INTERNAL MEDICINE

## 2022-11-09 PROCEDURE — G8536 NO DOC ELDER MAL SCRN: HCPCS | Performed by: INTERNAL MEDICINE

## 2022-11-09 PROCEDURE — G8754 DIAS BP LESS 90: HCPCS | Performed by: INTERNAL MEDICINE

## 2022-11-09 PROCEDURE — G8427 DOCREV CUR MEDS BY ELIG CLIN: HCPCS | Performed by: INTERNAL MEDICINE

## 2022-11-09 PROCEDURE — G8752 SYS BP LESS 140: HCPCS | Performed by: INTERNAL MEDICINE

## 2022-11-09 PROCEDURE — 3074F SYST BP LT 130 MM HG: CPT | Performed by: INTERNAL MEDICINE

## 2022-11-09 PROCEDURE — 1101F PT FALLS ASSESS-DOCD LE1/YR: CPT | Performed by: INTERNAL MEDICINE

## 2022-11-09 PROCEDURE — 1090F PRES/ABSN URINE INCON ASSESS: CPT | Performed by: INTERNAL MEDICINE

## 2022-11-09 PROCEDURE — 3078F DIAST BP <80 MM HG: CPT | Performed by: INTERNAL MEDICINE

## 2022-11-09 PROCEDURE — G8399 PT W/DXA RESULTS DOCUMENT: HCPCS | Performed by: INTERNAL MEDICINE

## 2022-11-09 RX ORDER — SULFAMETHOXAZOLE AND TRIMETHOPRIM 800; 160 MG/1; MG/1
1 TABLET ORAL 2 TIMES DAILY
Qty: 14 TABLET | Refills: 0 | Status: SHIPPED | OUTPATIENT
Start: 2022-11-09 | End: 2022-11-16

## 2022-11-09 NOTE — TELEPHONE ENCOUNTER
Called to pt verified pt name and date of birth. Notified pt to come in today at 10:10 and have issues evaluated. Pt stated understanding.   Pt agreed to come in 10 min early to provide urine sample to help expedite appointment

## 2022-11-09 NOTE — PROGRESS NOTES
CC:   Chief Complaint   Patient presents with    Urinary Frequency    Skin Problem       HISTORY OF PRESENT ILLNESS  Tiny Gustafson is a 76 y.o. female. Had UTI a month ago. Seen at [facility] and prescribed Macrodantin BID x 7 days. Caused stomach pain so took for 5 days. Helped her symptoms. Then about 3-4 days ago started having increased urinary frequency, burning with urination, and dark urine with a bad odor. Took Macrodantin for 2 doses. Also complains of a bump on her buttocks that has been very sore when she sits. Had  to look at it and put Neosporin and a bandage on it. Patient Active Problem List   Diagnosis Code    Hypercholesteremia E78.00    Depression F32. A    Complex regional pain syndrome [id number]    Diabetic peripheral neuropathy associated with type 2 diabetes mellitus (HCC) E11.42    Hypertension, essential I10    Diabetes mellitus type 2, diet-controlled (HCC) E11.9    Osteopenia M85.80    Vitamin D deficiency E55.9    GERD (gastroesophageal reflux disease) K21.9    Psoriasis L40.9    Paroxysmal atrial fibrillation (HCC) I48.0    Hepatic steatosis K76.0    COPD (chronic obstructive pulmonary disease) ([facility]) J44.9    Sarcoidosis of lymph nodes D86.1     Past Medical History:   Diagnosis Date    Abnormal chest x-ray 12/23/2009    Anemia 12/23/2009    Arrhythmia     SVT.  Converts with Adenocard    Atrial fibrillation (HCC) 12/23/2009    Carpal tunnel syndrome 12/23/2009    Chronic obstructive pulmonary disease ([address]     Colitis 12/23/2009    Complex regional pain syndrome 12/23/2009    Depression 12/23/2009    Diabetes ([facility]     no meds    Family history of skin cancer     brother-melanoma    GERD (gastroesophageal reflux disease) 2/8/2010    HTN (hypertension) 12/23/2009    Hypercholesteremia 12/23/2009    Hypertriglyceridemia 12/23/2009    Idiopathic peripheral neuropathy 12/23/2009    Iron deficiency anemia 12/23/2009    Osteopenia 12/23/2009    Paroxysmal atrial fibrillation ([address] 12/2/2011    Psoriasis 6/27/2012    Sarcoidosis      Allergies   Allergen Reactions    Duragesic [Fentanyl] Other (comments)     Patient sees things when taking    Codeine Nausea Only       Current Outpatient Medications   Medication Sig Dispense Refill    amitriptyline (ELAVIL) 100 mg tablet Take 1 Tablet by mouth nightly. 90 Tablet 0    atorvastatin (LIPITOR) 40 mg tablet Take 1 Tablet by mouth daily. Indications: high cholesterol 90 Tablet 3    losartan (COZAAR) 100 mg tablet Take 1 Tablet by mouth daily. Indications: high blood pressure 90 Tablet 3    gabapentin (NEURONTIN) 800 mg tablet Take 1 Tablet by mouth three (3) times daily. Max Daily Amount: 2,400 mg. 270 Tablet 0    hydroCHLOROthiazide (HYDRODIURIL) 12.5 mg tablet TAKE 1 TABLET BY MOUTH  DAILY 90 Tablet 3    diclofenac EC (VOLTAREN) 75 mg EC tablet Take 1 Tablet by mouth two (2) times a day. 60 Tablet 0    metoprolol tartrate (LOPRESSOR) 50 mg tablet TAKE 1 TAB BY MOUTH TWO (2) TIMES A DAY. 180 Tab 3    omeprazole (PRILOSEC) 20 mg capsule Take 20 mg by mouth daily. PHYSICAL EXAM  Visit Vitals  /77 (BP 1 Location: Left upper arm, BP Patient Position: Sitting, BP Cuff Size: Adult)   Pulse 72   Temp 98.2 °F (36.8 °C) (Oral)   Resp 20   Ht 5' 7\" (1.702 m)   Wt 166 lb 3.2 oz (75.4 kg)   SpO2 98%   BMI 26.03 kg/m²       General: Well-developed and well-nourished, no distress.   HEENT:  Head normocephalic/atraumatic, no scleral icterus  Skin:  1.5 cm soft subcutaneous mass with small opening at medial aspect of right buttock      Results for orders placed or performed in visit on 11/09/22   AMB POC URINALYSIS DIP STICK MANUAL W/O MICRO   Result Value Ref Range    Color (UA POC) Dark Radha     Clarity (UA POC) Clear     Glucose (UA POC) Negative Negative    Bilirubin (UA POC) Negative Negative    Ketones (UA POC) Negative Negative    Specific gravity (UA POC) 1.015 1.001 - 1.035    Blood (UA POC) Negative Negative    pH (UA POC) 6.5 4.6 - 8.0    Protein (UA POC) Negative Negative    Urobilinogen (UA POC) 0.2 mg/dL 0.2 - 1    Nitrites (UA POC) Negative Negative    Leukocyte esterase (UA POC) Negative Negative         ASSESSMENT AND PLAN    ICD-10-CM ICD-9-CM    1. Acute cystitis without hematuria  N30.00 595.0 trimethoprim-sulfamethoxazole (BACTRIM DS, SEPTRA DS) 160-800 mg per tablet      2. Frequency of urination  R35.0 788.41 AMB POC URINALYSIS DIP STICK MANUAL W/O MICRO      3. Gluteal abscess  L02.31 682.5 trimethoprim-sulfamethoxazole (BACTRIM DS, SEPTRA DS) 160-800 mg per tablet        Diagnoses and all orders for this visit:    1. Acute cystitis without hematuria  -     trimethoprim-sulfamethoxazole (BACTRIM DS, SEPTRA DS) 160-800 mg per tablet; Take 1 Tablet by mouth two (2) times a day for 7 days. 2. Frequency of urination  -     AMB POC URINALYSIS DIP STICK MANUAL W/O MICRO    3. Gluteal abscess  -     trimethoprim-sulfamethoxazole (BACTRIM DS, SEPTRA DS) 160-800 mg per tablet; Take 1 Tablet by mouth two (2) times a day for 7 days. She has typical symptoms of UTI. Urine dip normal but likely due to taking 2 doses of nitrofurantoin. She also has a gluteal abscess that is fairly shallow. Will treat both with Bactrim DS. Explained to her that if abscess does not resolve with antibiotic therapy, will need an I & D. Patient Instructions  Take losartan 100 mg 1/2 tab once daily for 7 days while you are on trimethoprim-sulfamethoxazole antibiotic. This is because taking both together can increase your potassium level. I have discussed the diagnosis with the patient and the intended plan as seen in the above orders. Patient is in agreement. The patient has received an after-visit summary and questions were answered concerning future plans. I have discussed medication side effects and warnings with the patient as well.

## 2022-11-09 NOTE — PROGRESS NOTES
Cat Madden  Identified pt with two pt identifiers(name and ). Chief Complaint   Patient presents with    Urinary Frequency    Skin Problem       Reviewed record In preparation for visit and have obtained necessary documentation. 1. Have you been to the ER, urgent care clinic or hospitalized since your last visit? No     2. Have you seen or consulted any other health care providers outside of the [address] [place] [address] since your last visit? Include any pap smears or colon screening. No    Vitals reviewed with provider.     Health Maintenance reviewed:     Health Maintenance Due   Topic    Eye Exam Retinal or Dilated     COVID-19 Vaccine (4 - Booster for Moderna series)          Wt Readings from Last 3 Encounters:   22 166 lb 3.2 oz (75.4 kg)   22 168 lb 6.4 oz (76.4 kg)   22 168 lb (76.2 kg)        Temp Readings from Last 3 Encounters:   22 98.2 °F (36.8 °C) (Oral)   22 98.8 °F (37.1 °C) (Oral)   22 98.2 °F (36.8 °C) (Oral)        BP Readings from Last 3 Encounters:   22 (!) 153/78   22 125/80   22 104/71        Pulse Readings from Last 3 Encounters:   22 72   22 71   22 74        Vitals:    22 1015   BP: (!) 153/78   Pulse: 72   Resp: 20   Temp: 98.2 °F (36.8 °C)   TempSrc: Oral   SpO2: 98%   Weight: 166 lb 3.2 oz (75.4 kg)   Height: 5' 7\" (1.702 m)   PainSc:   6   PainLoc: Buttocks          Learning Assessment:   :       Learning Assessment 2014   PRIMARY LEARNER Patient   HIGHEST LEVEL OF EDUCATION - PRIMARY LEARNER  GRADUATED HIGH SCHOOL OR GED   BARRIERS PRIMARY LEARNER NONE   CO-LEARNER CAREGIVER No   PRIMARY LANGUAGE ENGLISH   LEARNER PREFERENCE PRIMARY DEMONSTRATION   ANSWERED BY patient   RELATIONSHIP SELF        Depression Screening:   :       3 most recent PHQ Screens 2022   Little interest or pleasure in doing things Not at all   Feeling down, depressed, irritable, or hopeless Not at all   Total Score PHQ 2 0   Trouble falling or staying asleep, or sleeping too much -   Feeling tired or having little energy -   Poor appetite, weight loss, or overeating -   Feeling bad about yourself - or that you are a failure or have let yourself or your family down -   Trouble concentrating on things such as school, work, reading, or watching TV -   Moving or speaking so slowly that other people could have noticed; or the opposite being so fidgety that others notice -   Thoughts of being better off dead, or hurting yourself in some way -   PHQ 9 Score -   How difficult have these problems made it for you to do your work, take care of your home and get along with others -        Fall Risk Assessment:   :       Fall Risk Assessment, last 12 mths 6/13/2022   Able to walk? Yes   Fall in past 12 months? 0   Do you feel unsteady? 1   Are you worried about falling 1   Number of falls in past 12 months -   Fall with injury? -        Abuse Screening:   :       Abuse Screening Questionnaire 6/13/2022 6/11/2021 5/4/2020 6/17/2019 4/17/2018 4/21/2017 3/22/2016   Do you ever feel afraid of your partner? N N N N N N N   Are you in a relationship with someone who physically or mentally threatens you? N N N N N N N   Is it safe for you to go home?  Y Y Y Y Y Y Y        ADL Screening:   :       ADL Assessment 6/13/2022   Feeding yourself No Help Needed   Getting from bed to chair No Help Needed   Getting dressed No Help Needed   Bathing or showering No Help Needed   Walk across the room (includes cane/walker) No Help Needed   Using the telphone No Help Needed   Taking your medications No Help Needed   Preparing meals No Help Needed   Managing money (expenses/bills) No Help Needed   Moderately strenuous housework (laundry) No Help Needed   Shopping for personal items (toiletries/medicines) No Help Needed   Shopping for groceries No Help Needed   Driving No Help Needed   Climbing a flight of stairs No Help Needed   Getting to places beyond walking distances No Help Needed

## 2022-11-09 NOTE — PATIENT INSTRUCTIONS
Patient Instructions  Take losartan 100 mg 1/2 tab once daily for 7 days while you are on trimethoprim-sulfamethoxazole antibiotic. This is because taking both together can increase your potassium level.

## 2023-01-30 ENCOUNTER — OFFICE VISIT (OUTPATIENT)
Dept: INTERNAL MEDICINE CLINIC | Age: 78
End: 2023-01-30
Payer: MEDICARE

## 2023-01-30 VITALS
SYSTOLIC BLOOD PRESSURE: 136 MMHG | RESPIRATION RATE: 18 BRPM | WEIGHT: 168 LBS | HEIGHT: 67 IN | BODY MASS INDEX: 26.37 KG/M2 | OXYGEN SATURATION: 93 % | HEART RATE: 79 BPM | DIASTOLIC BLOOD PRESSURE: 85 MMHG | TEMPERATURE: 98.7 F

## 2023-01-30 DIAGNOSIS — I48.0 PAROXYSMAL ATRIAL FIBRILLATION (HCC): ICD-10-CM

## 2023-01-30 DIAGNOSIS — E78.00 HYPERCHOLESTEREMIA: ICD-10-CM

## 2023-01-30 DIAGNOSIS — E11.42 DIABETIC PERIPHERAL NEUROPATHY ASSOCIATED WITH TYPE 2 DIABETES MELLITUS (HCC): ICD-10-CM

## 2023-01-30 DIAGNOSIS — I10 HYPERTENSION, ESSENTIAL: ICD-10-CM

## 2023-01-30 DIAGNOSIS — J44.9 CHRONIC OBSTRUCTIVE PULMONARY DISEASE, UNSPECIFIED COPD TYPE (HCC): ICD-10-CM

## 2023-01-30 DIAGNOSIS — E55.9 VITAMIN D DEFICIENCY: ICD-10-CM

## 2023-01-30 DIAGNOSIS — E11.9 DIABETES MELLITUS TYPE 2, DIET-CONTROLLED (HCC): Primary | ICD-10-CM

## 2023-01-30 LAB — HBA1C MFR BLD HPLC: 6.3 %

## 2023-01-30 PROCEDURE — 3044F HG A1C LEVEL LT 7.0%: CPT | Performed by: INTERNAL MEDICINE

## 2023-01-30 PROCEDURE — 3075F SYST BP GE 130 - 139MM HG: CPT | Performed by: INTERNAL MEDICINE

## 2023-01-30 PROCEDURE — G9717 DOC PT DX DEP/BP F/U NT REQ: HCPCS | Performed by: INTERNAL MEDICINE

## 2023-01-30 PROCEDURE — 1101F PT FALLS ASSESS-DOCD LE1/YR: CPT | Performed by: INTERNAL MEDICINE

## 2023-01-30 PROCEDURE — 1123F ACP DISCUSS/DSCN MKR DOCD: CPT | Performed by: INTERNAL MEDICINE

## 2023-01-30 PROCEDURE — 99214 OFFICE O/P EST MOD 30 MIN: CPT | Performed by: INTERNAL MEDICINE

## 2023-01-30 PROCEDURE — 1090F PRES/ABSN URINE INCON ASSESS: CPT | Performed by: INTERNAL MEDICINE

## 2023-01-30 PROCEDURE — G8427 DOCREV CUR MEDS BY ELIG CLIN: HCPCS | Performed by: INTERNAL MEDICINE

## 2023-01-30 PROCEDURE — 3079F DIAST BP 80-89 MM HG: CPT | Performed by: INTERNAL MEDICINE

## 2023-01-30 PROCEDURE — G8417 CALC BMI ABV UP PARAM F/U: HCPCS | Performed by: INTERNAL MEDICINE

## 2023-01-30 PROCEDURE — 83036 HEMOGLOBIN GLYCOSYLATED A1C: CPT | Performed by: INTERNAL MEDICINE

## 2023-01-30 PROCEDURE — G8536 NO DOC ELDER MAL SCRN: HCPCS | Performed by: INTERNAL MEDICINE

## 2023-01-30 PROCEDURE — G8399 PT W/DXA RESULTS DOCUMENT: HCPCS | Performed by: INTERNAL MEDICINE

## 2023-01-30 RX ORDER — GABAPENTIN 800 MG/1
800 TABLET ORAL 3 TIMES DAILY
Qty: 270 TABLET | Refills: 3 | Status: SHIPPED | OUTPATIENT
Start: 2023-01-30

## 2023-01-30 RX ORDER — LOSARTAN POTASSIUM 50 MG/1
50 TABLET ORAL DAILY
Qty: 90 TABLET | Refills: 3 | Status: SHIPPED | OUTPATIENT
Start: 2023-01-30

## 2023-01-30 NOTE — PROGRESS NOTES
CC:   Chief Complaint   Patient presents with    Diabetes    Hypertension       HISTORY OF PRESENT ILLNESS  Asad Harden is a 76 y.o. female. Presents for 4 month follow up evaluation of DM and HTN. She has diet-controlled type 2 DM with peripheral neuropathy, HTN, hyperlipidemia, paroxysmal a-fib, complex regional pain syndrome, osteopenia, GERD, hepatic steatosis, and hx of sarcoidosis. Has diet-controlled diabetes. Home glucose monitoring: not done. Denies polydipsia, polyuria, or hypoglycemia. Has chronic numbness, tingling, and burning at feet controlled on gabapentin 800 mg TID (initially prescribed by podiatrist Dr. Tesfaye Fox). Lab review: A1c 6.3% today (1/30/23), was 6.0% on 9/26/22, and 6.3% on 3/10/22. Eye exam: Dr. Mario Patten, 1/19/23. Denies HA's, CP, SOB, dizziness, or leg swelling. Patient Active Problem List   Diagnosis Code    Hypercholesteremia E78.00    Depression F32. A    Complex regional pain syndrome [id number]    Diabetic peripheral neuropathy associated with type 2 diabetes mellitus (HCC) E11.42    Hypertension, essential I10    Diabetes mellitus type 2, diet-controlled (HCC) E11.9    Osteopenia M85.80    Vitamin D deficiency E55.9    GERD (gastroesophageal reflux disease) K21.9    Psoriasis L40.9    Paroxysmal atrial fibrillation (HCC) I48.0    Hepatic steatosis K76.0    COPD (chronic obstructive pulmonary disease) (HCC) J44.9    Sarcoidosis of lymph nodes D86.1     Past Medical History:   Diagnosis Date    Abnormal chest x-ray 12/23/2009    Anemia 12/23/2009    Arrhythmia     SVT.  Converts with Adenocard    Atrial fibrillation (HCC) 12/23/2009    Carpal tunnel syndrome 12/23/2009    Chronic obstructive pulmonary disease ([address]     Colitis 12/23/2009    Complex regional pain syndrome 12/23/2009    Depression 12/23/2009    Diabetes ([address]     no meds    Family history of skin cancer     brother-melanoma    GERD (gastroesophageal reflux disease) 2/8/2010    HTN (hypertension) 12/23/2009 Hypercholesteremia 12/23/2009    Hypertriglyceridemia 12/23/2009    Idiopathic peripheral neuropathy 12/23/2009    Iron deficiency anemia 12/23/2009    Osteopenia 12/23/2009    Paroxysmal atrial fibrillation ([address] 12/2/2011    Psoriasis 6/27/2012    Sarcoidosis      Allergies   Allergen Reactions    Duragesic [Fentanyl] Other (comments)     Patient sees things when taking    Codeine Nausea Only       Current Outpatient Medications   Medication Sig Dispense Refill    amitriptyline (ELAVIL) 100 mg tablet TAKE 1 TABLET EVERY NIGHT 90 Tablet 1    atorvastatin (LIPITOR) 40 mg tablet Take 1 Tablet by mouth daily. Indications: high cholesterol 90 Tablet 3    losartan (COZAAR) 100 mg tablet Take 1 Tablet by mouth daily. Indications: high blood pressure (Patient taking differently: Take 50 mg by mouth daily. Indications: high blood pressure) 90 Tablet 3    gabapentin (NEURONTIN) 800 mg tablet Take 1 Tablet by mouth three (3) times daily. Max Daily Amount: 2,400 mg. 270 Tablet 0    hydroCHLOROthiazide (HYDRODIURIL) 12.5 mg tablet TAKE 1 TABLET BY MOUTH  DAILY 90 Tablet 3    diclofenac EC (VOLTAREN) 75 mg EC tablet Take 1 Tablet by mouth two (2) times a day. 60 Tablet 0    metoprolol tartrate (LOPRESSOR) 50 mg tablet TAKE 1 TAB BY MOUTH TWO (2) TIMES A DAY. 180 Tab 3    omeprazole (PRILOSEC) 20 mg capsule Take 20 mg by mouth daily. PHYSICAL EXAM  Visit Vitals  /85 (BP 1 Location: Left upper arm, BP Patient Position: Sitting, BP Cuff Size: Adult)   Pulse 79   Temp 98.7 °F (37.1 °C) (Oral)   Resp 18   Ht 5' 7\" (1.702 m)   Wt 168 lb (76.2 kg)   SpO2 93%   BMI 26.31 kg/m²       General: Well-developed and well-nourished, no distress. HEENT:  Head normocephalic/atraumatic, no scleral icterus  Neck: Supple. No carotid bruits, JVD, lymphadenopathy, or thyromegaly. Lungs:  Clear to ausculation bilaterally. Good air movement.   Heart:  Regular rate and rhythm, normal S1 and S2, no murmur, gallop, or rub  Extremities: No clubbing, cyanosis, or edema. Neurological: Alert and oriented. Psychiatric: Normal mood and affect. Behavior is normal.       ASSESSMENT AND PLAN    ICD-10-CM ICD-9-CM    1. Diabetes mellitus type 2, diet-controlled (HCC)  E11.9 250.00 AMB POC HEMOGLOBIN A1C      HEMOGLOBIN A1C WITH EAG      MICROALBUMIN, UR, RAND W/ MICROALB/CREAT RATIO      HEMOGLOBIN A1C WITH EAG      MICROALBUMIN, UR, RAND W/ MICROALB/CREAT RATIO      2. Diabetic peripheral neuropathy associated with type 2 diabetes mellitus (HCC)  E11.42 250.60 gabapentin (NEURONTIN) 800 mg tablet     357.2       3. Hypertension, essential  I10 401.9 losartan (COZAAR) 50 mg tablet      METABOLIC PANEL, COMPREHENSIVE      CBC WITH AUTOMATED DIFF      METABOLIC PANEL, COMPREHENSIVE      CBC WITH AUTOMATED DIFF      4. Hypercholesteremia  E78.00 272.0 LIPID PANEL      LIPID PANEL      5. Chronic obstructive pulmonary disease, unspecified COPD type ([facility]  J44.9 496       6. Paroxysmal atrial fibrillation ([facility])  I48.0 427.31       7. Vitamin D deficiency  E55.9 268.9 VITAMIN D, 25 HYDROXY      VITAMIN D, 25 HYDROXY        Diagnoses and all orders for this visit:    1. Diabetes mellitus type 2, diet-controlled (HCC)  -     AMB POC HEMOGLOBIN A1C  -     HEMOGLOBIN A1C WITH EAG; Future  -     MICROALBUMIN, UR, RAND W/ MICROALB/CREAT RATIO; Future    2. Diabetic peripheral neuropathy associated with type 2 diabetes mellitus (HCC)  -     gabapentin (NEURONTIN) 800 mg tablet; Take 1 Tablet by mouth three (3) times daily. Max Daily Amount: 2,400 mg.    3. Hypertension, essential  -     losartan (COZAAR) 50 mg tablet; Take 1 Tablet by mouth daily.  -     METABOLIC PANEL, COMPREHENSIVE; Future  -     CBC WITH AUTOMATED DIFF; Future    4. Hypercholesteremia  -     LIPID PANEL; Future    5. Chronic obstructive pulmonary disease, unspecified [facility] type ([facility]    6. Paroxysmal atrial fibrillation ([facility]    7.  Vitamin D deficiency  -     VITAMIN D, 25 HYDROXY; Future    Diet-controlled DM doing well with A1c 6.3% today. HTN controlled. Continue losartan, HCTZ, and metoprolol. Refill on gabapentin. Helping diabetic neuropathy and neck pain form cervical spondylosis. Follow up Cardiology for a-fib. Follow-up and Dispositions    Return in about 6 months (around 7/30/2023), or if symptoms worsen or fail to improve, for Medicare AWV; have fasting labs 1 week before appointment. I have discussed the diagnosis with the patient and the intended plan as seen in the above orders. Patient is in agreement. The patient has received an after-visit summary and questions were answered concerning future plans. I have discussed medication side effects and warnings with the patient as well.

## 2023-01-30 NOTE — PROGRESS NOTES
Tracy Madden  Identified pt with two pt identifiers(name and ). Chief Complaint   Patient presents with    Diabetes    Hypertension       Reviewed record In preparation for visit and have obtained necessary documentation. 1. Have you been to the ER, urgent care clinic or hospitalized since your last visit? No     2. Have you seen or consulted any other health care providers outside of the [address] [place] [address] since your last visit? Include any pap smears or colon screening. No    Vitals reviewed with provider.     Health Maintenance reviewed:     Health Maintenance Due   Topic    Eye Exam Retinal or Dilated     COVID-19 Vaccine (4 - Booster for Moderna series)          Wt Readings from Last 3 Encounters:   23 168 lb (76.2 kg)   22 166 lb 3.2 oz (75.4 kg)   22 168 lb 6.4 oz (76.4 kg)        Temp Readings from Last 3 Encounters:   23 98.7 °F (37.1 °C) (Oral)   22 98.2 °F (36.8 °C) (Oral)   22 98.8 °F (37.1 °C) (Oral)        BP Readings from Last 3 Encounters:   23 136/85   22 123/77   22 125/80        Pulse Readings from Last 3 Encounters:   23 79   22 72   22 71        Vitals:    23 1319 23 1323   BP: (!) 150/89 136/85   Pulse: 79    Resp: 18    Temp: 98.7 °F (37.1 °C)    TempSrc: Oral    SpO2:  93%   Weight: 168 lb (76.2 kg)    Height: 5' 7\" (1.702 m)    PainSc:   7    PainLoc: Foot           Learning Assessment:   :       Learning Assessment 2014   PRIMARY LEARNER Patient   HIGHEST LEVEL OF EDUCATION - PRIMARY LEARNER  GRADUATED HIGH SCHOOL OR GED   BARRIERS PRIMARY LEARNER NONE   CO-LEARNER CAREGIVER No   PRIMARY LANGUAGE ENGLISH   LEARNER PREFERENCE PRIMARY DEMONSTRATION   ANSWERED BY patient   RELATIONSHIP SELF        Depression Screening:   :       3 most recent PHQ Screens 2023   Little interest or pleasure in doing things Not at all   Feeling down, depressed, irritable, or hopeless Not at all   Total Score PHQ 2 0   Trouble falling or staying asleep, or sleeping too much -   Feeling tired or having little energy -   Poor appetite, weight loss, or overeating -   Feeling bad about yourself - or that you are a failure or have let yourself or your family down -   Trouble concentrating on things such as school, work, reading, or watching TV -   Moving or speaking so slowly that other people could have noticed; or the opposite being so fidgety that others notice -   Thoughts of being better off dead, or hurting yourself in some way -   PHQ 9 Score -   How difficult have these problems made it for you to do your work, take care of your home and get along with others -        Fall Risk Assessment:   :       Fall Risk Assessment, last 12 mths 6/13/2022   Able to walk? Yes   Fall in past 12 months? 0   Do you feel unsteady? 1   Are you worried about falling 1   Number of falls in past 12 months -   Fall with injury? -        Abuse Screening:   :       Abuse Screening Questionnaire 6/13/2022 6/11/2021 5/4/2020 6/17/2019 4/17/2018 4/21/2017 3/22/2016   Do you ever feel afraid of your partner? N N N N N N N   Are you in a relationship with someone who physically or mentally threatens you? N N N N N N N   Is it safe for you to go home?  Y Y Y Y Y Y Y        ADL Screening:   :       ADL Assessment 6/13/2022   Feeding yourself No Help Needed   Getting from bed to chair No Help Needed   Getting dressed No Help Needed   Bathing or showering No Help Needed   Walk across the room (includes cane/walker) No Help Needed   Using the telphone No Help Needed   Taking your medications No Help Needed   Preparing meals No Help Needed   Managing money (expenses/bills) No Help Needed   Moderately strenuous housework (laundry) No Help Needed   Shopping for personal items (toiletries/medicines) No Help Needed   Shopping for groceries No Help Needed   Driving No Help Needed   Climbing a flight of stairs No Help Needed   Getting to places beyond walking distances No Help Needed

## 2023-03-23 ENCOUNTER — APPOINTMENT (OUTPATIENT)
Dept: GENERAL RADIOLOGY | Age: 78
DRG: 193 | End: 2023-03-23
Attending: EMERGENCY MEDICINE
Payer: MEDICARE

## 2023-03-23 ENCOUNTER — APPOINTMENT (OUTPATIENT)
Dept: CT IMAGING | Age: 78
DRG: 193 | End: 2023-03-23
Attending: EMERGENCY MEDICINE
Payer: MEDICARE

## 2023-03-23 ENCOUNTER — HOSPITAL ENCOUNTER (INPATIENT)
Age: 78
LOS: 3 days | Discharge: HOME | DRG: 193 | End: 2023-03-27
Attending: EMERGENCY MEDICINE | Admitting: STUDENT IN AN ORGANIZED HEALTH CARE EDUCATION/TRAINING PROGRAM
Payer: MEDICARE

## 2023-03-23 DIAGNOSIS — I50.9 ACUTE ON CHRONIC CONGESTIVE HEART FAILURE, UNSPECIFIED HEART FAILURE TYPE (HCC): ICD-10-CM

## 2023-03-23 DIAGNOSIS — N30.00 ACUTE CYSTITIS WITHOUT HEMATURIA: ICD-10-CM

## 2023-03-23 DIAGNOSIS — J18.9 PNEUMONIA OF BOTH LUNGS DUE TO INFECTIOUS ORGANISM, UNSPECIFIED PART OF LUNG: ICD-10-CM

## 2023-03-23 DIAGNOSIS — J96.01 ACUTE HYPOXEMIC RESPIRATORY FAILURE (HCC): Primary | ICD-10-CM

## 2023-03-23 LAB
ALBUMIN SERPL-MCNC: 2.8 G/DL (ref 3.5–5)
ALBUMIN/GLOB SERPL: 0.5 (ref 1.1–2.2)
ALP SERPL-CCNC: 116 U/L (ref 45–117)
ALT SERPL-CCNC: 120 U/L (ref 12–78)
ANION GAP SERPL CALC-SCNC: 8 MMOL/L (ref 5–15)
APPEARANCE UR: ABNORMAL
AST SERPL-CCNC: 135 U/L (ref 15–37)
BACTERIA URNS QL MICRO: ABNORMAL /HPF
BASOPHILS # BLD: 0.1 K/UL (ref 0–0.1)
BASOPHILS NFR BLD: 1 % (ref 0–1)
BILIRUB SERPL-MCNC: 0.8 MG/DL (ref 0.2–1)
BILIRUB UR QL: NEGATIVE
BNP SERPL-MCNC: 595 PG/ML
BUN SERPL-MCNC: 12 MG/DL (ref 6–20)
BUN/CREAT SERPL: 17 (ref 12–20)
CALCIUM SERPL-MCNC: 9 MG/DL (ref 8.5–10.1)
CHLORIDE SERPL-SCNC: 99 MMOL/L (ref 97–108)
CO2 SERPL-SCNC: 28 MMOL/L (ref 21–32)
COLOR UR: ABNORMAL
CREAT SERPL-MCNC: 0.72 MG/DL (ref 0.55–1.02)
DIFFERENTIAL METHOD BLD: ABNORMAL
EOSINOPHIL # BLD: 0.3 K/UL (ref 0–0.4)
EOSINOPHIL NFR BLD: 2 % (ref 0–7)
EPITH CASTS URNS QL MICRO: ABNORMAL /LPF
ERYTHROCYTE [DISTWIDTH] IN BLOOD BY AUTOMATED COUNT: 16.1 % (ref 11.5–14.5)
FLUAV AG NPH QL IA: NEGATIVE
FLUBV AG NOSE QL IA: NEGATIVE
GLOBULIN SER CALC-MCNC: 5.2 G/DL (ref 2–4)
GLUCOSE BLD-MCNC: 124 MG/DL (ref 65–100)
GLUCOSE SERPL-MCNC: 125 MG/DL (ref 65–100)
GLUCOSE UR STRIP.AUTO-MCNC: NEGATIVE MG/DL
HCT VFR BLD AUTO: 36.8 % (ref 35–47)
HGB BLD-MCNC: 12.1 G/DL (ref 11.5–16)
HGB UR QL STRIP: ABNORMAL
HYALINE CASTS URNS QL MICRO: ABNORMAL /LPF (ref 0–2)
IMM GRANULOCYTES # BLD AUTO: 0.3 K/UL (ref 0–0.04)
IMM GRANULOCYTES NFR BLD AUTO: 2 % (ref 0–0.5)
KETONES UR QL STRIP.AUTO: 15 MG/DL
LEUKOCYTE ESTERASE UR QL STRIP.AUTO: ABNORMAL
LYMPHOCYTES # BLD: 1.2 K/UL (ref 0.8–3.5)
LYMPHOCYTES NFR BLD: 8 % (ref 12–49)
MCH RBC QN AUTO: 26 PG (ref 26–34)
MCHC RBC AUTO-ENTMCNC: 32.9 G/DL (ref 30–36.5)
MCV RBC AUTO: 79 FL (ref 80–99)
MONOCYTES # BLD: 0.9 K/UL (ref 0–1)
MONOCYTES NFR BLD: 6 % (ref 5–13)
NEUTS SEG # BLD: 12.8 K/UL (ref 1.8–8)
NEUTS SEG NFR BLD: 81 % (ref 32–75)
NITRITE UR QL STRIP.AUTO: NEGATIVE
NRBC # BLD: 0 K/UL (ref 0–0.01)
NRBC BLD-RTO: 0 PER 100 WBC
PH UR STRIP: 7 (ref 5–8)
PLATELET # BLD AUTO: 347 K/UL (ref 150–400)
PMV BLD AUTO: 10.4 FL (ref 8.9–12.9)
POTASSIUM SERPL-SCNC: 3.6 MMOL/L (ref 3.5–5.1)
PROT SERPL-MCNC: 8 G/DL (ref 6.4–8.2)
PROT UR STRIP-MCNC: 30 MG/DL
RBC # BLD AUTO: 4.66 M/UL (ref 3.8–5.2)
RBC #/AREA URNS HPF: ABNORMAL /HPF (ref 0–5)
SARS-COV-2 RDRP RESP QL NAA+PROBE: NOT DETECTED
SERVICE CMNT-IMP: ABNORMAL
SODIUM SERPL-SCNC: 135 MMOL/L (ref 136–145)
SOURCE, COVRS: NORMAL
SP GR UR REFRACTOMETRY: 1.01
TROPONIN I SERPL HS-MCNC: 7 NG/L (ref 0–51)
UA: UC IF INDICATED,UAUC: ABNORMAL
UROBILINOGEN UR QL STRIP.AUTO: 2 EU/DL (ref 0.2–1)
WBC # BLD AUTO: 15.5 K/UL (ref 3.6–11)
WBC URNS QL MICRO: >100 /HPF (ref 0–4)

## 2023-03-23 PROCEDURE — 82962 GLUCOSE BLOOD TEST: CPT

## 2023-03-23 PROCEDURE — 87147 CULTURE TYPE IMMUNOLOGIC: CPT

## 2023-03-23 PROCEDURE — 83605 ASSAY OF LACTIC ACID: CPT

## 2023-03-23 PROCEDURE — 71045 X-RAY EXAM CHEST 1 VIEW: CPT

## 2023-03-23 PROCEDURE — 87040 BLOOD CULTURE FOR BACTERIA: CPT

## 2023-03-23 PROCEDURE — 93005 ELECTROCARDIOGRAM TRACING: CPT

## 2023-03-23 PROCEDURE — 74011250637 HC RX REV CODE- 250/637: Performed by: EMERGENCY MEDICINE

## 2023-03-23 PROCEDURE — 87635 SARS-COV-2 COVID-19 AMP PRB: CPT

## 2023-03-23 PROCEDURE — 80053 COMPREHEN METABOLIC PANEL: CPT

## 2023-03-23 PROCEDURE — 74011250636 HC RX REV CODE- 250/636: Performed by: EMERGENCY MEDICINE

## 2023-03-23 PROCEDURE — 99285 EMERGENCY DEPT VISIT HI MDM: CPT

## 2023-03-23 PROCEDURE — 74011000636 HC RX REV CODE- 636: Performed by: EMERGENCY MEDICINE

## 2023-03-23 PROCEDURE — 85025 COMPLETE CBC W/AUTO DIFF WBC: CPT

## 2023-03-23 PROCEDURE — 84145 PROCALCITONIN (PCT): CPT

## 2023-03-23 PROCEDURE — 87086 URINE CULTURE/COLONY COUNT: CPT

## 2023-03-23 PROCEDURE — 71275 CT ANGIOGRAPHY CHEST: CPT

## 2023-03-23 PROCEDURE — 36415 COLL VENOUS BLD VENIPUNCTURE: CPT

## 2023-03-23 PROCEDURE — 96365 THER/PROPH/DIAG IV INF INIT: CPT

## 2023-03-23 PROCEDURE — 74011000258 HC RX REV CODE- 258: Performed by: EMERGENCY MEDICINE

## 2023-03-23 PROCEDURE — 84484 ASSAY OF TROPONIN QUANT: CPT

## 2023-03-23 PROCEDURE — 87804 INFLUENZA ASSAY W/OPTIC: CPT

## 2023-03-23 PROCEDURE — 81001 URINALYSIS AUTO W/SCOPE: CPT

## 2023-03-23 PROCEDURE — 83880 ASSAY OF NATRIURETIC PEPTIDE: CPT

## 2023-03-23 RX ORDER — LEVOFLOXACIN 5 MG/ML
750 INJECTION, SOLUTION INTRAVENOUS
Status: COMPLETED | OUTPATIENT
Start: 2023-03-23 | End: 2023-03-24

## 2023-03-23 RX ORDER — ACETAMINOPHEN 500 MG
1000 TABLET ORAL
Status: COMPLETED | OUTPATIENT
Start: 2023-03-23 | End: 2023-03-23

## 2023-03-23 RX ORDER — SODIUM CHLORIDE 0.9 % (FLUSH) 0.9 %
5-10 SYRINGE (ML) INJECTION AS NEEDED
Status: DISCONTINUED | OUTPATIENT
Start: 2023-03-23 | End: 2023-03-27 | Stop reason: HOSPADM

## 2023-03-23 RX ADMIN — ACETAMINOPHEN 1000 MG: 500 TABLET ORAL at 21:27

## 2023-03-23 RX ADMIN — SODIUM CHLORIDE 1000 ML: 9 INJECTION, SOLUTION INTRAVENOUS at 21:27

## 2023-03-23 RX ADMIN — CEFEPIME 2 G: 2 INJECTION, POWDER, FOR SOLUTION INTRAVENOUS at 23:36

## 2023-03-23 RX ADMIN — IOMEPROL INJECTION 100 ML: 714 INJECTION, SOLUTION INTRAVASCULAR at 22:43

## 2023-03-24 ENCOUNTER — APPOINTMENT (OUTPATIENT)
Dept: ULTRASOUND IMAGING | Age: 78
DRG: 193 | End: 2023-03-24
Attending: INTERNAL MEDICINE
Payer: MEDICARE

## 2023-03-24 PROBLEM — J18.9 PNEUMONIA: Status: ACTIVE | Noted: 2023-03-24

## 2023-03-24 LAB — PROCALCITONIN SERPL-MCNC: <0.05 NG/ML

## 2023-03-24 PROCEDURE — 74011250636 HC RX REV CODE- 250/636: Performed by: STUDENT IN AN ORGANIZED HEALTH CARE EDUCATION/TRAINING PROGRAM

## 2023-03-24 PROCEDURE — 65270000029 HC RM PRIVATE

## 2023-03-24 PROCEDURE — 74011000250 HC RX REV CODE- 250: Performed by: INTERNAL MEDICINE

## 2023-03-24 PROCEDURE — 94761 N-INVAS EAR/PLS OXIMETRY MLT: CPT

## 2023-03-24 PROCEDURE — 74011250636 HC RX REV CODE- 250/636: Performed by: EMERGENCY MEDICINE

## 2023-03-24 PROCEDURE — 94640 AIRWAY INHALATION TREATMENT: CPT

## 2023-03-24 PROCEDURE — 76705 ECHO EXAM OF ABDOMEN: CPT

## 2023-03-24 PROCEDURE — 77010033678 HC OXYGEN DAILY

## 2023-03-24 PROCEDURE — 74011250637 HC RX REV CODE- 250/637: Performed by: STUDENT IN AN ORGANIZED HEALTH CARE EDUCATION/TRAINING PROGRAM

## 2023-03-24 PROCEDURE — 74011000250 HC RX REV CODE- 250: Performed by: STUDENT IN AN ORGANIZED HEALTH CARE EDUCATION/TRAINING PROGRAM

## 2023-03-24 PROCEDURE — 74011000258 HC RX REV CODE- 258: Performed by: STUDENT IN AN ORGANIZED HEALTH CARE EDUCATION/TRAINING PROGRAM

## 2023-03-24 RX ORDER — PANTOPRAZOLE SODIUM 40 MG/1
40 TABLET, DELAYED RELEASE ORAL
Status: DISCONTINUED | OUTPATIENT
Start: 2023-03-24 | End: 2023-03-27 | Stop reason: HOSPADM

## 2023-03-24 RX ORDER — PROMETHAZINE HYDROCHLORIDE 25 MG/1
12.5 TABLET ORAL
Status: DISCONTINUED | OUTPATIENT
Start: 2023-03-24 | End: 2023-03-27 | Stop reason: HOSPADM

## 2023-03-24 RX ORDER — BENZONATATE 100 MG/1
100 CAPSULE ORAL
Status: DISCONTINUED | OUTPATIENT
Start: 2023-03-24 | End: 2023-03-27 | Stop reason: HOSPADM

## 2023-03-24 RX ORDER — ARFORMOTEROL TARTRATE 15 UG/2ML
15 SOLUTION RESPIRATORY (INHALATION)
Status: DISCONTINUED | OUTPATIENT
Start: 2023-03-24 | End: 2023-03-27 | Stop reason: HOSPADM

## 2023-03-24 RX ORDER — METOPROLOL TARTRATE 50 MG/1
50 TABLET ORAL 2 TIMES DAILY
Status: DISCONTINUED | OUTPATIENT
Start: 2023-03-24 | End: 2023-03-27 | Stop reason: HOSPADM

## 2023-03-24 RX ORDER — ATORVASTATIN CALCIUM 40 MG/1
40 TABLET, FILM COATED ORAL DAILY
Status: DISCONTINUED | OUTPATIENT
Start: 2023-03-24 | End: 2023-03-27 | Stop reason: HOSPADM

## 2023-03-24 RX ORDER — GUAIFENESIN 600 MG/1
600 TABLET, EXTENDED RELEASE ORAL 2 TIMES DAILY
Status: DISCONTINUED | OUTPATIENT
Start: 2023-03-24 | End: 2023-03-27 | Stop reason: HOSPADM

## 2023-03-24 RX ORDER — ACETAMINOPHEN 325 MG/1
650 TABLET ORAL
Status: DISCONTINUED | OUTPATIENT
Start: 2023-03-24 | End: 2023-03-27 | Stop reason: HOSPADM

## 2023-03-24 RX ORDER — SODIUM CHLORIDE 0.9 % (FLUSH) 0.9 %
5-40 SYRINGE (ML) INJECTION EVERY 8 HOURS
Status: DISCONTINUED | OUTPATIENT
Start: 2023-03-24 | End: 2023-03-27 | Stop reason: HOSPADM

## 2023-03-24 RX ORDER — POLYETHYLENE GLYCOL 3350 17 G/17G
17 POWDER, FOR SOLUTION ORAL DAILY PRN
Status: DISCONTINUED | OUTPATIENT
Start: 2023-03-24 | End: 2023-03-27 | Stop reason: HOSPADM

## 2023-03-24 RX ORDER — ENOXAPARIN SODIUM 100 MG/ML
40 INJECTION SUBCUTANEOUS DAILY
Status: DISCONTINUED | OUTPATIENT
Start: 2023-03-24 | End: 2023-03-27 | Stop reason: HOSPADM

## 2023-03-24 RX ORDER — ONDANSETRON 2 MG/ML
4 INJECTION INTRAMUSCULAR; INTRAVENOUS
Status: DISCONTINUED | OUTPATIENT
Start: 2023-03-24 | End: 2023-03-27 | Stop reason: HOSPADM

## 2023-03-24 RX ORDER — ALBUTEROL SULFATE 0.83 MG/ML
1.25 SOLUTION RESPIRATORY (INHALATION)
Status: DISCONTINUED | OUTPATIENT
Start: 2023-03-24 | End: 2023-03-27

## 2023-03-24 RX ORDER — SODIUM CHLORIDE 0.9 % (FLUSH) 0.9 %
5-40 SYRINGE (ML) INJECTION AS NEEDED
Status: DISCONTINUED | OUTPATIENT
Start: 2023-03-24 | End: 2023-03-27 | Stop reason: HOSPADM

## 2023-03-24 RX ORDER — AMITRIPTYLINE HYDROCHLORIDE 25 MG/1
100 TABLET, FILM COATED ORAL
Status: DISCONTINUED | OUTPATIENT
Start: 2023-03-24 | End: 2023-03-27 | Stop reason: HOSPADM

## 2023-03-24 RX ORDER — LOSARTAN POTASSIUM 50 MG/1
50 TABLET ORAL DAILY
Status: DISCONTINUED | OUTPATIENT
Start: 2023-03-24 | End: 2023-03-27 | Stop reason: HOSPADM

## 2023-03-24 RX ORDER — VANCOMYCIN 1.75 GRAM/500 ML IN 0.9 % SODIUM CHLORIDE INTRAVENOUS
1750 ONCE
Status: COMPLETED | OUTPATIENT
Start: 2023-03-24 | End: 2023-03-24

## 2023-03-24 RX ADMIN — ENOXAPARIN SODIUM 40 MG: 100 INJECTION SUBCUTANEOUS at 09:15

## 2023-03-24 RX ADMIN — SODIUM CHLORIDE, PRESERVATIVE FREE 10 ML: 5 INJECTION INTRAVENOUS at 01:37

## 2023-03-24 RX ADMIN — ALBUTEROL SULFATE: 2.5 SOLUTION RESPIRATORY (INHALATION) at 20:04

## 2023-03-24 RX ADMIN — PANTOPRAZOLE SODIUM 40 MG: 40 TABLET, DELAYED RELEASE ORAL at 09:13

## 2023-03-24 RX ADMIN — ALBUTEROL SULFATE 1.25 MG: 2.5 SOLUTION RESPIRATORY (INHALATION) at 07:44

## 2023-03-24 RX ADMIN — AMITRIPTYLINE HYDROCHLORIDE 100 MG: 25 TABLET, FILM COATED ORAL at 00:47

## 2023-03-24 RX ADMIN — METHYLPREDNISOLONE SODIUM SUCCINATE 40 MG: 40 INJECTION, POWDER, FOR SOLUTION INTRAMUSCULAR; INTRAVENOUS at 00:47

## 2023-03-24 RX ADMIN — GUAIFENESIN 600 MG: 600 TABLET, EXTENDED RELEASE ORAL at 08:58

## 2023-03-24 RX ADMIN — SODIUM CHLORIDE 1 G: 900 INJECTION INTRAVENOUS at 22:48

## 2023-03-24 RX ADMIN — METHYLPREDNISOLONE SODIUM SUCCINATE 40 MG: 40 INJECTION, POWDER, FOR SOLUTION INTRAMUSCULAR; INTRAVENOUS at 14:45

## 2023-03-24 RX ADMIN — VANCOMYCIN HYDROCHLORIDE 750 MG: 750 INJECTION, POWDER, LYOPHILIZED, FOR SOLUTION INTRAVENOUS at 14:42

## 2023-03-24 RX ADMIN — SODIUM CHLORIDE, PRESERVATIVE FREE 10 ML: 5 INJECTION INTRAVENOUS at 16:44

## 2023-03-24 RX ADMIN — GUAIFENESIN 600 MG: 600 TABLET, EXTENDED RELEASE ORAL at 19:06

## 2023-03-24 RX ADMIN — AMITRIPTYLINE HYDROCHLORIDE 100 MG: 25 TABLET, FILM COATED ORAL at 21:21

## 2023-03-24 RX ADMIN — SODIUM CHLORIDE, PRESERVATIVE FREE 10 ML: 5 INJECTION INTRAVENOUS at 21:22

## 2023-03-24 RX ADMIN — ARFORMOTEROL TARTRATE 15 MCG: 15 SOLUTION RESPIRATORY (INHALATION) at 20:04

## 2023-03-24 RX ADMIN — LEVOFLOXACIN 750 MG: 5 INJECTION, SOLUTION INTRAVENOUS at 00:10

## 2023-03-24 RX ADMIN — GABAPENTIN 800 MG: 300 CAPSULE ORAL at 16:43

## 2023-03-24 RX ADMIN — AZITHROMYCIN MONOHYDRATE 500 MG: 500 INJECTION, POWDER, LYOPHILIZED, FOR SOLUTION INTRAVENOUS at 09:51

## 2023-03-24 RX ADMIN — VANCOMYCIN HYDROCHLORIDE 1750 MG: 10 INJECTION, POWDER, LYOPHILIZED, FOR SOLUTION INTRAVENOUS at 03:03

## 2023-03-24 RX ADMIN — ALBUTEROL SULFATE 1.25 MG: 2.5 SOLUTION RESPIRATORY (INHALATION) at 14:29

## 2023-03-24 RX ADMIN — METOPROLOL TARTRATE 50 MG: 50 TABLET ORAL at 18:55

## 2023-03-24 RX ADMIN — ALBUTEROL SULFATE: 2.5 SOLUTION RESPIRATORY (INHALATION) at 01:59

## 2023-03-24 RX ADMIN — ARFORMOTEROL TARTRATE 15 MCG: 15 SOLUTION RESPIRATORY (INHALATION) at 07:48

## 2023-03-24 RX ADMIN — METOPROLOL TARTRATE 50 MG: 50 TABLET ORAL at 08:58

## 2023-03-24 RX ADMIN — LOSARTAN POTASSIUM 50 MG: 50 TABLET, FILM COATED ORAL at 09:13

## 2023-03-24 RX ADMIN — GABAPENTIN 800 MG: 300 CAPSULE ORAL at 09:14

## 2023-03-24 RX ADMIN — GABAPENTIN 800 MG: 300 CAPSULE ORAL at 21:21

## 2023-03-24 RX ADMIN — SODIUM CHLORIDE, PRESERVATIVE FREE 10 ML: 5 INJECTION INTRAVENOUS at 05:19

## 2023-03-24 RX ADMIN — METHYLPREDNISOLONE SODIUM SUCCINATE 40 MG: 40 INJECTION, POWDER, FOR SOLUTION INTRAMUSCULAR; INTRAVENOUS at 21:21

## 2023-03-24 RX ADMIN — METHYLPREDNISOLONE SODIUM SUCCINATE 40 MG: 40 INJECTION, POWDER, FOR SOLUTION INTRAMUSCULAR; INTRAVENOUS at 05:18

## 2023-03-24 NOTE — ED PROVIDER NOTES
[facility] EMERGENCY DEPT  EMERGENCY DEPARTMENT ENCOUNTER       Pt Name: Amilcar Ramírez  MRN: [id number]  [place] 1945  Date of evaluation: 3/23/2023  Provider: Marlene Craven MD   PCP: Ramez Cordero MD  Note Started: 9:19 PM 3/23/23     CHIEF COMPLAINT       Chief Complaint   Patient presents with    Cough    Shortness of Breath        HISTORY OF PRESENT ILLNESS: 1 or more elements      History From: Patient and Patient's   HPI Limitations : None     Amilcar Ramírez is a 76 y.o. female who presents with sob and an intermittent dry cough, fatigue, chills, chest congestion. Both her and her  developed a cough and chest congestion about 1.5 weeks ago,  since has recovered but patient reports that her symptoms have gradually become worse. Tonight she felt very sob, referred to the ed when she called pcp      Please see more comprehensive history below under MDM  Nursing Notes were all reviewed in real time as they are made available. Any disagreements addressed in the HPI/MDM. REVIEW OF SYSTEMS      Review of Systems   Constitutional:  Positive for chills and fever. HENT:  Negative for congestion and sore throat. Respiratory:  Positive for cough and shortness of breath. Negative for wheezing. Cardiovascular:  Negative for chest pain, palpitations and leg swelling. Gastrointestinal:  Negative for abdominal pain, diarrhea, nausea and vomiting. Genitourinary:  Positive for urgency. Negative for dysuria, flank pain and hematuria. Musculoskeletal:  Negative for back pain. Skin:  Negative for rash. Neurological:  Negative for syncope and light-headedness. Psychiatric/Behavioral:  Negative for confusion. Positives and Pertinent negatives as per HPI and MDM. PAST HISTORY     Past Medical History:  Past Medical History:   Diagnosis Date    Abnormal chest x-ray 12/23/2009    Anemia 12/23/2009    Arrhythmia     SVT.  Converts with Adenocard    Atrial fibrillation ([facility] [address] [facility] 2009    Carpal tunnel syndrome 2009    Chronic obstructive pulmonary disease ([facility]     Colitis 2009    Complex regional pain syndrome 2009    Depression 2009    Diabetes ([facility]     no meds    Family history of skin cancer     brother-melanoma    GERD (gastroesophageal reflux disease) 2010    HTN (hypertension) 2009    Hypercholesteremia 2009    Hypertriglyceridemia 2009    Idiopathic peripheral neuropathy 2009    Iron deficiency anemia 2009    Osteopenia 2009    Paroxysmal atrial fibrillation ([facility] 2011    Psoriasis 2012    Sarcoidosis        Past Surgical History:  Past Surgical History:   Procedure Laterality Date    HX CARPAL TUNNEL RELEASE      Both hands    HX CYST REMOVAL      Left wrist    HX HYSTERECTOMY      HX ORTHOPAEDIC      back surgery (disc)    HX ORTHOPAEDIC      right foot X 3 neuromas and tarsal tunnel release    HX TONSILLECTOMY      PAIN PUMP DEVICE      in left lower abdomen (for foot pain)    [place] UNLISTED NEUROLOGICAL/NEUROMUSCULAR DX PX      Pain pump       Family History:  Family History   Problem Relation Age of Onset    Arrhythmia Father     Heart Attack Father     Cancer Brother         Melanoma    Breast Cancer Daughter         48    Breast Cancer Daughter 46    Breast Cancer Daughter 46       Social History:  Social History     Tobacco Use    Smoking status: Former     Packs/day: 0.10     Years: 2.00     Pack years: 0.20     Types: Cigarettes     Quit date: 1975     Years since quittin.2    Smokeless tobacco: Never   Vaping Use    Vaping Use: Never used   Substance Use Topics    Alcohol use: No    Drug use: No       Allergies:   Allergies   Allergen Reactions    Duragesic [Fentanyl] Other (comments)     Patient sees things when taking    Codeine Nausea Only       CURRENT MEDICATIONS      Previous Medications    AMITRIPTYLINE (ELAVIL) 100 MG TABLET    TAKE 1 TABLET EVERY NIGHT    ATORVASTATIN (LIPITOR) 40 MG TABLET    Take 1 Tablet by mouth daily. Indications: high cholesterol    DICLOFENAC EC (VOLTAREN) 75 MG EC TABLET    Take 1 Tablet by mouth two (2) times a day. GABAPENTIN (NEURONTIN) 800 MG TABLET    Take 1 Tablet by mouth three (3) times daily. Max Daily Amount: 2,400 mg. HYDROCHLOROTHIAZIDE (HYDRODIURIL) 12.5 MG TABLET    TAKE 1 TABLET BY MOUTH  DAILY    LOSARTAN (COZAAR) 50 MG TABLET    Take 1 Tablet by mouth daily. METOPROLOL TARTRATE (LOPRESSOR) 50 MG TABLET    TAKE 1 TAB BY MOUTH TWO (2) TIMES A DAY. OMEPRAZOLE (PRILOSEC) 20 MG CAPSULE    Take 20 mg by mouth daily. PHYSICAL EXAM      ED Triage Vitals [03/23/23 2009]   ED Encounter Vitals Group      /81      Pulse (Heart Rate) (!) 126      Resp Rate 18      Temp 99.9 °F (37.7 °C)      Temp src       O2 Sat (%) 91 %      Weight 150 lb      Height 5' 7\"        Physical Exam  Vitals and nursing note reviewed. Constitutional:       Appearance: She is ill-appearing. She is not diaphoretic. HENT:      Mouth/Throat:      Pharynx: Oropharynx is clear. Comments: Dry mucus membranes  Neck:      Vascular: No JVD. Cardiovascular:      Rate and Rhythm: Regular rhythm. Tachycardia present. Pulses: Normal pulses. Heart sounds: Normal heart sounds. Pulmonary:      Effort: Pulmonary effort is normal.      Breath sounds: Examination of the right-lower field reveals decreased breath sounds. Examination of the left-lower field reveals decreased breath sounds. Decreased breath sounds present. No wheezing. Comments: Coarse breath sounds bilaterally  Chest:      Chest wall: No tenderness. Abdominal:      General: Bowel sounds are normal.      Palpations: Abdomen is soft. Tenderness: There is no abdominal tenderness. Musculoskeletal:         General: Normal range of motion. Cervical back: Normal range of motion and neck supple.       Right lower leg: No tenderness. No edema. Left lower leg: No tenderness. No edema. Lymphadenopathy:      Cervical: No cervical adenopathy. Skin:     General: Skin is warm and dry. Capillary Refill: Capillary refill takes less than 2 seconds. Findings: No erythema. Neurological:      Mental Status: She is alert and oriented to person, place, and time. DIAGNOSTIC RESULTS   LABS:     Recent Results (from the past 24 hour(s))   EKG, 12 LEAD, INITIAL    Collection Time: 03/23/23  8:20 PM   Result Value Ref Range    Ventricular Rate 123 BPM    Atrial Rate 123 BPM    P-R Interval 156 ms    QRS Duration 94 ms    Q-T Interval 306 ms    QTC Calculation (Bezet) 438 ms    Calculated P Axis 50 degrees    Calculated R Axis -44 degrees    Calculated T Axis 50 degrees    Diagnosis       Sinus tachycardia  Left axis deviation  Voltage criteria for left ventricular hypertrophy  When compared with ECG of 18-JUL-2018 23:11,  Vent. rate has increased BY  45 BPM  Nonspecific T wave abnormality no longer evident in Inferior leads     CBC WITH AUTOMATED DIFF    Collection Time: 03/23/23  8:26 PM   Result Value Ref Range    WBC 15.5 (H) 3.6 - 11.0 K/uL    RBC 4.66 3.80 - 5.20 M/uL    HGB 12.1 11.5 - 16.0 g/dL    HCT 36.8 35.0 - 47.0 %    MCV 79.0 (L) 80.0 - 99.0 FL    MCH 26.0 26.0 - 34.0 PG    MCHC 32.9 30.0 - 36.5 g/dL    RDW 16.1 (H) 11.5 - 14.5 %    PLATELET 647 036 - 353 K/uL    MPV 10.4 8.9 - 12.9 FL    NRBC 0.0 0  WBC    ABSOLUTE NRBC 0.00 0.00 - 0.01 K/uL    NEUTROPHILS 81 (H) 32 - 75 %    LYMPHOCYTES 8 (L) 12 - 49 %    MONOCYTES 6 5 - 13 %    EOSINOPHILS 2 0 - 7 %    BASOPHILS 1 0 - 1 %    IMMATURE GRANULOCYTES 2 (H) 0.0 - 0.5 %    ABS. NEUTROPHILS 12.8 (H) 1.8 - 8.0 K/UL    ABS. LYMPHOCYTES 1.2 0.8 - 3.5 K/UL    ABS. MONOCYTES 0.9 0.0 - 1.0 K/UL    ABS. EOSINOPHILS 0.3 0.0 - 0.4 K/UL    ABS. BASOPHILS 0.1 0.0 - 0.1 K/UL    ABS. IMM.  GRANS. 0.3 (H) 0.00 - 0.04 K/UL    DF AUTOMATED     METABOLIC PANEL, COMPREHENSIVE    Collection Time: 03/23/23  8:26 PM   Result Value Ref Range    Sodium 135 (L) 136 - 145 mmol/L    Potassium 3.6 3.5 - 5.1 mmol/L    Chloride 99 97 - 108 mmol/L    CO2 28 21 - 32 mmol/L    Anion gap 8 5 - 15 mmol/L    Glucose 125 (H) 65 - 100 mg/dL    BUN 12 6 - 20 MG/DL    Creatinine 0.72 0.55 - 1.02 MG/DL    BUN/Creatinine ratio 17 12 - 20      eGFR >60 >60 ml/min/1.73m2    Calcium 9.0 8.5 - 10.1 MG/DL    Bilirubin, total 0.8 0.2 - 1.0 MG/DL    ALT (SGPT) 120 (H) 12 - 78 U/L    AST (SGOT) 135 (H) 15 - 37 U/L    Alk.  phosphatase 116 45 - 117 U/L    Protein, total 8.0 6.4 - 8.2 g/dL    Albumin 2.8 (L) 3.5 - 5.0 g/dL    Globulin 5.2 (H) 2.0 - 4.0 g/dL    A-G Ratio 0.5 (L) 1.1 - 2.2     TROPONIN-HIGH SENSITIVITY    Collection Time: 03/23/23  8:26 PM   Result Value Ref Range    Troponin-High Sensitivity 7 0 - 51 ng/L   NT-PRO BNP    Collection Time: 03/23/23  8:26 PM   Result Value Ref Range    NT pro- (H) <450 PG/ML   COVID-19 RAPID TEST    Collection Time: 03/23/23  8:51 PM   Result Value Ref Range    Specimen source Nasopharyngeal      COVID-19 rapid test Not detected NOTD     GLUCOSE, POC    Collection Time: 03/23/23  9:33 PM   Result Value Ref Range    Glucose (POC) 124 (H) 65 - 100 mg/dL    Performed by Vivian OLIVA    INFLUENZA A+B VIRAL AGS    Collection Time: 03/23/23  9:48 PM   Result Value Ref Range    Influenza A Antigen Negative NEG      Influenza B Antigen Negative NEG     URINALYSIS W/ REFLEX CULTURE    Collection Time: 03/23/23 10:08 PM    Specimen: Urine   Result Value Ref Range    Color YELLOW/STRAW      Appearance CLOUDY (A) CLEAR      Specific gravity 1.011      pH (UA) 7.0 5.0 - 8.0      Protein 30 (A) NEG mg/dL    Glucose Negative NEG mg/dL    Ketone 15 (A) NEG mg/dL    Bilirubin Negative NEG      Blood TRACE (A) NEG      Urobilinogen 2.0 (H) 0.2 - 1.0 EU/dL    Nitrites Negative NEG      Leukocyte Esterase LARGE (A) NEG      UA:UC IF INDICATED URINE CULTURE ORDERED (A) CNI      WBC >100 (H) 0 - 4 /hpf    RBC 0-5 0 - 5 /hpf    Epithelial cells MODERATE (A) FEW /lpf    Bacteria 1+ (A) NEG /hpf    Hyaline cast 0-2 0 - 2 /lpf           RADIOLOGY:  Non-plain film images such as CT, Ultrasound and MRI are read by the radiologist. Plain radiographic images are visualized and preliminarily interpreted by the ED Provider with the below findings:     Cxr reviewed as soon as images were available. No acute process identified. Final radiology read to follow and will be copied below. Interpretation per the Radiologist below, if available at the time of this note:     CTA CHEST W OR W WO CONT   Final Result   Interstitial infiltrate right upper lobe. Left lower lobe consolidation. No   evidence of pulmonary embolism. XR CHEST PORT   Final Result      Chronic pulmonary scarring. No acute process on portable chest. No significant   change since 2018 given difference in technique. PROCEDURES       SCREENINGS       CRITICAL CARE TIME   CRITICAL CARE NOTE (does not include separately billable procedure time)::    IMPENDING DETERIORATION -Respiratory and Cardiovascular  ASSOCIATED RISK FACTORS - Hypotension, Hypoxia, Dysrhythmia, and Metabolic changes  MANAGEMENT- Bedside Assessment and Supervision of Care  INTERPRETATION -  Xrays, CT Scan, ECG, and Blood Pressure  INTERVENTIONS - hemodynamic mngmt and Metobolic interventions  CASE REVIEW - Hospitalist/Intensivist, Nursing, and Family  TREATMENT RESPONSE -Improved  PERFORMED BY - Self    NOTES   :    I have spent 55 minutes of critical care time involved in lab review, consultations with specialist, family decision- making, bedside attention and documentation. During this entire length of time I was immediately available to the patient. (This does not include time spent on performing separately billable procedures)    Critical Care:   The reason for providing this level of medical care for this critically ill patient was due to a critical illness that impaired one or more vital organ systems, such that there was a high probability of imminent or life threatening deterioration in the patient's condition. This care involved high complexity decision making to assess, manipulate, and support vital system functions, to treat this degree of vital organ system failure, and to prevent further life threatening deterioration of the patients condition. Elizabet Gomez MD       MEDICAL DECISION MAKING     Vitals:    Vitals:    03/23/23 2009 03/23/23 2111   BP: 113/81 (!) 144/98   Pulse: (!) 126 (!) 117   Resp: 18 17   Temp: 99.9 °F (37.7 °C) 100.2 °F (37.9 °C)   SpO2: 91% 94%   Weight: 68 kg (150 lb)    Height: 5' 7\" (1.702 m)        Pertinent Chronic Medical Conditions or Prior Surgeries: Listed under PMH above and includes dm, htn, hld, copd, paroxysmal afib    Social Determinants affecting Dx or Tx: None    Records Reviewed: Prior medical records, Previous Radiology studies, Previous EKGs, and Nursing notes  I reviewed and interpreted the nursing notes and and vital signs from today's visit, as well as the electronic medical record system for any external medical records that were available that may contribute to the patients current condition. Nursing notes will be reviewed and interpreted by me as they become available in realtime while the pt has been in the ED. Records reviewed include   - last stress test in 2018, dr. aZkia Dewitt, normal    Independent history obtained from . Confirms history as detailed in hpi. EKG interpretation by ED physician in the absence of a cardiologist: Rhythm: sinus; and regular . Rate (approx.): 122; ST/T wave: nml; Other intervals normal. This and prior available ECGs have been viewed and interpreted by me personally.  Elizabet Gomez MD, Msc    Cardiac monitor interpretation by ED physician:    Sinus tachycardia, regular  Pulse ox interpretation by ED physician:   83-84% on ra, acute hypoxemic respiratory failure. 94% on 4L o2.    CC/HPI Summary, DDx, MDM, ED Course, and Reassessment:   Patient presents with a dry cough, chest congestion, fatigue, chills. Symptoms started a little over a week ago but seem to have worsened over the past few days. Medical  history is as documented above and includes copd. At baseline, she does not require o2, however. In the ed, patient found to be tachycardic, spiked a temp to 100.2, acute hypoxemic respiratory failure with o2 sat of 83% on ra, now stable in mid 90s while on 4L. On exam, she appears ill and fatigued. Euvolemic to slightly hypovolemic. Normal mental status. Well perfused. Lungs with coarse breath sounds and diminished breath sounds at bases. No wheezing. Ddx: bacterial vs viral pneumonia, PE, pleural effusion, less likely chf or acs. Also considered copd exacerbation but patient is not wheezing on exam and productive cough more suggestive of infectious etiology. ED Course as of 03/23/23 2309   Thu Mar 23, 2023   2128 02 sat in mid 90s and stable on 4 L of o2 via NC, sinus tach may be due to intravascular fluid loss vs temp but considering also PE so cta chest ordered. Labs available so far significant for:  Leukocytosis 15.5 with left shift  Trop 7  Bnp elevated to 595 but clinically patient appears hypovolemic to euvolemic. No orthopnea, LE edema or pnd. No edema on cxr. Will therefore proceed with gentle fluids but avoid large volumes. [TZ]   2332       CTA chest shows infiltrate in left upper lobe, consolidation in RLL cw bilateral pneumonia. Poc lactate and procal still pending. UA also concerning for acute cystitis. - abx orderd which should cover for both  - cultures sent  - patient feeling better, tachycardia improved, however, she is still dependent on supplementary o2, will admit for further mgmt.       ED Course User Index  [TZ] Jack Mcdonald MD     Consult Note:  Yash Stover MD spoke with  dr. Marva Barraza, hospitalist service, Discussed pt's hx, physical exam and available diagnostic and imaging results. Reviewed care plans. Agree with management and plan thus far. No other recommendations. Will admit to their service. ED [facility] ED SEPSIS NOTE:     9:19 PM The patient now meets criteria for: Severe Sepsis    Fluid resuscitation with: Patient does not require a 30cc/kg bolus because they do not meet criteria for septic shock (SBP<90 or decreased by >40 mmHG from baseline, MAP<65, Lactate 4 or greater). Due to concern for rapidly advancing infection and deterioration of patient's condition, antibiotics are started STAT and cultures ordered. Medical Decision Making  Amount and/or Complexity of Data Reviewed  Independent Historian: spouse     Details: details in hpi and mdm  External Data Reviewed: ECG and notes. Details: details in mdm  Labs: ordered. Decision-making details documented in ED Course. Radiology: ordered and independent interpretation performed. Decision-making details documented in ED Course. ECG/medicine tests: ordered and independent interpretation performed. Decision-making details documented in ED Course. Risk  OTC drugs. Prescription drug management. Drug therapy requiring intensive monitoring for toxicity. Decision regarding hospitalization. Critical Care  Total time providing critical care: 30-74 minutes        Disposition Considerations and Planning:  I have discussed with the patient and/or caregiver my initial clinical impression which is based on an evidence-based clinical evaluation of the patient and interpretation of available results. Involved patient and/or caregiver in management, treatment options and final disposition. Patient and/or caregiver verbalizes understanding and agreement.     ED Medications Administered  Medications   sodium chloride (NS) flush 5-10 mL (has no administration in time range)   cefepime (MAXIPIME) 2 g in 0.9% sodium chloride (MBP/ADV) 100 mL MBP (has no administration in time range)   levoFLOXacin (LEVAQUIN) 750 mg in D5W IVPB (has no administration in time range)   acetaminophen (TYLENOL) tablet 1,000 mg (1,000 mg Oral Given 3/23/23 2127)   sodium chloride 0.9 % bolus infusion 1,000 mL (0 mL IntraVENous IV Completed 3/23/23 2227)   iomeprol (IOMERON 350) 350 mg iodine /mL (71.44 %) contrast injection 100 mL (100 mL IntraVENous Given 3/23/23 2243)       ED Orders Placed:  Orders Placed This Encounter    COVID-19 RAPID TEST    CULTURE, BLOOD    CULTURE, BLOOD    CULTURE, URINE    XR CHEST  Port (Per MD Order)    CTA CHEST W OR W WO CONT    CBC WITH AUTOMATED DIFF    METABOLIC PANEL, COMPREHENSIVE    TROPONIN-HIGH SENSITIVITY    NT-PRO BNP    URINALYSIS W/ REFLEX CULTURE    INFLUENZA A+B VIRAL AGS    GLUCOSE, POC    PROCALCITONIN    SOB PANEL TRACKING (DO NOT DESELECT)    OXYGEN CANNULA (To maintain O2 sat greater than 92%) Consult MD if Hx. of COPD    PULSE OXIMETRY SPOT CHECK    VITAL SIGNS    CARDIAC MONITOR - ED ONLY    POC LACTIC ACID (If Available)    POC GLUCOSE    VITAL SIGNS - PER UNIT ROUTINE    STRICT I & O    WEIGH PATIENT    MEASURE HEIGHT    NOTIFY PROVIDER: SPECIFY Notify provider within one hour to start vasopressors if patient is unable to maintain a MAP of greater than or equal to 65 mmHg despite fluid resuscitation  ONE TIME STAT    NEUROLOGIC STATUS ASSESSMENT - PER UNIT ROUTINE    EKG, 12 LEAD, INITIAL    EKG, 12 LEAD, INITIAL    SALINE LOCK IV ONE TIME STAT    SALINE LOCK IV ONE TIME STAT    SALINE LOCK IV ONE TIME STAT    acetaminophen (TYLENOL) tablet 1,000 mg    sodium chloride (NS) flush 5-10 mL    sodium chloride 0.9 % bolus infusion 1,000 mL    iomeprol (IOMERON 350) 350 mg iodine /mL (71.44 %) contrast injection 100 mL    cefepime (MAXIPIME) 2 g in 0.9% sodium chloride (MBP/ADV) 100 mL MBP    levoFLOXacin (LEVAQUIN) 750 mg in D5W IVPB             FINAL IMPRESSION     1. Acute hypoxemic respiratory failure (HCC)    2.  Pneumonia of both lungs due to infectious organism, unspecified part of lung    3. Acute cystitis without hematuria    4. Acute on chronic congestive heart failure, unspecified heart failure type [facility])          DISPOSITION/PLAN     DISPOSITION: ADMIT  Patient is being admitted to the hospital.  Their test results and reasons for admission have been discussed. The patient and/or available family express agreement with and understanding of the need to be admitted and their admission diagnosis. Thank you for resuming the care of this patient. Please don't hesitate to contact me in the emergency department if you  have any additional questions. Dionicio Ramey MD, MSc      I am the Primary Clinician of Record. Link MD Lianna (electronically signed)    (Please note that parts of this dictation were completed with voice recognition software. Quite often unanticipated grammatical, syntax, homophones, and other interpretive errors are inadvertently transcribed by the computer software. Please disregards these errors.  Please excuse any errors that have escaped final proofreading.)

## 2023-03-24 NOTE — ACP (ADVANCE CARE PLANNING)
Advance Care Planning Note      NAME: Asad Dong   :  1945   MRN:  [id number]     Date/Time:  3/24/2023 1:58 PM    Active Diagnoses:  Hospital Problems  Date Reviewed: 2023            Codes Class Noted POA    Pneumonia ICD-10-CM: J18.9  ICD-9-CM: 175  3/24/2023 Unknown       COPD exacerbation  Pneumonia  Acute hypoxic respiratory failure  Hypertension  Acute transaminitis    These active diagnoses are of sufficient risk that focused discussion on advance care planning is indicated in order to allow the patient to thoughtfully consider personal goals of care, and if situations arise that prevent the ability to personally give input, to ensure appropriate representation of their personal desires for different levels and aggressiveness of care. Discussion:   Code status addressed and wants to be a Full Code. Patient wants central line and vasopressors if needed. Patient would also want a feeding tube, if needed, for nutritional support. Patient  would like to assign  Holden Torres as the surrogate decision maker. Persons present and participating in discussion: Ira Cleveland MD16       Time Spent:   Total time spent face-to-face in education and discussion:   16   minutes.          Ira Reilly MD   Hospitalist

## 2023-03-24 NOTE — ED NOTES
eTRANSFER SBAR NOTE    IP UNIT CALLED NOTE IS READY: Yes Spoke to     IF there are questions Call transferring nurse (your name) Genevaquintin Gautam at phone # [phone]:    Patient is being transferred to [facility] General Surgery, Room# [phone]    Patient's Chief Complaint on arrival to ED was Cough & Shortness of Breath and is admitted for acute hypoxemic respiratory failure. CODE STATUS: Full Code    VITAL SIGNS (MUST BE WITHIN 1 HOUR OF TRANSFER TO IP UNIT:    TEMP: 98.3  PULSE: 92  RESP: 15  BP: 164/82  PAIN SCORE: 0  MEWS: 1     ISOLATION/PRECAUTIONS: No   ISOLATION TYPE: None    Called outstanding consults: No      Are there sign and held orders that need to be released? No   Are all STAT orders completed: Yes    STAT labs collected: Yes  REPEAT LACTIC ACID DUE? No  TIME DUE: None  Critical Labs Results? No  What? Are there any titrating drips? No   If so what? None    The following personal items will be sent with the patient during transfer to the floor: All valuables:   ITEM:    ITEM: Visual Aid: Glasses  ITEM:           ASSESSMENT:    CIWA Assessment: No  Last Score: None    NEURO:   NIH SCORE:        LONI SCREENING:          NEURO ASSESSMENT:        Is patient impulsive? No   Is patient oriented? Yes   Do they follow commands? Yes  Is the patient ambulatory? Yes Device need None    FALL RISK? Yes   Is the [place] Assessment completed? Yes  INTERVENTIONS: Gripper Socks    INTEGUMENTARY:   IS THE PATIENT UNDRESSED? Yes  WOUNDS PRESENT? No  On admit to ED No   ARE THE WOUNDS DOCUMENTED? None    RESPIRATORY:   Is patient on Oxygen? Yes   OXYGEN: Oxygen Therapy  O2 Device: Nasal cannula (03/23/23 2111)  O2 Flow Rate (L/min): 4 l/min (03/23/23 2111)  IS patient on VENT? No      CARDIAC:   Is cardiac monitoring ordered? Yes Last Rhythm: Sinus   Patient to transfer with tele box on? No   Is patient using a LIFE VEST?  No     LINE ACCESS:   Peripheral IV Left Antecubital (Active)        /GI: CONTINENT BOWEL/BLADDER? Yes  URINARY OUTPUT: voiding   Written Order for Singer Cath? No   CHRONIC OR ACUTE? None   If CHRONIC, is it 1days old, was it changed prior to specimen collection? None  WAS UA WITH REFLEX SENT TO LAB? None  IF NO,  COLLECT AND SEND PRIOR TO TRANSPORT TO INPATIENT AREA    RESTRAINTS IN USE:  No     IS DOCUMENTATION COMPLETE:  None  Is there a current Order? None  When does it ?  None    Additional details as Needed:  None

## 2023-03-24 NOTE — PROGRESS NOTES
Problem: Falls - Risk of  Goal: *Absence of Falls  Description: Document Renuka Yañez Fall Risk and appropriate interventions in the flowsheet.   Note: Fall Risk Interventions:

## 2023-03-24 NOTE — PROGRESS NOTES
Patient seen and examined today    Acute respiratory failure  COPD exacerbation  Pneumonia  Hypertension  Acute transaminitis    Continue vancomycin, azithromycin and also ceftriaxone  Continue Solu-Medrol, albuterol and home inhalers  Check hepatitis panel, ultrasound liver and also CMP in a.m. due to transaminitis    Do not bill

## 2023-03-24 NOTE — PROGRESS NOTES
Transition of Care Plan:    RUR: 10%  Disposition: Anticipate D/C to home  If SNF or IPR: Date FOC offered:  Date FOC received:  Date authorization started with reference number:  Date authorization received and expires:  Accepting facility:  Follow up appointments: PCP  DME needed: Has a cane at home. Do not anticipate additional DME  Transportation at Discharge: Spouse will transport home  [address] or means to access home:  Spouse has      IM Medicare Letter: 2nd IM to be given prior to d/c  Is patient a  and connected with the [place]? No               If yes, was [employer] transfer form completed and [facility] notified? Caregiver Contact: Spouse Shahbaz Blandon [phone]  Discharge Caregiver contacted prior to discharge? Spouse present at bedside 3/24/23  Care Conference needed?:  No    Reason for Admission:  Pneumonia                     RUR Score:     10%                Plan for utilizing home health:    Not anticipated      PCP: First and Last name:  Veronica Aleman MD     Name of Practice:    Are you a current patient: Yes/No: Yes   Approximate date of last visit: Jan 2023   Can you participate in a virtual visit with your PCP:                     Current Advanced Directive/Advance Care Plan: Full Code      Healthcare Decision Maker:   Click here to complete [address] including selection of the Healthcare Decision Maker Relationship (ie \"Primary\")             Primary Decision Maker: Aaron Madden - Spouse - [phone]                  Transition of Care Plan:  Home with spouse    3/24/23 3:15pm CM to bedside. Pt off floor for procedure, spouse present at bedside. CM introduced self and role. Verified demographics, emergency contact, insurance, pharmacy and pcp. Pharmacy is [facility] in [place]. Pt resides at home with spouse and 3 cats. Independent with all ADLs and Driving prior to admission. Pt has a cane at home, spouse reports she does not use it much. 4 RO home, no steps inside.  Spouse will transport home at time of d/c. Care Management Interventions  PCP Verified by CM: Yes  Palliative Care Criteria Met (RRAT>21 & CHF Dx)?: No  Mode of Transport at Discharge:  Other (see comment) (Spouse)  Transition of Care Consult (CM Consult): Discharge Planning  MyChart Signup: No  Discharge Durable Medical Equipment: No  Physical Therapy Consult: No  Occupational Therapy Consult: No  Speech Therapy Consult: No  Support Systems: Spouse/Significant Other  Confirm Follow Up Transport: Self  Discharge Location  Patient Expects to be Discharged to[de-identified] [facility] RN,MSN  Care Manager  [phone]

## 2023-03-24 NOTE — H&P
Hospitalist Admission Note    NAME: Asad Dong   :  1945   MRN:  [id number]     Date/Time:  3/24/2023 12:04 AM    Patient PCP: Genia Carrera MD  ________________________________________________________________________    My assessment of this patient's clinical condition and my plan of care is as follows. Assessment / Plan:    68-year-old female with past medical history significant for COPD, hypertension and A-fib among other comorbidities presents with cough and dyspnea in the setting of pneumonia and COPD exacerbation    #Acute respiratory failure with hypoxia: With new oxygen requirement of 4 L nasal cannula,  -Most likely secondary to postviral pneumonia triggering a COPD exacerbation.  -We will obtain sputum culture and cover the patient broadly with vancomycin, ceftriaxone and azithromycin in the meantime given concern for postviral pneumonia. Negative testing for influenza and COVID-19.  -We will also treat the patient with Solu-Medrol and nebulizer therapy. -CTA personally reviewed and showed no evidence of pulmonary embolism, was consistent with interstitial opacities of the right upper lobe and a consolidation of the left lower lobe consistent with pneumonia. #Hypertension: We will resume losartan and metoprolol. #Dyslipidemia: We will resume statin. #Diet-controlled diabetes with neuropathy: We will continue with gabapentin and diabetic diet      Code Status: Full code    DVT Prophylaxis: Lovenox subcu, not on anticoagulation at home  GI Prophylaxis: not indicated    Baseline: Alert and oriented x4        Subjective:   CHIEF COMPLAINT: Cough and shortness of 5    HISTORY OF PRESENT ILLNESS:     Anali Nguyen is a 76 y.o.  female with past medical history significant for COPD, hypertension, dyslipidemia and A-fib presents with cough and shortness of breath.   Patient symptoms started 2 weeks ago, initially as a cold with mild dry cough and generalized body aches. Patient's  has similar symptoms. However, patient's  recovered with no complications but the patient continued to have worsening cough now productive of brown sputum and associated with dyspnea and low-grade fever so patient came to the emergency room for evaluation. No pain exacerbating relieving factors. Patient denies chest pain diarrhea. All other systems were reviewed and were otherwise negative. Vital signs upon arrival to the emergency room were 99.9, Tmax 100.2, 126, 113/81, 18 and 98% on 4 L nasal cannula. Patient was noted to have white cell count of 15.5, CTA with no evidence of pulmonary embolism but significant for pneumonia. Patient was given vancomycin, cefepime, Levaquin and 1 L normal saline with improvement in heart rate, blood and urine cultures were obtained and hospitalist service was called for the admission. Past Medical History:   Diagnosis Date    Abnormal chest x-ray 12/23/2009    Anemia 12/23/2009    Arrhythmia     SVT.  Converts with Adenocard    Atrial fibrillation (HCC) 12/23/2009    Carpal tunnel syndrome 12/23/2009    Chronic obstructive pulmonary disease ([address]     Colitis 12/23/2009    Complex regional pain syndrome 12/23/2009    Depression 12/23/2009    Diabetes ([address]     no meds    Family history of skin cancer     brother-melanoma    GERD (gastroesophageal reflux disease) 2/8/2010    HTN (hypertension) 12/23/2009    Hypercholesteremia 12/23/2009    Hypertriglyceridemia 12/23/2009    Idiopathic peripheral neuropathy 12/23/2009    Iron deficiency anemia 12/23/2009    Osteopenia 12/23/2009    Paroxysmal atrial fibrillation ([address] 12/2/2011    Psoriasis 6/27/2012    Sarcoidosis         Past Surgical History:   Procedure Laterality Date    HX CARPAL TUNNEL RELEASE  00/00/2002    Both hands    HX CYST REMOVAL  00/00/1965    Left wrist    HX HYSTERECTOMY  00/00/1985    HX ORTHOPAEDIC  00/00/1987    back surgery (disc)    HX ORTHOPAEDIC      right foot X 3 neuromas and tarsal tunnel release    HX TONSILLECTOMY      PAIN PUMP DEVICE      in left lower abdomen (for foot pain)    IA UNLISTED NEUROLOGICAL/NEUROMUSCULAR DX PX      Pain pump       Social History     Tobacco Use    Smoking status: Former     Packs/day: 0.10     Years: 2.00     Pack years: 0.20     Types: Cigarettes     Quit date: 1975     Years since quittin.2    Smokeless tobacco: Never   Substance Use Topics    Alcohol use: No        Family History   Problem Relation Age of Onset    Arrhythmia Father     Heart Attack Father     Cancer Brother         Melanoma    Breast Cancer Daughter         48    Breast Cancer Daughter 46    Breast Cancer Daughter 46     Allergies   Allergen Reactions    Duragesic [Fentanyl] Other (comments)     Patient sees things when taking    Codeine Nausea Only        Prior to Admission medications    Medication Sig Start Date End Date Taking? Authorizing Provider   gabapentin (NEURONTIN) 800 mg tablet Take 1 Tablet by mouth three (3) times daily. Max Daily Amount: 2,400 mg. 23   Kim Salguero MD   losartan (COZAAR) 50 mg tablet Take 1 Tablet by mouth daily. 23   Kim Salguero MD   amitriptyline (ELAVIL) 100 mg tablet TAKE 1 TABLET EVERY NIGHT 1/3/23   Kim Salguero MD   atorvastatin (LIPITOR) 40 mg tablet Take 1 Tablet by mouth daily. Indications: high cholesterol 10/17/22   Kim Salguero MD   hydroCHLOROthiazide (HYDRODIURIL) 12.5 mg tablet TAKE 1 TABLET BY MOUTH  DAILY 22   Kim Salguero MD   diclofenac EC (VOLTAREN) 75 mg EC tablet Take 1 Tablet by mouth two (2) times a day. 22   Ran Silverman DO   metoprolol tartrate (LOPRESSOR) 50 mg tablet TAKE 1 TAB BY MOUTH TWO (2) TIMES A DAY. 19   Cheli Clemente MD   omeprazole (PRILOSEC) 20 mg capsule Take 20 mg by mouth daily.  17   Provider, Historical     Objective:   VITALS:    Visit Vitals  BP (!) 144/98 (BP 1 Location: Left arm, BP Patient Position: At rest) Pulse (!) 117   Temp 100.2 °F (37.9 °C)   Resp 17   Ht 5' 7\" (1.702 m)   Wt 68 kg (150 lb)   SpO2 94%   BMI 23.49 kg/m²       PHYSICAL EXAM:    General:    Alert, cooperative, no distress, appears stated age. HEENT: Atraumatic, anicteric sclerae, pink conjunctivae     No oral ulcers, mucosa moist, throat clear, dentition fair  Neck:  Supple, symmetrical,  thyroid: non tender  Lungs: Moderate diffuse wheezes. No rales. Chest wall:  No tenderness  No Accessory muscle use. Heart:   Regular  rhythm,  No  murmur   No edema  Abdomen:   Soft, non-tender. Not distended. Bowel sounds normal  Extremities: No cyanosis. No clubbing      Capillary refill normal,  Radial pulse 2+,  DP   Skin:     Not pale. Not Jaundiced  No rashes   Psych:  Good insight. Not depressed. Not anxious or agitated. Neurologic: EOMs intact. No facial asymmetry. No aphasia or slurred speech. Symmetrical strength, Sensation grossly intact. Alert and oriented X 4.     _______________________________________________________________________  Care Plan discussed with:    Comments   Patient **    Family      RN     Care Manager                    Consultant:      _______________________________________________________________________  Expected  Disposition:   Home with Family **   HH/PT/OT/RN    SNF/LTC    SRINIVAS    ________________________________________________________________________  TOTAL TIME:  54 Minutes    Critical Care Provided     Minutes non procedure based      Comments   >50% of visit spent in counseling and coordination of care   ** Chart review  Discussion with patient and/or family and questions answered     ________________________________________________________________________  Jr Atkinson MD    Procedures: see electronic medical records for all procedures/Xrays and details which were not copied into this note but were reviewed prior to creation of Plan.     LAB DATA REVIEWED:    Recent Results (from the past 24 hour(s)) EKG, 12 LEAD, INITIAL    Collection Time: 03/23/23  8:20 PM   Result Value Ref Range    Ventricular Rate 123 BPM    Atrial Rate 123 BPM    P-R Interval 156 ms    QRS Duration 94 ms    Q-T Interval 306 ms    QTC Calculation (Bezet) 438 ms    Calculated P Axis 50 degrees    Calculated R Axis -44 degrees    Calculated T Axis 50 degrees    Diagnosis       Sinus tachycardia  Left axis deviation  Voltage criteria for left ventricular hypertrophy  When compared with ECG of 18-JUL-2018 23:11,  Vent. rate has increased BY  45 BPM  Nonspecific T wave abnormality no longer evident in Inferior leads     CBC WITH AUTOMATED DIFF    Collection Time: 03/23/23  8:26 PM   Result Value Ref Range    WBC 15.5 (H) 3.6 - 11.0 K/uL    RBC 4.66 3.80 - 5.20 M/uL    HGB 12.1 11.5 - 16.0 g/dL    HCT 36.8 35.0 - 47.0 %    MCV 79.0 (L) 80.0 - 99.0 FL    MCH 26.0 26.0 - 34.0 PG    MCHC 32.9 30.0 - 36.5 g/dL    RDW 16.1 (H) 11.5 - 14.5 %    PLATELET 608 541 - 797 K/uL    MPV 10.4 8.9 - 12.9 FL    NRBC 0.0 0  WBC    ABSOLUTE NRBC 0.00 0.00 - 0.01 K/uL    NEUTROPHILS 81 (H) 32 - 75 %    LYMPHOCYTES 8 (L) 12 - 49 %    MONOCYTES 6 5 - 13 %    EOSINOPHILS 2 0 - 7 %    BASOPHILS 1 0 - 1 %    IMMATURE GRANULOCYTES 2 (H) 0.0 - 0.5 %    ABS. NEUTROPHILS 12.8 (H) 1.8 - 8.0 K/UL    ABS. LYMPHOCYTES 1.2 0.8 - 3.5 K/UL    ABS. MONOCYTES 0.9 0.0 - 1.0 K/UL    ABS. EOSINOPHILS 0.3 0.0 - 0.4 K/UL    ABS. BASOPHILS 0.1 0.0 - 0.1 K/UL    ABS. IMM.  GRANS. 0.3 (H) 0.00 - 0.04 K/UL    DF AUTOMATED     METABOLIC PANEL, COMPREHENSIVE    Collection Time: 03/23/23  8:26 PM   Result Value Ref Range    Sodium 135 (L) 136 - 145 mmol/L    Potassium 3.6 3.5 - 5.1 mmol/L    Chloride 99 97 - 108 mmol/L    CO2 28 21 - 32 mmol/L    Anion gap 8 5 - 15 mmol/L    Glucose 125 (H) 65 - 100 mg/dL    BUN 12 6 - 20 MG/DL    Creatinine 0.72 0.55 - 1.02 MG/DL    BUN/Creatinine ratio 17 12 - 20      eGFR >60 >60 ml/min/1.73m2    Calcium 9.0 8.5 - 10.1 MG/DL    Bilirubin, total 0.8 0.2 - 1.0 MG/DL    ALT (SGPT) 120 (H) 12 - 78 U/L    AST (SGOT) 135 (H) 15 - 37 U/L    Alk.  phosphatase 116 45 - 117 U/L    Protein, total 8.0 6.4 - 8.2 g/dL    Albumin 2.8 (L) 3.5 - 5.0 g/dL    Globulin 5.2 (H) 2.0 - 4.0 g/dL    A-G Ratio 0.5 (L) 1.1 - 2.2     TROPONIN-HIGH SENSITIVITY    Collection Time: 03/23/23  8:26 PM   Result Value Ref Range    Troponin-High Sensitivity 7 0 - 51 ng/L   NT-PRO BNP    Collection Time: 03/23/23  8:26 PM   Result Value Ref Range    NT pro- (H) <450 PG/ML   COVID-19 RAPID TEST    Collection Time: 03/23/23  8:51 PM   Result Value Ref Range    Specimen source Nasopharyngeal      COVID-19 rapid test Not detected NOTD     GLUCOSE, POC    Collection Time: 03/23/23  9:33 PM   Result Value Ref Range    Glucose (POC) 124 (H) 65 - 100 mg/dL    Performed by Rossana OLIVA    INFLUENZA A+B VIRAL AGS    Collection Time: 03/23/23  9:48 PM   Result Value Ref Range    Influenza A Antigen Negative NEG      Influenza B Antigen Negative NEG     URINALYSIS W/ REFLEX CULTURE    Collection Time: 03/23/23 10:08 PM    Specimen: Urine   Result Value Ref Range    Color YELLOW/STRAW      Appearance CLOUDY (A) CLEAR      Specific gravity 1.011      pH (UA) 7.0 5.0 - 8.0      Protein 30 (A) NEG mg/dL    Glucose Negative NEG mg/dL    Ketone 15 (A) NEG mg/dL    Bilirubin Negative NEG      Blood TRACE (A) NEG      Urobilinogen 2.0 (H) 0.2 - 1.0 EU/dL    Nitrites Negative NEG      Leukocyte Esterase LARGE (A) NEG      UA:UC IF INDICATED URINE CULTURE ORDERED (A) CNI      WBC >100 (H) 0 - 4 /hpf    RBC 0-5 0 - 5 /hpf    Epithelial cells MODERATE (A) FEW /lpf    Bacteria 1+ (A) NEG /hpf    Hyaline cast 0-2 0 - 2 /lpf

## 2023-03-24 NOTE — ED NOTES
Bedside shift change report given to Dane Hatch (oncoming nurse) by Farhat Huang (offgoing nurse). Report included the following information SBAR, Kardex and ED Summary.

## 2023-03-24 NOTE — ED TRIAGE NOTES
Pt ambulatory to triage c/o cough, congestion, shortness of breath x8 days. Hx of COPD. O2 sat 88-91% on RA in triage. Hx of implanted nerve stimulator for neuropathy.

## 2023-03-24 NOTE — PROGRESS NOTES
ADULT PROTOCOL: JET AEROSOL  REASSESSMENT    Patient  Leonardo Yung     76 y.o.   female     3/24/2023  10:54 AM    Breath Sounds Pre Procedure: Right Breath Sounds: Diminished, Crackles                               Left Breath Sounds: Expiratory wheezing, Crackles    Breath Sounds Post Procedure:                                        Breathing pattern: Pre procedure Breathing Pattern: Regular          Post procedure Breathing Pattern: Regular    Heart Rate: Pre procedure Pulse: 88           Post procedure Pulse: 88    Resp Rate: Pre procedure Respirations: 19           Post procedure Respirations: 18      SpO2: Pre procedure SpO2: 95 %   with oxygen              Post procedure SpO2: 95 %  with oxygen    Nebulizer Therapy: Current medications Aerosolized Medications: Albuterol, Brovana      Changed: YES    Problem List:   Patient Active Problem List   Diagnosis Code    Hypercholesteremia E78.00    Depression F32. A    Complex regional pain syndrome [id number]    Diabetic peripheral neuropathy associated with type 2 diabetes mellitus ([facility] E11.42    Hypertension, essential I10    Diabetes mellitus type 2, diet-controlled (HCC) E11.9    Osteopenia M85.80    Vitamin D deficiency E55.9    GERD (gastroesophageal reflux disease) K21.9    Psoriasis L40.9    Paroxysmal atrial fibrillation (HCC) I48.0    Hepatic steatosis K76.0    COPD (chronic obstructive pulmonary disease) (HCC) J44.9    Sarcoidosis of lymph nodes D86.1    Pneumonia J18.9       Added Jacki    Respiratory Therapist: Silvia Sneed, RT

## 2023-03-25 LAB
ALBUMIN SERPL-MCNC: 2.3 G/DL (ref 3.5–5)
ALBUMIN/GLOB SERPL: 0.5 (ref 1.1–2.2)
ALP SERPL-CCNC: 87 U/L (ref 45–117)
ALT SERPL-CCNC: 103 U/L (ref 12–78)
ANION GAP SERPL CALC-SCNC: 2 MMOL/L (ref 5–15)
AST SERPL-CCNC: 85 U/L (ref 15–37)
ATRIAL RATE: 123 BPM
BACTERIA SPEC CULT: ABNORMAL
BACTERIA SPEC CULT: ABNORMAL
BILIRUB SERPL-MCNC: 0.5 MG/DL (ref 0.2–1)
BUN SERPL-MCNC: 14 MG/DL (ref 6–20)
BUN/CREAT SERPL: 24 (ref 12–20)
CALCIUM SERPL-MCNC: 8.9 MG/DL (ref 8.5–10.1)
CALCULATED P AXIS, ECG09: 50 DEGREES
CALCULATED R AXIS, ECG10: -44 DEGREES
CALCULATED T AXIS, ECG11: 50 DEGREES
CC UR VC: ABNORMAL
CHLORIDE SERPL-SCNC: 107 MMOL/L (ref 97–108)
CO2 SERPL-SCNC: 29 MMOL/L (ref 21–32)
COMMENT, HOLDF: NORMAL
CREAT SERPL-MCNC: 0.59 MG/DL (ref 0.55–1.02)
DATE LAST DOSE: ABNORMAL
DIAGNOSIS, 93000: NORMAL
ERYTHROCYTE [DISTWIDTH] IN BLOOD BY AUTOMATED COUNT: 16.1 % (ref 11.5–14.5)
GLOBULIN SER CALC-MCNC: 4.2 G/DL (ref 2–4)
GLUCOSE SERPL-MCNC: 137 MG/DL (ref 65–100)
HAV IGM SER QL: NONREACTIVE
HBV CORE IGM SER QL: NONREACTIVE
HBV SURFACE AG SER QL: <0.1 INDEX
HBV SURFACE AG SER QL: NEGATIVE
HCT VFR BLD AUTO: 36.3 % (ref 35–47)
HCV AB SERPL QL IA: NONREACTIVE
HGB BLD-MCNC: 11.3 G/DL (ref 11.5–16)
MCH RBC QN AUTO: 25.7 PG (ref 26–34)
MCHC RBC AUTO-ENTMCNC: 31.1 G/DL (ref 30–36.5)
MCV RBC AUTO: 82.7 FL (ref 80–99)
NRBC # BLD: 0 K/UL (ref 0–0.01)
NRBC BLD-RTO: 0 PER 100 WBC
P-R INTERVAL, ECG05: 156 MS
PLATELET # BLD AUTO: 348 K/UL (ref 150–400)
PMV BLD AUTO: 10 FL (ref 8.9–12.9)
POTASSIUM SERPL-SCNC: 3.9 MMOL/L (ref 3.5–5.1)
PROT SERPL-MCNC: 6.5 G/DL (ref 6.4–8.2)
Q-T INTERVAL, ECG07: 306 MS
QRS DURATION, ECG06: 94 MS
QTC CALCULATION (BEZET), ECG08: 438 MS
RBC # BLD AUTO: 4.39 M/UL (ref 3.8–5.2)
REPORTED DOSE,DOSE: ABNORMAL UNITS
REPORTED DOSE/TIME,TMG: ABNORMAL
SAMPLES BEING HELD,HOLD: NORMAL
SERVICE CMNT-IMP: ABNORMAL
SODIUM SERPL-SCNC: 138 MMOL/L (ref 136–145)
SP1: NORMAL
SP2: NORMAL
SP3: NORMAL
VANCOMYCIN TROUGH SERPL-MCNC: 11.7 UG/ML (ref 5–10)
VENTRICULAR RATE, ECG03: 123 BPM
WBC # BLD AUTO: 18.7 K/UL (ref 3.6–11)

## 2023-03-25 PROCEDURE — 74011250637 HC RX REV CODE- 250/637: Performed by: STUDENT IN AN ORGANIZED HEALTH CARE EDUCATION/TRAINING PROGRAM

## 2023-03-25 PROCEDURE — 94640 AIRWAY INHALATION TREATMENT: CPT

## 2023-03-25 PROCEDURE — 97162 PT EVAL MOD COMPLEX 30 MIN: CPT

## 2023-03-25 PROCEDURE — 74011000250 HC RX REV CODE- 250: Performed by: STUDENT IN AN ORGANIZED HEALTH CARE EDUCATION/TRAINING PROGRAM

## 2023-03-25 PROCEDURE — 80053 COMPREHEN METABOLIC PANEL: CPT

## 2023-03-25 PROCEDURE — 74011250636 HC RX REV CODE- 250/636: Performed by: STUDENT IN AN ORGANIZED HEALTH CARE EDUCATION/TRAINING PROGRAM

## 2023-03-25 PROCEDURE — 85027 COMPLETE CBC AUTOMATED: CPT

## 2023-03-25 PROCEDURE — 97165 OT EVAL LOW COMPLEX 30 MIN: CPT | Performed by: OCCUPATIONAL THERAPIST

## 2023-03-25 PROCEDURE — 97116 GAIT TRAINING THERAPY: CPT

## 2023-03-25 PROCEDURE — 77010033678 HC OXYGEN DAILY

## 2023-03-25 PROCEDURE — 36415 COLL VENOUS BLD VENIPUNCTURE: CPT

## 2023-03-25 PROCEDURE — 65270000029 HC RM PRIVATE

## 2023-03-25 PROCEDURE — 97535 SELF CARE MNGMENT TRAINING: CPT | Performed by: OCCUPATIONAL THERAPIST

## 2023-03-25 PROCEDURE — 80202 ASSAY OF VANCOMYCIN: CPT

## 2023-03-25 PROCEDURE — 74011000258 HC RX REV CODE- 258: Performed by: STUDENT IN AN ORGANIZED HEALTH CARE EDUCATION/TRAINING PROGRAM

## 2023-03-25 PROCEDURE — 74011000250 HC RX REV CODE- 250: Performed by: INTERNAL MEDICINE

## 2023-03-25 PROCEDURE — 80074 ACUTE HEPATITIS PANEL: CPT

## 2023-03-25 RX ADMIN — METHYLPREDNISOLONE SODIUM SUCCINATE 40 MG: 40 INJECTION, POWDER, FOR SOLUTION INTRAMUSCULAR; INTRAVENOUS at 21:46

## 2023-03-25 RX ADMIN — GABAPENTIN 800 MG: 300 CAPSULE ORAL at 21:44

## 2023-03-25 RX ADMIN — SODIUM CHLORIDE 1 G: 900 INJECTION INTRAVENOUS at 23:29

## 2023-03-25 RX ADMIN — METOPROLOL TARTRATE 50 MG: 50 TABLET ORAL at 18:27

## 2023-03-25 RX ADMIN — BENZONATATE 100 MG: 100 CAPSULE ORAL at 21:44

## 2023-03-25 RX ADMIN — LOSARTAN POTASSIUM 50 MG: 50 TABLET, FILM COATED ORAL at 08:54

## 2023-03-25 RX ADMIN — GUAIFENESIN 600 MG: 600 TABLET, EXTENDED RELEASE ORAL at 08:54

## 2023-03-25 RX ADMIN — AMITRIPTYLINE HYDROCHLORIDE 100 MG: 25 TABLET, FILM COATED ORAL at 21:46

## 2023-03-25 RX ADMIN — POLYETHYLENE GLYCOL 3350 17 G: 17 POWDER, FOR SOLUTION ORAL at 14:18

## 2023-03-25 RX ADMIN — GUAIFENESIN 600 MG: 600 TABLET, EXTENDED RELEASE ORAL at 18:27

## 2023-03-25 RX ADMIN — METOPROLOL TARTRATE 50 MG: 50 TABLET ORAL at 08:54

## 2023-03-25 RX ADMIN — ENOXAPARIN SODIUM 40 MG: 100 INJECTION SUBCUTANEOUS at 08:55

## 2023-03-25 RX ADMIN — VANCOMYCIN HYDROCHLORIDE 750 MG: 750 INJECTION, POWDER, LYOPHILIZED, FOR SOLUTION INTRAVENOUS at 03:21

## 2023-03-25 RX ADMIN — GABAPENTIN 800 MG: 300 CAPSULE ORAL at 08:54

## 2023-03-25 RX ADMIN — METHYLPREDNISOLONE SODIUM SUCCINATE 40 MG: 40 INJECTION, POWDER, FOR SOLUTION INTRAMUSCULAR; INTRAVENOUS at 05:55

## 2023-03-25 RX ADMIN — PANTOPRAZOLE SODIUM 40 MG: 40 TABLET, DELAYED RELEASE ORAL at 08:35

## 2023-03-25 RX ADMIN — SODIUM CHLORIDE, PRESERVATIVE FREE 10 ML: 5 INJECTION INTRAVENOUS at 05:55

## 2023-03-25 RX ADMIN — ARFORMOTEROL TARTRATE 15 MCG: 15 SOLUTION RESPIRATORY (INHALATION) at 19:35

## 2023-03-25 RX ADMIN — METHYLPREDNISOLONE SODIUM SUCCINATE 40 MG: 40 INJECTION, POWDER, FOR SOLUTION INTRAMUSCULAR; INTRAVENOUS at 14:18

## 2023-03-25 RX ADMIN — ARFORMOTEROL TARTRATE 15 MCG: 15 SOLUTION RESPIRATORY (INHALATION) at 08:25

## 2023-03-25 RX ADMIN — AZITHROMYCIN MONOHYDRATE 500 MG: 500 INJECTION, POWDER, LYOPHILIZED, FOR SOLUTION INTRAVENOUS at 10:20

## 2023-03-25 RX ADMIN — SODIUM CHLORIDE, PRESERVATIVE FREE 10 ML: 5 INJECTION INTRAVENOUS at 14:14

## 2023-03-25 RX ADMIN — ALBUTEROL SULFATE 1.25 MG: 2.5 SOLUTION RESPIRATORY (INHALATION) at 15:30

## 2023-03-25 RX ADMIN — GABAPENTIN 800 MG: 300 CAPSULE ORAL at 15:48

## 2023-03-25 RX ADMIN — SODIUM CHLORIDE, PRESERVATIVE FREE 10 ML: 5 INJECTION INTRAVENOUS at 21:50

## 2023-03-25 RX ADMIN — ALBUTEROL SULFATE 1.25 MG: 2.5 SOLUTION RESPIRATORY (INHALATION) at 19:35

## 2023-03-25 RX ADMIN — ALBUTEROL SULFATE 1.25 MG: 2.5 SOLUTION RESPIRATORY (INHALATION) at 08:24

## 2023-03-25 NOTE — PROGRESS NOTES
Pharmacy Antimicrobial Kinetic Dosing    Indication for Antimicrobials: cap     Current Regimen of Each Antimicrobial:  Zithromax 500 mg iv every 24 hr; Start Date 3/24; Day # 2  Rocephin 1 gm iv every 24 hr Start Date 3/24; Day # 2  Vancomycin 1750 mg iv once then 750 mg iv every 12 hr Start Date 3/24; Day # 2    Previous Antimicrobial Therapy:       Goal Level: AUC: 400-600 mg/hr/Liter/day    Date Dose & Interval Measured (mcg/mL) Predicted AUC/ERIK   3/25 750 mg q12h 11.7 411                 Date & time of next level: 3/26    Dosing calculator used: [facility] [employer] calculator    Significant Cultures:   3/23 blood - NG- prelim  3/23 urine - NG- prelim    Conditions for Dosing Consideration:     Labs:  Recent Labs     23  0429 23  2334 23   CREA 0.59  --  0.72   BUN 14  --  12   PCT  --  <0.05  --      Recent Labs     23  0118 23   WBC 18.7* 15.5*     Temp (24hrs), Av.6 °F (36.4 °C), Min:97.3 °F (36.3 °C), Max:97.8 °F (36.6 °C)        Creatinine Clearance (mL/min):   CrCl (Adjusted Body Weight): 66.0 mL/min   If actual weight < IBW: CrCl (Actual Body Weight) 70.0    Impression/Plan:   Vancomycin trough of 11.7 on low end of therapeutic. Increase to vanc 1000 mg q12h for projected AUC of 542 and trough of 17.3  Vanc trough 3/26 with AM labs  Ceftriaxone+ Azith for CAP  BMP daily  Antimicrobial stop date 5 days     Pharmacy will follow daily and adjust medications as appropriate for renal function and/or serum levels.     Thank you,  Cheo Ivy, PHARMD

## 2023-03-25 NOTE — PROGRESS NOTES
Comprehensive Nutrition Assessment    Type and Reason for Visit: Initial, Positive nutrition screen    Nutrition Recommendations/Plan:   Continue 4 carb choice diet      Malnutrition Assessment:  Malnutrition Status:  No malnutrition (03/25/23 1007)      Nutrition Assessment:    Patient medically noted for pneumonia and acute respiratory failure. PMH Depression, DM, GERD, COPD, and HTN. Patient reports a pretty good appetite this morning; ate most of her breakfast. No changes in appetite/intake recently. Denies any recent weight changes. Menu provided and room service explained. Encouraged PO intake of meals. Nutrition Related Findings:    -124-125   BM 3/21   Solu-medrol, Lopressor, Protonix   Wound Type: None    Current Nutrition Intake & Therapies:  Average Meal Intake: 51-75%     ADULT DIET Regular; 4 carb choices (60 gm/meal)    Anthropometric Measures:  Height: 5' 7\" (170.2 cm)  Ideal Body Weight (IBW): 135 lbs (61 kg)     Current Body Wt:  68 kg (149 lb 14.6 oz), 111 % IBW. Current BMI (kg/m2): 23.5                          BMI Category: Normal weight (BMI 18.5-24. 9)    Estimated Daily Nutrient Needs:  Energy Requirements Based On: Formula  Weight Used for Energy Requirements: Current  Energy (kcal/day): 1544 kcals (BMR x 1. 3AF)  Weight Used for Protein Requirements: Current  Protein (g/day): 68g (1.0 g/kg bw)  Method Used for Fluid Requirements: 1 ml/kcal  Fluid (ml/day): 1550 mL    Nutrition Diagnosis:   No nutrition diagnosis at this time       Nutrition Interventions:   Food and/or Nutrient Delivery: Continue current diet  Nutrition Education/Counseling: No recommendations at this time  Coordination of Nutrition Care: Continue to monitor while inpatient       Goals:     Goals: PO intake 75% or greater, by next RD assessment       Nutrition Monitoring and Evaluation:   Behavioral-Environmental Outcomes: None identified  Food/Nutrient Intake Outcomes: Food and nutrient intake  Physical Signs/Symptoms Outcomes: Biochemical data, Weight    Discharge Planning:    Continue current diet    Delisa Norman RD  Contact: ext [phone]

## 2023-03-25 NOTE — PROGRESS NOTES
End of Shift Note    Bedside shift change report given to Luis Antonio RN (oncoming nurse) by Elijah Johnston RN (offgoing nurse). Report included the following information SBAR, Kardex, Procedure Summary, MAR, Accordion, and Recent Results    Shift worked:  7a- 7p     Shift summary and any significant changes:          Concerns for physician to address: No concernes     Zone phone for oncoming shift:   [phone]       Activity:  Activity Level:  Up with Assistance  Number times ambulated in hallways past shift: 0  Number of times OOB to chair past shift: 1    Cardiac:   Cardiac Monitoring: No      Cardiac Rhythm: Sinus Rhythm    Access:  Current line(s): PIV     Genitourinary:   Urinary status: voiding    Respiratory:   O2 Device: Nasal cannula 4 L   Chronic home O2 use?: NO  Incentive spirometer at bedside: YES       GI:  Last Bowel Movement Date:  (pta)  Current diet:  ADULT DIET Regular; 4 carb choices (60 gm/meal)  Passing flatus: YES  Tolerating current diet: YES       Pain Management:   Patient states pain is manageable on current regimen: YES    Skin:  Neri Score: 20  Interventions: float heels and PT/OT consult    Patient Safety:  Fall Score:    Interventions: assistive device (walker, cane, etc), gripper socks, and pt to call before getting OOB       Length of Stay:  Expected LOS: 2d 21h  Actual LOS: 0      Inez Elizabeth RN

## 2023-03-25 NOTE — PROGRESS NOTES
Orders received, chart reviewed and patient evaluated by physical therapy. Pending progression with skilled acute physical therapy, recommend:  Physical therapy at least 2 days/week in the home AND ensure assist and/or supervision for safety with ambulation. Recommend with nursing OOB to chair 3x/day and walking daily with x 1 assist and walker. Thank you for completing as able in order to maintain patient strength, endurance and independence. Full evaluation to follow.       Kandice Ramirez, PT, DPT

## 2023-03-25 NOTE — PROGRESS NOTES
End of Shift Note    Bedside shift change report given to Jamin Siddiqi RN (oncoming nurse) by Jerry Thomson RN (offgoing nurse). Report included the following information SBAR, Kardex, Procedure Summary, MAR, Accordion, and Recent Results    Shift worked:  7p-7a     Shift summary and any significant changes:     Patient continues on iv antibiotics for pneumonia, very congested and lung field deminished. Pt continues on oxygen @ 3L via nasal cannula. No c/o pain voiced. Concerns for physician to address: No concernes     Zone phone for oncoming shift:   [phone]       Activity:  Activity Level:  Up with Assistance  Number times ambulated in hallways past shift: 0  Number of times OOB to chair past shift: 1    Cardiac:   Cardiac Monitoring: No      Cardiac Rhythm: Sinus Rhythm    Access:  Current line(s): PIV     Genitourinary:   Urinary status: voiding    Respiratory:   O2 Device: Nasal cannula 4 L   Chronic home O2 use?: NO  Incentive spirometer at bedside: YES       GI:  Last Bowel Movement Date: 03/21/23  Current diet:  ADULT DIET Regular; 4 carb choices (60 gm/meal)  Passing flatus: YES  Tolerating current diet: YES       Pain Management:   Patient states pain is manageable on current regimen: YES    Skin:  Neri Score: 20  Interventions: float heels and PT/OT consult    Patient Safety:  Fall Score:    Interventions: assistive device (walker, cane, etc), gripper socks, and pt to call before getting OOB       Length of Stay:  Expected LOS: 2d 21h  Actual LOS: Professor Garrett Croft, RN

## 2023-03-25 NOTE — PROGRESS NOTES
End of Shift Note    Bedside shift change report given to Luis Antonio RN (oncoming nurse) by Nirav Bal RN (offgoing nurse). Report included the following information SBAR, Kardex, Intake/Output, MAR, Accordion, and Recent Results    Shift worked:  7a-7p     Shift summary and any significant changes:    Continues treatment for pneumonia. Solumedrol/ antibiotics administered as ordered. Denied pain during shift. PRN Miralax given to facilitate BM. Pt walked to Restroom and to the chair several times. Activity tolerated  well. Family visited. Concerns for physician to address:  No concerns to address at this time     Zone phone for oncoming shift:   [phone]       Activity:  Activity Level:  Up with Assistance  Number times ambulated in hallways past shift: 0  Number of times OOB to chair past shift: 2    Cardiac:   Cardiac Monitoring: No      Cardiac Rhythm: Sinus Rhythm    Access:  Current line(s): PIV     Genitourinary:   Urinary status: voiding    Respiratory:   O2 Device: Nasal cannula  Chronic home O2 use?: NO  Incentive spirometer at bedside: YES       GI:  Last Bowel Movement Date: 03/21/23  Current diet:  ADULT DIET Regular; 4 carb choices (60 gm/meal)  Passing flatus: YES  Tolerating current diet: YES       Pain Management:   Patient states pain is manageable on current regimen: YES    Skin:  Neri Score: 20  Interventions: float heels, increase time out of bed, and PT/OT consult    Patient Safety:  Fall Score:    Interventions: assistive device (walker, cane, etc), gripper socks, and pt to call before getting OOB       Length of Stay:  Expected LOS: 2d 21h  Actual LOS: [address], SHELDON

## 2023-03-25 NOTE — PROGRESS NOTES
Problem: Mobility Impaired (Adult and Pediatric)  Goal: *Acute Goals and Plan of Care (Insert Text)  Description: FUNCTIONAL STATUS PRIOR TO ADMISSION: Patient was independent and active without use of DME.    HOME SUPPORT PRIOR TO ADMISSION: The patient lived with her  who is supportive. Physical Therapy Goals  Initiated 3/25/2023  1. Patient will move from supine to sit and sit to supine , scoot up and down, and roll side to side in bed with independence within 7 day(s). 2.  Patient will transfer from bed to chair and chair to bed with independence using the least restrictive device within 7 day(s). 3.  Patient will perform sit to stand with independence within 7 day(s). 4.  Patient will ambulate with independence for 150 feet with the least restrictive device within 7 day(s). 5.  Patient will ascend/descend 4 stairs with one sided handrail(s) with modified independence within 7 day(s). Outcome: Not Met   PHYSICAL THERAPY EVALUATION  Patient: Tresa Goltz (89 y.o. female)  Date: 3/25/2023  Primary Diagnosis: Pneumonia [J18.9]       Precautions:        ASSESSMENT  Based on the objective data described below, the patient presents with decreased strength, decreased functional mobility, impaired balance, and mildly unsteady gait following admission for pneumonia. Patient is functioning below her baseline due to SOB and decreased endurance. Patient's O2 sats dropped on 2L O2, requiring 4L O2 to recover to 94% with mobility. Patient with increased trunk sway and shuffled steps with decreased gait speed. Encouraged patient to sit in the chair for all meals to improve endurance and respiratory status. Current Level of Function Impacting Discharge (mobility/balance): CGA without AD    Functional Outcome Measure: The patient scored 21/24 on the [facility] outcome measure which is indicative of may need HHPT at discharge.       Other factors to consider for discharge: decreased endurance, SOB, needs 4L O2     Patient will benefit from skilled therapy intervention to address the above noted impairments. PLAN :  Recommendations and Planned Interventions: bed mobility training, transfer training, gait training, therapeutic exercises, patient and family training/education, and therapeutic activities      Frequency/Duration: Patient will be followed by physical therapy:  3 times a week to address goals. Recommendation for discharge: (in order for the patient to meet his/her long term goals)  Physical therapy at least 2 days/week in the home AND ensure assist and/or supervision for safety with functional mobility and ADLs    This discharge recommendation:  A follow-up discussion with the attending provider and/or case management is planned    IF patient discharges home will need the following DME: patient owns DME required for discharge         SUBJECTIVE:   Patient stated I just feel tired but I can walk a little bit.     OBJECTIVE DATA SUMMARY:   HISTORY:    Past Medical History:   Diagnosis Date    Abnormal chest x-ray 12/23/2009    Anemia 12/23/2009    Arrhythmia     SVT.  Converts with Adenocard    Atrial fibrillation (HCC) 12/23/2009    Carpal tunnel syndrome 12/23/2009    Chronic obstructive pulmonary disease ([address]     Colitis 12/23/2009    Complex regional pain syndrome 12/23/2009    Depression 12/23/2009    Diabetes ([address]     no meds    Family history of skin cancer     brother-melanoma    GERD (gastroesophageal reflux disease) 2/8/2010    HTN (hypertension) 12/23/2009    Hypercholesteremia 12/23/2009    Hypertriglyceridemia 12/23/2009    Idiopathic peripheral neuropathy 12/23/2009    Iron deficiency anemia 12/23/2009    Osteopenia 12/23/2009    Paroxysmal atrial fibrillation ([address] 12/2/2011    Psoriasis 6/27/2012    Sarcoidosis      Past Surgical History:   Procedure Laterality Date    HX CARPAL TUNNEL RELEASE  00/00/2002    Both hands    HX CYST REMOVAL  00/00/1965    Left wrist    HX HYSTERECTOMY 00/00/1985    HX ORTHOPAEDIC  00/00/1987    back surgery (disc)    HX ORTHOPAEDIC      right foot X 3 neuromas and tarsal tunnel release    HX TONSILLECTOMY  00/00/1962    PAIN PUMP DEVICE  00/00/2007    in left lower abdomen (for foot pain)    ME UNLISTED NEUROLOGICAL/NEUROMUSCULAR DX PX      Pain pump       Personal factors and/or comorbidities impacting plan of care: fall risk, decreased endurance    Home Situation  Home Environment: Private residence  # Steps to Enter: 3  Rails to Enter: Yes  Hand Rails : Bilateral  One/Two Story Residence: One story  Living Alone: No  Support Systems: Spouse/Significant Other  Patient Expects to be Discharged to[de-identified] Home  Current DME Used/Available at Home: Walker, rolling  Tub or Shower Type: Tub/Shower combination    EXAMINATION/PRESENTATION/DECISION MAKING:   Critical Behavior:              Hearing: Auditory  Auditory Impairment: None  Skin:  intact   Edema: none  Range Of Motion:  AROM: Generally decreased, functional                       Strength:    Strength: Generally decreased, functional                    Tone & Sensation:   Tone: Normal              Sensation: Impaired (neuropathy in B feet)               Coordination:  Coordination: Within functional limits  Vision:      Functional Mobility:  Bed Mobility:  Rolling:  (sitting in the chair)           Transfers:  Sit to Stand: Contact guard assistance  Stand to Sit: Contact guard assistance        Bed to Chair: Contact guard assistance              Balance:   Sitting: Intact; Without support  Standing: Impaired; Without support  Standing - Static: Good  Standing - Dynamic : Fair  Ambulation/Gait Training:  Distance (ft): 40 Feet (ft)  Assistive Device: Gait belt  Ambulation - Level of Assistance: Contact guard assistance     Gait Description (WDL): Exceptions to WDL  Gait Abnormalities: Decreased step clearance;Trunk sway increased        Base of Support: Widened     Speed/Noemi: Pace decreased (<100 feet/min)  Step Length: Right shortened;Left shortened               Functional Measure:  [facility] AM-PAC®      Basic Mobility Inpatient Short Form (6-Clicks) Version 2  How much HELP from another person do you currently need. .. (If the patient hasn't done an activity recently, how much help from another person do you think they would need if they tried?) Total A Lot A Little None   1. Turning from your back to your side while in a flat bed without using bedrails? []  1 []  2 []  3  [x]  4   2. Moving from lying on your back to sitting on the side of a flat bed without using bedrails? []  1 []  2 [x]  3  []  4   3. Moving to and from a bed to a chair (including a wheelchair)? []  1 []  2 []  3  [x]  4   4. Standing up from a chair using your arms (e.g. wheelchair or bedside chair)? []  1 []  2 []  3  [x]  4   5. Walking in hospital room? []  1 []  2 [x]  3  []  4   6. Climbing 3-5 steps with a railing? []  1 []  2 [x]  3  []  4     Raw Score: 21/24                            Cutoff score ?171,2,3 had higher odds of discharging home with home health or need of SNF/IPR. [address], Danny Medina. Validity of the AM-PAC 6-Clicks Inpatient Daily Activity and Basic Mobility Short Forms. Physical Therapy Mar 2014, 94 3 [phone]; DOI: 10.2522/ptj.01966959  2. Analilia Guerrero. Association of AM-PAC \"6-Clicks\" Basic Mobility and Daily Activity Scores With Discharge Destination. Phys Ther. 2021 Apr 4;101(4):xrfc797. doi: 10.1093/ptj/bicr674. PMID: 88807212. V Carito Castillo, Marylou CARDENAS, Ray Murry, Destini K, Maximus S. Activity Measure for Post-Acute Care \"6-Clicks\" Basic Mobility Scores Predict Discharge Destination After Acute Care Hospitalization in Select Patient Groups: A Retrospective, Observational Study. Arch Rehabil Res Clin Transl. 2022 Jul 16;4(3):576570. doi: 10.1016/j.arrct. 1501.807732.  PMID: 06429833; PMCID: CPR2131166. 4. Kristy Arceo Ni P. AM-PAC Short Forms Manual 4.0. Revised 2/2020. Physical Therapy Evaluation Charge Determination   History Examination Presentation Decision-Making   MEDIUM  Complexity : 1-2 comorbidities / personal factors will impact the outcome/ POC  MEDIUM Complexity : 3 Standardized tests and measures addressing body structure, function, activity limitation and / or participation in recreation  MEDIUM Complexity : Evolving with changing characteristics  Other outcome measures [facility]  MEDIUM      Based on the above components, the patient evaluation is determined to be of the following complexity level: MEDIUM    Pain Rating:  Denies any pain    Activity Tolerance:   Good and O2 sats dropped to 82% on 2L O2, requiring 4L O2 to recover to 94%. After treatment patient left in no apparent distress:   Sitting in chair, Call bell within reach, and Caregiver / family present    COMMUNICATION/EDUCATION:   The patients plan of care was discussed with: Occupational therapist and Registered nurse. Fall prevention education was provided and the patient/caregiver indicated understanding., Patient/family have participated as able in goal setting and plan of care. , and Patient/family agree to work toward stated goals and plan of care.     Thank you for this referral.  Walker Crocker, PT, DPT   Time Calculation: 17 mins

## 2023-03-25 NOTE — PROGRESS NOTES
Problem: Falls - Risk of  Goal: *Absence of Falls  Description: Document Jory Jain Fall Risk and appropriate interventions in the flowsheet.   3/25/2023 1108 by Kiki Diehl RN  Outcome: Progressing Towards Goal  Note: Fall Risk Interventions:                             3/25/2023 1107 by Ortega Alexandra, RN  Outcome: Progressing Towards Goal  Note: Fall Risk Interventions:

## 2023-03-25 NOTE — PROGRESS NOTES
Problem: Self Care Deficits Care Plan (Adult)  Goal: *Acute Goals and Plan of Care (Insert Text)  Description: FUNCTIONAL STATUS PRIOR TO ADMISSION: Patient was independent and active without use of DME.    HOME SUPPORT PRIOR TO ADMISSION: The patient lived with  but didn't require assist.    Occupational Therapy Goals:  Initiated 3/25/2023  1. Patient will perform grooming standing with modified independence within 7 days. 2. Patient will perform upper body dressing and lower body dressing with modified independence within 7 days. 3. Patient will perform toileting with modified independence within 7 days. 4. Patient will transfer from toilet with modified independence using the least restrictive device and appropriate durable medical equipment within 7 days. Outcome: Not Met   OCCUPATIONAL THERAPY EVALUATION  Patient: Argelia Terrazas (60 y.o. female)  Date: 3/25/2023  Primary Diagnosis: Pneumonia [J18.9]       Precautions: none       ASSESSMENT  Based on the objective data described below, the patient presents with SOB on exertion and was unable to be weaned from O2 this session. SOB on exertion and supportive daughter was at bedside. CGA with flexed upper trunk for mobility and pt is kyphotic. CGA overall for ADLS at this time and pt may need O2 at discharge. 03/25/23 1300 03/25/23 1304 03/25/23 1307   Vital Signs   O2 Sat (%) 94 %  (seated upon arrival) 93 %  (seated) (!) 82 %   O2 Device Nasal cannula Nasal cannula Nasal cannula   O2 Flow Rate (L/min) 4 l/min 2 l/min 2 l/min      03/25/23 1312   Vital Signs   O2 Sat (%) 93 %   O2 Device Nasal cannula   O2 Flow Rate (L/min) 4 l/min       Current Level of Function Impacting Discharge (ADLs/self-care): CGA for mobility and ADLS    Functional Outcome Measure: The patient scored 65/100 on the barthel outcome measure which is indicative of minimal deficits with mobility and ADLS.       Other factors to consider for discharge: none Patient will benefit from skilled therapy intervention to address the above noted impairments. PLAN :  Recommendations and Planned Interventions: self care training, functional mobility training, therapeutic exercise, balance training, therapeutic activities, patient education, home safety training, and family training/education    Frequency/Duration: Patient will be followed by occupational therapy 3 times a week to address goals. Recommendation for discharge: (in order for the patient to meet his/her long term goals)  Occupational therapy at least 2 days/week in the home AND ensure assist and/or supervision for safety with mobility and ADLs    This discharge recommendation:  Has not yet been discussed the attending provider and/or case management    IF patient discharges home will need the following DME: pulse ox, O2       SUBJECTIVE:   Patient stated I feel tired.     OBJECTIVE DATA SUMMARY:   HISTORY:   Past Medical History:   Diagnosis Date    Abnormal chest x-ray 12/23/2009    Anemia 12/23/2009    Arrhythmia     SVT.  Converts with Adenocard    Atrial fibrillation (HCC) 12/23/2009    Carpal tunnel syndrome 12/23/2009    Chronic obstructive pulmonary disease ([address]     Colitis 12/23/2009    Complex regional pain syndrome 12/23/2009    Depression 12/23/2009    Diabetes ([address]     no meds    Family history of skin cancer     brother-melanoma    GERD (gastroesophageal reflux disease) 2/8/2010    HTN (hypertension) 12/23/2009    Hypercholesteremia 12/23/2009    Hypertriglyceridemia 12/23/2009    Idiopathic peripheral neuropathy 12/23/2009    Iron deficiency anemia 12/23/2009    Osteopenia 12/23/2009    Paroxysmal atrial fibrillation ([address] 12/2/2011    Psoriasis 6/27/2012    Sarcoidosis      Past Surgical History:   Procedure Laterality Date    HX CARPAL TUNNEL RELEASE  00/00/2002    Both hands    HX CYST REMOVAL  00/00/1965    Left wrist    HX HYSTERECTOMY  00/00/1985    HX ORTHOPAEDIC  00/00/1987    back surgery (disc)    HX ORTHOPAEDIC      right foot X 3 neuromas and tarsal tunnel release    HX TONSILLECTOMY  00/00/1962    PAIN PUMP DEVICE  00/00/2007    in left lower abdomen (for foot pain)    PA UNLISTED NEUROLOGICAL/NEUROMUSCULAR DX PX      Pain pump       Expanded or extensive additional review of patient history:     Home Situation  Home Environment: Private residence  # Steps to Enter: 3  Rails to Enter: Yes  Hand Rails : Bilateral  One/Two Story Residence: One story  Living Alone: No  Support Systems: Spouse/Significant Other  Patient Expects to be Discharged to[de-identified] Home  Current DME Used/Available at Home: Walker, rolling  Tub or Shower Type: Tub/Shower combination    Hand dominance: Right    EXAMINATION OF PERFORMANCE DEFICITS:  Cognitive/Behavioral Status:  Neurologic State: Alert    Hearing: Auditory  Auditory Impairment: None    Vision/Perceptual:                           Acuity:  (grossly intact)         Range of Motion:    AROM: Generally decreased, functional                         Strength:    Strength: Generally decreased, functional                Coordination:  Coordination: Within functional limits  Fine Motor Skills-Upper: Left Intact; Right Intact    Gross Motor Skills-Upper: Left Intact; Right Intact    Tone & Sensation:    Tone: Normal  Sensation: Impaired (neuropathy in B feet)                      Balance:  Sitting: Intact; Without support  Standing: Impaired; Without support  Standing - Static: Good  Standing - Dynamic : Fair    Functional Mobility and Transfers for ADLs:  Bed Mobility:  Rolling:  (sitting in the chair)    Transfers:  Sit to Stand: Contact guard assistance  Stand to Sit: Contact guard assistance  Bed to Chair: Contact guard assistance  Bathroom Mobility: Contact guard assistance  Toilet Transfer : Contact guard assistance    ADL Assessment:  Feeding: Independent    Oral Facial Hygiene/Grooming: Contact guard assistance    Bathing: Contact guard assistance         Upper Body Dressing: Setup    Lower Body Dressing: Contact guard assistance    Toileting: Contact guard assistance                  ADL Intervention and task modifications:     CGA standing at sink to wash hands    Toileting  Bladder Hygiene: Supervision  Clothing Management: Contact guard assistance      Functional Measure:    Barthel Index:  Bathin  Bladder: 10  Bowels: 10  Groomin  Dressin  Feeding: 10  Mobility: 10  Stairs: 0  Toilet Use: 5  Transfer (Bed to Chair and Back): 10  Total: 65/100      The Barthel ADL Index: Guidelines  1. The index should be used as a record of what a patient does, not as a record of what a patient could do. 2. The main aim is to establish degree of independence from any help, physical or verbal, however minor and for whatever reason. 3. The need for supervision renders the patient not independent. 4. A patient's performance should be established using the best available evidence. Asking the patient, friends/relatives and nurses are the usual sources, but direct observation and common sense are also important. However direct testing is not needed. 5. Usually the patient's performance over the preceding 24-48 hours is important, but occasionally longer periods will be relevant. 6. Middle categories imply that the patient supplies over 50 per cent of the effort. 7. Use of aids to be independent is allowed. Score Interpretation (from [address] [address])    Independent   60-79 Minimally independent   40-59 Partially dependent   20-39 Very dependent   <20 Totally dependent     -Allen Louie., Barthel, D.W. (1965). Functional evaluation: the Barthel Index. [address] [facility] ([address] (). The Barthel activities of daily living index: self-reporting versus actual performance in the old (> or = 75 years). Journal of [address] [place] [address] 45(7), [facility], [facility]eseReese, Mayda Panchal., Kat Agudelo. (1999).  Measuring the change in disability after inpatient rehabilitation; comparison of the responsiveness of the Barthel Index and Functional [place] Measure. Journal of Neurology, Neurosurgery, and Psychiatry, 66(4), 514-081. CECE Quinn, LIZA Ramirez, & Tyler Flor M.A. (2004) Assessment of post-stroke quality of life in cost-effectiveness studies: The usefulness of the Barthel Index and the EuroQoL-5D. Quality of Life Research, 15, 831-96         Occupational Therapy Evaluation Charge Determination   History Examination Decision-Making   LOW Complexity : Brief history review  LOW Complexity : 1-3 performance deficits relating to physical, cognitive , or psychosocial skils that result in activity limitations and / or participation restrictions  LOW Complexity : No comorbidities that affect functional and no verbal or physical assistance needed to complete eval tasks       Based on the above components, the patient evaluation is determined to be of the following complexity level: LOW   Pain Ratin/10    Activity Tolerance:   Fair, desaturates with exertion and requires oxygen, requires rest breaks, and observed SOB with activity    After treatment patient left in no apparent distress:    Sitting in chair, Call bell within reach, Bed / chair alarm activated, and Caregiver / family present    COMMUNICATION/EDUCATION:   The patients plan of care was discussed with: Physical therapist, Registered nurse, and patient . Home safety education was provided and the patient/caregiver indicated understanding., Patient/family have participated as able in goal setting and plan of care. , and Patient/family agree to work toward stated goals and plan of care. This patients plan of care is appropriate for delegation to [facility].     Thank you for this referral.  Ashley Ceja OTR/L  Time Calculation: 17 mins

## 2023-03-26 LAB
ALBUMIN SERPL-MCNC: 2.5 G/DL (ref 3.5–5)
ALBUMIN/GLOB SERPL: 0.6 (ref 1.1–2.2)
ALP SERPL-CCNC: 86 U/L (ref 45–117)
ALT SERPL-CCNC: 126 U/L (ref 12–78)
ANION GAP SERPL CALC-SCNC: 1 MMOL/L (ref 5–15)
AST SERPL-CCNC: 83 U/L (ref 15–37)
BILIRUB SERPL-MCNC: 0.3 MG/DL (ref 0.2–1)
BUN SERPL-MCNC: 20 MG/DL (ref 6–20)
BUN/CREAT SERPL: 28 (ref 12–20)
CALCIUM SERPL-MCNC: 8.9 MG/DL (ref 8.5–10.1)
CHLORIDE SERPL-SCNC: 105 MMOL/L (ref 97–108)
CO2 SERPL-SCNC: 32 MMOL/L (ref 21–32)
CREAT SERPL-MCNC: 0.71 MG/DL (ref 0.55–1.02)
ERYTHROCYTE [DISTWIDTH] IN BLOOD BY AUTOMATED COUNT: 16.2 % (ref 11.5–14.5)
GLOBULIN SER CALC-MCNC: 4.3 G/DL (ref 2–4)
GLUCOSE SERPL-MCNC: 159 MG/DL (ref 65–100)
HCT VFR BLD AUTO: 32.5 % (ref 35–47)
HGB BLD-MCNC: 10.1 G/DL (ref 11.5–16)
INR PPP: 1.1 (ref 0.9–1.1)
MCH RBC QN AUTO: 26 PG (ref 26–34)
MCHC RBC AUTO-ENTMCNC: 31.1 G/DL (ref 30–36.5)
MCV RBC AUTO: 83.8 FL (ref 80–99)
NRBC # BLD: 0 K/UL (ref 0–0.01)
NRBC BLD-RTO: 0 PER 100 WBC
PLATELET # BLD AUTO: 344 K/UL (ref 150–400)
PMV BLD AUTO: 10.3 FL (ref 8.9–12.9)
POTASSIUM SERPL-SCNC: 4.7 MMOL/L (ref 3.5–5.1)
PROT SERPL-MCNC: 6.8 G/DL (ref 6.4–8.2)
PROTHROMBIN TIME: 11.7 SEC (ref 9–11.1)
RBC # BLD AUTO: 3.88 M/UL (ref 3.8–5.2)
SODIUM SERPL-SCNC: 138 MMOL/L (ref 136–145)
WBC # BLD AUTO: 17.3 K/UL (ref 3.6–11)

## 2023-03-26 PROCEDURE — 77010033678 HC OXYGEN DAILY

## 2023-03-26 PROCEDURE — 36415 COLL VENOUS BLD VENIPUNCTURE: CPT

## 2023-03-26 PROCEDURE — 94640 AIRWAY INHALATION TREATMENT: CPT

## 2023-03-26 PROCEDURE — 80053 COMPREHEN METABOLIC PANEL: CPT

## 2023-03-26 PROCEDURE — 85027 COMPLETE CBC AUTOMATED: CPT

## 2023-03-26 PROCEDURE — 74011250636 HC RX REV CODE- 250/636: Performed by: STUDENT IN AN ORGANIZED HEALTH CARE EDUCATION/TRAINING PROGRAM

## 2023-03-26 PROCEDURE — 74011000258 HC RX REV CODE- 258: Performed by: STUDENT IN AN ORGANIZED HEALTH CARE EDUCATION/TRAINING PROGRAM

## 2023-03-26 PROCEDURE — 74011250637 HC RX REV CODE- 250/637: Performed by: STUDENT IN AN ORGANIZED HEALTH CARE EDUCATION/TRAINING PROGRAM

## 2023-03-26 PROCEDURE — 65270000029 HC RM PRIVATE

## 2023-03-26 PROCEDURE — 74011000250 HC RX REV CODE- 250: Performed by: STUDENT IN AN ORGANIZED HEALTH CARE EDUCATION/TRAINING PROGRAM

## 2023-03-26 PROCEDURE — 74011000250 HC RX REV CODE- 250: Performed by: INTERNAL MEDICINE

## 2023-03-26 PROCEDURE — 85610 PROTHROMBIN TIME: CPT

## 2023-03-26 RX ADMIN — ALBUTEROL SULFATE 1.25 MG: 2.5 SOLUTION RESPIRATORY (INHALATION) at 13:47

## 2023-03-26 RX ADMIN — SODIUM CHLORIDE, PRESERVATIVE FREE 10 ML: 5 INJECTION INTRAVENOUS at 21:36

## 2023-03-26 RX ADMIN — ARFORMOTEROL TARTRATE 15 MCG: 15 SOLUTION RESPIRATORY (INHALATION) at 08:32

## 2023-03-26 RX ADMIN — BENZONATATE 100 MG: 100 CAPSULE ORAL at 21:35

## 2023-03-26 RX ADMIN — SODIUM CHLORIDE, PRESERVATIVE FREE 10 ML: 5 INJECTION INTRAVENOUS at 15:18

## 2023-03-26 RX ADMIN — GABAPENTIN 800 MG: 300 CAPSULE ORAL at 08:50

## 2023-03-26 RX ADMIN — SODIUM CHLORIDE 1 G: 900 INJECTION INTRAVENOUS at 23:29

## 2023-03-26 RX ADMIN — METOPROLOL TARTRATE 50 MG: 50 TABLET ORAL at 18:47

## 2023-03-26 RX ADMIN — ALBUTEROL SULFATE 1.25 MG: 2.5 SOLUTION RESPIRATORY (INHALATION) at 02:12

## 2023-03-26 RX ADMIN — METHYLPREDNISOLONE SODIUM SUCCINATE 40 MG: 40 INJECTION, POWDER, FOR SOLUTION INTRAMUSCULAR; INTRAVENOUS at 14:50

## 2023-03-26 RX ADMIN — ARFORMOTEROL TARTRATE 15 MCG: 15 SOLUTION RESPIRATORY (INHALATION) at 19:31

## 2023-03-26 RX ADMIN — METHYLPREDNISOLONE SODIUM SUCCINATE 40 MG: 40 INJECTION, POWDER, FOR SOLUTION INTRAMUSCULAR; INTRAVENOUS at 05:58

## 2023-03-26 RX ADMIN — ALBUTEROL SULFATE 1.25 MG: 2.5 SOLUTION RESPIRATORY (INHALATION) at 19:31

## 2023-03-26 RX ADMIN — SODIUM CHLORIDE, PRESERVATIVE FREE 10 ML: 5 INJECTION INTRAVENOUS at 05:58

## 2023-03-26 RX ADMIN — POLYETHYLENE GLYCOL 3350 17 G: 17 POWDER, FOR SOLUTION ORAL at 16:27

## 2023-03-26 RX ADMIN — LOSARTAN POTASSIUM 50 MG: 50 TABLET, FILM COATED ORAL at 08:50

## 2023-03-26 RX ADMIN — GUAIFENESIN 600 MG: 600 TABLET, EXTENDED RELEASE ORAL at 18:47

## 2023-03-26 RX ADMIN — AZITHROMYCIN MONOHYDRATE 500 MG: 500 INJECTION, POWDER, LYOPHILIZED, FOR SOLUTION INTRAVENOUS at 08:51

## 2023-03-26 RX ADMIN — AMITRIPTYLINE HYDROCHLORIDE 100 MG: 25 TABLET, FILM COATED ORAL at 21:35

## 2023-03-26 RX ADMIN — METOPROLOL TARTRATE 50 MG: 50 TABLET ORAL at 08:50

## 2023-03-26 RX ADMIN — GABAPENTIN 800 MG: 300 CAPSULE ORAL at 15:17

## 2023-03-26 RX ADMIN — METHYLPREDNISOLONE SODIUM SUCCINATE 40 MG: 40 INJECTION, POWDER, FOR SOLUTION INTRAMUSCULAR; INTRAVENOUS at 21:36

## 2023-03-26 RX ADMIN — ENOXAPARIN SODIUM 40 MG: 100 INJECTION SUBCUTANEOUS at 08:51

## 2023-03-26 RX ADMIN — PANTOPRAZOLE SODIUM 40 MG: 40 TABLET, DELAYED RELEASE ORAL at 08:00

## 2023-03-26 RX ADMIN — GUAIFENESIN 600 MG: 600 TABLET, EXTENDED RELEASE ORAL at 08:50

## 2023-03-26 RX ADMIN — ALBUTEROL SULFATE 1.25 MG: 2.5 SOLUTION RESPIRATORY (INHALATION) at 08:32

## 2023-03-26 RX ADMIN — GABAPENTIN 800 MG: 300 CAPSULE ORAL at 21:35

## 2023-03-26 NOTE — PROGRESS NOTES
End of Shift Note    Bedside shift change report given to Zuleima Villalta RN (oncoming nurse) by Sandra Farah RN (offgoing nurse). Report included the following information SBAR, Kardex, Intake/Output, MAR, Accordion, and Recent Results    Shift worked:  7p-7a     Shift summary and any significant changes:    Patient continues on iv antibiotics and solumedrol for pneumonia. No c/o pain voiced. Concerns for physician to address:  No concerns to address at this time     Zone phone for oncoming shift:   [phone]       Activity:  Activity Level:  Up with Assistance  Number times ambulated in hallways past shift: 0  Number of times OOB to chair past shift: 2    Cardiac:   Cardiac Monitoring: No      Cardiac Rhythm: Sinus Rhythm    Access:  Current line(s): PIV     Genitourinary:   Urinary status: voiding    Respiratory:   O2 Device: Nasal cannula  Chronic home O2 use?: NO  Incentive spirometer at bedside: YES       GI:  Last Bowel Movement Date: 03/21/23  Current diet:  ADULT DIET Regular; 4 carb choices (60 gm/meal)  Passing flatus: YES  Tolerating current diet: YES       Pain Management:   Patient states pain is manageable on current regimen: YES    Skin:  Neri Score: 20  Interventions: float heels, increase time out of bed, and PT/OT consult    Patient Safety:  Fall Score:    Interventions: assistive device (walker, cane, etc), gripper socks, and pt to call before getting OOB       Length of Stay:  Expected LOS: 2d 21h  Actual LOS: 2      Sandra Farah RN

## 2023-03-26 NOTE — PROGRESS NOTES
End of Shift Note    Bedside shift change report given to Luis Antonio CHUNG (oncoming nurse) by Veronica Yan RN (offgoing nurse). Report included the following information SBAR, Kardex, Intake/Output, MAR, and Accordion    Shift worked:  7a-7p     Shift summary and any significant changes:     Pt continues treatment for pneumonia to include breathing treatment,antibiotics and solumedrol. Coughing and deep breathing. Pt walked in the room several times. She had her meals in the chair. Family visited as usual. Pt SpO2 to be maintained between 88 to 92% and to wean O2 supplement down per MD order. Concerns for physician to address:  No new concerns to address       Zone phone for oncoming shift:   [phone]       Activity:  Activity Level:  Up with Assistance  Number times ambulated in hallways past shift: 0  Number of times OOB to chair past shift: 3    Cardiac:   Cardiac Monitoring: No      Cardiac Rhythm: Sinus Rhythm    Access:  Current line(s): PIV     Genitourinary:   Urinary status: voiding    Respiratory:   O2 Device: Nasal cannula  Chronic home O2 use?: NO  Incentive spirometer at bedside: YES       GI:  Last Bowel Movement Date: 03/21/23  Current diet:  ADULT DIET Regular; 4 carb choices (60 gm/meal)  ADULT ORAL NUTRITION SUPPLEMENT Breakfast, Dinner; Standard High Calorie/High Protein  Passing flatus: YES  Tolerating current diet: YES       Pain Management:   Patient states pain is manageable on current regimen: YES    Skin:  Neri Score: 20  Interventions: float heels, increase time out of bed, and PT/OT consult    Patient Safety:  Fall Score:    Interventions: assistive device (walker, cane, etc), gripper socks, and pt to call before getting OOB       Length of Stay:  Expected LOS: 2d 21h  Actual LOS: Radha Stout RN

## 2023-03-26 NOTE — PROGRESS NOTES
Hospitalist Progress Note    NAME: Laina Acosta   :  1945   MRN:  [id number]     She is a 75-year-old female past medical history of COPD admitted for shortness of breath and noted to have COPD exacerbation, pneumonia and also transaminitis. Assessment / Plan:  Acute hypoxic respiratory failure  Acute COPD exacerbation  Community-acquired pneumonia  -CT chest showed no pulmonary embolism but shows bilateral pneumonia. -COVID rapid test is negative   -Blood cultures negative at 2 days. -UCX group B strep  Plan  -Continue Solu-Medrol, albuterol.    -Continue ceftriaxone and zithromax.  -Continue oxygen by nasal cannula, currently on 3 L and wean as tolerated  -Encouraged use of incentive spirometer    UTI  -Urine culture growing Streptococcus.    -Continue ceftriaxone.    -Adjust antibiotics based on culture results. Acute transaminitis  -ALT is 103 and AST is 85 and is trending down  -Check PT/INR  -Ultrasound of the liver shows gallbladder sludge but no acute process  -Hepatitis panel negative  -Daily CMP    Hypertension  Neuropathy  GERD  -Continue losartan, metoprolol  -Continue Elavil  -Continue PTA    Estimated discharge date: 3/27  Barriers: Weaning oxygen, improvement of LFTs    Code status: Full  Prophylaxis: Lovenox  Recommended Disposition: Home w/Family     Subjective:     Chief Complaint / Reason for Physician Visit  Increasingly drowsy secondary to boredom. Otherwise feeling less SOB. Mild cough without production. Never been on O2 NC at home.      Overnight events noted and discussed with nursing    Review of Systems:  Symptom Y/N Comments  Symptom Y/N Comments   Fever/Chills    Chest Pain     Poor Appetite    Edema     Cough    Abdominal Pain     Sputum    Joint Pain     SOB/LEBRON    Pruritis/Rash     Nausea/vomit    Tolerating PT/OT     Diarrhea    Tolerating Diet     Constipation    Other       Could NOT obtain due to:      Objective:     VITALS:   Last 24hrs VS reviewed since prior progress note. Most recent are:  Patient Vitals for the past 24 hrs:   Temp Pulse Resp BP SpO2   03/26/23 0833 -- -- -- -- 95 %   03/26/23 0751 97.3 °F (36.3 °C) 66 18 (!) 156/72 94 %   03/26/23 0312 97.5 °F (36.4 °C) 70 20 127/69 93 %   03/25/23 1950 98.1 °F (36.7 °C) 74 20 138/62 95 %   03/25/23 1530 -- -- -- -- 90 %   03/25/23 1439 98.1 °F (36.7 °C) 72 18 139/68 94 %       No intake or output data in the 24 hours ending 03/26/23 1315     I had a face to face encounter and independently examined this patient on 3/26/2023, as outlined below:  PHYSICAL EXAM:  General: WD, WN. Alert, cooperative, no acute distress    EENT:  EOMI. Anicteric sclerae. MMM  Resp:  Bilateral intubated, crackles present, no wheezing  CV:  Regular  rhythm,  No edema  GI:  Soft, Non distended, Non tender. +Bowel sounds  Neurologic:  Alert and oriented X 3, normal speech,   Psych:   Good insight. Not anxious nor agitated  Skin:  No rashes. No jaundice    Reviewed most current lab test results and cultures  YES  Reviewed most current radiology test results   YES  Review and summation of old records today    NO  Reviewed patient's current orders and MAR    YES  PMH/ reviewed - no change compared to H&P  ________________________________________________________________________  Care Plan discussed with:    Comments   Patient y    Family  y Daughter present at bedside   RN y    Care Manager     Consultant                        Multidiciplinary team rounds were held today with , nursing, pharmacist and clinical coordinator. Patient's plan of care was discussed; medications were reviewed and discharge planning was addressed.      ________________________________________________________________________  Total NON critical care TIME:  54   Minutes    Total CRITICAL CARE TIME Spent:   Minutes non procedure based      Comments   >50% of visit spent in counseling and coordination of care x ________________________________________________________________________  Hanane Almaraz MD     Procedures: see electronic medical records for all procedures/Xrays and details which were not copied into this note but were reviewed prior to creation of Plan. LABS:  I reviewed today's most current labs and imaging studies.   Pertinent labs include:  Recent Labs     03/26/23  0425 03/25/23  0118 03/23/23 2026   WBC 17.3* 18.7* 15.5*   HGB 10.1* 11.3* 12.1   HCT 32.5* 36.3 36.8    348 347       Recent Labs     03/26/23  0425 03/25/23  0429 03/23/23 2026    138 135*   K 4.7 3.9 3.6    107 99   CO2 32 29 28   * 137* 125*   BUN 20 14 12   CREA 0.71 0.59 0.72   CA 8.9 8.9 9.0   ALB 2.5* 2.3* 2.8*   TBILI 0.3 0.5 0.8   * 103* 120*   INR 1.1  --   --          Signed: Hanane Almaraz MD

## 2023-03-26 NOTE — PROGRESS NOTES
Problem: Falls - Risk of  Goal: *Absence of Falls  Description: Document Cruz Christina Fall Risk and appropriate interventions in the flowsheet.   Outcome: Progressing Towards Goal  Note: Fall Risk Interventions:                                Problem: Patient Education: Go to Patient Education Activity  Goal: Patient/Family Education  Outcome: Progressing Towards Goal     Problem: Patient Education: Go to Patient Education Activity  Goal: Patient/Family Education  Outcome: Progressing Towards Goal     Problem: Patient Education: Go to Patient Education Activity  Goal: Patient/Family Education  Outcome: Progressing Towards Goal

## 2023-03-26 NOTE — PROGRESS NOTES
Problem: Falls - Risk of  Goal: *Absence of Falls  Description: Document Renuka Yañez Fall Risk and appropriate interventions in the flowsheet.   Outcome: Progressing Towards Goal  Note: Fall Risk Interventions:                                Problem: Patient Education: Go to Patient Education Activity  Goal: Patient/Family Education  Outcome: Progressing Towards Goal     Problem: Patient Education: Go to Patient Education Activity  Goal: Patient/Family Education  Outcome: Progressing Towards Goal

## 2023-03-26 NOTE — PROGRESS NOTES
Hospitalist Progress Note    NAME: Aram Solomon   :  1945   MRN:  [id number]     She is a 75-year-old female past medical history of COPD admitted for shortness of breath and noted to have COPD exacerbation, pneumonia and also transaminitis. Assessment / Plan:  Acute hypoxic respiratory failure  Acute COPD exacerbation  Community-acquired pneumonia  -CT chest showed no pulmonary embolism but shows bilateral pneumonia. COVID rapid test is negative  -  Stop vancomycin recommended ceftriaxone Zithromax. Blood cultures negative at 2 days  -Continue Solu-Medrol, albuterol. Continue Zithromax as above  -Continue oxygen by nasal cannula, currently on 3 L and wean as tolerated    UTI  -Urine culture growing Streptococcus. Continue ceftriaxone. Adjust antibiotics based on culture results. Acute transaminitis  -ALT is 103 and AST is 85 and is trending down  -Check PT/INR  -Ultrasound of the liver shows gallbladder sludge but no acute process  -Hepatitis panel negative  -Check CMP in a.m. tomorrow    Hypertension  Neuropathy  GERD  -Continue losartan, metoprolol  -Continue Elavil  -Continue PTA      Estimated discharge date: 3/27  Barriers: Weaning oxygen, improvement of LFTs    Code status: Full  Prophylaxis: Lovenox  Recommended Disposition: Home w/Family     Subjective:     Chief Complaint / Reason for Physician Visit  Shortness of breath is better. Mild cough. No phlegm. She is on 4 L nasal cannula  Does not report any abdominal pain  No fever  Overnight events noted and discussed with nursing      Review of Systems:  Symptom Y/N Comments  Symptom Y/N Comments   Fever/Chills    Chest Pain     Poor Appetite    Edema     Cough    Abdominal Pain     Sputum    Joint Pain     SOB/LEBRON    Pruritis/Rash     Nausea/vomit    Tolerating PT/OT     Diarrhea    Tolerating Diet     Constipation    Other       Could NOT obtain due to:      Objective:     VITALS:   Last 24hrs VS reviewed since prior progress note. Most recent are:  Patient Vitals for the past 24 hrs:   Temp Pulse Resp BP SpO2   03/25/23 1950 98.1 °F (36.7 °C) 74 20 138/62 95 %   03/25/23 1530 -- -- -- -- 90 %   03/25/23 1439 98.1 °F (36.7 °C) 72 18 139/68 94 %   03/25/23 1312 -- -- -- -- 93 %   03/25/23 1307 -- -- -- -- (!) 82 %   03/25/23 1304 -- -- -- -- 93 %   03/25/23 1300 -- -- -- -- 94 %   03/25/23 0825 -- -- -- -- 95 %   03/25/23 0716 97.7 °F (36.5 °C) 70 18 131/66 94 %   03/25/23 0300 97.6 °F (36.4 °C) 68 18 134/74 95 %     No intake or output data in the 24 hours ending 03/25/23 2224     I had a face to face encounter and independently examined this patient on 3/25/2023, as outlined below:  PHYSICAL EXAM:  General: WD, WN. Alert, cooperative, no acute distress    EENT:  EOMI. Anicteric sclerae. MMM  Resp:  Bilateral intubated, crackles present, no wheezing  CV:  Regular  rhythm,  No edema  GI:  Soft, Non distended, Non tender. +Bowel sounds  Neurologic:  Alert and oriented X 3, normal speech,   Psych:   Good insight. Not anxious nor agitated  Skin:  No rashes. No jaundice    Reviewed most current lab test results and cultures  YES  Reviewed most current radiology test results   YES  Review and summation of old records today    NO  Reviewed patient's current orders and MAR    YES  PMH/SH reviewed - no change compared to H&P  ________________________________________________________________________  Care Plan discussed with:    Comments   Patient y    Family      RN y    Care Manager     Consultant                        Multidiciplinary team rounds were held today with , nursing, pharmacist and clinical coordinator. Patient's plan of care was discussed; medications were reviewed and discharge planning was addressed.      ________________________________________________________________________  Total NON critical care TIME:  55   Minutes    Total CRITICAL CARE TIME Spent:   Minutes non procedure based      Comments   >50% of visit spent in counseling and coordination of care     ________________________________________________________________________  Leander Rodriguez MD     Procedures: see electronic medical records for all procedures/Xrays and details which were not copied into this note but were reviewed prior to creation of Plan. LABS:  I reviewed today's most current labs and imaging studies.   Pertinent labs include:  Recent Labs     03/25/23  0118 03/23/23 2026   WBC 18.7* 15.5*   HGB 11.3* 12.1   HCT 36.3 36.8    347     Recent Labs     03/25/23  0429 03/23/23 2026    135*   K 3.9 3.6    99   CO2 29 28   * 125*   BUN 14 12   CREA 0.59 0.72   CA 8.9 9.0   ALB 2.3* 2.8*   TBILI 0.5 0.8   * 120*       Signed: Leander Rodriguez MD

## 2023-03-27 VITALS
HEIGHT: 67 IN | BODY MASS INDEX: 23.56 KG/M2 | RESPIRATION RATE: 18 BRPM | SYSTOLIC BLOOD PRESSURE: 157 MMHG | DIASTOLIC BLOOD PRESSURE: 69 MMHG | WEIGHT: 150.13 LBS | HEART RATE: 63 BPM | OXYGEN SATURATION: 96 % | TEMPERATURE: 97.7 F

## 2023-03-27 LAB
ALBUMIN SERPL-MCNC: 2.6 G/DL (ref 3.5–5)
ALBUMIN/GLOB SERPL: 0.7 (ref 1.1–2.2)
ALP SERPL-CCNC: 85 U/L (ref 45–117)
ALT SERPL-CCNC: 112 U/L (ref 12–78)
ANION GAP SERPL CALC-SCNC: 2 MMOL/L (ref 5–15)
AST SERPL-CCNC: 53 U/L (ref 15–37)
BILIRUB SERPL-MCNC: 0.7 MG/DL (ref 0.2–1)
BUN SERPL-MCNC: 21 MG/DL (ref 6–20)
BUN/CREAT SERPL: 31 (ref 12–20)
CALCIUM SERPL-MCNC: 9 MG/DL (ref 8.5–10.1)
CHLORIDE SERPL-SCNC: 103 MMOL/L (ref 97–108)
CO2 SERPL-SCNC: 33 MMOL/L (ref 21–32)
CREAT SERPL-MCNC: 0.67 MG/DL (ref 0.55–1.02)
ERYTHROCYTE [DISTWIDTH] IN BLOOD BY AUTOMATED COUNT: 16.1 % (ref 11.5–14.5)
GLOBULIN SER CALC-MCNC: 4 G/DL (ref 2–4)
GLUCOSE SERPL-MCNC: 143 MG/DL (ref 65–100)
HCT VFR BLD AUTO: 32.2 % (ref 35–47)
HGB BLD-MCNC: 9.9 G/DL (ref 11.5–16)
LACTATE BLD-SCNC: 0.88 MMOL/L (ref 0.4–2)
MCH RBC QN AUTO: 25.1 PG (ref 26–34)
MCHC RBC AUTO-ENTMCNC: 30.7 G/DL (ref 30–36.5)
MCV RBC AUTO: 81.7 FL (ref 80–99)
NRBC # BLD: 0 K/UL (ref 0–0.01)
NRBC BLD-RTO: 0 PER 100 WBC
PLATELET # BLD AUTO: 364 K/UL (ref 150–400)
PMV BLD AUTO: 10.2 FL (ref 8.9–12.9)
POTASSIUM SERPL-SCNC: 4.9 MMOL/L (ref 3.5–5.1)
PROT SERPL-MCNC: 6.6 G/DL (ref 6.4–8.2)
RBC # BLD AUTO: 3.94 M/UL (ref 3.8–5.2)
SODIUM SERPL-SCNC: 138 MMOL/L (ref 136–145)
WBC # BLD AUTO: 17.1 K/UL (ref 3.6–11)

## 2023-03-27 PROCEDURE — 74011000250 HC RX REV CODE- 250: Performed by: STUDENT IN AN ORGANIZED HEALTH CARE EDUCATION/TRAINING PROGRAM

## 2023-03-27 PROCEDURE — 74011000250 HC RX REV CODE- 250: Performed by: INTERNAL MEDICINE

## 2023-03-27 PROCEDURE — 36415 COLL VENOUS BLD VENIPUNCTURE: CPT

## 2023-03-27 PROCEDURE — 74011250637 HC RX REV CODE- 250/637: Performed by: STUDENT IN AN ORGANIZED HEALTH CARE EDUCATION/TRAINING PROGRAM

## 2023-03-27 PROCEDURE — 80053 COMPREHEN METABOLIC PANEL: CPT

## 2023-03-27 PROCEDURE — 77010033678 HC OXYGEN DAILY

## 2023-03-27 PROCEDURE — 85027 COMPLETE CBC AUTOMATED: CPT

## 2023-03-27 PROCEDURE — 94640 AIRWAY INHALATION TREATMENT: CPT

## 2023-03-27 PROCEDURE — 74011250636 HC RX REV CODE- 250/636: Performed by: STUDENT IN AN ORGANIZED HEALTH CARE EDUCATION/TRAINING PROGRAM

## 2023-03-27 RX ORDER — LEVOFLOXACIN 750 MG/1
750 TABLET ORAL DAILY
Qty: 4 TABLET | Refills: 0 | Status: SHIPPED | OUTPATIENT
Start: 2023-03-27 | End: 2023-03-31

## 2023-03-27 RX ORDER — PREDNISONE 20 MG/1
40 TABLET ORAL
Qty: 10 TABLET | Refills: 0 | Status: SHIPPED | OUTPATIENT
Start: 2023-03-27 | End: 2023-04-01

## 2023-03-27 RX ORDER — ALBUTEROL SULFATE 0.83 MG/ML
2.5 SOLUTION RESPIRATORY (INHALATION)
Status: DISCONTINUED | OUTPATIENT
Start: 2023-03-27 | End: 2023-03-27 | Stop reason: HOSPADM

## 2023-03-27 RX ORDER — ALBUTEROL SULFATE 90 UG/1
2 AEROSOL, METERED RESPIRATORY (INHALATION)
Qty: 18 G | Refills: 0 | Status: SHIPPED | OUTPATIENT
Start: 2023-03-27 | End: 2023-04-26

## 2023-03-27 RX ADMIN — METHYLPREDNISOLONE SODIUM SUCCINATE 40 MG: 40 INJECTION, POWDER, FOR SOLUTION INTRAMUSCULAR; INTRAVENOUS at 13:14

## 2023-03-27 RX ADMIN — ARFORMOTEROL TARTRATE 15 MCG: 15 SOLUTION RESPIRATORY (INHALATION) at 08:52

## 2023-03-27 RX ADMIN — METHYLPREDNISOLONE SODIUM SUCCINATE 40 MG: 40 INJECTION, POWDER, FOR SOLUTION INTRAMUSCULAR; INTRAVENOUS at 05:37

## 2023-03-27 RX ADMIN — SODIUM CHLORIDE, PRESERVATIVE FREE 10 ML: 5 INJECTION INTRAVENOUS at 13:14

## 2023-03-27 RX ADMIN — ALBUTEROL SULFATE 1.25 MG: 2.5 SOLUTION RESPIRATORY (INHALATION) at 01:56

## 2023-03-27 RX ADMIN — ENOXAPARIN SODIUM 40 MG: 100 INJECTION SUBCUTANEOUS at 08:27

## 2023-03-27 RX ADMIN — ALBUTEROL SULFATE 1.25 MG: 2.5 SOLUTION RESPIRATORY (INHALATION) at 08:47

## 2023-03-27 RX ADMIN — SODIUM CHLORIDE, PRESERVATIVE FREE 10 ML: 5 INJECTION INTRAVENOUS at 05:40

## 2023-03-27 RX ADMIN — METOPROLOL TARTRATE 50 MG: 50 TABLET ORAL at 08:27

## 2023-03-27 RX ADMIN — GABAPENTIN 800 MG: 300 CAPSULE ORAL at 08:27

## 2023-03-27 RX ADMIN — GUAIFENESIN 600 MG: 600 TABLET, EXTENDED RELEASE ORAL at 08:27

## 2023-03-27 RX ADMIN — AZITHROMYCIN MONOHYDRATE 500 MG: 500 INJECTION, POWDER, LYOPHILIZED, FOR SOLUTION INTRAVENOUS at 08:33

## 2023-03-27 RX ADMIN — PANTOPRAZOLE SODIUM 40 MG: 40 TABLET, DELAYED RELEASE ORAL at 08:27

## 2023-03-27 RX ADMIN — LOSARTAN POTASSIUM 50 MG: 50 TABLET, FILM COATED ORAL at 08:27

## 2023-03-27 NOTE — PROGRESS NOTES
Patient is a 75 YO female admitted for acute hypoxic respiratory failure and Pneumonia. Discharge order received and patient informed. Education on discharge instructions, follow-up appointment and medications provided to patient at the bedside. Opportunity for questions provided and clarification provided as required. Patient verbalized understanding of discharge instructions and medications. Peripheral IV access discontinued and patient belongings returned to patient at discharge. Patient was stable during shift and at discharge. Patient was safely discharged from the unit in the care of her relative.

## 2023-03-27 NOTE — DISCHARGE SUMMARY
Hospitalist Discharge Summary     Patient ID:  Cheryl Atkins  [id number]  55 y.o.  1945  3/23/2023    PCP on record: Jose Chino MD    Admit date: 3/23/2023  Discharge date and time: 3/27/2023    DISCHARGE DIAGNOSIS:    Acute hypoxic respiratory failure  Acute COPD exacerbation  Community-acquired pneumonia  UTI  Acute transaminitis  Hypertension  Neuropathy  GERD    CONSULTATIONS:  None    Excerpted HPI from H&P of Jr Atkinson MD:  HISTORY OF PRESENT ILLNESS:     Tristin Eagle is a 76 y.o.  female with past medical history significant for COPD, hypertension, dyslipidemia and A-fib presents with cough and shortness of breath. Patient symptoms started 2 weeks ago, initially as a cold with mild dry cough and generalized body aches. Patient's  has similar symptoms. However, patient's  recovered with no complications but the patient continued to have worsening cough now productive of brown sputum and associated with dyspnea and low-grade fever so patient came to the emergency room for evaluation. No pain exacerbating relieving factors. Patient denies chest pain diarrhea. All other systems were reviewed and were otherwise negative. Vital signs upon arrival to the emergency room were 99.9, Tmax 100.2, 126, 113/81, 18 and 98% on 4 L nasal cannula. Patient was noted to have white cell count of 15.5, CTA with no evidence of pulmonary embolism but significant for pneumonia. Patient was given vancomycin, cefepime, Levaquin and 1 L normal saline with improvement in heart rate, blood and urine cultures were obtained and hospitalist service was called for the admission. ______________________________________________________________________  DISCHARGE SUMMARY/HOSPITAL COURSE:  for full details see H&P, daily progress notes, labs, consult notes.      Acute hypoxic respiratory failure  Acute COPD exacerbation  Community-acquired pneumonia  -CT chest showed no pulmonary embolism but shows bilateral pneumonia. -COVID rapid test is negative   -Blood cultures negative at 2 days. -UCX group B strep  Plan  -Continue po steroids and albuterol inhaler prn  -Continue levaquin  -Patient did drop to 86% with ambulation, declines home O2 and staying to wean. Desires to go home. Discussed risks, pt agrees to close follow up with PCP. UTI  -Urine culture growing Streptococcus.    -Levaquin coverage for PNA and UTI     Acute transaminitis  -ALT is 103 and AST is 85 and is trending down  -Check PT/INR  -Ultrasound of the liver shows gallbladder sludge but no acute process  -Hepatitis panel negative  -OP Follow up labs     Hypertension  Neuropathy  GERD  -Continue losartan, metoprolol  -Continue Elavil  -Continue PTA  ______________________________________________________________  Patient seen and examined by me on discharge day. Pertinent Findings:  Gen:    Not in distress  Chest: Clear lungs  CVS:   Regular rhythm. No edema  Abd:  Soft, not distended, not tender  Neuro:  Alert, oriented x4  _______________________________________________________________________  DISCHARGE MEDICATIONS:   Current Discharge Medication List        START taking these medications    Details   predniSONE (DELTASONE) 20 mg tablet Take 2 Tablets by mouth daily (with breakfast) for 5 days. Qty: 10 Tablet, Refills: 0  Start date: 3/27/2023, End date: 4/1/2023      levoFLOXacin (Levaquin) 750 mg tablet Take 1 Tablet by mouth daily for 4 days. Qty: 4 Tablet, Refills: 0  Start date: 3/27/2023, End date: 3/31/2023      albuterol (PROVENTIL HFA, VENTOLIN HFA, PROAIR HFA) 90 mcg/actuation inhaler Take 2 Puffs by inhalation every four (4) hours as needed for Wheezing for up to 30 days. Qty: 18 g, Refills: 0  Start date: 3/27/2023, End date: 4/26/2023           CONTINUE these medications which have NOT CHANGED    Details   gabapentin (NEURONTIN) 800 mg tablet Take 1 Tablet by mouth three (3) times daily.  Max Daily Amount: 2,400 mg. Qty: 270 Tablet, Refills: 3    Associated Diagnoses: Diabetic peripheral neuropathy associated with type 2 diabetes mellitus (HCC)      losartan (COZAAR) 50 mg tablet Take 1 Tablet by mouth daily. Qty: 90 Tablet, Refills: 3    Associated Diagnoses: Hypertension, essential      atorvastatin (LIPITOR) 40 mg tablet Take 1 Tablet by mouth daily. Indications: high cholesterol  Qty: 90 Tablet, Refills: 3    Comments: Requesting 1 year supply  Associated Diagnoses: Hypercholesteremia      metoprolol tartrate (LOPRESSOR) 50 mg tablet TAKE 1 TAB BY MOUTH TWO (2) TIMES A DAY. Qty: 180 Tab, Refills: 3    Associated Diagnoses: Coronary artery disease involving native coronary artery of native heart, angina presence unspecified      amitriptyline (ELAVIL) 100 mg tablet TAKE 1 TABLET EVERY NIGHT  Qty: 90 Tablet, Refills: 1    Associated Diagnoses: Controlled type 2 diabetes mellitus with diabetic polyneuropathy, without long-term current use of insulin (HCC)      diclofenac EC (VOLTAREN) 75 mg EC tablet Take 1 Tablet by mouth two (2) times a day. Qty: 60 Tablet, Refills: 0    Associated Diagnoses: Impingement syndrome of right shoulder      omeprazole (PRILOSEC) 20 mg capsule Take 20 mg by mouth daily. STOP taking these medications       hydroCHLOROthiazide (HYDRODIURIL) 12.5 mg tablet Comments:   Reason for Stopping:                 Patient Follow Up Instructions: Activity: Activity as tolerated  Diet: Regular Diet  Wound Care: None needed    Follow-up with PCP in 1-2 weeks. Follow up with pulmonology as scheduled.   Follow-up tests/labs   Follow-up Information       Follow up With Specialties Details Why Jeimy Garcia MD Internal Medicine Physician   [address] [place] [address] Avenue  [phone]            ________________________________________________________________    Risk of deterioration: Low    Condition at Discharge: Stable  __________________________________________________________________    Disposition  Home with family, no needs-pt declined [place]    ____________________________________________________________________    Code Status: Full Code  ___________________________________________________________________      Total time in minutes spent coordinating this discharge (includes going over instructions, follow-up, prescriptions, and preparing report for sign off to her PCP) :  >30 minutes    Signed:  Adrianne Paredes MD

## 2023-03-27 NOTE — PROGRESS NOTES
Transition of Care Plan:     RUR: 11%  Disposition: D/C to home  Follow up appointments: PCP  MD spoke with pt at bedside about timing of needed PCP appointment, pt to make own appointment. DME needed: Has a cane at home. Do not anticipate additional DME  Transportation at Discharge: Spouse will transport home  [address] or means to access home:  Spouse has      IM Medicare Letter: 2nd IM reviewed, signed and placed on chart. Is patient a [place] and connected with the [place]? No               If yes, was [employer] transfer form completed and [facility] notified? Caregiver Contact: Spouse [place] Kelly [phone]  Discharge Caregiver contacted prior to discharge? Spouse present at bedside 3/24/23  Care Conference needed?:  No    3/27/23 4:48pm CM received verification via [employer] that they will deliver Home O2 to pt this evening. 3/27/23 4:40pm Home O2 referral accepted with [facility]. Pt has already d/c home. CM requested Home O2 delivery to home this evening. CM called to notify pt, no answer on phone call, left detailed message for pt on  to include [facility] approve Home O2, requested delivery to home this evening, [facility] phone number should she need to contact them directly. 3/27/23 3:35pm DME Home O2 referral sent to [facility]. 3/27/23 2:55pm JIMMIE appreciates from conversation with bedside nurse that pt did not pass O2 challenge, CM awaiting documentation of O2 challenge. CM and MD spoke with pt about Home O2 and lack of qualifying diagnosis thus anticipate home O2 not being approved. CM will submit for home O2 upon receipt of O2 challenge note. CM spoke with pt and spouse at bedside about timing of process and DME agency has up to 1 hr to review referral before responding. Spouse and pt will not wait, going home prior to referral sent and response received. MD aware. Pt cleared for d/c from CM standpoint.     3/27/23 1215pm JIMMIE appreciates from morning rounds that pt may d/c today vs tomorrow and remains on O2. CM requested Oxygen Challenge during rounds as pt is not on O2 at baseline. CM to bedside, pt sitting up in chair with lunch tray. Pt on NC 2L. CM spoke with pt regarding therapy rec of HH PT/OT, pt declines HH. CM following for Oxygen challenge note from bedside RN.       [address] [facility] RN,MSN  Care Manager  [phone]

## 2023-03-27 NOTE — PROGRESS NOTES
Problem: Falls - Risk of  Goal: *Absence of Falls  Description: Document Jakub Stubbs Fall Risk and appropriate interventions in the flowsheet.   Outcome: Progressing Towards Goal  Note: Fall Risk Interventions:                                Problem: Patient Education: Go to Patient Education Activity  Goal: Patient/Family Education  Outcome: Progressing Towards Goal     Problem: Patient Education: Go to Patient Education Activity  Goal: Patient/Family Education  Outcome: Progressing Towards Goal     Problem: Patient Education: Go to Patient Education Activity  Goal: Patient/Family Education  Outcome: Progressing Towards Goal

## 2023-03-27 NOTE — PROGRESS NOTES
ADULT PROTOCOL: JET AEROSOL ASSESSMENT    Patient  Jude Munoz     76 y.o.   female     3/27/2023  10:00 AM    Breath Sounds Pre Procedure: Right Breath Sounds: Diminished                               Left Breath Sounds: Diminished    Breath Sounds Post Procedure: Right Breath Sounds: Diminished                                 Left Breath Sounds: Diminished    Breathing pattern: Pre procedure Breathing Pattern: Regular          Post procedure Breathing Pattern: Regular    Heart Rate: Pre procedure Pulse: 76           Post procedure Pulse: 78    Resp Rate: Pre procedure Respirations: 18           Post procedure Respirations: 18    Cough: Pre procedure                 Post procedure Cough: Non-productive    Oxygen: O2 Device: Nasal cannula   O2 Flow Rate (L/min): 2 l/min         SpO2: Pre procedure SpO2: 90 %   with oxygen              Post procedure SpO2: 95 %  with oxygen    Nebulizer Therapy: Current medications Aerosolized Medications: Brovana      Changed: YES    Smoking History:   Social History     Tobacco Use   Smoking Status Former    Packs/day: 0.10    Years: 2.00    Pack years: 0.20    Types: Cigarettes    Quit date: 1975    Years since quittin.2   Smokeless Tobacco Never       Problem List:   Patient Active Problem List   Diagnosis Code    Hypercholesteremia E78.00    Depression F32. A    Complex regional pain syndrome [id number]    Diabetic peripheral neuropathy associated with type 2 diabetes mellitus (HCC) E11.42    Hypertension, essential I10    Diabetes mellitus type 2, diet-controlled (HCC) E11.9    Osteopenia M85.80    Vitamin D deficiency E55.9    GERD (gastroesophageal reflux disease) K21.9    Psoriasis L40.9    Paroxysmal atrial fibrillation (HCC) I48.0    Hepatic steatosis K76.0    COPD (chronic obstructive pulmonary disease) (HCC) J44.9    Sarcoidosis of lymph nodes D86.1    Pneumonia J18.9       Respiratory Therapist: Jenna Emery RT

## 2023-03-27 NOTE — PROGRESS NOTES
Six Minute Walk Test     Data Measured Before the Walk  Heart Rate:  74  O2 Saturation:  96%  O2 Device:  RA    Data Measured During the Walk  Heart Rate:  89  O2 Flow Rate:  RA  O2 Saturation:  86% (250 meters into walk)    O2 Flow Rate:  2L (immediately after 250 meters of walk test)  O2 Saturation:  95%     Symptoms:  Denies SOB  Any Problems While Exercising:  Low O2 saturation without the use of supplemental oxygen. Data Measured Immediately After Walk  Did Patient Stop or Pause Before 6 Minutes:    Why Patient Stopped or Paused:    Predicted Distance: 414 Meters  Total Distance Walked:  420 Meters  Heart Rate:  86  Blood Pressure:  157/69  O2 Flow Rate:  RA  O2 Saturation:  96%    Data Measured 5 Minutes After Walk  Heart Rate:  73  Blood Pressure:  157/69  O2 Saturation:  97%    Additional Comments: Patient walked about 250 meters on RA before experiencing a drop in O2 saturation to the mid 80's. 2L O2 administered and saturation improved to the mid 90's.  Patient O2 saturation was 96% on RA at the end of the walk test.    Electronically signed by Mamadou Thornton RN on 3/27/2023 at 2:44 PM

## 2023-03-27 NOTE — PROGRESS NOTES
End of Shift Note    Bedside shift change report given to Christy Le RN (oncoming nurse) by Chio Burger RN (offgoing nurse). Report included the following information SBAR, Kardex, Intake/Output, MAR, Accordion, and Recent Results    Shift worked:  7p-7a     Shift summary and any significant changes:    Patient continues on iv antibiotics and solumedrol for pneumonia. No c/o pain voiced.      Concerns for physician to address:  No concerns to address at this time     Zone phone for oncoming shift:   [phone]       Activity:  Activity Level: Up ad theresa  Number times ambulated in hallways past shift: 0  Number of times OOB to chair past shift: 2    Cardiac:   Cardiac Monitoring: No      Cardiac Rhythm: Sinus Rhythm    Access:  Current line(s): PIV     Genitourinary:   Urinary status: voiding    Respiratory:   O2 Device: Nasal cannula  Chronic home O2 use?: NO  Incentive spirometer at bedside: YES       GI:  Last Bowel Movement Date: 03/21/23  Current diet:  ADULT DIET Regular; 4 carb choices (60 gm/meal)  ADULT ORAL NUTRITION SUPPLEMENT Breakfast, Dinner; Standard High Calorie/High Protein  Passing flatus: YES  Tolerating current diet: YES       Pain Management:   Patient states pain is manageable on current regimen: YES    Skin:  Neri Score: 20  Interventions: float heels, increase time out of bed, and PT/OT consult    Patient Safety:  Fall Score:    Interventions: assistive device (walker, cane, etc), gripper socks, and pt to call before getting OOB       Length of Stay:  Expected LOS: 2d 21h  Actual LOS: Tasha Harrington RN

## 2023-03-27 NOTE — DISCHARGE INSTRUCTIONS
All of your medications from before your hospitalization are the same EXCEPT:  STOP taking hydrochlorothiazide. Your new prescription for you to start is prednisone and levaquin for pneumonia and COPD exacerbation. Please take all of your medications as prescribed. If prescribed any medications, please read all pharmacy instructions and inserts. Inform your doctor and pharmacist about all other medications and alternative therapies. Please follow up with your PCP in 1-2 weeks to be reassessed after your hospital stay. Please also follow up with pulmonolgy. If you start feeling any symptoms similar to what brought you into the hospital, please come back to the ED to be re-evaluated.

## 2023-03-28 ENCOUNTER — PATIENT OUTREACH (OUTPATIENT)
Dept: CASE MANAGEMENT | Age: 78
End: 2023-03-28

## 2023-03-28 NOTE — PROGRESS NOTES
Care Transitions Initial Call    Call within 2 business days of discharge: Yes     Patient Current Location: [place]    Patient: Ryan Shanks Patient : 1945 MRN: [id number]    Last Discharge  Street       Date Complaint Diagnosis Description Type Department Provider    3/23/23 Cough; Shortness of Breath Acute hypoxemic respiratory failure ([facility] [address] . .. ED to Hosp-Admission (Discharged) (ADMIT) Kassi Funes MD; Radha Merchant. .. Was this an external facility discharge? No     Challenges to be reviewed by the provider   Additional needs identified to be addressed with provider:   Consider repeat CXR for resolution of PNA  Per [facility], O2 being delivered today. Method of communication with provider : none    Discussed COVID-19 related testing which was available at this time. Test results were negative. Patient informed of results, if available? no     Advance Care Planning:   Does patient have an Advance Directive: yes, reviewed and needs to be updated. Inpatient Readmission Risk score: Unplanned Readmit Risk Score: 10.9    Was this a readmission? no   Patient stated reason for the admission: sob    Patients top risk factors for readmission: medical condition-PNA    Interventions to address risk factors: Scheduled appointment with PCP-pt will arrange, Scheduled appointment with Specialist-pt will arrange with pulm, Obtained and reviewed discharge summary and/or continuity of care documents, and Communication with home health agencies or other community services the patient is currently using-has not received O2 from [address] [place] [address] Transition Nurse (CTN) contacted the patient by telephone to perform post hospital discharge assessment. Verified name and  with patient as identifiers. Provided introduction to self, and explanation of the CTN role. Reports fatigue. Denies chest pain, fever. Endorses mild sob, dry cough.  Reports compliance with incentive spirometer. Pt able to pull 1500 ml. Tolerating PO. No bladder, bowel concerns. Has cane, walker for ambulation. Reviewed fall risk with o2 tubing, no smoking with o2, checking for kinks in tubing. Reviewed abx treatment and possible s.e. of diarrhea. Discussed taking probiotic. CTN reviewed discharge instructions, medical action plan and red flags with patient who verbalized understanding. Were discharge instructions available to patient? yes. Reviewed appropriate site of care based on symptoms and resources available to patient including: PCP and Specialist. Patient given an opportunity to ask questions and does not have any further questions or concerns at this time. The patient agrees to contact the PCP office for questions related to their healthcare. Medication reconciliation was performed with patient, who verbalizes understanding of administration of home medications. Referral to Pharm D needed: no     Home Health/Outpatient orders at discharge:  patient declined    Durable Medical Equipment ordered at discharge: [address]: [facility]  Durable Medical Equipment received: not yet,  en route    Was patient discharged with a pulse oximeter? no    Discussed follow-up appointments. If no appointment was previously scheduled, appointment scheduling offered: yes; however, patient declined. Is follow up appointment scheduled within 7 days of discharge? no.   [address] follow up appointment(s):   Future Appointments   Date Time Provider Vaibhav Quintanilla   7/31/2023 11:10 AM Cliff Nguyen MD [facility] BS [facility]     Non-BS follow up appointment(s): NA    Plan for follow-up call in 10-14 days based on severity of symptoms and risk factors. Plan for next call: self management-received o2, completed abx, arranged fu appts with pcp/pulm  CTN provided contact information for future needs.      Goals Addressed                   This Visit's Progress     Prevent complications post hospitalization.           03/28/23  Absence of falls  Will complete steroid, abx therapy  Will arrange fu appts with pulm, pcp  Will report compliance with o2

## 2023-03-29 LAB
BACTERIA SPEC CULT: NORMAL
BACTERIA SPEC CULT: NORMAL
SERVICE CMNT-IMP: NORMAL
SERVICE CMNT-IMP: NORMAL

## 2023-04-07 ENCOUNTER — OFFICE VISIT (OUTPATIENT)
Dept: INTERNAL MEDICINE CLINIC | Age: 78
End: 2023-04-07

## 2023-04-18 ENCOUNTER — PATIENT OUTREACH (OUTPATIENT)
Dept: CASE MANAGEMENT | Age: 78
End: 2023-04-18

## 2023-04-18 NOTE — PROGRESS NOTES
Care Transitions Follow Up Call    Patient Current Location: [facility] Transition Nurse (CTN) contacted the patient by telephone to follow up after admission on 3/23/2023. Reports doing well. Compliant with o2 at night    Addressed changes since last contact: none    CTN reviewed medical action plan and red flags with patient and discussed any barriers to care and/or understanding of plan of care after discharge. Discussed appropriate site of care based on symptoms and resources available to patient including: Specialist.     Patients top risk factors for readmission:  NA    Interventions to address risk factors:  [facility] follow up appointment(s):   Future Appointments   Date Time Provider Vaibhav Quintanilla   7/31/2023 11:10 AM Jose Chino MD [facility] BS [facility]     Non-[facility] follow up appointment(s):     CTN provided contact information for future needs. Plan for follow-up call in 7-10 days based on severity of symptoms and risk factors. Plan for next call:      Goals Addressed                   This Visit's Progress     Prevent complications post hospitalization.           03/28/23  Absence of falls  Will complete steroid, abx therapy  Will arrange fu appts with pulm, pcp  Will report compliance with o2    04/12/23  No falls  Completed steroid, abx therapy  Attended fu appts with pulm/pcp  Wearing O2 at night only  Plans to purchase BP cuff and pulse ox    04/18/23  Has not purchased BP cuff/pulse ox yet, but plans to  Repeat imaging for PNA resolution

## 2023-04-22 DIAGNOSIS — I10 HYPERTENSION, ESSENTIAL: Primary | ICD-10-CM

## 2023-04-23 DIAGNOSIS — E78.00 HYPERCHOLESTEREMIA: Primary | ICD-10-CM

## 2023-04-23 DIAGNOSIS — E11.9 DIABETES MELLITUS TYPE 2, DIET-CONTROLLED (HCC): Primary | ICD-10-CM

## 2023-04-24 DIAGNOSIS — E55.9 VITAMIN D DEFICIENCY: Primary | ICD-10-CM

## 2023-04-27 ENCOUNTER — PATIENT OUTREACH (OUTPATIENT)
Dept: CASE MANAGEMENT | Age: 78
End: 2023-04-27

## 2023-04-27 NOTE — PROGRESS NOTES
Patient has graduated from the Transitions of Care Coordination  program on 4/27/2023. Patient/family has the ability to self-manage at this time Care management goals have been completed. Patient was not referred to the [facility] team for further management. Goals Addressed                   This Visit's Progress     COMPLETED: Prevent complications post hospitalization. 03/28/23  Absence of falls  Will complete steroid, abx therapy  Will arrange fu appts with pulm, pcp  Will report compliance with o2    04/12/23  No falls  Completed steroid, abx therapy  Attended fu appts with pulm/pcp  Wearing O2 at night only  Plans to purchase BP cuff and pulse ox    04/18/23  Has not purchased BP cuff/pulse ox yet, but plans to  Repeat imaging for PNA resolution              Patient has Care Transition Nurse's contact information for any further questions, concerns, or needs. Patients upcoming visits:  No future appointments.

## 2023-05-01 ENCOUNTER — HOSPITAL ENCOUNTER (OUTPATIENT)
Dept: GENERAL RADIOLOGY | Age: 78
Discharge: HOME | End: 2023-05-01
Payer: MEDICARE

## 2023-05-01 DIAGNOSIS — J18.9 COMMUNITY ACQUIRED PNEUMONIA, UNSPECIFIED LATERALITY: ICD-10-CM

## 2023-05-01 PROCEDURE — 71046 X-RAY EXAM CHEST 2 VIEWS: CPT

## 2023-05-01 NOTE — PROGRESS NOTES
Let patient know: Your chest X-ray showed that your pneumonia has improved but not cleared completely. Have you had a cough, fever, or chills recently? If so, Dr. Guillermo Magaña will send in another course of antibiotics for you. If not, Dr. Guillermo Magaña will plan on ordering a repeat CXR in 6-8 weeks.

## 2023-06-07 RX ORDER — METOPROLOL TARTRATE 50 MG/1
TABLET, FILM COATED ORAL
Qty: 180 TABLET | Refills: 3 | Status: SHIPPED | OUTPATIENT
Start: 2023-06-07

## 2023-06-07 NOTE — TELEPHONE ENCOUNTER
PCP: Tahmina Galvez MD     Last appt:  4/7/2023      Future Appointments   Date Time Provider Starr Quinteros   7/31/2023 11:10 AM Tahmina Galvez MD [facility] BS AMB          Requested Prescriptions     Pending Prescriptions Disp Refills    metoprolol tartrate (LOPRESSOR) 50 MG tablet 180 tablet 3     Sig: TAKE 1 TAB BY MOUTH TWO (2) TIMES A DAY.

## 2023-06-26 ENCOUNTER — TELEMEDICINE (OUTPATIENT)
Facility: CLINIC | Age: 78
End: 2023-06-26
Payer: COMMERCIAL

## 2023-06-26 DIAGNOSIS — J44.9 CHRONIC OBSTRUCTIVE PULMONARY DISEASE, UNSPECIFIED COPD TYPE (HCC): ICD-10-CM

## 2023-06-26 DIAGNOSIS — J06.9 UPPER RESPIRATORY TRACT INFECTION, UNSPECIFIED TYPE: Primary | ICD-10-CM

## 2023-06-26 PROCEDURE — 99442 PR PHYS/QHP TELEPHONE EVALUATION 11-20 MIN: CPT | Performed by: PHYSICIAN ASSISTANT

## 2023-06-26 RX ORDER — AZITHROMYCIN 250 MG/1
250 TABLET, FILM COATED ORAL SEE ADMIN INSTRUCTIONS
Qty: 6 TABLET | Refills: 0 | Status: SHIPPED | OUTPATIENT
Start: 2023-06-26 | End: 2023-07-01

## 2023-06-26 RX ORDER — PREDNISONE 20 MG/1
40 TABLET ORAL DAILY
Qty: 14 TABLET | Refills: 0 | Status: SHIPPED | OUTPATIENT
Start: 2023-06-26 | End: 2023-07-03

## 2023-06-26 SDOH — ECONOMIC STABILITY: FOOD INSECURITY: WITHIN THE PAST 12 MONTHS, YOU WORRIED THAT YOUR FOOD WOULD RUN OUT BEFORE YOU GOT MONEY TO BUY MORE.: NEVER TRUE

## 2023-06-26 SDOH — ECONOMIC STABILITY: FOOD INSECURITY: WITHIN THE PAST 12 MONTHS, THE FOOD YOU BOUGHT JUST DIDN'T LAST AND YOU DIDN'T HAVE MONEY TO GET MORE.: NEVER TRUE

## 2023-06-26 SDOH — ECONOMIC STABILITY: INCOME INSECURITY: HOW HARD IS IT FOR YOU TO PAY FOR THE VERY BASICS LIKE FOOD, HOUSING, MEDICAL CARE, AND HEATING?: NOT HARD AT ALL

## 2023-06-26 SDOH — ECONOMIC STABILITY: HOUSING INSECURITY
IN THE LAST 12 MONTHS, WAS THERE A TIME WHEN YOU DID NOT HAVE A STEADY PLACE TO SLEEP OR SLEPT IN A SHELTER (INCLUDING NOW)?: NO

## 2023-07-31 ENCOUNTER — TELEPHONE (OUTPATIENT)
Facility: CLINIC | Age: 78
End: 2023-07-31

## 2023-07-31 ENCOUNTER — OFFICE VISIT (OUTPATIENT)
Facility: CLINIC | Age: 78
End: 2023-07-31
Payer: COMMERCIAL

## 2023-07-31 VITALS
RESPIRATION RATE: 16 BRPM | WEIGHT: 166 LBS | BODY MASS INDEX: 26.06 KG/M2 | TEMPERATURE: 98.1 F | DIASTOLIC BLOOD PRESSURE: 63 MMHG | HEART RATE: 85 BPM | HEIGHT: 67 IN | SYSTOLIC BLOOD PRESSURE: 113 MMHG | OXYGEN SATURATION: 94 %

## 2023-07-31 DIAGNOSIS — Z00.00 MEDICARE ANNUAL WELLNESS VISIT, SUBSEQUENT: Primary | ICD-10-CM

## 2023-07-31 DIAGNOSIS — I10 HYPERTENSION, ESSENTIAL: ICD-10-CM

## 2023-07-31 DIAGNOSIS — J44.9 CHRONIC OBSTRUCTIVE PULMONARY DISEASE, UNSPECIFIED COPD TYPE (HCC): ICD-10-CM

## 2023-07-31 DIAGNOSIS — J44.9 CHRONIC OBSTRUCTIVE PULMONARY DISEASE, UNSPECIFIED COPD TYPE (HCC): Primary | ICD-10-CM

## 2023-07-31 DIAGNOSIS — E78.00 HYPERCHOLESTEREMIA: ICD-10-CM

## 2023-07-31 DIAGNOSIS — E11.9 DIABETES MELLITUS TYPE 2, DIET-CONTROLLED (HCC): ICD-10-CM

## 2023-07-31 DIAGNOSIS — E55.9 VITAMIN D DEFICIENCY: ICD-10-CM

## 2023-07-31 DIAGNOSIS — E11.42 DIABETIC PERIPHERAL NEUROPATHY ASSOCIATED WITH TYPE 2 DIABETES MELLITUS (HCC): ICD-10-CM

## 2023-07-31 DIAGNOSIS — J96.11 CHRONIC RESPIRATORY FAILURE WITH HYPOXIA (HCC): ICD-10-CM

## 2023-07-31 LAB — HBA1C MFR BLD: 6.3 %

## 2023-07-31 PROCEDURE — 99214 OFFICE O/P EST MOD 30 MIN: CPT | Performed by: INTERNAL MEDICINE

## 2023-07-31 PROCEDURE — 3078F DIAST BP <80 MM HG: CPT | Performed by: INTERNAL MEDICINE

## 2023-07-31 PROCEDURE — G0439 PPPS, SUBSEQ VISIT: HCPCS | Performed by: INTERNAL MEDICINE

## 2023-07-31 PROCEDURE — 3074F SYST BP LT 130 MM HG: CPT | Performed by: INTERNAL MEDICINE

## 2023-07-31 PROCEDURE — 83036 HEMOGLOBIN GLYCOSYLATED A1C: CPT | Performed by: INTERNAL MEDICINE

## 2023-07-31 PROCEDURE — 1123F ACP DISCUSS/DSCN MKR DOCD: CPT | Performed by: INTERNAL MEDICINE

## 2023-07-31 ASSESSMENT — PATIENT HEALTH QUESTIONNAIRE - PHQ9
SUM OF ALL RESPONSES TO PHQ9 QUESTIONS 1 & 2: 0
1. LITTLE INTEREST OR PLEASURE IN DOING THINGS: 0
3. TROUBLE FALLING OR STAYING ASLEEP: 0
9. THOUGHTS THAT YOU WOULD BE BETTER OFF DEAD, OR OF HURTING YOURSELF: 0
4. FEELING TIRED OR HAVING LITTLE ENERGY: 0
SUM OF ALL RESPONSES TO PHQ QUESTIONS 1-9: 2
7. TROUBLE CONCENTRATING ON THINGS, SUCH AS READING THE NEWSPAPER OR WATCHING TELEVISION: 2
8. MOVING OR SPEAKING SO SLOWLY THAT OTHER PEOPLE COULD HAVE NOTICED. OR THE OPPOSITE, BEING SO FIGETY OR RESTLESS THAT YOU HAVE BEEN MOVING AROUND A LOT MORE THAN USUAL: 0
SUM OF ALL RESPONSES TO PHQ QUESTIONS 1-9: 2
5. POOR APPETITE OR OVEREATING: 0
6. FEELING BAD ABOUT YOURSELF - OR THAT YOU ARE A FAILURE OR HAVE LET YOURSELF OR YOUR FAMILY DOWN: 0
2. FEELING DOWN, DEPRESSED OR HOPELESS: 0
10. IF YOU CHECKED OFF ANY PROBLEMS, HOW DIFFICULT HAVE THESE PROBLEMS MADE IT FOR YOU TO DO YOUR WORK, TAKE CARE OF THINGS AT HOME, OR GET ALONG WITH OTHER PEOPLE: 0
SUM OF ALL RESPONSES TO PHQ QUESTIONS 1-9: 2
SUM OF ALL RESPONSES TO PHQ QUESTIONS 1-9: 2

## 2023-07-31 ASSESSMENT — LIFESTYLE VARIABLES
HOW OFTEN DO YOU HAVE A DRINK CONTAINING ALCOHOL: NEVER
HOW MANY STANDARD DRINKS CONTAINING ALCOHOL DO YOU HAVE ON A TYPICAL DAY: PATIENT DOES NOT DRINK
HOW MANY STANDARD DRINKS CONTAINING ALCOHOL DO YOU HAVE ON A TYPICAL DAY: PATIENT DOES NOT DRINK
HOW OFTEN DO YOU HAVE A DRINK CONTAINING ALCOHOL: NEVER

## 2023-07-31 NOTE — ACP (ADVANCE CARE PLANNING)
directive AND provides care preference(s) that are inconsistent with that prior directive, advise the patient to consider either: creating a new advance directive that complies with state-specific requirements; or, if that is not possible, orally revoking that prior directive in accordance with state-specific requirements, which must be documented in the EHR. Conversation Outcomes / Follow-Up Plan:   ACP document on file in chart.       Length of Voluntary ACP Conversation in minutes:  <16 minutes (Non-Billable)    Ashleigh Rodas MD

## 2023-07-31 NOTE — TELEPHONE ENCOUNTER
----- Message from Vero Hall sent at 7/31/2023  3:12 PM EDT -----  Subject: Message to Provider    QUESTIONS  Information for Provider? The patient stated that her PCP wanted her to   call back with the name of the place that she gets her oxygen from. The   patient gets it from [address] and the phone number is   [phone].   ---------------------------------------------------------------------------  --------------  [address]  [phone]; OK to leave message on voicemail  ---------------------------------------------------------------------------  --------------  SCRIPT ANSWERS  Relationship to Patient?  Self

## 2023-07-31 NOTE — PATIENT INSTRUCTIONS
Personalized Preventive Plan for Afuaie Or - 7/31/2023  Medicare offers a range of preventive health benefits. Some of the tests and screenings are paid in full while other may be subject to a deductible, co-insurance, and/or copay. Some of these benefits include a comprehensive review of your medical history including lifestyle, illnesses that may run in your family, and various assessments and screenings as appropriate. After reviewing your medical record and screening and assessments performed today your provider may have ordered immunizations, labs, imaging, and/or referrals for you. A list of these orders (if applicable) as well as your Preventive Care list are included within your After Visit Summary for your review. Other Preventive Recommendations:    A preventive eye exam performed by an eye specialist is recommended every 1-2 years to screen for glaucoma; cataracts, macular degeneration, and other eye disorders. A preventive dental visit is recommended every 6 months. Try to get at least 150 minutes of exercise per week or 10,000 steps per day on a pedometer . Order or download the FREE \"Exercise & Physical Activity: Your Everyday Guide\" from [employer]. Call 7-835.613.5741 or search [employer] online. You need 4180-3228 mg of calcium and 9527-4190 IU of vitamin D per day. It is possible to meet your calcium requirement with diet alone, but a vitamin D supplement is usually necessary to meet this goal.  When exposed to the sun, use a sunscreen that protects against both UVA and UVB radiation with an SPF of 30 or greater. Reapply every 2 to 3 hours or after sweating, drying off with a towel, or swimming. Always wear a seat belt when traveling in a car. Always wear a helmet when riding a bicycle or motorcycle.

## 2023-07-31 NOTE — PROGRESS NOTES
Medicare Annual Wellness Visit    Breanna Hale is here for Medicare AWV    Assessment & Plan   Medicare annual wellness visit, subsequent  Diabetes mellitus type 2, diet-controlled ([address])  A1c 6.3% today. Controlled. -     AMB POC HEMOGLOBIN A1C  -      DIABETES FOOT EXAM  Diabetic peripheral neuropathy associated with type 2 diabetes mellitus (HCC)  Continue gabapentin 800 mg TID. Hypertension, essential  Well-controlled. Continue losartan 50 mg daily and metoprolol 50 mg BID.  -     Comprehensive Metabolic Panel; Future  -     CBC with Auto Differential; Future  Hypercholesteremia  Continue on atorvastatin pending lab results. -     Lipid Panel; Future  Chronic respiratory failure with hypoxia ([address])  I will order 2 L home O2 for daytime use. Chronic obstructive pulmonary disease, unspecified COPD type (HCC)  Vitamin D deficiency  -     Vitamin D 25 Hydroxy; Future    Recommendations for Preventive Services Due: see orders and patient instructions/AVS.  Recommended screening schedule for the next 5-10 years is provided to the patient in written form: see Patient Instructions/AVS.     Return in about 6 months (around 1/31/2024), or if symptoms worsen or fail to improve, for DM, HTN; have fasting labs done within the next 2 weeks. Subjective     Presents for Medicare AWV and 6 month follow up evaluation. She has diet-controlled type 2 DM with peripheral neuropathy, HTN, hyperlipidemia, paroxysmal a-fib, complex regional pain syndrome, osteopenia, GERD, hepatic steatosis, and hx of sarcoidosis. Home glucose monitoring: not done. Denies polydipsia, polyuria, or hypoglycemia. Has chronic numbness, tingling, and burning at feet controlled on gabapentin 800 mg TID (initially prescribed by podiatrist Dr. Hilda Medley). Lab review: A1c 6.3% today (7/31/23), was 6.3% on 1/30/23), and 6.0% on 9/26/22. Eye exam: Dr. Rosey Son, 1/19/23. Complains of increased SOB after talking on phone or walking.  Currently uses

## 2023-08-04 NOTE — TELEPHONE ENCOUNTER
Send order for portable oxygen 2 L by nasal cannula to [facility]. O2 sat at rest: 94%. O2 sat with exercise for 5 mins: 84%.

## 2023-08-09 LAB
BASOPHILS # BLD AUTO: 0.1 X10E3/UL (ref 0–0.2)
BASOPHILS NFR BLD AUTO: 1 %
EOSINOPHIL # BLD AUTO: 0.5 X10E3/UL (ref 0–0.4)
EOSINOPHIL NFR BLD AUTO: 7 %
ERYTHROCYTE [DISTWIDTH] IN BLOOD BY AUTOMATED COUNT: 13.7 % (ref 11.7–15.4)
HCT VFR BLD AUTO: 34.7 % (ref 34–46.6)
HGB BLD-MCNC: 11.1 G/DL (ref 11.1–15.9)
IMM GRANULOCYTES # BLD AUTO: 0 X10E3/UL (ref 0–0.1)
IMM GRANULOCYTES NFR BLD AUTO: 0 %
LYMPHOCYTES # BLD AUTO: 1.6 X10E3/UL (ref 0.7–3.1)
LYMPHOCYTES NFR BLD AUTO: 22 %
MCH RBC QN AUTO: 26.7 PG (ref 26.6–33)
MCHC RBC AUTO-ENTMCNC: 32 G/DL (ref 31.5–35.7)
MCV RBC AUTO: 83 FL (ref 79–97)
MONOCYTES # BLD AUTO: 0.7 X10E3/UL (ref 0.1–0.9)
MONOCYTES NFR BLD AUTO: 9 %
NEUTROPHILS # BLD AUTO: 4.6 X10E3/UL (ref 1.4–7)
NEUTROPHILS NFR BLD AUTO: 61 %
PLATELET # BLD AUTO: 289 X10E3/UL (ref 150–450)
RBC # BLD AUTO: 4.16 X10E6/UL (ref 3.77–5.28)
WBC # BLD AUTO: 7.6 X10E3/UL (ref 3.4–10.8)

## 2023-08-10 LAB
25(OH)D3+25(OH)D2 SERPL-MCNC: 23.1 NG/ML (ref 30–100)
ALBUMIN SERPL-MCNC: 4.2 G/DL (ref 3.8–4.8)
ALBUMIN/GLOB SERPL: 1.6 {RATIO} (ref 1.2–2.2)
ALP SERPL-CCNC: 148 IU/L (ref 44–121)
ALT SERPL-CCNC: 14 IU/L (ref 0–32)
AST SERPL-CCNC: 19 IU/L (ref 0–40)
BILIRUB SERPL-MCNC: 0.3 MG/DL (ref 0–1.2)
BUN SERPL-MCNC: 9 MG/DL (ref 8–27)
BUN/CREAT SERPL: 11 (ref 12–28)
CALCIUM SERPL-MCNC: 9.8 MG/DL (ref 8.7–10.3)
CHLORIDE SERPL-SCNC: 100 MMOL/L (ref 96–106)
CHOLEST SERPL-MCNC: 213 MG/DL (ref 100–199)
CO2 SERPL-SCNC: 29 MMOL/L (ref 20–29)
CREAT SERPL-MCNC: 0.83 MG/DL (ref 0.57–1)
EGFRCR SERPLBLD CKD-EPI 2021: 71 ML/MIN/1.73
GLOBULIN SER CALC-MCNC: 2.7 G/DL (ref 1.5–4.5)
GLUCOSE SERPL-MCNC: 106 MG/DL (ref 70–99)
HDLC SERPL-MCNC: 34 MG/DL
LDLC SERPL CALC-MCNC: 129 MG/DL (ref 0–99)
POTASSIUM SERPL-SCNC: 5.3 MMOL/L (ref 3.5–5.2)
PROT SERPL-MCNC: 6.9 G/DL (ref 6–8.5)
SODIUM SERPL-SCNC: 141 MMOL/L (ref 134–144)
TRIGL SERPL-MCNC: 280 MG/DL (ref 0–149)
VLDLC SERPL CALC-MCNC: 50 MG/DL (ref 5–40)

## 2023-08-16 DIAGNOSIS — E11.42 DIABETIC PERIPHERAL NEUROPATHY ASSOCIATED WITH TYPE 2 DIABETES MELLITUS (HCC): ICD-10-CM

## 2023-08-16 DIAGNOSIS — E78.00 HYPERCHOLESTEREMIA: Primary | ICD-10-CM

## 2023-08-16 RX ORDER — ATORVASTATIN CALCIUM 40 MG/1
TABLET, FILM COATED ORAL
Qty: 90 TABLET | Refills: 3 | Status: SHIPPED | OUTPATIENT
Start: 2023-08-16

## 2023-08-16 RX ORDER — AMITRIPTYLINE HYDROCHLORIDE 100 MG/1
TABLET ORAL
Qty: 90 TABLET | Refills: 3 | Status: SHIPPED | OUTPATIENT
Start: 2023-08-16

## 2023-08-16 NOTE — TELEPHONE ENCOUNTER
PCP: Mikayla Flores MD     Last appt: 7/31/2023    Future Appointments   Date Time Provider [address]   1/30/2024 11:30 AM Mikayla Flores MD [facility]          Requested Prescriptions     Pending Prescriptions Disp Refills    amitriptyline (ELAVIL) 100 MG tablet [Pharmacy Med Name: AMITRIPTYLINE HYDROCHLORIDE 100 MG Tablet] 90 tablet      Sig: TAKE 1 TABLET EVERY NIGHT    atorvastatin (LIPITOR) 40 MG tablet [Pharmacy Med Name: ATORVASTATIN CALCIUM 40 MG Tablet] 90 tablet 3     Sig: TAKE 1 TABLET EVERY DAY FOR HIGH CHOLESTEROL

## 2023-09-25 ENCOUNTER — OFFICE VISIT (OUTPATIENT)
Facility: CLINIC | Age: 78
End: 2023-09-25
Payer: COMMERCIAL

## 2023-09-25 VITALS
DIASTOLIC BLOOD PRESSURE: 78 MMHG | TEMPERATURE: 98.2 F | BODY MASS INDEX: 24.33 KG/M2 | HEART RATE: 78 BPM | SYSTOLIC BLOOD PRESSURE: 136 MMHG | HEIGHT: 67 IN | RESPIRATION RATE: 16 BRPM | WEIGHT: 155 LBS | OXYGEN SATURATION: 95 %

## 2023-09-25 DIAGNOSIS — R63.4 WEIGHT LOSS: ICD-10-CM

## 2023-09-25 DIAGNOSIS — R53.83 FATIGUE, UNSPECIFIED TYPE: Primary | ICD-10-CM

## 2023-09-25 DIAGNOSIS — R53.1 GENERALIZED WEAKNESS: ICD-10-CM

## 2023-09-25 LAB
Lab: NORMAL
QC PASS/FAIL: NORMAL
SARS-COV-2, POC: NORMAL

## 2023-09-25 PROCEDURE — 99213 OFFICE O/P EST LOW 20 MIN: CPT | Performed by: INTERNAL MEDICINE

## 2023-09-25 PROCEDURE — 3075F SYST BP GE 130 - 139MM HG: CPT | Performed by: INTERNAL MEDICINE

## 2023-09-25 PROCEDURE — 3078F DIAST BP <80 MM HG: CPT | Performed by: INTERNAL MEDICINE

## 2023-09-25 PROCEDURE — 1123F ACP DISCUSS/DSCN MKR DOCD: CPT | Performed by: INTERNAL MEDICINE

## 2023-09-25 PROCEDURE — 87635 SARS-COV-2 COVID-19 AMP PRB: CPT | Performed by: INTERNAL MEDICINE

## 2023-09-25 RX ORDER — HYDROCHLOROTHIAZIDE 12.5 MG/1
TABLET ORAL
COMMUNITY

## 2023-09-25 NOTE — PROGRESS NOTES
Chief Complaint   Patient presents with    Extremity Weakness    Fatigue     3A       HISTORY OF PRESENT ILLNESS  Maris Ruelas is a 78 y.o. female. Accompanied by . Complains of not feeling well, very tired for the past 5-6 days. Feels weak all over. Poor appetite. Fatigue unchanged since onset. 10 lb weight loss over 2 months without trying. Denies fevers, chills, cough, SOB, myalgias, abd pain, diarrhea, CP, polyuria, polydipsia, heat or cold intolerance, skin or hair changes, or depressed mood. Unchanged numbness at legs and hands. Has neck pain at left side, hx of cervical surgery. No known ill contacts. Patient Active Problem List   Diagnosis    GERD (gastroesophageal reflux disease)    Paroxysmal atrial fibrillation (HCC)    Diabetic peripheral neuropathy associated with type 2 diabetes mellitus (HCC)    Hypercholesteremia    Depression    Vitamin D deficiency    Hepatic steatosis    Hypertension, essential    Psoriasis    Osteopenia    Sarcoidosis of lymph nodes (HCC)    COPD (chronic obstructive pulmonary disease) ([address])    Diabetes mellitus type 2, diet-controlled (HCC)    Pneumonia    Chronic respiratory failure with hypoxia (HCC)     Past Medical History:   Diagnosis Date    Abnormal chest x-ray 12/23/2009    Anemia 12/23/2009    Arrhythmia     SVT.  Converts with [facility]enocard    Atrial fibrillation (HCC) 12/23/2009    Carpal tunnel syndrome 12/23/2009    Chronic obstructive pulmonary disease ([address])     Colitis 12/23/2009    Complex regional pain syndrome 12/23/2009    Depression 12/23/2009    Diabetes ([address])     no meds    Family history of skin cancer     brother-melanoma    GERD (gastroesophageal reflux disease) 2/8/2010    HTN (hypertension) 12/23/2009    Hypercholesteremia 12/23/2009    Hypertriglyceridemia 12/23/2009    Idiopathic peripheral neuropathy 12/23/2009    Iron deficiency anemia 12/23/2009    Osteopenia 12/23/2009    Paroxysmal atrial fibrillation ([address]) 12/2/2011    Psoriasis

## 2023-09-26 LAB
BASOPHILS # BLD AUTO: 0.1 X10E3/UL (ref 0–0.2)
BASOPHILS NFR BLD AUTO: 1 %
EOSINOPHIL # BLD AUTO: 0.5 X10E3/UL (ref 0–0.4)
EOSINOPHIL NFR BLD AUTO: 5 %
ERYTHROCYTE [DISTWIDTH] IN BLOOD BY AUTOMATED COUNT: 14.7 % (ref 11.7–15.4)
HBA1C MFR BLD: 6.3 % (ref 4.8–5.6)
HCT VFR BLD AUTO: 36.5 % (ref 34–46.6)
HGB BLD-MCNC: 11.6 G/DL (ref 11.1–15.9)
IMM GRANULOCYTES # BLD AUTO: 0 X10E3/UL (ref 0–0.1)
IMM GRANULOCYTES NFR BLD AUTO: 0 %
LYMPHOCYTES # BLD AUTO: 2 X10E3/UL (ref 0.7–3.1)
LYMPHOCYTES NFR BLD AUTO: 22 %
MCH RBC QN AUTO: 26.1 PG (ref 26.6–33)
MCHC RBC AUTO-ENTMCNC: 31.8 G/DL (ref 31.5–35.7)
MCV RBC AUTO: 82 FL (ref 79–97)
MONOCYTES # BLD AUTO: 0.7 X10E3/UL (ref 0.1–0.9)
MONOCYTES NFR BLD AUTO: 7 %
NEUTROPHILS # BLD AUTO: 6 X10E3/UL (ref 1.4–7)
NEUTROPHILS NFR BLD AUTO: 65 %
PLATELET # BLD AUTO: 285 X10E3/UL (ref 150–450)
RBC # BLD AUTO: 4.45 X10E6/UL (ref 3.77–5.28)
WBC # BLD AUTO: 9.3 X10E3/UL (ref 3.4–10.8)

## 2023-09-27 LAB
BUN SERPL-MCNC: 18 MG/DL (ref 8–27)
BUN/CREAT SERPL: 21 (ref 12–28)
CALCIUM SERPL-MCNC: 10.3 MG/DL (ref 8.7–10.3)
CHLORIDE SERPL-SCNC: 102 MMOL/L (ref 96–106)
CK SERPL-CCNC: 57 U/L (ref 32–182)
CO2 SERPL-SCNC: 27 MMOL/L (ref 20–29)
CREAT SERPL-MCNC: 0.85 MG/DL (ref 0.57–1)
EGFRCR SERPLBLD CKD-EPI 2021: 69 ML/MIN/1.73
GLUCOSE SERPL-MCNC: 130 MG/DL (ref 70–99)
MAGNESIUM SERPL-MCNC: 1.9 MG/DL (ref 1.6–2.3)
POTASSIUM SERPL-SCNC: 5.1 MMOL/L (ref 3.5–5.2)
SODIUM SERPL-SCNC: 144 MMOL/L (ref 134–144)
TSH SERPL DL<=0.005 MIU/L-ACNC: 0.62 UIU/ML (ref 0.45–4.5)

## 2023-09-28 PROBLEM — J18.9 PNEUMONIA: Status: RESOLVED | Noted: 2023-03-24 | Resolved: 2023-09-28

## 2023-10-07 DIAGNOSIS — E11.42 DIABETIC PERIPHERAL NEUROPATHY ASSOCIATED WITH TYPE 2 DIABETES MELLITUS (HCC): Primary | ICD-10-CM

## 2023-10-09 RX ORDER — GABAPENTIN 800 MG/1
TABLET ORAL
Qty: 270 TABLET | Refills: 1 | Status: SHIPPED | OUTPATIENT
Start: 2023-10-09 | End: 2024-01-07

## 2023-10-09 NOTE — TELEPHONE ENCOUNTER
PCP: David Reyez MD     Last appt:  9/25/2023    Future Appointments   Date Time Provider [address]   1/30/2024 11:30 AM David Reyez MD [facility] BS AMB          Requested Prescriptions     Pending Prescriptions Disp Refills    gabapentin (NEURONTIN) 800 MG tablet [Pharmacy Med Name: GABAPENTIN 800 MG Tablet] 270 tablet 0     Sig: TAKE 1 TABLET THREE TIMES DAILY - MAX DAILY AMOUNT: 2400MG

## 2023-11-07 ENCOUNTER — HOSPITAL ENCOUNTER (OUTPATIENT)
Facility: HOSPITAL | Age: 78
Discharge: HOME | End: 2023-11-10
Payer: MEDICARE

## 2023-11-07 DIAGNOSIS — M47.812 CERVICAL SPONDYLOSIS: ICD-10-CM

## 2023-11-07 DIAGNOSIS — M54.2 CERVICALGIA: ICD-10-CM

## 2023-11-07 DIAGNOSIS — M54.12 RADICULOPATHY OF CERVICAL REGION: ICD-10-CM

## 2023-11-07 PROCEDURE — 72125 CT NECK SPINE W/O DYE: CPT

## 2023-11-18 DIAGNOSIS — I10 HYPERTENSION, ESSENTIAL: Primary | ICD-10-CM

## 2023-11-20 RX ORDER — LOSARTAN POTASSIUM 50 MG/1
50 TABLET ORAL DAILY
Qty: 90 TABLET | Refills: 3 | Status: SHIPPED | OUTPATIENT
Start: 2023-11-20

## 2023-11-20 NOTE — TELEPHONE ENCOUNTER
PCP: Aida Alberto MD     Last appt:  9/25/2023      Future Appointments   Date Time Provider Department Center   1/30/2024 11:30 AM Aida Alberto MD [facility] BS AMB          Requested Prescriptions     Pending Prescriptions Disp Refills    losartan (COZAAR) 50 MG tablet [Pharmacy Med Name: LOSARTAN POTASSIUM 50 MG Tablet] 90 tablet 10     Sig: TAKE 1 TABLET EVERY DAY

## 2024-01-30 ENCOUNTER — OFFICE VISIT (OUTPATIENT)
Facility: CLINIC | Age: 79
End: 2024-01-30
Payer: MEDICARE

## 2024-01-30 VITALS
SYSTOLIC BLOOD PRESSURE: 94 MMHG | WEIGHT: 161.6 LBS | DIASTOLIC BLOOD PRESSURE: 56 MMHG | OXYGEN SATURATION: 95 % | TEMPERATURE: 97.7 F | RESPIRATION RATE: 16 BRPM | HEART RATE: 73 BPM | HEIGHT: 67 IN | BODY MASS INDEX: 25.36 KG/M2

## 2024-01-30 DIAGNOSIS — I48.0 PAROXYSMAL ATRIAL FIBRILLATION (HCC): ICD-10-CM

## 2024-01-30 DIAGNOSIS — J44.9 CHRONIC OBSTRUCTIVE PULMONARY DISEASE, UNSPECIFIED COPD TYPE (HCC): ICD-10-CM

## 2024-01-30 DIAGNOSIS — E11.42 DIABETIC PERIPHERAL NEUROPATHY ASSOCIATED WITH TYPE 2 DIABETES MELLITUS (HCC): ICD-10-CM

## 2024-01-30 DIAGNOSIS — I50.22 CHRONIC SYSTOLIC HEART FAILURE (HCC): ICD-10-CM

## 2024-01-30 DIAGNOSIS — E11.9 DIABETES MELLITUS TYPE 2, DIET-CONTROLLED (HCC): Primary | ICD-10-CM

## 2024-01-30 DIAGNOSIS — E55.9 VITAMIN D DEFICIENCY: ICD-10-CM

## 2024-01-30 DIAGNOSIS — E78.00 HYPERCHOLESTEREMIA: ICD-10-CM

## 2024-01-30 DIAGNOSIS — I10 HYPERTENSION, ESSENTIAL: ICD-10-CM

## 2024-01-30 LAB — HBA1C MFR BLD: 5.7 %

## 2024-01-30 PROCEDURE — G8484 FLU IMMUNIZE NO ADMIN: HCPCS | Performed by: INTERNAL MEDICINE

## 2024-01-30 PROCEDURE — 3074F SYST BP LT 130 MM HG: CPT | Performed by: INTERNAL MEDICINE

## 2024-01-30 PROCEDURE — G8427 DOCREV CUR MEDS BY ELIG CLIN: HCPCS | Performed by: INTERNAL MEDICINE

## 2024-01-30 PROCEDURE — 1123F ACP DISCUSS/DSCN MKR DOCD: CPT | Performed by: INTERNAL MEDICINE

## 2024-01-30 PROCEDURE — 3078F DIAST BP <80 MM HG: CPT | Performed by: INTERNAL MEDICINE

## 2024-01-30 PROCEDURE — 83036 HEMOGLOBIN GLYCOSYLATED A1C: CPT | Performed by: INTERNAL MEDICINE

## 2024-01-30 PROCEDURE — 1036F TOBACCO NON-USER: CPT | Performed by: INTERNAL MEDICINE

## 2024-01-30 PROCEDURE — 99214 OFFICE O/P EST MOD 30 MIN: CPT | Performed by: INTERNAL MEDICINE

## 2024-01-30 PROCEDURE — G8419 CALC BMI OUT NRM PARAM NOF/U: HCPCS | Performed by: INTERNAL MEDICINE

## 2024-01-30 PROCEDURE — 3023F SPIROM DOC REV: CPT | Performed by: INTERNAL MEDICINE

## 2024-01-30 PROCEDURE — 1090F PRES/ABSN URINE INCON ASSESS: CPT | Performed by: INTERNAL MEDICINE

## 2024-01-30 PROCEDURE — G8399 PT W/DXA RESULTS DOCUMENT: HCPCS | Performed by: INTERNAL MEDICINE

## 2024-01-30 ASSESSMENT — PATIENT HEALTH QUESTIONNAIRE - PHQ9
2. FEELING DOWN, DEPRESSED OR HOPELESS: 0
8. MOVING OR SPEAKING SO SLOWLY THAT OTHER PEOPLE COULD HAVE NOTICED. OR THE OPPOSITE, BEING SO FIGETY OR RESTLESS THAT YOU HAVE BEEN MOVING AROUND A LOT MORE THAN USUAL: 0
4. FEELING TIRED OR HAVING LITTLE ENERGY: 0
SUM OF ALL RESPONSES TO PHQ QUESTIONS 1-9: 0
3. TROUBLE FALLING OR STAYING ASLEEP: 0
SUM OF ALL RESPONSES TO PHQ QUESTIONS 1-9: 0
SUM OF ALL RESPONSES TO PHQ QUESTIONS 1-9: 0
SUM OF ALL RESPONSES TO PHQ9 QUESTIONS 1 & 2: 0
SUM OF ALL RESPONSES TO PHQ QUESTIONS 1-9: 0
1. LITTLE INTEREST OR PLEASURE IN DOING THINGS: 0
7. TROUBLE CONCENTRATING ON THINGS, SUCH AS READING THE NEWSPAPER OR WATCHING TELEVISION: 0
5. POOR APPETITE OR OVEREATING: 0
10. IF YOU CHECKED OFF ANY PROBLEMS, HOW DIFFICULT HAVE THESE PROBLEMS MADE IT FOR YOU TO DO YOUR WORK, TAKE CARE OF THINGS AT HOME, OR GET ALONG WITH OTHER PEOPLE: 0
SUM OF ALL RESPONSES TO PHQ QUESTIONS 1-9: 0
6. FEELING BAD ABOUT YOURSELF - OR THAT YOU ARE A FAILURE OR HAVE LET YOURSELF OR YOUR FAMILY DOWN: 0
9. THOUGHTS THAT YOU WOULD BE BETTER OFF DEAD, OR OF HURTING YOURSELF: 0

## 2024-01-30 NOTE — PROGRESS NOTES
Hattie Bhatt  Identified pt with two pt identifiers(name and ).  Chief Complaint   Patient presents with    Hypertension    Diabetes       1. Have you been to the ER, urgent care clinic since your last visit?  Hospitalized since your last visit? Urgent care about a month ago.     2. Have you seen or consulted any other health care providers outside of the [facility] since your last visit?  Include any pap smears or colon screening. Pt [facility]. Albert at [facility].   Pt plans on getting Shingles vaccine, has had Flu and Covid vaccines    Provider notified of reason for visit, vitals and flowsheets obtained on patients.     Patient received paperwork for advance directive during previous visit but has not completed at this time     Reviewed record In preparation for visit, huddled with provider and have obtained necessary documentation      Health Maintenance Due   Topic    Shingles vaccine (1 of 2)    Diabetic retinal exam     Flu vaccine (1)    COVID-19 Vaccine ( season)    Annual Wellness Visit (Medicare Advantage)        Wt Readings from Last 3 Encounters:   23 70.3 kg (155 lb)   23 75.3 kg (166 lb)   23 73.9 kg (163 lb)     Temp Readings from Last 3 Encounters:   23 98.2 °F (36.8 °C) (Oral)   23 98.1 °F (36.7 °C) (Oral)     BP Readings from Last 3 Encounters:   23 136/78   23 113/63   23 (!) 78/51     Pulse Readings from Last 3 Encounters:   23 78   23 85   23 76          No data to display                  Learning Assessment:  :          No data to display                Fall Risk Assessment:  :         2023    11:27 AM 2023     4:18 PM 2023    10:36 AM 2022     1:17 PM 2022     2:42 PM 3/29/2022     3:43 PM 3/16/2022    11:27 AM   Amb Fall Risk Assessment and TUG Test   Do you feel unsteady or are you worried about falling?  no yes        2 or more falls in past year? no no        Fall

## 2024-01-30 NOTE — PROGRESS NOTES
Chief Complaint   Patient presents with    Hypertension    Diabetes       HISTORY OF PRESENT ILLNESS  Hattie Bhatt is a 78 y.o. female    Presents for 4 month follow up evaluation. She has diet-controlled type 2 DM with peripheral neuropathy, HTN, hyperlipidemia, paroxysmal a-fib, complex regional pain syndrome, osteopenia, GERD, hepatic steatosis, and hx of sarcoidosis.     Had scrape on left lower leg. Seen at [facility] but no treatment given. Took care of wound on her own with peroxide and salt water; has improved.      Uses 2L O2 at night. Trying to get portable home O2. [facility] Not sure why Dr. Treadwell has not ordered portable O2 for her.    Has chronic neck and shoulder pain. Holding head up hurts.     Denies polydipsia, polyuria, hypoglycemia, CP, SOB, dizziness, heart palpitations, or leg swelling. A1c 5.7% on 1/20/24 (today), was 6.3% on 9/26/23, 6.3% on 1/30/23, and 6.5% on 9/29/22.    Eye exam: 8/31/23, Dr. Harrington. Last saw 3-4 weeks ago  She plans on getting flu, COVID 2023-4, and shingles vaccines.    Patient Active Problem List   Diagnosis    GERD (gastroesophageal reflux disease)    Paroxysmal atrial fibrillation (HCC)    Diabetic peripheral neuropathy associated with type 2 diabetes mellitus (HCC)    Hypercholesteremia    Depression    Vitamin D deficiency    Hepatic steatosis    Hypertension, essential    Psoriasis    Osteopenia    Sarcoidosis of lymph nodes (HCC)    COPD (chronic obstructive pulmonary disease) (HCC)    Diabetes mellitus type 2, diet-controlled (HCC)    Chronic respiratory failure with hypoxia (HCC)     Past Medical History:   Diagnosis Date    Abnormal chest x-ray 12/23/2009    Anemia 12/23/2009    Arrhythmia     SVT. Converts with [facility]    Atrial fibrillation (HCC) 12/23/2009    Carpal tunnel syndrome 12/23/2009    Chronic obstructive pulmonary disease (HCC)     Colitis 12/23/2009    Complex regional pain syndrome 12/23/2009    Depression

## 2024-02-16 ENCOUNTER — TRANSCRIBE ORDERS (OUTPATIENT)
Facility: HOSPITAL | Age: 79
End: 2024-02-16

## 2024-02-16 DIAGNOSIS — M50.30 DISC DISEASE, DEGENERATIVE, CERVICAL: ICD-10-CM

## 2024-02-16 DIAGNOSIS — M48.02 CERVICAL STENOSIS OF SPINE: ICD-10-CM

## 2024-02-16 DIAGNOSIS — M48.02 FORAMINAL STENOSIS OF CERVICAL REGION: ICD-10-CM

## 2024-02-16 DIAGNOSIS — M47.812 SPONDYLOSIS OF CERVICAL REGION WITHOUT MYELOPATHY OR RADICULOPATHY: ICD-10-CM

## 2024-02-16 DIAGNOSIS — M54.12 CERVICAL RADICULOPATHY: Primary | ICD-10-CM

## 2024-02-16 DIAGNOSIS — M54.2 NECK PAIN: ICD-10-CM

## 2024-02-16 DIAGNOSIS — M54.12 CERVICAL RADICULITIS: ICD-10-CM

## 2024-02-29 ENCOUNTER — HOSPITAL ENCOUNTER (OUTPATIENT)
Facility: HOSPITAL | Age: 79
Discharge: HOME | End: 2024-02-29
Attending: ORTHOPAEDIC SURGERY
Payer: MEDICARE

## 2024-02-29 ENCOUNTER — HOSPITAL ENCOUNTER (OUTPATIENT)
Facility: HOSPITAL | Age: 79
End: 2024-02-29
Attending: ORTHOPAEDIC SURGERY
Payer: MEDICARE

## 2024-02-29 VITALS
HEART RATE: 67 BPM | RESPIRATION RATE: 20 BRPM | SYSTOLIC BLOOD PRESSURE: 141 MMHG | DIASTOLIC BLOOD PRESSURE: 66 MMHG | OXYGEN SATURATION: 92 %

## 2024-02-29 DIAGNOSIS — M48.02 CERVICAL STENOSIS OF SPINE: ICD-10-CM

## 2024-02-29 DIAGNOSIS — M54.12 CERVICAL RADICULITIS: ICD-10-CM

## 2024-02-29 DIAGNOSIS — M54.2 NECK PAIN: ICD-10-CM

## 2024-02-29 DIAGNOSIS — M50.30 DISC DISEASE, DEGENERATIVE, CERVICAL: ICD-10-CM

## 2024-02-29 DIAGNOSIS — M54.12 CERVICAL RADICULOPATHY: ICD-10-CM

## 2024-02-29 DIAGNOSIS — M47.812 SPONDYLOSIS OF CERVICAL REGION WITHOUT MYELOPATHY OR RADICULOPATHY: ICD-10-CM

## 2024-02-29 DIAGNOSIS — M48.02 FORAMINAL STENOSIS OF CERVICAL REGION: ICD-10-CM

## 2024-02-29 PROCEDURE — 62302 MYELOGRAPHY LUMBAR INJECTION: CPT

## 2024-02-29 PROCEDURE — 72126 CT NECK SPINE W/DYE: CPT

## 2024-02-29 PROCEDURE — 6360000004 HC RX CONTRAST MEDICATION: Performed by: RADIOLOGY

## 2024-02-29 RX ORDER — LIDOCAINE HYDROCHLORIDE 10 MG/ML
2 INJECTION, SOLUTION EPIDURAL; INFILTRATION; INTRACAUDAL; PERINEURAL ONCE
Status: DISCONTINUED | OUTPATIENT
Start: 2024-02-29 | End: 2024-03-04 | Stop reason: HOSPADM

## 2024-02-29 RX ADMIN — IOHEXOL 20 ML: 240 INJECTION, SOLUTION INTRATHECAL; INTRAVASCULAR; INTRAVENOUS; ORAL at 10:20

## 2024-02-29 ASSESSMENT — PAIN - FUNCTIONAL ASSESSMENT: PAIN_FUNCTIONAL_ASSESSMENT: NONE - DENIES PAIN

## 2024-02-29 NOTE — DISCHARGE INSTRUCTIONS
[facility]  Radiology Department  [phone]    Radiologist:  [facility]    Date:  February 29, 2024      Myelogram Discharge Instructions    Go home and rest and restrict your activity the next 24 - 48 hours.  Rest in a reclined position, keeping your head elevated to minimize post procedure complications.     Resume your previous diet and prescribed medications.    Increase your fluid intake over the next 1-2 days to help the kidneys flush out the dye that was injected during your procedure.      You may take Tylenol if allowed, as directed on the label, for pain or discomfort.  Avoid Ibuprofen (Advil, Motrin etc.) and Aspirin today as they may increase your risk of bleeding.       Avoid heavy lifting (nothing greater than 5 pounds),  excessive bending, pushing or pulling movements for 2 days to minimize your risk of post procedure headache.    You may shower in 24 hours.  Do not soak or swim until the site has healed completely.      Results will be sent to your physician as soon as they become available.  Follow up with your physician as previously discussed and be sure to bring the CD to that was provided for you today to your appointment.      If you have any questions regarding your procedure please call and ask to speak to a radiology nurse.

## 2024-02-29 NOTE — PROGRESS NOTES
Pt dressed self without difficulty - assistance with placing soft c-collar on neck \"it helps me hold my head up\" per pt.  Pt in w/c for discharge to home via private vehicle with her  driving her home.  Pt accomp. By nurse to car for discharge.  Pt.'s  has pt.'s cane with him upon discharge.

## 2024-03-27 RX ORDER — METOPROLOL TARTRATE 50 MG/1
TABLET, FILM COATED ORAL
Qty: 180 TABLET | Refills: 3 | Status: SHIPPED | OUTPATIENT
Start: 2024-03-27

## 2024-04-29 DIAGNOSIS — E11.42 DIABETIC PERIPHERAL NEUROPATHY ASSOCIATED WITH TYPE 2 DIABETES MELLITUS (HCC): ICD-10-CM

## 2024-04-30 RX ORDER — GABAPENTIN 800 MG/1
TABLET ORAL
Qty: 270 TABLET | Refills: 0 | Status: SHIPPED | OUTPATIENT
Start: 2024-04-30 | End: 2024-07-29

## 2024-04-30 NOTE — TELEPHONE ENCOUNTER
PCP: Aida Alberto MD     Last appt:  1/30/2024      Future Appointments   Date Time Provider Department Center   8/5/2024 11:10 AM Aida Alberto MD [facility] AMB          Requested Prescriptions     Pending Prescriptions Disp Refills    gabapentin (NEURONTIN) 800 MG tablet [Pharmacy Med Name: GABAPENTIN 800 MG Tablet] 270 tablet 0     Sig: TAKE 1 TABLET 3 TIMES DAILY (MAXIMUM DAILY AMOUNT 2400 MG)

## 2024-05-10 DIAGNOSIS — M54.12 CERVICAL RADICULOPATHY: ICD-10-CM

## 2024-05-10 DIAGNOSIS — M47.812 CERVICAL SPONDYLOSIS WITHOUT MYELOPATHY: Primary | ICD-10-CM

## 2024-05-10 NOTE — TELEPHONE ENCOUNTER
Spoke to pt verified name and . Pt stated she had taken a higher dose a Amitriptyline in the past for neck pain. She brought this up to CARMEN Kincaid at [facility]. CARMEN Kincaid advised pt to see if PCP would increase Amitriptyline.

## 2024-05-13 RX ORDER — AMITRIPTYLINE HYDROCHLORIDE 25 MG/1
25 TABLET, FILM COATED ORAL NIGHTLY
Qty: 30 TABLET | Refills: 3 | Status: SHIPPED | OUTPATIENT
Start: 2024-05-13 | End: 2024-05-13 | Stop reason: SDUPTHER

## 2024-05-13 RX ORDER — AMITRIPTYLINE HYDROCHLORIDE 25 MG/1
25 TABLET, FILM COATED ORAL NIGHTLY
Qty: 90 TABLET | Refills: 0 | Status: SHIPPED | OUTPATIENT
Start: 2024-05-13

## 2024-05-13 NOTE — TELEPHONE ENCOUNTER
Call patient: Dr. Alberto will increase her amitriptyline dose from 100 to 125 mg nightly taken as a 100 mg tablet and a separate 25 mg tablet. A prescription for 25 mg tablets sent to [facility].

## 2024-05-13 NOTE — TELEPHONE ENCOUNTER
PCP: Aida Alberto MD     Last appt:  1/30/2024      Future Appointments   Date Time Provider Department Center   8/5/2024 11:10 AM Aida Alberto MD [facility] AMB          Requested Prescriptions     Pending Prescriptions Disp Refills    amitriptyline (ELAVIL) 25 MG tablet 30 tablet 3     Sig: Take 1 tablet by mouth nightly Take with amitriptyline 100 mg tablet.     Signed Prescriptions Disp Refills    amitriptyline (ELAVIL) 25 MG tablet 30 tablet 3     Sig: Take 1 tablet by mouth nightly Take with amitriptyline 100 mg tablet.     Authorizing Provider: AIDA ALBERTO

## 2024-05-28 ENCOUNTER — TELEPHONE (OUTPATIENT)
Facility: CLINIC | Age: 79
End: 2024-05-28

## 2024-05-28 NOTE — TELEPHONE ENCOUNTER
----- Message from Marcella Reyes MA sent at 5/28/2024  3:13 PM EDT -----  Subject: Medication Problem     Medication: amitriptyline (ELAVIL) 100 MG tablet  Dosage: 100 mg 1 tablet nightly   Ordering Provider:     Question/Problem: Patient states that she went to see Dr. Zepeda for her   knee injections and they recommended for her to contact Dr. Alberto and have   her increase her Amitriptyline to 150 mg nightly. Please advise       Pharmacy: [address] - P   [phone] - F [phone]    ---------------------------------------------------------------------------  --------------  CALL BACK INFO  [phone]; Do not leave any message, patient will call back for answer  ---------------------------------------------------------------------------  --------------    SCRIPT ANSWERS  Relationship to Patient: Self

## 2024-05-31 ENCOUNTER — OFFICE VISIT (OUTPATIENT)
Facility: CLINIC | Age: 79
End: 2024-05-31
Payer: MEDICARE

## 2024-05-31 VITALS
HEIGHT: 67 IN | DIASTOLIC BLOOD PRESSURE: 67 MMHG | OXYGEN SATURATION: 94 % | SYSTOLIC BLOOD PRESSURE: 112 MMHG | WEIGHT: 160.8 LBS | RESPIRATION RATE: 16 BRPM | HEART RATE: 73 BPM | TEMPERATURE: 98.4 F | BODY MASS INDEX: 25.24 KG/M2

## 2024-05-31 DIAGNOSIS — M47.812 CERVICAL SPONDYLOSIS WITHOUT MYELOPATHY: Primary | ICD-10-CM

## 2024-05-31 PROCEDURE — G8427 DOCREV CUR MEDS BY ELIG CLIN: HCPCS | Performed by: INTERNAL MEDICINE

## 2024-05-31 PROCEDURE — 3074F SYST BP LT 130 MM HG: CPT | Performed by: INTERNAL MEDICINE

## 2024-05-31 PROCEDURE — 1123F ACP DISCUSS/DSCN MKR DOCD: CPT | Performed by: INTERNAL MEDICINE

## 2024-05-31 PROCEDURE — 3078F DIAST BP <80 MM HG: CPT | Performed by: INTERNAL MEDICINE

## 2024-05-31 PROCEDURE — 1090F PRES/ABSN URINE INCON ASSESS: CPT | Performed by: INTERNAL MEDICINE

## 2024-05-31 PROCEDURE — G8419 CALC BMI OUT NRM PARAM NOF/U: HCPCS | Performed by: INTERNAL MEDICINE

## 2024-05-31 PROCEDURE — G8399 PT W/DXA RESULTS DOCUMENT: HCPCS | Performed by: INTERNAL MEDICINE

## 2024-05-31 PROCEDURE — 99213 OFFICE O/P EST LOW 20 MIN: CPT | Performed by: INTERNAL MEDICINE

## 2024-05-31 PROCEDURE — 1036F TOBACCO NON-USER: CPT | Performed by: INTERNAL MEDICINE

## 2024-05-31 RX ORDER — DULOXETIN HYDROCHLORIDE 30 MG/1
CAPSULE, DELAYED RELEASE ORAL
Qty: 60 CAPSULE | Refills: 0 | Status: SHIPPED | OUTPATIENT
Start: 2024-05-31

## 2024-05-31 NOTE — PATIENT INSTRUCTIONS
Patient Instructions  Decrease dose of amitriptyline as follows:  Take 100 mg once daily for 2 weeks.  Take 50 mg once daily. Stay on this dose until you see me again in August.   If you have any problems at any step of the dose decrease, let me know.  Start taking duloxetine (Cymbalta) for pain. I will start you on a low dose with plans to gradually increase the dose. It is okay with me if Dr. Ospina increases the dose.

## 2024-05-31 NOTE — PROGRESS NOTES
Chief Complaint   Patient presents with    Medication Check     History of Present Illness  The patient is an 78-year-old female with a history of chronic low back pain. She has cervical spondylosis without myelopathy and cervical radiculopathy. She has been following with Dr. Ospina, a pain specialist.     She had a C7-T1 DARRION done by Dr. Ospina on 01/10/2024, which unfortunately did not yield any relief. She is scheduled for a follow-up appointment with Dr. Ospina on 06/12/2024 and is scheduled for another injection.     She recently consulted with a nurse practitioner in Orthopedics on 05/07/2024, during which she was advised to discuss with me the possibility of increasing her amitriptyline dosage from 125 mg to 150 mg daily. She has been on amitriptyline for a duration of 30 years. She reports experiencing dry mouth, blurred vision, and urinary retention, but denies any constipation. She has an upcoming appointment with her ophthalmologist on 06/05/2024. She has not observed any irregular heart rhythms, palpitations, dizziness, fatigue, or orthostatic hypotension. She has noticed some memory issues and confusion, but denies any suicidal ideation. She rates her neck pain as 7 on a scale of 1 to 10. She has an implanted pain pump.    She has pain in her left wrist.  She saw Dr. Inocencio Gray about the pain and was diagnosed with left cubital tunnel syndrome.    Patient Active Problem List   Diagnosis    GERD (gastroesophageal reflux disease)    Paroxysmal atrial fibrillation (HCC)    Diabetic peripheral neuropathy associated with type 2 diabetes mellitus (HCC)    Hypercholesteremia    Depression    Vitamin D deficiency    Hepatic steatosis    Hypertension, essential    Psoriasis    Osteopenia    Sarcoidosis of lymph nodes (HCC)    COPD (chronic obstructive pulmonary disease) (HCC)    Diabetes mellitus type 2, diet-controlled (HCC)    Chronic respiratory failure with hypoxia (HCC)    Chronic systolic heart failure 
3/29/2022     3:43 PM 3/16/2022    11:27 AM   Amb Fall Risk Assessment and TUG Test   Do you feel unsteady or are you worried about falling?  no yes        2 or more falls in past year? no no        Fall with injury in past year? no no        Fall in past 12 months?   0 0   1   Able to walk?   Yes Yes Yes Yes Yes       Abuse Screening:  :         1/30/2024    11:00 AM   AMB Abuse Screening   Do you ever feel afraid of your partner? N   Are you in a relationship with someone who physically or mentally threatens you? N   Is it safe for you to go home? Y       ADL Screening:  :         1/30/2024    11:00 AM   ADL ASSESSMENT   Feeding yourself No Help Needed   Getting from bed to chair No Help Needed   Getting dressed No Help Needed   Bathing or showering No Help Needed   Walk across the room (includes cane/walker) No Help Needed   Using the telphone No Help Needed   Taking your medications No Help Needed   Preparing meals No Help Needed   Managing money (expenses/bills) No Help Needed   Moderately strenuous housework (laundry) No Help Needed   Shopping for personal items (toiletries/medicines) No Help Needed   Shopping for groceries No Help Needed   Driving No Help Needed   Climbing a flight of stairs No Help Needed   Getting to places beyond walking distances No Help Needed         Medication reconciliation up to date and corrected with patient at this time.

## 2024-06-19 RX ORDER — ALBUTEROL SULFATE 90 UG/1
2 AEROSOL, METERED RESPIRATORY (INHALATION) EVERY 6 HOURS PRN
COMMUNITY

## 2024-06-19 NOTE — PERIOP NOTE
[facility]  Ambulatory Surgery Unit  Pre-operative Instructions    Surgery/Procedure Date  6/24            Tentative Arrival Time TBD      1. On the day of your surgery/procedure, please report to the Ambulatory Surgery Unit Registration Desk and sign in at your designated time. The Ambulatory Surgery Unit is located in [facility] on the [facility] side of the [facility] across from the [facility]. Please have all of your health insurance cards, co-payment, and a photo ID.    **TWO adults may accompany you the day of the procedure.  We have limited seating available.      2. You cannot be dropped off for surgery.  Please make arrangements for a responsible adult friend or family member to remain on the hospital campus during your procedure, and drive you home, as you should not drive for 24 hours following anesthesia. Make arrangements for a responsible adult to stay with you for at least the first 24 hours after your surgery.    3. Do not have anything to eat or drink (including water, gum, mints, coffee, juice) after 11:59 PM  6/23. This may not apply to medications prescribed by your physician.  (Please note below the special instructions with medications to take the morning of surgery, if applicable.)    4. We recommend you do not drink any alcoholic beverages for 24 hours before and after your surgery.    5. Contact your surgeon’s office for instructions on the following medications: non-steroidal anti-inflammatory drugs (i.e. Advil, Aleve), vitamins, and supplements. (Some surgeon’s will want you to stop these medications prior to surgery and others may allow you to take them)   **If you are currently taking Plavix, Coumadin, Aspirin and/or other blood-thinning agents, contact your surgeon for instructions.** Your surgeon will partner with the physician prescribing these medications to determine if it is safe to stop or if you need to continue taking. Please do not stop taking

## 2024-06-20 NOTE — PERIOP NOTE
Patient's  Fidel Bhatt came to  Dial and Hibiclens soaps. Reviewed pre- op bathing instructions. Able to verbalized understanding.    Chart reviewed by anesthesiologist joe Jesus to proceed with planned procedure as per same.

## 2024-06-21 ENCOUNTER — ANESTHESIA EVENT (OUTPATIENT)
Facility: HOSPITAL | Age: 79
End: 2024-06-21
Payer: MEDICARE

## 2024-06-24 ENCOUNTER — ANESTHESIA (OUTPATIENT)
Facility: HOSPITAL | Age: 79
End: 2024-06-24
Payer: MEDICARE

## 2024-06-24 ENCOUNTER — HOSPITAL ENCOUNTER (OUTPATIENT)
Facility: HOSPITAL | Age: 79
Setting detail: OUTPATIENT SURGERY
Discharge: HOME | End: 2024-06-24
Attending: ORTHOPAEDIC SURGERY | Admitting: ORTHOPAEDIC SURGERY
Payer: MEDICARE

## 2024-06-24 VITALS
OXYGEN SATURATION: 94 % | TEMPERATURE: 97.4 F | DIASTOLIC BLOOD PRESSURE: 70 MMHG | BODY MASS INDEX: 25.07 KG/M2 | RESPIRATION RATE: 12 BRPM | WEIGHT: 156 LBS | SYSTOLIC BLOOD PRESSURE: 122 MMHG | HEART RATE: 71 BPM | HEIGHT: 66 IN

## 2024-06-24 DIAGNOSIS — G89.18 POST-OP PAIN: Primary | ICD-10-CM

## 2024-06-24 PROCEDURE — 6360000002 HC RX W HCPCS: Performed by: NURSE ANESTHETIST, CERTIFIED REGISTERED

## 2024-06-24 PROCEDURE — 3600000012 HC SURGERY LEVEL 2 ADDTL 15MIN: Performed by: ORTHOPAEDIC SURGERY

## 2024-06-24 PROCEDURE — 7100000000 HC PACU RECOVERY - FIRST 15 MIN: Performed by: ORTHOPAEDIC SURGERY

## 2024-06-24 PROCEDURE — 3700000001 HC ADD 15 MINUTES (ANESTHESIA): Performed by: ORTHOPAEDIC SURGERY

## 2024-06-24 PROCEDURE — 64415 NJX AA&/STRD BRCH PLXS IMG: CPT | Performed by: STUDENT IN AN ORGANIZED HEALTH CARE EDUCATION/TRAINING PROGRAM

## 2024-06-24 PROCEDURE — 7100000010 HC PHASE II RECOVERY - FIRST 15 MIN: Performed by: ORTHOPAEDIC SURGERY

## 2024-06-24 PROCEDURE — 3600000002 HC SURGERY LEVEL 2 BASE: Performed by: ORTHOPAEDIC SURGERY

## 2024-06-24 PROCEDURE — 7100000001 HC PACU RECOVERY - ADDTL 15 MIN: Performed by: ORTHOPAEDIC SURGERY

## 2024-06-24 PROCEDURE — 3700000000 HC ANESTHESIA ATTENDED CARE: Performed by: ORTHOPAEDIC SURGERY

## 2024-06-24 PROCEDURE — 6360000002 HC RX W HCPCS: Performed by: STUDENT IN AN ORGANIZED HEALTH CARE EDUCATION/TRAINING PROGRAM

## 2024-06-24 PROCEDURE — 2709999900 HC NON-CHARGEABLE SUPPLY: Performed by: ORTHOPAEDIC SURGERY

## 2024-06-24 PROCEDURE — 2580000003 HC RX 258: Performed by: NURSE ANESTHETIST, CERTIFIED REGISTERED

## 2024-06-24 PROCEDURE — 6370000000 HC RX 637 (ALT 250 FOR IP): Performed by: STUDENT IN AN ORGANIZED HEALTH CARE EDUCATION/TRAINING PROGRAM

## 2024-06-24 PROCEDURE — 7100000011 HC PHASE II RECOVERY - ADDTL 15 MIN: Performed by: ORTHOPAEDIC SURGERY

## 2024-06-24 RX ORDER — SODIUM CHLORIDE 0.9 % (FLUSH) 0.9 %
5-40 SYRINGE (ML) INJECTION EVERY 12 HOURS SCHEDULED
Status: DISCONTINUED | OUTPATIENT
Start: 2024-06-24 | End: 2024-06-24 | Stop reason: HOSPADM

## 2024-06-24 RX ORDER — SODIUM CHLORIDE 9 MG/ML
INJECTION, SOLUTION INTRAVENOUS PRN
Status: DISCONTINUED | OUTPATIENT
Start: 2024-06-24 | End: 2024-06-24 | Stop reason: HOSPADM

## 2024-06-24 RX ORDER — ACETAMINOPHEN 500 MG
1000 TABLET ORAL ONCE
Status: DISCONTINUED | OUTPATIENT
Start: 2024-06-24 | End: 2024-06-24 | Stop reason: HOSPADM

## 2024-06-24 RX ORDER — IPRATROPIUM BROMIDE AND ALBUTEROL SULFATE 2.5; .5 MG/3ML; MG/3ML
SOLUTION RESPIRATORY (INHALATION)
Status: DISCONTINUED
Start: 2024-06-24 | End: 2024-06-24 | Stop reason: HOSPADM

## 2024-06-24 RX ORDER — PROPOFOL 10 MG/ML
INJECTION, EMULSION INTRAVENOUS
Status: COMPLETED
Start: 2024-06-24 | End: 2024-06-24

## 2024-06-24 RX ORDER — SODIUM CHLORIDE 0.9 % (FLUSH) 0.9 %
5-40 SYRINGE (ML) INJECTION PRN
Status: DISCONTINUED | OUTPATIENT
Start: 2024-06-24 | End: 2024-06-24 | Stop reason: HOSPADM

## 2024-06-24 RX ORDER — GLUCAGON 1 MG/ML
1 KIT INJECTION PRN
Status: DISCONTINUED | OUTPATIENT
Start: 2024-06-24 | End: 2024-06-24 | Stop reason: HOSPADM

## 2024-06-24 RX ORDER — HYDROMORPHONE HYDROCHLORIDE 1 MG/ML
0.25 INJECTION, SOLUTION INTRAMUSCULAR; INTRAVENOUS; SUBCUTANEOUS EVERY 5 MIN PRN
Status: DISCONTINUED | OUTPATIENT
Start: 2024-06-24 | End: 2024-06-24 | Stop reason: HOSPADM

## 2024-06-24 RX ORDER — SODIUM CHLORIDE, SODIUM LACTATE, POTASSIUM CHLORIDE, CALCIUM CHLORIDE 600; 310; 30; 20 MG/100ML; MG/100ML; MG/100ML; MG/100ML
INJECTION, SOLUTION INTRAVENOUS CONTINUOUS PRN
Status: DISCONTINUED | OUTPATIENT
Start: 2024-06-24 | End: 2024-06-24 | Stop reason: SDUPTHER

## 2024-06-24 RX ORDER — NALOXONE HYDROCHLORIDE 0.4 MG/ML
INJECTION, SOLUTION INTRAMUSCULAR; INTRAVENOUS; SUBCUTANEOUS PRN
Status: DISCONTINUED | OUTPATIENT
Start: 2024-06-24 | End: 2024-06-24 | Stop reason: HOSPADM

## 2024-06-24 RX ORDER — CEFAZOLIN SODIUM 1 G/3ML
INJECTION, POWDER, FOR SOLUTION INTRAMUSCULAR; INTRAVENOUS PRN
Status: DISCONTINUED | OUTPATIENT
Start: 2024-06-24 | End: 2024-06-24 | Stop reason: SDUPTHER

## 2024-06-24 RX ORDER — PROPOFOL 10 MG/ML
INJECTION, EMULSION INTRAVENOUS PRN
Status: DISCONTINUED | OUTPATIENT
Start: 2024-06-24 | End: 2024-06-24 | Stop reason: SDUPTHER

## 2024-06-24 RX ORDER — PROCHLORPERAZINE EDISYLATE 5 MG/ML
5 INJECTION INTRAMUSCULAR; INTRAVENOUS
Status: DISCONTINUED | OUTPATIENT
Start: 2024-06-24 | End: 2024-06-24 | Stop reason: HOSPADM

## 2024-06-24 RX ORDER — SODIUM CHLORIDE, SODIUM LACTATE, POTASSIUM CHLORIDE, CALCIUM CHLORIDE 600; 310; 30; 20 MG/100ML; MG/100ML; MG/100ML; MG/100ML
INJECTION, SOLUTION INTRAVENOUS CONTINUOUS
Status: DISCONTINUED | OUTPATIENT
Start: 2024-06-24 | End: 2024-06-24 | Stop reason: HOSPADM

## 2024-06-24 RX ORDER — HYDROCODONE BITARTRATE AND ACETAMINOPHEN 5; 325 MG/1; MG/1
1 TABLET ORAL EVERY 6 HOURS PRN
Qty: 20 TABLET | Refills: 0 | Status: SHIPPED | OUTPATIENT
Start: 2024-06-24 | End: 2024-06-29

## 2024-06-24 RX ORDER — IPRATROPIUM BROMIDE AND ALBUTEROL SULFATE 2.5; .5 MG/3ML; MG/3ML
1 SOLUTION RESPIRATORY (INHALATION)
Status: COMPLETED | OUTPATIENT
Start: 2024-06-24 | End: 2024-06-24

## 2024-06-24 RX ORDER — IPRATROPIUM BROMIDE AND ALBUTEROL SULFATE 2.5; .5 MG/3ML; MG/3ML
1 SOLUTION RESPIRATORY (INHALATION) ONCE
Status: DISCONTINUED | OUTPATIENT
Start: 2024-06-24 | End: 2024-06-24 | Stop reason: HOSPADM

## 2024-06-24 RX ORDER — MIDAZOLAM HYDROCHLORIDE 2 MG/2ML
2 INJECTION, SOLUTION INTRAMUSCULAR; INTRAVENOUS
Status: DISCONTINUED | OUTPATIENT
Start: 2024-06-24 | End: 2024-06-24 | Stop reason: HOSPADM

## 2024-06-24 RX ORDER — ONDANSETRON 2 MG/ML
4 INJECTION INTRAMUSCULAR; INTRAVENOUS
Status: DISCONTINUED | OUTPATIENT
Start: 2024-06-24 | End: 2024-06-24 | Stop reason: HOSPADM

## 2024-06-24 RX ORDER — CEFAZOLIN SODIUM 1 G/3ML
INJECTION, POWDER, FOR SOLUTION INTRAMUSCULAR; INTRAVENOUS
Status: COMPLETED
Start: 2024-06-24 | End: 2024-06-24

## 2024-06-24 RX ORDER — WATER 10 ML/10ML
INJECTION INTRAMUSCULAR; INTRAVENOUS; SUBCUTANEOUS
Status: DISCONTINUED
Start: 2024-06-24 | End: 2024-06-24 | Stop reason: HOSPADM

## 2024-06-24 RX ORDER — LIDOCAINE HYDROCHLORIDE 10 MG/ML
1 INJECTION, SOLUTION EPIDURAL; INFILTRATION; INTRACAUDAL; PERINEURAL
Status: DISCONTINUED | OUTPATIENT
Start: 2024-06-24 | End: 2024-06-24 | Stop reason: HOSPADM

## 2024-06-24 RX ORDER — DEXTROSE MONOHYDRATE 100 MG/ML
INJECTION, SOLUTION INTRAVENOUS CONTINUOUS PRN
Status: DISCONTINUED | OUTPATIENT
Start: 2024-06-24 | End: 2024-06-24 | Stop reason: HOSPADM

## 2024-06-24 RX ADMIN — PROPOFOL 50 MCG/KG/MIN: 10 INJECTION, EMULSION INTRAVENOUS at 07:59

## 2024-06-24 RX ADMIN — PROPOFOL 30 MG: 10 INJECTION, EMULSION INTRAVENOUS at 07:40

## 2024-06-24 RX ADMIN — CEFAZOLIN 2 G: 1 INJECTION, POWDER, FOR SOLUTION INTRAMUSCULAR; INTRAVENOUS; PARENTERAL at 08:02

## 2024-06-24 RX ADMIN — MEPIVACAINE HYDROCHLORIDE 25 ML: 15 INJECTION, SOLUTION EPIDURAL; INFILTRATION at 07:43

## 2024-06-24 RX ADMIN — PROPOFOL 20 MG: 10 INJECTION, EMULSION INTRAVENOUS at 07:58

## 2024-06-24 RX ADMIN — SODIUM CHLORIDE, POTASSIUM CHLORIDE, SODIUM LACTATE AND CALCIUM CHLORIDE: 600; 310; 30; 20 INJECTION, SOLUTION INTRAVENOUS at 07:51

## 2024-06-24 RX ADMIN — IPRATROPIUM BROMIDE AND ALBUTEROL SULFATE 1 DOSE: .5; 3 SOLUTION RESPIRATORY (INHALATION) at 09:59

## 2024-06-24 ASSESSMENT — PAIN - FUNCTIONAL ASSESSMENT: PAIN_FUNCTIONAL_ASSESSMENT: 0-10

## 2024-06-24 NOTE — OP NOTE
PATIENT NAME:  Hattie Bhatt     SURGEON:    Inocencio Gray MD     DATE OF SURGERY: 6/24/24      LOCATION: [facility]      PREOPERATIVE DIAGNOSIS: left cubital tunnel syndrome, left carpal tunnel syndrome     POSTOPERATIVE DIAGNOSIS:  Same     PROCEDURE:  left cubital tunnel release with anterior subcutaneous transposition, left open carpal tunnel release     ANESTHESIA: Brachial Plexus block/IV sedation      BLOOD LOSS:  Minimal     COMPLICATIONS: none      TOURNIQUET TIME: 23 min    Assistant: Maria Esther Castañeda PA-C       OPERATIVE INDICATIONS:  They had developed persistent ulnar neuropathy at the elbow and median neuropathy at the wrist.  They failed non operative management.  They were therefore indicated for surgery. After risks benefits alternatives were discussed with the patient, they consented to proceed.     DESCRIPTION  OF PROCEDURE:   On the date of operation the patient presented stable to the holding area. The correct upper extremity was identified and marked.  Regional anesthesia was induced by the anesthesia team.  They were then brought to the operating room and placed supine with the operative upper extremity on a hand table. The upper extremity was then prepped and draped in the standard sterile fashion.  After formal time-out was performed, the upper extremity was elevated and exsanguinated and a sterile upper arm tourniquet was inflated to 250 mm of mercury.     An incision was then made at the medial elbow just posterior to the medial epicondyle curving proximally along the medial intermuscular septum and distally between the 2 heads of the FCU.  Skin and subcutaneous tissue were taken down sharply and deeper tissues were carefully dissected with care to avoid any damage to the medial antebrachial cutaneous nerve branches.  The ulnar nerve was then located just proximal to Barraza's ligament. It was released from Barraza's ligament and then released in its entirety both proximally and distally

## 2024-06-24 NOTE — H&P
Patient ID: Hattie Bhatt is a 78 y.o. female.    Hand-dominance: Right    Chief Complaint: Pain of the Left Hand    History of present illness:    This is an 78-year-old right-hand-dominant female who presents today with numbness and tingling in the left hand. As skin soreness in bilateral hands. Numbness involves the whole hand but worse at the thumb and index finger. No nocturnal or morning symptoms. No history of diabetes. Does get some neck pain. Pain is 3/10 at rest today.    Underwent EMG on 10/18/2023. This is reviewed by myself. This is notable for mild to moderate left cubital tunnel syndrome. No evidence of left carpal tunnel syndrome. No evidence of active cervical radiculopathy.    Pain rating = 3 out of 10    Review of Systems  4/4/2024    Constitutional: Unexplained: Negative  Genitourinary: Frequent Urination: Negative  HEENT: Vision Loss: Negative  Neurological: Memory Loss: Negative  Integumentary: Rash: Negative  Cardiovascular: Palpatations: Negative  Hematologic: Bruises/Bleeds Easily: Negative  Gastrointestinal: Constipation: Negative  Immunological: Seasonal Allergies: Negative  Musculoskeletal: Joint Pain: Negative    Current Outpatient Medications:   albuterol HFA (PROVENTIL HFA;VENTOLIN HFA) 108 (90 BASE) MCG/ACT inhaler, Inhale 2 puffs., Disp: , Rfl:   amitriptyline (ELAVIL) 100 MG tablet, Take 100 mg by mouth., Disp: , Rfl:   aspirin 81 MG chewable tablet, Chew 81 mg., Disp: , Rfl:   atorvastatin (LIPITOR) 40 MG tablet, TAKE 1 TABLET BY MOUTH NIGHTLY., Disp: , Rfl:   B Complex-C-Folic Acid (B COMPLEX + C TR PO), Take 1 tablet by mouth., Disp: , Rfl:   gabapentin (NEURONTIN) 800 MG tablet, , Disp: , Rfl:   hydrochlorothiazide (HYDRODIURIL) 12.5 MG tablet, once daily, Disp: , Rfl:   losartan (COZAAR) 100 MG tablet, TAKE 1 TABLET BY MOUTH EVERY DAY, Disp: , Rfl:   metoprolol tartrate (LOPRESSOR) 25 MG tablet, TAKE 1 TAB BY MOUTH TWO (2) TIMES A DAY., Disp: , Rfl:   traMADol (ULTRAM) 50 MG

## 2024-06-24 NOTE — PERIOP NOTE
Drowsy but responds appropriately to questions. Denies pain. LUE elevated on pillow, ace wrap intact.  0905 Awake, alert, ready for drink. Glasses returned to patient  0910 HOB elevated, sipping ginger ale.  0940 D/C instructions reviewed  via phone. Pt has home O2 ,  wears mostly at night but prn during day at times, according to patient. Placed on room air for trial run.  0950 Pt given IS and instructed on use (states \"have used before\") . Has desatted into 80's since on room air. Spoke with  re:oxygen levels, home O2 setup in place. Also have pulse oximeter to check sats at home, according to .  0955 Dr Delarosa to bedside to see patient. Breath sounds  clear posteriorly  0959 Duo-neb breathing treatment started.  1012 Neb treatment completed. Pt repositioned with pillow behind head. O2 sats at 90-91% but continue to drop to high 80's at times.  1020  aware patient has 20 minute ride to get home and will use O2 on 2l nc once home and check O2 levels periodically at home. Pt encouraged to cough, deep breathe every 15 minutes x 4 hours. Pt denies any shortness of breath at this time.  1044 Pt assisted with dressing, sling applied to LUE and aware to be worn until block has worn off. Assisted to bathroom, voided adequate amount. Discharged to home via/wc,accompanied to car per RN. Skin warm and dry, awake and alert. Respirations even, unlabored. Pt and family members questions and concerns addressed prior to discharge. All belongings  (glasses)with pt.

## 2024-06-24 NOTE — ANESTHESIA PRE PROCEDURE
Department of Anesthesiology  Preprocedure Note       Name:  Hattie Bhatt   Age:  78 y.o.  :  1945                                          MRN:  [id number]         Date:  2024      Surgeon: Surgeon(s):  Inocencio Gray MD    Procedure: Procedure(s):  LEFT CUBITAL TUNNEL RELEASE WITH POSSIBLE TRANSPOSITION, LEFT OPEN CARPAL TUNNEL RELEASE (MAC WITH REGIONAL BLOCK)    Medications prior to admission:   Prior to Admission medications    Medication Sig Start Date End Date Taking? Authorizing Provider   albuterol sulfate HFA (VENTOLIN HFA) 108 (90 Base) MCG/ACT inhaler Inhale 2 puffs into the lungs every 6 hours as needed for Wheezing   Yes Provider, MD Hiral   DULoxetine (CYMBALTA) 30 MG extended release capsule Take 1 capsule by mouth once daily for 2 weeks then increase to 1 capsule by mouth twice daily. 24   Aida Alberto MD   gabapentin (NEURONTIN) 800 MG tablet TAKE 1 TABLET 3 TIMES DAILY (MAXIMUM DAILY AMOUNT 2400 MG) 24  Aida Alberto MD   NONFORMULARY Has implanted pump with morphine and bupivicaine    Provider, MD Hrial   metoprolol tartrate (LOPRESSOR) 50 MG tablet TAKE 1 TABLET TWICE DAILY 3/27/24   Aida Alberto MD   losartan (COZAAR) 50 MG tablet TAKE 1 TABLET EVERY DAY 23   Aida Alberto MD   amitriptyline (ELAVIL) 100 MG tablet TAKE 1 TABLET EVERY NIGHT 23   Aida Alberto MD   atorvastatin (LIPITOR) 40 MG tablet TAKE 1 TABLET EVERY DAY FOR HIGH CHOLESTEROL 23   Aida Alberto MD       Current medications:    Current Facility-Administered Medications   Medication Dose Route Frequency Provider Last Rate Last Admin   • ceFAZolin (ANCEF) 1 g injection            • sterile water injection            • lidocaine PF 1 % injection 1 mL  1 mL IntraDERmal Once PRN Lazaro Delarosa MD       • acetaminophen (TYLENOL) tablet 1,000 mg  1,000 mg Oral Once Lazaro Delarosa MD       • lactated ringers IV soln infusion   IntraVENous

## 2024-06-24 NOTE — DISCHARGE INSTRUCTIONS
[facility]  Post-operative instructions  For: Hattie Bhatt    Your first postop appointment should be scheduled with Dr. Gray for 2-3 weeks post-op.    [facility]  [address], [address]  [place] [address]  Phone: [phone]  Fax: [phone]    Please follow these instructions for a safe and speedy recovery:    1. Surgical Bandage: Leave the bandage in place until 2 weeks after surgery. Please keep it clean and dry. To shower or bathe, apply a plastic bag or GLAD Press'n Seal® plastic wrap around the bandage or simply sponge bathe. After 2 weeks, you can remove the dressing and get incision wet but NO SOAKING.     2. Elevation: Hand swelling is best prevented by keeping your hand elevated above the level of your heart at all times, night and day. The opposite, dangling your hand below your waist, will cause additional pain, swelling, and later stiffness. You can elevate the hand in a sling or by propping it on a pillow at night. Ice compresses may help but do not replace elevation. Frequently, extreme pain is caused by a tight bandage, which should be loosened. If pain is severe and progressive, call us at [phone] during the day (ask for immediate connection to Dr. Gray's Team) or during the night [phone](ask for the on-call physician).    3. Medication: You will be provided with an appropriate pain medication (over-the-counter or prescription). Please fill this at a pharmacy promptly so you will have it available when all local anesthetic wears off. Take this to relieve pain as directed on the bottle. Please refrain from driving, drinking alcohol, and making important medical decisions while taking the medication. Please call us if you need something stronger. Medication changes or refills must be made before 5pm or through your pharmacy.    4. Weight bearing: Do NOT bear any weight on the operative

## 2024-06-24 NOTE — PERIOP NOTE
Air Warming blanket placed on pt; turned on for comfort    Permission received to review discharge instructions and discuss private health information with   and will have someone with them after discharge     Dr. Delarosa performed a LISA Extremity   nerve block with the U/S machine. PT  was on cardiac monitoring, pulse oximetry and 3L NC O2.  Pt. Was given  30 mg of propofol IV for sedation.  VSS. Pt.  Slightly drowsy easy to arouse  post block.    T.O. 0740

## 2024-06-24 NOTE — ANESTHESIA PROCEDURE NOTES
Peripheral Block    Patient location during procedure: holding area  Reason for block: post-op pain management, primary anesthetic and at surgeon's request  Start time: 6/24/2024 7:40 AM  End time: 6/24/2024 7:45 AM  Staffing  Performed: anesthesiologist   Anesthesiologist: Lazaro Delarosa MD  Performed by: Lazaro Delarosa MD  Authorized by: Lazaro Delarosa MD    Preanesthetic Checklist  Completed: patient identified, IV checked, site marked, risks and benefits discussed, surgical/procedural consents, equipment checked, pre-op evaluation, timeout performed, anesthesia consent given, oxygen available, monitors applied/VS acknowledged, fire risk safety assessment completed and verbalized and blood product R/B/A discussed and consented  Peripheral Block   Patient position: sitting  Prep: ChloraPrep  Provider prep: mask and sterile gloves  Patient monitoring: cardiac monitor, continuous pulse ox, continuous capnometry, frequent blood pressure checks, IV access, oxygen and responsive to questions  Block type: Brachial plexus  Supraclavicular  Laterality: left  Injection technique: single-shot  Guidance: ultrasound guided    Needle   Needle type: insulated echogenic nerve stimulator needle   Needle gauge: 20 G  Needle localization: ultrasound guidance  Needle length: 10 cm  Assessment   Injection assessment: negative aspiration for heme, no paresthesia on injection, local visualized surrounding nerve on ultrasound and no intravascular symptoms  Paresthesia pain: immediately resolved  Slow fractionated injection: yes  Hemodynamics: stable  Outcomes: patient tolerated procedure well and uncomplicated

## 2024-06-24 NOTE — PERIOP NOTE
Hattie Bhatt  1945  [id number]    Situation:  Verbal report given from: Emma HANKINS and Memo SCHAEFER  Procedure: Procedure(s):  LEFT CUBITAL TUNNEL RELEASE WITH TRANSPOSITION, LEFT OPEN CARPAL TUNNEL RELEASE (MAC WITH REGIONAL BLOCK)    Background:    Preoperative diagnosis: Cubital tunnel syndrome on left [G56.22]  Carpal tunnel syndrome, left [G56.02]    Postoperative diagnosis: * No post-op diagnosis entered *    :  Dr. Gray    Assistant(s): Circulator: Mena Mares RN  Scrub Person First: Tonya Conway RN  Physician Assistant: Maria Esther Castañeda PA-C    Specimens: * No specimens in log *    Assessment:  Intra-procedure medications         Anesthesia gave intra-procedure sedation and medications, see anesthesia flow sheet     Intravenous fluids: LR@ KVO     Vital signs stable       Recommendation:    Permission to share finding with

## 2024-08-02 ENCOUNTER — LAB (OUTPATIENT)
Facility: CLINIC | Age: 79
End: 2024-08-02

## 2024-08-02 DIAGNOSIS — I10 HYPERTENSION, ESSENTIAL: ICD-10-CM

## 2024-08-02 DIAGNOSIS — E78.00 HYPERCHOLESTEREMIA: ICD-10-CM

## 2024-08-02 DIAGNOSIS — E11.9 DIABETES MELLITUS TYPE 2, DIET-CONTROLLED (HCC): ICD-10-CM

## 2024-08-02 DIAGNOSIS — E55.9 VITAMIN D DEFICIENCY: ICD-10-CM

## 2024-08-03 LAB
25(OH)D3 SERPL-MCNC: 19 NG/ML (ref 30–100)
ALBUMIN SERPL-MCNC: 3.5 G/DL (ref 3.5–5)
ALBUMIN/GLOB SERPL: 1.1 (ref 1.1–2.2)
ALP SERPL-CCNC: 156 U/L (ref 45–117)
ALT SERPL-CCNC: 40 U/L (ref 12–78)
ANION GAP SERPL CALC-SCNC: 5 MMOL/L (ref 5–15)
AST SERPL-CCNC: 33 U/L (ref 15–37)
BASOPHILS # BLD: 0.1 K/UL (ref 0–0.1)
BASOPHILS NFR BLD: 1 % (ref 0–1)
BILIRUB SERPL-MCNC: 0.4 MG/DL (ref 0.2–1)
BUN SERPL-MCNC: 20 MG/DL (ref 6–20)
BUN/CREAT SERPL: 19 (ref 12–20)
CALCIUM SERPL-MCNC: 9.1 MG/DL (ref 8.5–10.1)
CHLORIDE SERPL-SCNC: 104 MMOL/L (ref 97–108)
CHOLEST SERPL-MCNC: 127 MG/DL
CO2 SERPL-SCNC: 32 MMOL/L (ref 21–32)
CREAT SERPL-MCNC: 1.03 MG/DL (ref 0.55–1.02)
CREAT UR-MCNC: 88.2 MG/DL
DIFFERENTIAL METHOD BLD: ABNORMAL
EOSINOPHIL # BLD: 0.8 K/UL (ref 0–0.4)
EOSINOPHIL NFR BLD: 9 % (ref 0–7)
ERYTHROCYTE [DISTWIDTH] IN BLOOD BY AUTOMATED COUNT: 15.2 % (ref 11.5–14.5)
EST. AVERAGE GLUCOSE BLD GHB EST-MCNC: 131 MG/DL
GLOBULIN SER CALC-MCNC: 3.2 G/DL (ref 2–4)
GLUCOSE SERPL-MCNC: 105 MG/DL (ref 65–100)
HBA1C MFR BLD: 6.2 % (ref 4–5.6)
HCT VFR BLD AUTO: 31.4 % (ref 35–47)
HDLC SERPL-MCNC: 41 MG/DL
HDLC SERPL: 3.1 (ref 0–5)
HGB BLD-MCNC: 10 G/DL (ref 11.5–16)
IMM GRANULOCYTES # BLD AUTO: 0 K/UL (ref 0–0.04)
IMM GRANULOCYTES NFR BLD AUTO: 0 % (ref 0–0.5)
LDLC SERPL CALC-MCNC: 49 MG/DL (ref 0–100)
LYMPHOCYTES # BLD: 1.7 K/UL (ref 0.8–3.5)
LYMPHOCYTES NFR BLD: 20 % (ref 12–49)
MCH RBC QN AUTO: 26.3 PG (ref 26–34)
MCHC RBC AUTO-ENTMCNC: 31.8 G/DL (ref 30–36.5)
MCV RBC AUTO: 82.6 FL (ref 80–99)
MICROALBUMIN UR-MCNC: 1.35 MG/DL
MICROALBUMIN/CREAT UR-RTO: 15 MG/G (ref 0–30)
MONOCYTES # BLD: 0.6 K/UL (ref 0–1)
MONOCYTES NFR BLD: 7 % (ref 5–13)
NEUTS SEG # BLD: 5.6 K/UL (ref 1.8–8)
NEUTS SEG NFR BLD: 63 % (ref 32–75)
NRBC # BLD: 0 K/UL (ref 0–0.01)
NRBC BLD-RTO: 0 PER 100 WBC
PLATELET # BLD AUTO: 239 K/UL (ref 150–400)
PMV BLD AUTO: 11.9 FL (ref 8.9–12.9)
POTASSIUM SERPL-SCNC: 4.7 MMOL/L (ref 3.5–5.1)
PROT SERPL-MCNC: 6.7 G/DL (ref 6.4–8.2)
RBC # BLD AUTO: 3.8 M/UL (ref 3.8–5.2)
SODIUM SERPL-SCNC: 141 MMOL/L (ref 136–145)
SPECIMEN HOLD: NORMAL
TRIGL SERPL-MCNC: 185 MG/DL
TSH SERPL DL<=0.05 MIU/L-ACNC: 1.62 UIU/ML (ref 0.36–3.74)
VLDLC SERPL CALC-MCNC: 37 MG/DL
WBC # BLD AUTO: 8.8 K/UL (ref 3.6–11)

## 2024-08-05 ENCOUNTER — OFFICE VISIT (OUTPATIENT)
Facility: CLINIC | Age: 79
End: 2024-08-05
Payer: MEDICARE

## 2024-08-05 VITALS
HEART RATE: 64 BPM | BODY MASS INDEX: 25.3 KG/M2 | RESPIRATION RATE: 16 BRPM | HEIGHT: 66 IN | TEMPERATURE: 98.1 F | DIASTOLIC BLOOD PRESSURE: 78 MMHG | SYSTOLIC BLOOD PRESSURE: 135 MMHG | OXYGEN SATURATION: 96 % | WEIGHT: 157.4 LBS

## 2024-08-05 DIAGNOSIS — E78.00 HYPERCHOLESTEREMIA: ICD-10-CM

## 2024-08-05 DIAGNOSIS — Z00.00 MEDICARE ANNUAL WELLNESS VISIT, SUBSEQUENT: Primary | ICD-10-CM

## 2024-08-05 DIAGNOSIS — I10 HYPERTENSION, ESSENTIAL: ICD-10-CM

## 2024-08-05 DIAGNOSIS — E11.9 DIABETES MELLITUS TYPE 2, DIET-CONTROLLED (HCC): ICD-10-CM

## 2024-08-05 DIAGNOSIS — E55.9 VITAMIN D DEFICIENCY: ICD-10-CM

## 2024-08-05 DIAGNOSIS — M79.641 PAIN IN BOTH HANDS: ICD-10-CM

## 2024-08-05 DIAGNOSIS — B35.4 TINEA CORPORIS: ICD-10-CM

## 2024-08-05 DIAGNOSIS — N17.9 ACUTE KIDNEY INJURY (HCC): ICD-10-CM

## 2024-08-05 DIAGNOSIS — M79.642 PAIN IN BOTH HANDS: ICD-10-CM

## 2024-08-05 PROBLEM — J96.11 CHRONIC RESPIRATORY FAILURE WITH HYPOXIA (HCC): Status: RESOLVED | Noted: 2023-07-31 | Resolved: 2024-08-05

## 2024-08-05 PROCEDURE — 3075F SYST BP GE 130 - 139MM HG: CPT | Performed by: INTERNAL MEDICINE

## 2024-08-05 PROCEDURE — G8399 PT W/DXA RESULTS DOCUMENT: HCPCS | Performed by: INTERNAL MEDICINE

## 2024-08-05 PROCEDURE — 99214 OFFICE O/P EST MOD 30 MIN: CPT | Performed by: INTERNAL MEDICINE

## 2024-08-05 PROCEDURE — 1036F TOBACCO NON-USER: CPT | Performed by: INTERNAL MEDICINE

## 2024-08-05 PROCEDURE — 1123F ACP DISCUSS/DSCN MKR DOCD: CPT | Performed by: INTERNAL MEDICINE

## 2024-08-05 PROCEDURE — 3044F HG A1C LEVEL LT 7.0%: CPT | Performed by: INTERNAL MEDICINE

## 2024-08-05 PROCEDURE — G8419 CALC BMI OUT NRM PARAM NOF/U: HCPCS | Performed by: INTERNAL MEDICINE

## 2024-08-05 PROCEDURE — G0439 PPPS, SUBSEQ VISIT: HCPCS | Performed by: INTERNAL MEDICINE

## 2024-08-05 PROCEDURE — 1090F PRES/ABSN URINE INCON ASSESS: CPT | Performed by: INTERNAL MEDICINE

## 2024-08-05 PROCEDURE — G8427 DOCREV CUR MEDS BY ELIG CLIN: HCPCS | Performed by: INTERNAL MEDICINE

## 2024-08-05 PROCEDURE — 3078F DIAST BP <80 MM HG: CPT | Performed by: INTERNAL MEDICINE

## 2024-08-05 RX ORDER — CLOTRIMAZOLE AND BETAMETHASONE DIPROPIONATE 10; .64 MG/G; MG/G
CREAM TOPICAL
Qty: 15 G | Refills: 0 | Status: SHIPPED | OUTPATIENT
Start: 2024-08-05

## 2024-08-05 RX ORDER — CHOLECALCIFEROL (VITAMIN D3) 50 MCG
2000 TABLET ORAL DAILY
Qty: 90 TABLET | Refills: 1 | COMMUNITY
Start: 2024-08-05 | End: 2025-02-01

## 2024-08-05 ASSESSMENT — PATIENT HEALTH QUESTIONNAIRE - PHQ9
5. POOR APPETITE OR OVEREATING: NOT AT ALL
6. FEELING BAD ABOUT YOURSELF - OR THAT YOU ARE A FAILURE OR HAVE LET YOURSELF OR YOUR FAMILY DOWN: NOT AT ALL
SUM OF ALL RESPONSES TO PHQ QUESTIONS 1-9: 0
8. MOVING OR SPEAKING SO SLOWLY THAT OTHER PEOPLE COULD HAVE NOTICED. OR THE OPPOSITE, BEING SO FIGETY OR RESTLESS THAT YOU HAVE BEEN MOVING AROUND A LOT MORE THAN USUAL: NOT AT ALL
10. IF YOU CHECKED OFF ANY PROBLEMS, HOW DIFFICULT HAVE THESE PROBLEMS MADE IT FOR YOU TO DO YOUR WORK, TAKE CARE OF THINGS AT HOME, OR GET ALONG WITH OTHER PEOPLE: NOT DIFFICULT AT ALL
4. FEELING TIRED OR HAVING LITTLE ENERGY: NOT AT ALL
7. TROUBLE CONCENTRATING ON THINGS, SUCH AS READING THE NEWSPAPER OR WATCHING TELEVISION: NOT AT ALL
2. FEELING DOWN, DEPRESSED OR HOPELESS: NOT AT ALL
9. THOUGHTS THAT YOU WOULD BE BETTER OFF DEAD, OR OF HURTING YOURSELF: NOT AT ALL
SUM OF ALL RESPONSES TO PHQ QUESTIONS 1-9: 0
SUM OF ALL RESPONSES TO PHQ9 QUESTIONS 1 & 2: 0
1. LITTLE INTEREST OR PLEASURE IN DOING THINGS: NOT AT ALL
3. TROUBLE FALLING OR STAYING ASLEEP: NOT AT ALL

## 2024-08-05 ASSESSMENT — LIFESTYLE VARIABLES
HOW MANY STANDARD DRINKS CONTAINING ALCOHOL DO YOU HAVE ON A TYPICAL DAY: PATIENT DOES NOT DRINK
HOW OFTEN DO YOU HAVE A DRINK CONTAINING ALCOHOL: NEVER

## 2024-08-05 NOTE — PROGRESS NOTES
Hattie Bhatt  Identified pt with two pt identifiers(name and ).  Chief Complaint   Patient presents with    Medicare AWV       1. Have you been to the ER, urgent care clinic since your last visit?  Hospitalized since your last visit? NO    2. Have you seen or consulted any other health care providers outside of the [facility] since your last visit?  Include any pap smears or colon screening. She has an upcoming appointment with her ophthalmologist on 2024   Pt plans on getting Shingles and Covid vaccines.     Provider notified of reason for visit, vitals and flowsheets obtained on patients.     Patient received paperwork for advance directive during previous visit but has not completed at this time     Reviewed record In preparation for visit, huddled with provider and have obtained necessary documentation      Health Maintenance Due   Topic    Shingles vaccine (1 of 2)    Diabetic retinal exam     COVID-19 Vaccine ( season)    Annual Wellness Visit (Medicare Advantage)     Diabetic foot exam        Wt Readings from Last 3 Encounters:   24 70.8 kg (156 lb)   24 72.9 kg (160 lb 12.8 oz)   24 73.3 kg (161 lb 9.6 oz)     Temp Readings from Last 3 Encounters:   24 97.4 °F (36.3 °C) (Temporal)   24 98.4 °F (36.9 °C) (Oral)   24 97.7 °F (36.5 °C) (Oral)     BP Readings from Last 3 Encounters:   24 122/70   24 112/67   24 (!) 141/66     Pulse Readings from Last 3 Encounters:   24 71   24 73   24 67          No data to display                  Learning Assessment:  :         2024     8:30 AM   [facility] AMB LEARNING ASSESSMENT   Primary Learner Patient   level of education GRADUATED HIGH SCHOOL OR GED   Primary Language ENGLISH   Learning Preference DEMONSTRATION   Answered By Patient   Relationship to Learner SELF       Fall Risk Assessment:  :         2023    11:27 AM 2023     4:18 PM 2023    10:36 AM

## 2024-08-05 NOTE — PATIENT INSTRUCTIONS
can you learn more?  Go to [email or web address] and enter R264 to learn more about \"Advance Directives: Care Instructions.\"  Current as of: November 16, 2023  Content Version: 14.1  © 9184-1665 [employer].   Care instructions adapted under license by [facility]. If you have questions about a medical condition or this instruction, always ask your healthcare professional. [employer] disclaims any warranty or liability for your use of this information.      Personalized Preventive Plan for Hattie Bhatt - 8/5/2024  Medicare offers a range of preventive health benefits. Some of the tests and screenings are paid in full while other may be subject to a deductible, co-insurance, and/or copay.    Some of these benefits include a comprehensive review of your medical history including lifestyle, illnesses that may run in your family, and various assessments and screenings as appropriate.    After reviewing your medical record and screening and assessments performed today your provider may have ordered immunizations, labs, imaging, and/or referrals for you.  A list of these orders (if applicable) as well as your Preventive Care list are included within your After Visit Summary for your review.    Other Preventive Recommendations:    A preventive eye exam performed by an eye specialist is recommended every 1-2 years to screen for glaucoma; cataracts, macular degeneration, and other eye disorders.  A preventive dental visit is recommended every 6 months.  Try to get at least 150 minutes of exercise per week or 10,000 steps per day on a pedometer .  Order or download the FREE \"Exercise & Physical Activity: Your Everyday Guide\" from [employer]. Call 1-470.980.6239 or search [employer] [place] [employer] online.  You need 1950-0315 mg of calcium and 1603-6598 IU of vitamin D per day. It is possible to meet your calcium requirement with diet alone, but a vitamin D

## 2024-08-05 NOTE — PROGRESS NOTES
Medicare Annual Wellness Visit    Hattie Bhatt is here for Medicare AWV       Assessment and Plan  Medicare annual wellness visit, subsequent  Diabetes mellitus type 2, diet-controlled (HCC) with peripheral neuropathy  Controlled. A1c 6.2% on 8/2/24. Continue diet-control for DM and gabapentin 800 mg twice daily for neuropathy.  -      DIABETES FOOT EXAM  -  Hemoglobin A1C; Future  Hypertension, essential  Controlled. Continue losartan and metoprolol.  - CBC with Auto Differential; Future  - Comprehensive Metabolic Panel; Future  Acute kidney injury (HCC)  Mild. Avoid NSAID's.  Hypercholesteremia  Controlled. Continue atorvastatin 40 mg daily.  Tinea corporis at RLE  -     Start clotrimazole-betamethasone (LOTRISONE) 1-0.05 % cream; Apply thin layer topically 2 times daily., Disp-15 g, R-0, Normal  Vitamin D deficiency  -     Start vitamin D (CHOLECALCIFEROL) 50 MCG (2000 UT) TABS tablet; Take 1 tablet by mouth daily, Disp-90 tablet, R-1OTC  -     Vitamin D 25 Hydroxy; Future  Pain in both hands  Most likely due to OA. Recommended Voltaren gel as needed.    Discussed lab results from 8/2/24. A1c 6.2% (was 5.7% on 1/30/24), meaning controlled DM by diet. Mildly abnormal kidneys for the first time (GFR 55, was 69 last yr). Avoid NSAID's. Mild normocytic anemia. Vitamin D deficiency, worse than a yr ago. Take vitamin D3 2000 units daily. Normal liver tests, potassium, cholesterol (tot chol 127, LDL 49), thyroid, and urine MCR.     Recommendations for Preventive Services Due: see orders and patient instructions/AVS.  Recommended screening schedule for the next 5-10 years is provided to the patient in written form: see Patient Instructions/AVS.     Return in about 6 months (around 2/5/2025), or if symptoms worsen or fail to improve, for DM, HTN; have labs 1 week before appointment.       Subjective     Presents for Medicare AWV. She has diet-controlled type 2 DM with peripheral neuropathy, HTN, hyperlipidemia,

## 2024-08-22 DIAGNOSIS — E78.00 HYPERCHOLESTEREMIA: ICD-10-CM

## 2024-08-22 DIAGNOSIS — E11.42 DIABETIC PERIPHERAL NEUROPATHY ASSOCIATED WITH TYPE 2 DIABETES MELLITUS (HCC): ICD-10-CM

## 2024-08-22 RX ORDER — AMITRIPTYLINE HYDROCHLORIDE 100 MG/1
TABLET ORAL
Qty: 90 TABLET | Refills: 3 | Status: SHIPPED | OUTPATIENT
Start: 2024-08-22

## 2024-08-22 RX ORDER — ATORVASTATIN CALCIUM 40 MG/1
TABLET, FILM COATED ORAL
Qty: 90 TABLET | Refills: 3 | Status: SHIPPED | OUTPATIENT
Start: 2024-08-22

## 2024-08-22 NOTE — TELEPHONE ENCOUNTER
PCP: Aida Alberto MD     Last appt:  8/5/2024      Future Appointments   Date Time Provider Department Center   2/5/2025 11:10 AM Aida Alberto MD [facility] DEP          Requested Prescriptions     Pending Prescriptions Disp Refills    amitriptyline (ELAVIL) 100 MG tablet [Pharmacy Med Name: AMITRIPTYLINE HYDROCHLORIDE 100 MG Tablet] 90 tablet 3     Sig: TAKE 1 TABLET EVERY NIGHT    atorvastatin (LIPITOR) 40 MG tablet [Pharmacy Med Name: ATORVASTATIN CALCIUM 40 MG Tablet] 90 tablet 3     Sig: TAKE 1 TABLET EVERY DAY FOR HIGH CHOLESTEROL

## 2024-09-07 DIAGNOSIS — I10 HYPERTENSION, ESSENTIAL: ICD-10-CM

## 2024-09-09 RX ORDER — LOSARTAN POTASSIUM 50 MG/1
50 TABLET ORAL DAILY
Qty: 90 TABLET | Refills: 3 | Status: SHIPPED | OUTPATIENT
Start: 2024-09-09

## 2024-11-24 DIAGNOSIS — E11.42 DIABETIC PERIPHERAL NEUROPATHY ASSOCIATED WITH TYPE 2 DIABETES MELLITUS (HCC): ICD-10-CM

## 2024-11-25 NOTE — TELEPHONE ENCOUNTER
PCP: Aida Alberto MD     Last appt:  8/5/2024      Future Appointments   Date Time Provider Department Center   2/5/2025 11:10 AM Aida Alberto MD [facility] DEP          Requested Prescriptions     Pending Prescriptions Disp Refills    gabapentin (NEURONTIN) 800 MG tablet [Pharmacy Med Name: Gabapentin Oral Tablet 800 MG] 270 tablet 0     Sig: TAKE 1 TABLET 3 TIMES DAILY (MAXIMUM DAILY AMOUNT 2400 MG)

## 2024-11-26 RX ORDER — GABAPENTIN 800 MG/1
TABLET ORAL
Qty: 270 TABLET | Refills: 0 | Status: SHIPPED | OUTPATIENT
Start: 2024-11-26 | End: 2025-02-24

## 2025-01-15 RX ORDER — METOPROLOL TARTRATE 50 MG
TABLET ORAL
Qty: 180 TABLET | Refills: 3 | Status: SHIPPED | OUTPATIENT
Start: 2025-01-15

## 2025-01-15 NOTE — TELEPHONE ENCOUNTER
Future Appointments:  Future Appointments   Date Time Provider Department Center   2/5/2025 11:10 AM Aida Alberto MD [facility] DEP        Last Appointment With Me:  8/5/2024     Requested Prescriptions     Pending Prescriptions Disp Refills    metoprolol tartrate (LOPRESSOR) 50 MG tablet [Pharmacy Med Name: Metoprolol Tartrate Oral Tablet 50 MG] 180 tablet 3     Sig: TAKE 1 TABLET TWICE DAILY

## 2025-03-17 DIAGNOSIS — E11.42 DIABETIC PERIPHERAL NEUROPATHY ASSOCIATED WITH TYPE 2 DIABETES MELLITUS: ICD-10-CM

## 2025-03-17 RX ORDER — GABAPENTIN 800 MG/1
TABLET ORAL
Qty: 270 TABLET | Refills: 0 | Status: SHIPPED | OUTPATIENT
Start: 2025-03-17 | End: 2025-06-15

## 2025-03-17 NOTE — TELEPHONE ENCOUNTER
PCP: Aida Alberto MD     Last appt:  8/5/2024    No future appointments.       Requested Prescriptions     Pending Prescriptions Disp Refills    gabapentin (NEURONTIN) 800 MG tablet [Pharmacy Med Name: Gabapentin Oral Tablet 800 MG] 270 tablet 1     Sig: TAKE 1 TABLET 3 TIMES DAILY (MAXIMUM DAILY AMOUNT 2400 MG)    amitriptyline (ELAVIL) 25 MG tablet [Pharmacy Med Name: Amitriptyline HCl Oral Tablet 25 MG] 90 tablet 3     Sig: TAKE 1 TABLET BY MOUTH NIGHTLY (TAKE WITH AMITRIPTYLINE 100 MG TABLET AS DIRECTED)

## 2025-03-26 ENCOUNTER — TELEPHONE (OUTPATIENT)
Facility: CLINIC | Age: 80
End: 2025-03-26

## 2025-04-23 ENCOUNTER — TELEPHONE (OUTPATIENT)
Facility: CLINIC | Age: 80
End: 2025-04-23

## 2025-04-23 SDOH — HEALTH STABILITY: PHYSICAL HEALTH: ON AVERAGE, HOW MANY DAYS PER WEEK DO YOU ENGAGE IN MODERATE TO STRENUOUS EXERCISE (LIKE A BRISK WALK)?: 7 DAYS

## 2025-04-23 ASSESSMENT — PATIENT HEALTH QUESTIONNAIRE - PHQ9
8. MOVING OR SPEAKING SO SLOWLY THAT OTHER PEOPLE COULD HAVE NOTICED. OR THE OPPOSITE, BEING SO FIGETY OR RESTLESS THAT YOU HAVE BEEN MOVING AROUND A LOT MORE THAN USUAL: NOT AT ALL
SUM OF ALL RESPONSES TO PHQ QUESTIONS 1-9: 0
7. TROUBLE CONCENTRATING ON THINGS, SUCH AS READING THE NEWSPAPER OR WATCHING TELEVISION: NOT AT ALL
SUM OF ALL RESPONSES TO PHQ QUESTIONS 1-9: 0
1. LITTLE INTEREST OR PLEASURE IN DOING THINGS: NOT AT ALL
4. FEELING TIRED OR HAVING LITTLE ENERGY: NOT AT ALL
9. THOUGHTS THAT YOU WOULD BE BETTER OFF DEAD, OR OF HURTING YOURSELF: NOT AT ALL
SUM OF ALL RESPONSES TO PHQ QUESTIONS 1-9: 0
3. TROUBLE FALLING OR STAYING ASLEEP: NOT AT ALL
6. FEELING BAD ABOUT YOURSELF - OR THAT YOU ARE A FAILURE OR HAVE LET YOURSELF OR YOUR FAMILY DOWN: NOT AT ALL
2. FEELING DOWN, DEPRESSED OR HOPELESS: NOT AT ALL
5. POOR APPETITE OR OVEREATING: NOT AT ALL
SUM OF ALL RESPONSES TO PHQ QUESTIONS 1-9: 0
10. IF YOU CHECKED OFF ANY PROBLEMS, HOW DIFFICULT HAVE THESE PROBLEMS MADE IT FOR YOU TO DO YOUR WORK, TAKE CARE OF THINGS AT HOME, OR GET ALONG WITH OTHER PEOPLE: NOT DIFFICULT AT ALL

## 2025-04-23 ASSESSMENT — LIFESTYLE VARIABLES
HOW MANY STANDARD DRINKS CONTAINING ALCOHOL DO YOU HAVE ON A TYPICAL DAY: PATIENT DOES NOT DRINK
HOW OFTEN DO YOU HAVE A DRINK CONTAINING ALCOHOL: NEVER
HOW MANY STANDARD DRINKS CONTAINING ALCOHOL DO YOU HAVE ON A TYPICAL DAY: 0
HOW OFTEN DO YOU HAVE SIX OR MORE DRINKS ON ONE OCCASION: 1
HOW OFTEN DO YOU HAVE A DRINK CONTAINING ALCOHOL: 1

## 2025-04-25 ENCOUNTER — OFFICE VISIT (OUTPATIENT)
Facility: CLINIC | Age: 80
End: 2025-04-25
Payer: MEDICARE

## 2025-04-25 VITALS
BODY MASS INDEX: 25.26 KG/M2 | TEMPERATURE: 98 F | HEIGHT: 66 IN | HEART RATE: 71 BPM | DIASTOLIC BLOOD PRESSURE: 73 MMHG | OXYGEN SATURATION: 88 % | RESPIRATION RATE: 16 BRPM | WEIGHT: 157.2 LBS | SYSTOLIC BLOOD PRESSURE: 108 MMHG

## 2025-04-25 DIAGNOSIS — E78.00 HYPERCHOLESTEREMIA: ICD-10-CM

## 2025-04-25 DIAGNOSIS — E11.9 DIABETES MELLITUS TYPE 2, DIET-CONTROLLED (HCC): ICD-10-CM

## 2025-04-25 DIAGNOSIS — M17.11 PRIMARY OSTEOARTHRITIS OF RIGHT KNEE: ICD-10-CM

## 2025-04-25 DIAGNOSIS — Z00.00 MEDICARE ANNUAL WELLNESS VISIT, SUBSEQUENT: Primary | ICD-10-CM

## 2025-04-25 DIAGNOSIS — E55.9 VITAMIN D DEFICIENCY: ICD-10-CM

## 2025-04-25 DIAGNOSIS — J44.9 CHRONIC OBSTRUCTIVE PULMONARY DISEASE, UNSPECIFIED COPD TYPE (HCC): ICD-10-CM

## 2025-04-25 DIAGNOSIS — I10 HYPERTENSION, ESSENTIAL: ICD-10-CM

## 2025-04-25 DIAGNOSIS — E11.42 DIABETIC PERIPHERAL NEUROPATHY ASSOCIATED WITH TYPE 2 DIABETES MELLITUS (HCC): ICD-10-CM

## 2025-04-25 PROBLEM — G90.50 COMPLEX REGIONAL PAIN SYNDROME I: Status: ACTIVE | Noted: 2025-04-25

## 2025-04-25 PROBLEM — I50.22 CHRONIC SYSTOLIC HEART FAILURE (HCC): Status: RESOLVED | Noted: 2024-01-30 | Resolved: 2025-04-25

## 2025-04-25 LAB — HBA1C MFR BLD: 6 %

## 2025-04-25 PROCEDURE — 1090F PRES/ABSN URINE INCON ASSESS: CPT | Performed by: INTERNAL MEDICINE

## 2025-04-25 PROCEDURE — G8399 PT W/DXA RESULTS DOCUMENT: HCPCS | Performed by: INTERNAL MEDICINE

## 2025-04-25 PROCEDURE — 99214 OFFICE O/P EST MOD 30 MIN: CPT | Performed by: INTERNAL MEDICINE

## 2025-04-25 PROCEDURE — 3023F SPIROM DOC REV: CPT | Performed by: INTERNAL MEDICINE

## 2025-04-25 PROCEDURE — G8419 CALC BMI OUT NRM PARAM NOF/U: HCPCS | Performed by: INTERNAL MEDICINE

## 2025-04-25 PROCEDURE — 1160F RVW MEDS BY RX/DR IN RCRD: CPT | Performed by: INTERNAL MEDICINE

## 2025-04-25 PROCEDURE — G0439 PPPS, SUBSEQ VISIT: HCPCS | Performed by: INTERNAL MEDICINE

## 2025-04-25 PROCEDURE — 3044F HG A1C LEVEL LT 7.0%: CPT | Performed by: INTERNAL MEDICINE

## 2025-04-25 PROCEDURE — 1123F ACP DISCUSS/DSCN MKR DOCD: CPT | Performed by: INTERNAL MEDICINE

## 2025-04-25 PROCEDURE — 1159F MED LIST DOCD IN RCRD: CPT | Performed by: INTERNAL MEDICINE

## 2025-04-25 PROCEDURE — 83036 HEMOGLOBIN GLYCOSYLATED A1C: CPT | Performed by: INTERNAL MEDICINE

## 2025-04-25 PROCEDURE — 1125F AMNT PAIN NOTED PAIN PRSNT: CPT | Performed by: INTERNAL MEDICINE

## 2025-04-25 PROCEDURE — 3078F DIAST BP <80 MM HG: CPT | Performed by: INTERNAL MEDICINE

## 2025-04-25 PROCEDURE — G8427 DOCREV CUR MEDS BY ELIG CLIN: HCPCS | Performed by: INTERNAL MEDICINE

## 2025-04-25 PROCEDURE — 1036F TOBACCO NON-USER: CPT | Performed by: INTERNAL MEDICINE

## 2025-04-25 PROCEDURE — 3074F SYST BP LT 130 MM HG: CPT | Performed by: INTERNAL MEDICINE

## 2025-04-25 SDOH — ECONOMIC STABILITY: FOOD INSECURITY: WITHIN THE PAST 12 MONTHS, THE FOOD YOU BOUGHT JUST DIDN'T LAST AND YOU DIDN'T HAVE MONEY TO GET MORE.: NEVER TRUE

## 2025-04-25 SDOH — ECONOMIC STABILITY: FOOD INSECURITY: WITHIN THE PAST 12 MONTHS, YOU WORRIED THAT YOUR FOOD WOULD RUN OUT BEFORE YOU GOT MONEY TO BUY MORE.: NEVER TRUE

## 2025-04-25 NOTE — PROGRESS NOTES
Medicare Annual Wellness Visit    Hattie Bhatt is here for Medicare AWV (Room 3B // )    Assessment & Plan  1. Medicare annual wellness visit.  - Given handout on fall prevention strategies.  Labs today:  - CBC with Auto Differential  - Comprehensive Metabolic Panel  - Lipid Panel  - TSH  - Vitamin D 25 Hydroxy  - Albumin/Creatinine Ratio, Urine    2. Type 2 diabetes with peripheral neuropathy: Stable. A1c 6.0%. Burning sensations at feet persist.  - Continue diet-control for diabetes.  - Continue gabapentin and amitriptyline for neuropathy.    3. Hypertension: Well-controlled..  - Continue losartan and metoprolol.     4. Hyperlipidemia: Controlled.  - Continue atorvastatin 40 mg daily.     5. Cervical spondylosis and radiculopathy.  - Follow up with Dr. Ospina for management.    6. Complex regional pain syndrome, cervical spondylosis: She has implanted pain pump (morphine and bupivacaine) but that helps pain but does not relieve symptoms.  - Continue pain pump.  - Continue following with Dr. Ospina.    7. Osteopenia.  - Continue monitoring bone density.    8. Right knee osteoarthritis: Acute. Significant pain for 2 months, taking meloxicam occasionally.  - Referral to orthopedic specialist for evaluation, potential x-ray, cortisone shot     Return in about 6 months (around 10/25/2025), or if symptoms worsen or fail to improve, for HTN, DM.       Subjective     History of Present Illness  79-year-old female presents for Medicare annual wellness visit, last seen 10 months ago. She has diet-controlled type 2 diabetes with peripheral neuropathy, hypertension, hyperlipidemia, cervical spondylosis and radiculopathy, complex regional pain syndrome s/p implanted pain pump (morphine and bupivacaine) in 2007, osteopenia, GERD, and history of sarcoidosis.    Reports significant neck discomfort. Received neck injections from Dr. Ospina, plans to contact him because she has not seen him recently. Neuropathy and neck pain managed

## 2025-04-25 NOTE — PROGRESS NOTES
Hattie Bhatt  Identified pt with two pt identifiers(name and ).     Chief Complaint   Patient presents with    Medicare AWV     Room 3B //        Reviewed record In preparation for visit and have obtained necessary documentation.     1. Have you been to the ER, urgent care clinic or hospitalized since your last visit? No     2. Have you seen or consulted any other health care providers outside of the [facility] since your last visit? Include any pap smears or colon screening. Yes. Eye Exam - Dr. Hamilton     Patient has an advance directive.     Vitals reviewed with provider.    Health Maintenance reviewed:     Health Maintenance Due   Topic    Shingles vaccine (1 of 2)    Diabetic retinal exam     COVID-19 Vaccine ( season)    Annual Wellness Visit (Medicare Advantage)     A1C test (Diabetic or Prediabetic)           Wt Readings from Last 3 Encounters:   24 71.4 kg (157 lb 6.4 oz)   24 70.8 kg (156 lb)   24 72.9 kg (160 lb 12.8 oz)        Temp Readings from Last 3 Encounters:   24 98.1 °F (36.7 °C) (Oral)   24 97.4 °F (36.3 °C) (Temporal)        BP Readings from Last 3 Encounters:   24 135/78   24 122/70   24 112/67        Pulse Readings from Last 3 Encounters:   24 64   24 71   24 73             No data to display                  Learning Assessment:   :         2024     8:30 AM   [facility] AMB LEARNING ASSESSMENT   Primary Learner Patient   level of education GRADUATED HIGH SCHOOL OR GED   Primary Language ENGLISH   Learning Preference DEMONSTRATION   Answered By Patient   Relationship to Learner SELF         Fall Risk Assessment:         2025     1:45 PM 2024    11:05 AM 2023    11:27 AM 2023     4:18 PM 2023    10:36 AM 2022     1:17 PM 2022     2:42 PM   Amb Fall Risk Assessment and TUG Test   Do you feel unsteady or are you worried about falling?  no  yes no yes      2 or more falls

## 2025-04-26 LAB
25(OH)D3 SERPL-MCNC: 40.6 NG/ML (ref 30–100)
ALBUMIN SERPL-MCNC: 3.6 G/DL (ref 3.5–5)
ALBUMIN/GLOB SERPL: 1.2 (ref 1.1–2.2)
ALP SERPL-CCNC: 138 U/L (ref 45–117)
ALT SERPL-CCNC: 31 U/L (ref 12–78)
ANION GAP SERPL CALC-SCNC: 3 MMOL/L (ref 2–12)
AST SERPL-CCNC: 31 U/L (ref 15–37)
BASOPHILS # BLD: 0.07 K/UL (ref 0–0.1)
BASOPHILS NFR BLD: 1 % (ref 0–1)
BILIRUB SERPL-MCNC: 0.4 MG/DL (ref 0.2–1)
BUN SERPL-MCNC: 26 MG/DL (ref 6–20)
BUN/CREAT SERPL: 17 (ref 12–20)
CALCIUM SERPL-MCNC: 9.2 MG/DL (ref 8.5–10.1)
CHLORIDE SERPL-SCNC: 101 MMOL/L (ref 97–108)
CHOLEST SERPL-MCNC: 124 MG/DL
CO2 SERPL-SCNC: 35 MMOL/L (ref 21–32)
CREAT SERPL-MCNC: 1.54 MG/DL (ref 0.55–1.02)
CREAT UR-MCNC: 124 MG/DL
DIFFERENTIAL METHOD BLD: ABNORMAL
EOSINOPHIL # BLD: 0.49 K/UL (ref 0–0.4)
EOSINOPHIL NFR BLD: 6.7 % (ref 0–7)
ERYTHROCYTE [DISTWIDTH] IN BLOOD BY AUTOMATED COUNT: 15.4 % (ref 11.5–14.5)
GLOBULIN SER CALC-MCNC: 3 G/DL (ref 2–4)
GLUCOSE SERPL-MCNC: 95 MG/DL (ref 65–100)
HCT VFR BLD AUTO: 31.9 % (ref 35–47)
HDLC SERPL-MCNC: 38 MG/DL
HDLC SERPL: 3.3 (ref 0–5)
HGB BLD-MCNC: 9.8 G/DL (ref 11.5–16)
IMM GRANULOCYTES # BLD AUTO: 0.02 K/UL (ref 0–0.04)
IMM GRANULOCYTES NFR BLD AUTO: 0.3 % (ref 0–0.5)
LDLC SERPL CALC-MCNC: 24 MG/DL (ref 0–100)
LYMPHOCYTES # BLD: 1.65 K/UL (ref 0.8–3.5)
LYMPHOCYTES NFR BLD: 22.5 % (ref 12–49)
MCH RBC QN AUTO: 26.2 PG (ref 26–34)
MCHC RBC AUTO-ENTMCNC: 30.7 G/DL (ref 30–36.5)
MCV RBC AUTO: 85.3 FL (ref 80–99)
MICROALBUMIN UR-MCNC: 1.42 MG/DL
MICROALBUMIN/CREAT UR-RTO: 11 MG/G (ref 0–30)
MONOCYTES # BLD: 0.65 K/UL (ref 0–1)
MONOCYTES NFR BLD: 8.9 % (ref 5–13)
NEUTS SEG # BLD: 4.46 K/UL (ref 1.8–8)
NEUTS SEG NFR BLD: 60.6 % (ref 32–75)
NRBC # BLD: 0 K/UL (ref 0–0.01)
NRBC BLD-RTO: 0 PER 100 WBC
PLATELET # BLD AUTO: 244 K/UL (ref 150–400)
PMV BLD AUTO: 11.4 FL (ref 8.9–12.9)
POTASSIUM SERPL-SCNC: 5.4 MMOL/L (ref 3.5–5.1)
PROT SERPL-MCNC: 6.6 G/DL (ref 6.4–8.2)
RBC # BLD AUTO: 3.74 M/UL (ref 3.8–5.2)
SODIUM SERPL-SCNC: 139 MMOL/L (ref 136–145)
SPECIMEN HOLD: NORMAL
TRIGL SERPL-MCNC: 310 MG/DL
TSH SERPL DL<=0.05 MIU/L-ACNC: 1.71 UIU/ML (ref 0.36–3.74)
VLDLC SERPL CALC-MCNC: 62 MG/DL
WBC # BLD AUTO: 7.3 K/UL (ref 3.6–11)

## 2025-04-27 ENCOUNTER — RESULTS FOLLOW-UP (OUTPATIENT)
Facility: CLINIC | Age: 80
End: 2025-04-27

## 2025-04-27 DIAGNOSIS — N17.9 ACUTE KIDNEY INJURY: Primary | ICD-10-CM

## 2025-05-21 NOTE — PROGRESS NOTES
Reviewed record In preparation for visit and have obtained necessary documentation. Advanced directive / living will on file. 1. Have you been to the ER, urgent care clinic or hospitalized since your last visit? No     2. Have you seen or consulted any other health care providers outside of the [address] [place] [address] since your last visit? Include any pap smears or colon screening.  Mammogram     Health Maintenance reviewed: [Time Spent: ___ minutes] : I have spent [unfilled] minutes of time on the encounter which excludes teaching and separately reported services.

## 2025-06-04 ENCOUNTER — TRANSCRIBE ORDERS (OUTPATIENT)
Facility: HOSPITAL | Age: 80
End: 2025-06-04

## 2025-06-04 DIAGNOSIS — N18.32 CHRONIC KIDNEY DISEASE (CKD) STAGE G3B/A1, MODERATELY DECREASED GLOMERULAR FILTRATION RATE (GFR) BETWEEN 30-44 ML/MIN/1.73 SQUARE METER AND ALBUMINURIA CREATININE RATIO LESS THAN 30 MG/G (HCC): Primary | ICD-10-CM

## 2025-06-13 ENCOUNTER — HOSPITAL ENCOUNTER (OUTPATIENT)
Facility: HOSPITAL | Age: 80
Discharge: HOME | End: 2025-06-16
Attending: INTERNAL MEDICINE
Payer: MEDICARE

## 2025-06-13 DIAGNOSIS — N18.32 CHRONIC KIDNEY DISEASE (CKD) STAGE G3B/A1, MODERATELY DECREASED GLOMERULAR FILTRATION RATE (GFR) BETWEEN 30-44 ML/MIN/1.73 SQUARE METER AND ALBUMINURIA CREATININE RATIO LESS THAN 30 MG/G (HCC): ICD-10-CM

## 2025-06-13 PROCEDURE — 76770 US EXAM ABDO BACK WALL COMP: CPT

## 2025-06-14 DIAGNOSIS — E11.42 DIABETIC PERIPHERAL NEUROPATHY ASSOCIATED WITH TYPE 2 DIABETES MELLITUS (HCC): ICD-10-CM

## 2025-06-18 RX ORDER — GABAPENTIN 800 MG/1
TABLET ORAL
Qty: 270 TABLET | Refills: 0 | Status: SHIPPED | OUTPATIENT
Start: 2025-06-18 | End: 2025-09-16

## 2025-06-28 DIAGNOSIS — I10 HYPERTENSION, ESSENTIAL: ICD-10-CM

## 2025-06-28 RX ORDER — LOSARTAN POTASSIUM 50 MG/1
50 TABLET ORAL DAILY
Qty: 90 TABLET | Refills: 2 | Status: SHIPPED | OUTPATIENT
Start: 2025-06-28

## 2025-07-11 ENCOUNTER — APPOINTMENT (OUTPATIENT)
Facility: HOSPITAL | Age: 80
End: 2025-07-11
Payer: MEDICARE

## 2025-07-11 ENCOUNTER — HOSPITAL ENCOUNTER (EMERGENCY)
Facility: HOSPITAL | Age: 80
Discharge: HOME | End: 2025-07-11
Attending: EMERGENCY MEDICINE
Payer: MEDICARE

## 2025-07-11 VITALS
HEART RATE: 88 BPM | BODY MASS INDEX: 27.35 KG/M2 | HEIGHT: 66 IN | DIASTOLIC BLOOD PRESSURE: 72 MMHG | TEMPERATURE: 99.4 F | RESPIRATION RATE: 14 BRPM | SYSTOLIC BLOOD PRESSURE: 164 MMHG | WEIGHT: 170.19 LBS | OXYGEN SATURATION: 94 %

## 2025-07-11 DIAGNOSIS — G89.29 ACUTE EXACERBATION OF CHRONIC LOW BACK PAIN: Primary | ICD-10-CM

## 2025-07-11 DIAGNOSIS — M54.50 ACUTE EXACERBATION OF CHRONIC LOW BACK PAIN: Primary | ICD-10-CM

## 2025-07-11 DIAGNOSIS — M54.2 NECK PAIN: ICD-10-CM

## 2025-07-11 LAB
ALBUMIN SERPL-MCNC: 3.7 G/DL (ref 3.5–5)
ALBUMIN/GLOB SERPL: 0.9 (ref 1.1–2.2)
ALP SERPL-CCNC: 156 U/L (ref 45–117)
ALT SERPL-CCNC: 29 U/L (ref 12–78)
ANION GAP SERPL CALC-SCNC: 7 MMOL/L (ref 2–12)
APPEARANCE UR: CLEAR
AST SERPL-CCNC: 24 U/L (ref 15–37)
BACTERIA URNS QL MICRO: NEGATIVE /HPF
BASOPHILS # BLD: 0.06 K/UL (ref 0–0.1)
BASOPHILS NFR BLD: 0.6 % (ref 0–1)
BILIRUB SERPL-MCNC: 0.6 MG/DL (ref 0.2–1)
BILIRUB UR QL: NEGATIVE
BUN SERPL-MCNC: 12 MG/DL (ref 6–20)
BUN/CREAT SERPL: 13 (ref 12–20)
CALCIUM SERPL-MCNC: 9.7 MG/DL (ref 8.5–10.1)
CHLORIDE SERPL-SCNC: 99 MMOL/L (ref 97–108)
CO2 SERPL-SCNC: 31 MMOL/L (ref 21–32)
COLOR UR: ABNORMAL
CREAT SERPL-MCNC: 0.9 MG/DL (ref 0.55–1.02)
DIFFERENTIAL METHOD BLD: ABNORMAL
EOSINOPHIL # BLD: 0.19 K/UL (ref 0–0.4)
EOSINOPHIL NFR BLD: 1.8 % (ref 0–7)
EPITH CASTS URNS QL MICRO: ABNORMAL /LPF
ERYTHROCYTE [DISTWIDTH] IN BLOOD BY AUTOMATED COUNT: 14.3 % (ref 11.5–14.5)
GLOBULIN SER CALC-MCNC: 4.1 G/DL (ref 2–4)
GLUCOSE SERPL-MCNC: 102 MG/DL (ref 65–100)
GLUCOSE UR STRIP.AUTO-MCNC: NEGATIVE MG/DL
HCT VFR BLD AUTO: 36.7 % (ref 35–47)
HGB BLD-MCNC: 11.4 G/DL (ref 11.5–16)
HGB UR QL STRIP: NEGATIVE
HYALINE CASTS URNS QL MICRO: ABNORMAL /LPF (ref 0–2)
IMM GRANULOCYTES # BLD AUTO: 0.05 K/UL (ref 0–0.04)
IMM GRANULOCYTES NFR BLD AUTO: 0.5 % (ref 0–0.5)
KETONES UR QL STRIP.AUTO: NEGATIVE MG/DL
LEUKOCYTE ESTERASE UR QL STRIP.AUTO: NEGATIVE
LYMPHOCYTES # BLD: 1.26 K/UL (ref 0.8–3.5)
LYMPHOCYTES NFR BLD: 12.2 % (ref 12–49)
MCH RBC QN AUTO: 26.4 PG (ref 26–34)
MCHC RBC AUTO-ENTMCNC: 31.1 G/DL (ref 30–36.5)
MCV RBC AUTO: 85 FL (ref 80–99)
MONOCYTES # BLD: 0.74 K/UL (ref 0–1)
MONOCYTES NFR BLD: 7.2 % (ref 5–13)
NEUTS SEG # BLD: 8 K/UL (ref 1.8–8)
NEUTS SEG NFR BLD: 77.7 % (ref 32–75)
NITRITE UR QL STRIP.AUTO: NEGATIVE
NRBC # BLD: 0 K/UL (ref 0–0.01)
NRBC BLD-RTO: 0 PER 100 WBC
PH UR STRIP: 8.5 (ref 5–8)
PLATELET # BLD AUTO: 252 K/UL (ref 150–400)
PMV BLD AUTO: 10.7 FL (ref 8.9–12.9)
POTASSIUM SERPL-SCNC: 3.8 MMOL/L (ref 3.5–5.1)
PROT SERPL-MCNC: 7.8 G/DL (ref 6.4–8.2)
PROT UR STRIP-MCNC: NEGATIVE MG/DL
RBC # BLD AUTO: 4.32 M/UL (ref 3.8–5.2)
RBC #/AREA URNS HPF: ABNORMAL /HPF (ref 0–5)
SODIUM SERPL-SCNC: 137 MMOL/L (ref 136–145)
SP GR UR REFRACTOMETRY: 1.01
URINE CULTURE IF INDICATED: ABNORMAL
UROBILINOGEN UR QL STRIP.AUTO: 1 EU/DL (ref 0.2–1)
WBC # BLD AUTO: 10.3 K/UL (ref 3.6–11)
WBC URNS QL MICRO: ABNORMAL /HPF (ref 0–4)

## 2025-07-11 PROCEDURE — 85025 COMPLETE CBC W/AUTO DIFF WBC: CPT

## 2025-07-11 PROCEDURE — 6360000002 HC RX W HCPCS: Performed by: EMERGENCY MEDICINE

## 2025-07-11 PROCEDURE — 6370000000 HC RX 637 (ALT 250 FOR IP): Performed by: EMERGENCY MEDICINE

## 2025-07-11 PROCEDURE — 71045 X-RAY EXAM CHEST 1 VIEW: CPT

## 2025-07-11 PROCEDURE — 96374 THER/PROPH/DIAG INJ IV PUSH: CPT

## 2025-07-11 PROCEDURE — 81001 URINALYSIS AUTO W/SCOPE: CPT

## 2025-07-11 PROCEDURE — 99285 EMERGENCY DEPT VISIT HI MDM: CPT

## 2025-07-11 PROCEDURE — 36415 COLL VENOUS BLD VENIPUNCTURE: CPT

## 2025-07-11 PROCEDURE — 96361 HYDRATE IV INFUSION ADD-ON: CPT

## 2025-07-11 PROCEDURE — 80053 COMPREHEN METABOLIC PANEL: CPT

## 2025-07-11 PROCEDURE — 96375 TX/PRO/DX INJ NEW DRUG ADDON: CPT

## 2025-07-11 PROCEDURE — 2580000003 HC RX 258: Performed by: EMERGENCY MEDICINE

## 2025-07-11 RX ORDER — ONDANSETRON 2 MG/ML
4 INJECTION INTRAMUSCULAR; INTRAVENOUS ONCE
Status: COMPLETED | OUTPATIENT
Start: 2025-07-11 | End: 2025-07-11

## 2025-07-11 RX ORDER — MORPHINE SULFATE 2 MG/ML
2 INJECTION, SOLUTION INTRAMUSCULAR; INTRAVENOUS
Refills: 0 | Status: COMPLETED | OUTPATIENT
Start: 2025-07-11 | End: 2025-07-11

## 2025-07-11 RX ORDER — LIDOCAINE 4 G/G
1 PATCH TOPICAL
Status: DISCONTINUED | OUTPATIENT
Start: 2025-07-11 | End: 2025-07-11 | Stop reason: HOSPADM

## 2025-07-11 RX ORDER — 0.9 % SODIUM CHLORIDE 0.9 %
500 INTRAVENOUS SOLUTION INTRAVENOUS ONCE
Status: COMPLETED | OUTPATIENT
Start: 2025-07-11 | End: 2025-07-11

## 2025-07-11 RX ORDER — HYDROCODONE BITARTRATE AND ACETAMINOPHEN 5; 325 MG/1; MG/1
1 TABLET ORAL
Refills: 0 | Status: COMPLETED | OUTPATIENT
Start: 2025-07-11 | End: 2025-07-11

## 2025-07-11 RX ORDER — KETOROLAC TROMETHAMINE 30 MG/ML
15 INJECTION, SOLUTION INTRAMUSCULAR; INTRAVENOUS ONCE
Status: COMPLETED | OUTPATIENT
Start: 2025-07-11 | End: 2025-07-11

## 2025-07-11 RX ADMIN — MORPHINE SULFATE 2 MG: 2 INJECTION, SOLUTION INTRAMUSCULAR; INTRAVENOUS at 06:48

## 2025-07-11 RX ADMIN — KETOROLAC TROMETHAMINE 15 MG: 30 INJECTION, SOLUTION INTRAMUSCULAR at 04:49

## 2025-07-11 RX ADMIN — HYDROCODONE BITARTRATE AND ACETAMINOPHEN 1 TABLET: 5; 325 TABLET ORAL at 04:47

## 2025-07-11 RX ADMIN — SODIUM CHLORIDE 500 ML: 0.9 INJECTION, SOLUTION INTRAVENOUS at 04:52

## 2025-07-11 RX ADMIN — ONDANSETRON 4 MG: 2 INJECTION, SOLUTION INTRAMUSCULAR; INTRAVENOUS at 06:47

## 2025-07-11 ASSESSMENT — LIFESTYLE VARIABLES
HOW OFTEN DO YOU HAVE A DRINK CONTAINING ALCOHOL: NEVER
HOW MANY STANDARD DRINKS CONTAINING ALCOHOL DO YOU HAVE ON A TYPICAL DAY: PATIENT DOES NOT DRINK

## 2025-07-11 ASSESSMENT — PAIN DESCRIPTION - PAIN TYPE: TYPE: CHRONIC PAIN

## 2025-07-11 ASSESSMENT — PAIN SCALES - GENERAL: PAINLEVEL_OUTOF10: 7

## 2025-07-11 ASSESSMENT — PAIN DESCRIPTION - LOCATION: LOCATION: BACK;NECK

## 2025-07-11 NOTE — ED NOTES
Transport arranged for discharge to [facility] residence with [facility] at [facility] time. Patient requires no interventions during transport. Transport arranged by [facility], [address] [place], with an ETA of [id number]. Patient will be received by Pérez Bhatt, at the following contact number: [phone].

## 2025-07-11 NOTE — ED NOTES
[facility] at bedside for patient transport back to home. Discharge instructions reviewed with patient. Patient verbalized understanding. Patient's discharge paperwork given to patient. Patient out of ED on stretcher with [facility] staff. No acute distress noted.

## 2025-07-11 NOTE — ED NOTES
Pt awake and hit the call bell. Pt requesting water. O2 turned off and pt satting at 96-98% on RA

## 2025-07-11 NOTE — ED NOTES
Straight cath performed per verbal order by DO. 350mL observed in collection bag. Urine sample collected and sent to lab.

## 2025-07-11 NOTE — ED PROVIDER NOTES
[facility] EMERGENCY DEPARTMENT  EMERGENCY DEPARTMENT ENCOUNTER       Pt Name: Hattie Bhatt  MRN: [id number]  Birthdate 1945  Date of evaluation: 7/11/2025  Provider: Jacob Salcedo DO   PCP: Aida Alberto MD  Note Started: 5:05 AM EDT 7/11/25     CHIEF COMPLAINT       Chief Complaint   Patient presents with    Back Pain     Pt brought in by EMS from home with cc of back and neck pain. Pt reportedly got a steroid shot in her back for chronic pain and last night stated that the pain felt different. Pt denies any fall or injury. Pt is axox2-3 on arrival unknown baseline.         HISTORY OF PRESENT ILLNESS: 1 or more elements      History From: Patient, History limited by: none     Hattie Bhatt is a 79 y.o. female presents to the emergency department for evaluation of back pain.       Please See MDM for Additional Details of the HPI/PMH  Nursing Notes were all reviewed and agreed with or any disagreements were addressed in the HPI.     REVIEW OF SYSTEMS        Positives and Pertinent negatives as per HPI.    PAST HISTORY     Past Medical History:  Past Medical History:   Diagnosis Date    Abnormal chest x-ray 12/23/2009    Anemia 12/23/2009    Carpal tunnel syndrome 12/23/2009    Chronic obstructive pulmonary disease (HCC)     Chronic systolic heart failure (HCC) 01/30/2024    Colitis 12/23/2009    Complex regional pain syndrome 12/23/2009    Dependence on nocturnal oxygen therapy     2L NC    Depression 12/23/2009    Diet-controlled type 2 diabetes mellitus (HCC)     Family history of skin cancer     brother-melanoma    GERD (gastroesophageal reflux disease) 02/08/2010    HTN (hypertension) 12/23/2009    Hypercholesteremia 12/23/2009    Hypertriglyceridemia 12/23/2009    Idiopathic peripheral neuropathy 12/23/2009    Iron deficiency anemia 12/23/2009    Osteopenia 12/23/2009    Paroxysmal atrial fibrillation (HCC) 12/02/2011    Pneumonia 03/24/2023    Psoriasis 06/27/2012    Sarcoidosis     SVT

## 2025-07-11 NOTE — ED NOTES
Pt desat while sleeping to 87%. This ED tech placed pt on 2L via NC. Pt now sat at 96%. Primary RN notified.

## 2025-07-11 NOTE — ED NOTES
Patient notified RN that she needed to urinate; patient stated she does not stand or ambulate well. Purewick placed at this time.

## 2025-07-16 NOTE — TELEPHONE ENCOUNTER
Verified pt name and date of birth. Pt states for last 3 weeks has had an pencil eraser size area which pt's  stated looks \"a Ton Alexis cyst\" that is solid in the crack of her buttocks. Pt states the area is very painful to sit and sore to touch. Area is red. Denied area worm. Denied drainage. 10/10/2022 hurt with urination, and fowl urine odor frequency [facility]x [facility] at [facility]. Did not finish round of antibiotic four pills left. Pt now experiencing same symptoms as on 10/10/2022. Pt would like appointment for evaluationt. To get better and follow your care plan as instructed.

## 2025-07-30 LAB
EKG ATRIAL RATE: 105 BPM
EKG DIAGNOSIS: NORMAL
EKG P AXIS: 30 DEGREES
EKG P-R INTERVAL: 158 MS
EKG Q-T INTERVAL: 344 MS
EKG QRS DURATION: 96 MS
EKG QTC CALCULATION (BAZETT): 454 MS
EKG R AXIS: -15 DEGREES
EKG T AXIS: 49 DEGREES
EKG VENTRICULAR RATE: 105 BPM

## 2025-08-03 DIAGNOSIS — E11.42 DIABETIC PERIPHERAL NEUROPATHY ASSOCIATED WITH TYPE 2 DIABETES MELLITUS (HCC): ICD-10-CM

## 2025-08-03 DIAGNOSIS — E78.00 HYPERCHOLESTEREMIA: ICD-10-CM

## 2025-08-04 RX ORDER — AMITRIPTYLINE HYDROCHLORIDE 100 MG/1
100 TABLET ORAL NIGHTLY
Qty: 90 TABLET | Refills: 0 | Status: SHIPPED | OUTPATIENT
Start: 2025-08-04

## 2025-08-04 RX ORDER — ATORVASTATIN CALCIUM 40 MG/1
TABLET, FILM COATED ORAL
Qty: 90 TABLET | Refills: 0 | Status: SHIPPED | OUTPATIENT
Start: 2025-08-04

## (undated) DEVICE — SOLUTION SURG PREP 26 CC PURPREP

## (undated) DEVICE — DRESSING,GAUZE,XEROFORM,CURAD,5"X9",ST: Brand: CURAD

## (undated) DEVICE — SUT ETHLN 3-0 18IN PS2 BLK --

## (undated) DEVICE — SOLUTION IRRIG 3000ML LAC RINGERS ARTHROMTC PLAS CONT

## (undated) DEVICE — SUTURE ETHILON SZ 3-0 L18IN NONABSORBABLE BLK PS-2 L19MM 3/8 1669H

## (undated) DEVICE — MASTISOL ADHESIVE LIQ 2/3ML

## (undated) DEVICE — BRUSH SCRB 4% CHG RED DISP --

## (undated) DEVICE — GLOVE ORANGE PI 7   MSG9070

## (undated) DEVICE — GLOVE SURG SZ 85 L12IN FNGR ORTHO 126MIL CRM LTX FREE

## (undated) DEVICE — GOWN,SIRUS,NONRNF,XLN/2XL,18/CS: Brand: MEDLINE

## (undated) DEVICE — GLOVE SURG SZ 85 L12IN FNGR THK79MIL GRN LTX FREE

## (undated) DEVICE — GLOVE SURG SZ 7 L12IN FNGR THK79MIL GRN LTX FREE

## (undated) DEVICE — SOLUTION IRRIG 1000ML 0.9% SOD CHL USP POUR PLAS BTL

## (undated) DEVICE — ZIMMER® STERILE DISPOSABLE TOURNIQUET CUFF WITH PLC, DUAL PORT, SINGLE BLADDER, 18 IN. (46 CM)

## (undated) DEVICE — SUTURE ETHIBOND EXCEL SZ 3-0 L36IN NONABSORBABLE GRN RB-1 X558H

## (undated) DEVICE — HAND-MRMCASU: Brand: MEDLINE INDUSTRIES, INC.

## (undated) DEVICE — SUTURE MONOCRYL SZ 3-0 L27IN ABSRB UD L24MM PS-1 3/8 CIR PRIM Y936H

## (undated) DEVICE — DYONICS 4.0 MM ELITE                                    ACROMIOBLASTER STRAIGHT DISPOSABLE                                    BURRS, SAGE GREEN, 10000 MAXIMUM                                    RPM, PACKAGED 6 PER BOX, STERILE

## (undated) DEVICE — CORD ES L12FT BPLR FRCP

## (undated) DEVICE — DYONICS 25 INFLOW TUBE SET, 3 PER BOX

## (undated) DEVICE — GOWN,SIRUS,NONRNF,SETINSLV,XL,20/CS: Brand: MEDLINE

## (undated) DEVICE — SUTURE MONOCRYL SZ 3-0 L27IN ABSRB UD L19MM PS-2 3/8 CIR PRIM Y427H

## (undated) DEVICE — REM POLYHESIVE ADULT PATIENT RETURN ELECTRODE: Brand: VALLEYLAB

## (undated) DEVICE — GOWN,AURORA,NON REIN,RAGLAN,XXL: Brand: MEDLINE

## (undated) DEVICE — SUPER TURBOVAC 90 INTEGRATED CABLE WAND ICW: Brand: COBLATION

## (undated) DEVICE — SHOULDER ARTHROSCOPY-MRMCASU: Brand: MEDLINE INDUSTRIES, INC.

## (undated) DEVICE — GLOVE SURG SZ 65 THK91MIL LTX FREE SYN POLYISOPRENE

## (undated) DEVICE — BASIC SINGLE BASIN BTC-LF: Brand: MEDLINE INDUSTRIES, INC.

## (undated) DEVICE — DRAPE,REIN 53X77,STERILE: Brand: MEDLINE

## (undated) DEVICE — ROCKER SWITCH PENCIL BLADE ELECTRODE, HOLSTER: Brand: EDGE

## (undated) DEVICE — 3M™ IOBAN™ 2 ANTIMICROBIAL INCISE DRAPE 6650EZ: Brand: IOBAN™ 2

## (undated) DEVICE — STRIP,CLOSURE,WOUND,MEDI-STRIP,1/2X4: Brand: MEDLINE

## (undated) DEVICE — BANDAGE COMPR W4INXL5YD WHT BGE POLY COT M E WRP WV HK AND

## (undated) DEVICE — SUTURE VCRL SZ 2-0 L27IN ABSRB UD L26MM SH 1/2 CIR J417H